# Patient Record
Sex: FEMALE | Race: BLACK OR AFRICAN AMERICAN | Employment: PART TIME | URBAN - METROPOLITAN AREA
[De-identification: names, ages, dates, MRNs, and addresses within clinical notes are randomized per-mention and may not be internally consistent; named-entity substitution may affect disease eponyms.]

---

## 2017-02-01 ENCOUNTER — OFFICE VISIT (OUTPATIENT)
Dept: BARIATRICS/WEIGHT MGMT | Age: 62
End: 2017-02-01

## 2017-02-01 DIAGNOSIS — E66.9 OBESITY, CLASS II, BMI 35-39.9: Primary | ICD-10-CM

## 2017-02-07 NOTE — PROGRESS NOTES
Patient attended a Medically Supervised Weight Loss New Patient Orientation today where we discussed:  - New Direction Very Low Calorie Diet details  - Medical Supervision  - Nutrition education  - Cost  - Policies and compliance required for program enrollment. - Lab slip was given to patient with instructions for having them drawn. Patients initial consultation with physician is tentatively scheduled for:  Future Appointments  Date Time Provider Amalia Garcia   2/15/2017 11:30 AM Vaughn Dyson, DO [facility] [address]   2/24/2017 8:30 AM [place] DO [facility] [address]   3/28/2017 8:30 AM Vaughn Dyson, DO [facility] SCHED       Patients starting weight was documented as: There were no vitals taken for this visit. Patient attended a Medically Supervised Weight Loss New Patient Orientation today where we discussed:  - New Direction Very Low Calorie Diet details  - Medical Supervision  - Nutrition education  - Cost  - Policies and compliance required for program enrollment. - Lab slip was given to patient with instructions for having them drawn. Patients initial consultation with physician is tentatively scheduled for:  Future Appointments  Date Time Provider Amalia Garcia   2/15/2017 11:30 AM Vaughn Dyson, DO [facility] [address]   2/24/2017 8:30 AM [place], DO [facility] [address]   3/28/2017 8:30 AM Vaughn Dyson, DO [facility] SCHED         There were no vitals taken for this visit.

## 2017-02-17 RX ORDER — ESZOPICLONE 2 MG/1
2 TABLET, FILM COATED ORAL
Qty: 30 TAB | Refills: 5 | OUTPATIENT
Start: 2017-02-17 | End: 2020-01-27 | Stop reason: ALTCHOICE

## 2017-02-24 ENCOUNTER — OFFICE VISIT (OUTPATIENT)
Dept: FAMILY MEDICINE CLINIC | Age: 62
End: 2017-02-24

## 2017-02-24 VITALS
SYSTOLIC BLOOD PRESSURE: 115 MMHG | BODY MASS INDEX: 37.08 KG/M2 | DIASTOLIC BLOOD PRESSURE: 72 MMHG | HEIGHT: 64 IN | HEART RATE: 65 BPM | RESPIRATION RATE: 16 BRPM | WEIGHT: 217.2 LBS | TEMPERATURE: 98 F | OXYGEN SATURATION: 100 %

## 2017-02-24 DIAGNOSIS — J20.8 ACUTE BRONCHITIS DUE TO OTHER SPECIFIED ORGANISMS: Primary | ICD-10-CM

## 2017-02-24 DIAGNOSIS — E66.9 OBESITY, CLASS II, BMI 35-39.9: ICD-10-CM

## 2017-02-24 DIAGNOSIS — R74.8 ELEVATED ALKALINE PHOSPHATASE LEVEL: ICD-10-CM

## 2017-02-24 DIAGNOSIS — F41.1 GENERALIZED ANXIETY DISORDER: ICD-10-CM

## 2017-02-24 DIAGNOSIS — E55.9 VITAMIN D DEFICIENCY: ICD-10-CM

## 2017-02-24 DIAGNOSIS — Z83.49 FAMILY HISTORY OF THYROID DISEASE: ICD-10-CM

## 2017-02-24 DIAGNOSIS — E78.00 PURE HYPERCHOLESTEROLEMIA: ICD-10-CM

## 2017-02-24 DIAGNOSIS — E06.3 HASHIMOTO'S THYROIDITIS: ICD-10-CM

## 2017-02-24 RX ORDER — MIRABEGRON 25 MG/1
25 TABLET, FILM COATED, EXTENDED RELEASE ORAL DAILY
Refills: 1 | COMMUNITY
Start: 2017-02-15 | End: 2017-09-21 | Stop reason: ALTCHOICE

## 2017-02-24 RX ORDER — LACTULOSE 10 G/15ML
30 SOLUTION ORAL; RECTAL
Refills: 1 | COMMUNITY
Start: 2017-01-10 | End: 2017-03-28 | Stop reason: ALTCHOICE

## 2017-02-24 RX ORDER — SERTRALINE HYDROCHLORIDE 50 MG/1
50 TABLET, FILM COATED ORAL DAILY
Qty: 135 TAB | Refills: 3 | Status: SHIPPED | OUTPATIENT
Start: 2017-02-24 | End: 2018-04-16 | Stop reason: SDUPTHER

## 2017-02-24 RX ORDER — ZOLPIDEM TARTRATE 6.25 MG/1
6.25 TABLET, FILM COATED, EXTENDED RELEASE ORAL
Refills: 5 | COMMUNITY
Start: 2016-11-20 | End: 2017-03-28 | Stop reason: ALTCHOICE

## 2017-02-24 RX ORDER — PHENDIMETRAZINE TARTRATE 105 MG/1
1 CAPSULE, EXTENDED RELEASE ORAL
Qty: 30 CAP | Refills: 0 | Status: SHIPPED | OUTPATIENT
Start: 2017-02-24 | End: 2017-03-28 | Stop reason: SDUPTHER

## 2017-02-24 RX ORDER — AZITHROMYCIN 250 MG/1
TABLET, FILM COATED ORAL
Qty: 6 TAB | Refills: 0 | Status: SHIPPED | OUTPATIENT
Start: 2017-02-24 | End: 2017-03-01

## 2017-02-24 RX ORDER — PHENTERMINE HYDROCHLORIDE 37.5 MG/1
37.5 TABLET ORAL DAILY
Refills: 0 | COMMUNITY
Start: 2016-12-03 | End: 2017-02-24 | Stop reason: ALTCHOICE

## 2017-02-24 NOTE — PROGRESS NOTES
HISTORY OF PRESENT ILLNESS  Kaylen Mcclure is a 64 y.o. female presents with Weight Management; Blood Pressure Check; Medication Refill; Cough; and Stress      Agree with nurse note. She is tolerating Phendimetrazine 105 mg well, month 1 of 3. Last prescribed on 12/29/16. She has noticed an increase of energy and decreased appetite since starting the medication. She drinks a Premier protein shake daily. Sometimes she thinks she does not eat enough. She normally walks on her treadmill before bed. She sleeps x8 hours nightly. 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  She gained 5 pounds since the last visit. Percent body fat has increased from 40.1% to 45.3%, waist circumference measurement has changed from 39.3\" to 41.25\". Overall, patient is pleased and requests a refill of the medication today. Pt with MARGI, Vitamin D deficiency, Hashimoto's thyroiditis, elevated alkaline phosphatase, hypercholesterolemia, and family hx of thyroid disease presents to the office with a BP of 115/72. She takes Zoloft 50 mg daily, tolerating well. She feels like she has a lot going on right now and has thought about looking into stress management through her job. Stressors: her sister with multiple health conditions who lives with her      Pt complains of a productive cough with green sputum with chest tightness and some SOB. She believes she may have had the flu about 3 weeks ago and her cough was worse then. Denies nasal congestion, wheezing, itchy/watery eyes, itchy nose, or other associated sxs. Patient denies fatigue, sleep disturbance, chest pain, heart palpitations, nausea, dry mouth, constipation, signs of depression. Written by hyun Braswell, as dictated by Dr. Christa Gowers, DO.    ROS    Review of Systems negative except as noted above in HPI.     ALLERGIES:    Allergies   Allergen Reactions    Advil Pm [Ibuprofen-Diphenhydramine Hcl] Other (comments)     Slightly elevated Creaitnine 1.02    Aleve [Naproxen Sodium] Other (comments)     Due to kidneys    Bactrim [Sulfamethoxazole-Trimethoprim] Nausea and Vomiting    Sulfa (Sulfonamide Antibiotics) Nausea and Vomiting       CURRENT MEDICATIONS:    Outpatient Prescriptions Marked as Taking for the 2/24/17 encounter (Office Visit) with Rain Yang, DO   Medication Sig Dispense Refill    GENERLAC 10 gram/15 mL solution Take 30 mL by mouth three (3) times daily as needed. 1    MYRBETRIQ 25 mg ER tablet Take 25 mg by mouth daily. 1    zolpidem CR (AMBIEN CR) 6.25 mg tablet Take 6.25 mg by mouth daily as needed. Taken at HS  5    azithromycin (ZITHROMAX) 250 mg tablet Take 2 tablets today, then take 1 tablet daily 6 Tab 0    sertraline (ZOLOFT) 50 mg tablet Take 1 Tab by mouth daily. Indications: GENERALIZED ANXIETY DISORDER 135 Tab 3    phendimetrazine tartrate 105 mg cpER Take 1 Cap by mouth Daily (before breakfast). Max Daily Amount: 1 Cap. Indications: WEIGHT LOSS MANAGEMENT FOR OBESE PATIENT (BMI >= 30) 30 Cap 0    eszopiclone (LUNESTA) 2 mg tablet Take 1 Tab by mouth nightly. Max Daily Amount: 2 mg. 30 Tab 5    furosemide (LASIX) 40 mg tablet TAKE 1 TABLET BY MOUTH ONCE DAILY FOR EDEMA 90 Tab 3    MINIVELLE 0.1 mg/24 hr 1 Patch by TransDERmal route every Monday. 0    cetirizine (ZYRTEC) 10 mg tablet Take 1 Tab by mouth daily. (Patient taking differently: Take 10 mg by mouth daily as needed.) 30 Tab 3    fluticasone (FLONASE) 50 mcg/actuation nasal spray 2 Sprays by Both Nostrils route daily. Indications: ALLERGIC RHINITIS 1 Bottle 5    albuterol (PROVENTIL HFA, VENTOLIN HFA) 90 mcg/actuation inhaler Take 2 Puffs by inhalation every four (4) hours as needed for Wheezing (cough). 1 Inhaler 1    montelukast (SINGULAIR) 10 mg tablet Take 1 Tab by mouth daily.  Indications: ALLERGIC RHINITIS, breathing 30 Tab 11       PAST MEDICAL HISTORY:    Past Medical History: Diagnosis Date    Achilles tendonitis 12/2004    Right. Dr. Abdulaziz Martinez Arthritis 2010    hips, knees. Dr. Radha Ibrahim    Chest pain 08/2013    Dr. Salma Almonte.  Chickenpox childhood    Endometriosis 1990    Dr. Bria Arroyo Environmental allergies     MARGI (generalized anxiety disorder)     Hip pain, bilateral 2010    due to severe OA. Iliopsoas bursitis. Dr. Lilliana Villalba    History of breast lump/mass excision 1998    Left breast.  Dr. Bobby Au Hyperlipidemia     Iron deficiency anemia     iron deficiency    Menopausal and postmenopausal disorder 2007    Dr. Bria Arroyo Uterine fibroid     Dr. Orlin Adkins.  Vitamin D deficiency 03/2008       PAST SURGICAL HISTORY:    Past Surgical History:   Procedure Laterality Date    BIOPSY BREAST  Rt 1993;Lt 1998    Dr. Valle Side    Right achiles tendon. Dr. Ari Rene.  HX BREAST BIOPSY  2001    Left. benign. Dr. Philip Pinto HX COLONOSCOPY  09/27/2016    Dr. Jackie Tarango.  due q 5 yrs due to fam hx   Vincent Fletcher. due to endometriosis    HX PELVIC LAPAROSCOPY  1995    due to endometriosis Dr. Scott Vieyra  04/2011    due to fibroids. Dr. Orlin Adkins.        FAMILY HISTORY:    Family History   Problem Relation Age of Onset    Hypertension Mother     Other Mother      hearing loss/vision loss    Heart Disease Father     Lung Disease Father     Diabetes Sister     Heart Attack Sister 54     March 2014    Stroke Sister 48     after MI    Heart Disease Sister     Thyroid Disease Sister      goiter    Colon Polyps Sister     Heart Disease Brother      Defibrillator placed    Diabetes Maternal Grandmother     Heart Attack Brother 48       SOCIAL HISTORY:    Social History     Social History    Marital status: SINGLE     Spouse name: N/A    Number of children: N/A    Years of education: N/A     Social History Main Topics    Smoking status: Never Smoker    Smokeless tobacco: Never Used      Comment: lived with smoker mom x 18 yrs    Alcohol use No    Drug use: No    Sexual activity: Not Currently     Partners: Male     Birth control/ protection: Abstinence     Other Topics Concern    None     Social History Narrative       IMMUNIZATIONS:    Immunization History   Administered Date(s) Administered    Influenza Vaccine 12/14/2012, 10/22/2013, 09/18/2014    Influenza Vaccine (Quad) 10/29/2015    Influenza Vaccine (Quad) PF 09/29/2016    Influenza Vaccine Split 11/12/2010, 11/07/2011    TD Vaccine 08/18/2000    Tdap 06/30/2016       PHYSICAL EXAMINATION    Vital Signs    Visit Vitals    /72 (BP 1 Location: Left arm, BP Patient Position: Sitting)    Pulse 65    Temp 98 °F (36.7 °C) (Oral)    Resp 16    Ht 5' 4\" (1.626 m)    Wt 217 lb 3.2 oz (98.5 kg)    SpO2 100%    BMI 37.28 kg/m2       Weight Metrics 2/24/2017 2/24/2017 12/29/2016 12/29/2016 10/28/2016 10/28/2016 9/29/2016   Weight - 217 lb 3.2 oz - 212 lb 6.4 oz - 217 lb -   Waist Measure Inches 41.25 - 39.3 - 38 - 38   Body Fat % 45.3 - 40.1 - 45.8 - 44.8   BMI - 37.28 kg/m2 - 36.46 kg/m2 - 37.25 kg/m2 -       General appearance - Well nourished. Well appearing. Well developed. No acute distress. Overweight. Head - Normocephalic. Atraumatic. Eyes - pupils equal and reactive, extraocular eye movements intact, sclera anicteric  Ears - Hearing is grossly normal bilaterally. Nose - normal and patent, no erythema, discharge or polyps   Mouth - mucous membranes moist, pharynx normal with cobblestone appearance. No erythema, white exudate or obstruction. Neck - supple. Midline trachea. No carotid bruits are noted. No thyromegaly noted. Chest - clear to auscultation bilaterally anterriorly and posteriorly. No wheezes, rales or rhonchi. Breath sounds are symmetrical and unlabored bilaterally. Heart - normal rate. Regular rhythm, normal S1, S2. No murmur. No rubs, clicks or gallops noted. Abdomen - soft and distended. No masses or organomegaly. No rebound, rigidity or guarding. Bowel sounds normal x 4 quadrants. No tenderness noted. Neurological - awake, alert and oriented to person, place, and time and event. Cranial nerves II through XII intact. Muscle strength is +5/5 x 4 extremities. Sensation is intact to light touch bilaterally. Steady gait. Heme/Lymph - peripheral pulses normal x 4 extremities. No peripheral edema is noted. No cervical adenopathy noted. Skin - no rashes, erythema, ecchymosis, lacerations, abrasions, suspicious moles noted. No skin tags. No acanthosis nigricans noted in the axilla or neck. Psychological -   normal behavior, speech, dress and thought processes. Good insight. Good eye contact. Normal affect. Appropriate mood. DATA REVIEWED    Lab Results   Component Value Date/Time    TSH 3.850 07/11/2016 08:10 AM    TSH 2.36 08/21/2014 07:50 AM     Lab Results   Component Value Date/Time    Vitamin D 25-Hydroxy 26.3 07/18/2011 10:22 AM    VITAMIN D, 25-HYDROXY 32.7 07/11/2016 08:10 AM       Lab Results   Component Value Date/Time    Sodium 139 07/11/2016 08:10 AM    Potassium 3.9 07/11/2016 08:10 AM    Chloride 97 07/11/2016 08:10 AM    CO2 24 07/11/2016 08:10 AM    Anion gap 5 04/23/2011 05:20 AM    Glucose 87 07/11/2016 08:10 AM    BUN 13 07/11/2016 08:10 AM    Creatinine 0.77 07/11/2016 08:10 AM    BUN/Creatinine ratio 17 07/11/2016 08:10 AM    GFR est AA 96 07/11/2016 08:10 AM    GFR est non-AA 84 07/11/2016 08:10 AM    Calcium 8.9 07/11/2016 08:10 AM    Bilirubin, total 0.5 07/11/2016 08:10 AM    AST (SGOT) 17 07/11/2016 08:10 AM    Alk. phosphatase 104 07/11/2016 08:10 AM    Protein, total 6.7 07/11/2016 08:10 AM    Albumin 3.9 07/11/2016 08:10 AM    A-G Ratio 1.4 07/11/2016 08:10 AM    ALT (SGPT) 12 07/11/2016 08:10 AM       ASSESSMENT and PLAN    ICD-10-CM ICD-9-CM    1.  Acute bronchitis due to other specified organisms J20.8 466.0 azithromycin (ZITHROMAX) 250 mg tablet   2. Hashimoto's thyroiditis E06.3 245.2 TSH 3RD GENERATION      T4, FREE   3. Generalized anxiety disorder F41.1 300.02 sertraline (ZOLOFT) 50 mg tablet   4. Elevated alkaline phosphatase level R74.8 790.5 ALK PHOS ISOENZYMES   5. Obesity, Class II, BMI 35-39.9 (HCC) E66.01 278.01 phendimetrazine tartrate 105 mg cpER   6. Vitamin D deficiency E55.9 268.9    7. Pure hypercholesterolemia E78.00 272.0    8. Family history of thyroid disease Z83.49 V18.19        Continue current medications and care. Take Z-shyam and use Albuterol HFA 90 mcg prn. Increase Zoloft from 50 mg to 75 mg daily. Pt will look into stress management through her job and into additional support for caring for her sister. Prescriptions written and given to patient (Zithromax 250 mg and Phendimetrazine 105 mg, month 2 of 3.)  Medication side effects discussed. Recheck pertinent labs today. Discussed the patient's BMI with her. The BMI follow up plan is as follows: I have counseled this patient on diet and exercise regimens. Addressed weight, diet and exercise with patient. Diet recommendations:  Decrease carbohydrates (white foods including flour, white bread, white rice, white pasta, white potatoes; corn, sweet foods, sweet drinks and alcohol), increase green leafy vegetables and protein with each meal.  Avoid fried foods. Eat 3-5 small meals daily. Do not skip meals. Ok to use meal replacements up to twice daily with protein goal of 60 mg per meal.   Recommend OTC Premiere Protein bars or shakes. Increase water intake. Increase physical activity to 30 minutes daily for health benefit or 60 minutes daily to prevent weight regain, as tolerated. Physical Activity prescription:  A goal of 30 minutes physical activity daily is recommended for health benefit and at least 60 minutes daily to prevent weight regain.   For weight loss, no less than 75% needs to be aerobic (i.e. Walking) and no more than 25% resistance exercising (i.e. Weight lifting). For weight maintenance phase, 50% aerobic and 50% resistance exercises. Sleep prescription:    A goal of 7-8 hours of uninterrupted sleep is recommended to turn off the Grehlin hormone to be released from the stomach and triggers appetite while promoting weight gain. Proper rest turns on Leptin hormone to be released from white adipose tissue and promotes weight loss. Counseled patient on: weight loss goals, emphasizing a 5-10% weight loss in 6-12 months and strategies. Stressors, bronchitis, Hashimoto's thyroiditis, and Vitamin D deficiency. Relevant handouts given and discussed with patient. Immunizations noted. Praised pt for weight loss efforts and progress. Patient was offered a choice/choices in the treatment plan today. Patient expresses understanding of the plan and agrees with recommendations. Follow-up Disposition: Not on File    Written by hyun Goins, as dictated by Dr. Zechariah Wray DO. Documentation True and Accepted by Alexa Parry. Jinx Check. Patient Instructions        Hashimoto's Thyroiditis: Care Instructions  Your Care Instructions    Hashimoto's thyroiditis is a problem with the thyroid gland. The thyroid gland, which is in your neck, controls the way your body uses energy. Sometimes the disease causes the gland to make too much thyroid hormone (thyrotoxicosis). This can make you feel nervous, lose weight, and have many loose bowel movements. You may also have a fast heartbeat. But as the disease progresses, the gland usually does not make enough thyroid hormone. This can cause you to feel tired and have dry skin and thinning hair. Most people with Hashimoto's are diagnosed when they have these symptoms.   You may need to take medicine if you have symptoms or if your thyroid hormone level is not normal. Most people with Hashimoto's thyroiditis need to take medicine for the rest of their lives. Follow-up care is a key part of your treatment and safety. Be sure to make and go to all appointments, and call your doctor if you are having problems. Its also a good idea to know your test results and keep a list of the medicines you take. How can you care for yourself at home? · Take your medicines exactly as prescribed. Call your doctor if you think you are having a problem with your medicine. You will get more details on the specific medicines your doctor prescribes. When should you call for help? Call 911 anytime you think you may need emergency care. For example, call if:  · You passed out (lost consciousness). · You have severe trouble breathing. · You have a very slow heartbeat (less than 60 beats a minute). · You have a low body temperature (95°F or below). Watch closely for changes in your health, and be sure to contact your doctor if:  · You gain weight even though you are eating normally or less than usual.  · You feel extremely weak or tired. · You have new changes in your skin, nails, or hair, or the changes get worse. · You notice that your thyroid gland has grown or changed in size. · You have constipation that is new or that gets worse. · You cannot stand cold temperatures. · You have heavy or irregular menstrual periods. · You have other new symptoms. Where can you learn more? Go to http://romulo-daron.info/. Enter K454 in the search box to learn more about \"Hashimoto's Thyroiditis: Care Instructions. \"  Current as of: July 28, 2016  Content Version: 11.1  © 7093-2550 [employer]. Care instructions adapted under license by [employer] (which disclaims liability or warranty for this information). If you have questions about a medical condition or this instruction, always ask your healthcare professional. [address] any warranty or liability for your use of this information.        Bronchitis: Care Instructions  Your Care Instructions    Bronchitis is inflammation of the bronchial tubes, which carry air to the lungs. The tubes swell and produce mucus, or phlegm. The mucus and inflamed bronchial tubes make you cough. You may have trouble breathing. Most cases of bronchitis are caused by viruses like those that cause colds. Antibiotics usually do not help and they may be harmful. Bronchitis usually develops rapidly and lasts about 2 to 3 weeks in otherwise healthy people. Follow-up care is a key part of your treatment and safety. Be sure to make and go to all appointments, and call your doctor if you are having problems. It's also a good idea to know your test results and keep a list of the medicines you take. How can you care for yourself at home? · Take all medicines exactly as prescribed. Call your doctor if you think you are having a problem with your medicine. · Get some extra rest.  · Take an over-the-counter pain medicine, such as acetaminophen (Tylenol), ibuprofen (Advil, Motrin), or naproxen (Aleve) to reduce fever and relieve body aches. Read and follow all instructions on the label. · Do not take two or more pain medicines at the same time unless the doctor told you to. Many pain medicines have acetaminophen, which is Tylenol. Too much acetaminophen (Tylenol) can be harmful. · Take an over-the-counter cough medicine that contains dextromethorphan to help quiet a dry, hacking cough so that you can sleep. Avoid cough medicines that have more than one active ingredient. Read and follow all instructions on the label. · Breathe moist air from a humidifier, hot shower, or sink filled with hot water. The heat and moisture will thin mucus so you can cough it out. · Do not smoke. Smoking can make bronchitis worse. If you need help quitting, talk to your doctor about stop-smoking programs and medicines. These can increase your chances of quitting for good. When should you call for help?   Call [phone] anytime you think you may need emergency care. For example, call if:  · You have severe trouble breathing. Call your doctor now or seek immediate medical care if:  · You have new or worse trouble breathing. · You cough up dark brown or bloody mucus (sputum). · You have a new or higher fever. · You have a new rash. Watch closely for changes in your health, and be sure to contact your doctor if:  · You cough more deeply or more often, especially if you notice more mucus or a change in the color of your mucus. · You are not getting better as expected. Where can you learn more? Go to http://romulo-daron.info/. Enter H333 in the search box to learn more about \"Bronchitis: Care Instructions. \"  Current as of: May 23, 2016  Content Version: 11.1  © 5454-5818 [employer]. Care instructions adapted under license by [employer] (which disclaims liability or warranty for this information). If you have questions about a medical condition or this instruction, always ask your healthcare professional. [place] [address] any warranty or liability for your use of this information. Starting a Weight Loss Plan: Care Instructions  Your Care Instructions  If you are thinking about losing weight, it can be hard to know where to start. Your doctor can help you set up a weight loss plan that best meets your needs. You may want to take a class on nutrition or exercise, or join a weight loss support group. If you have questions about how to make changes to your eating or exercise habits, ask your doctor about seeing a registered dietitian or an exercise specialist.  It can be a big challenge to lose weight. But you do not have to make huge changes at once. Make small changes, and stick with them. When those changes become habit, add a few more changes. If you do not think you are ready to make changes right now, try to pick a date in the future.  Make an appointment to see your doctor to discuss whether the time is right for you to start a plan. Follow-up care is a key part of your treatment and safety. Be sure to make and go to all appointments, and call your doctor if you are having problems. Its also a good idea to know your test results and keep a list of the medicines you take. How can you care for yourself at home? · Set realistic goals. Many people expect to lose much more weight than is likely. A weight loss of 5% to 10% of your body weight may be enough to improve your health. · Get family and friends involved to provide support. Talk to them about why you are trying to lose weight, and ask them to help. They can help by participating in exercise and having meals with you, even if they may be eating something different. · Find what works best for you. If you do not have time or do not like to cook, a program that offers meal replacement bars or shakes may be better for you. Or if you like to prepare meals, finding a plan that includes daily menus and recipes may be best.  · Ask your doctor about other health professionals who can help you achieve your weight loss goals. ¨ A dietitian can help you make healthy changes in your diet. ¨ An exercise specialist or  can help you develop a safe and effective exercise program.  ¨ A counselor or psychiatrist can help you cope with issues such as depression, anxiety, or family problems that can make it hard to focus on weight loss. · Consider joining a support group for people who are trying to lose weight. Your doctor can suggest groups in your area. Where can you learn more? Go to http://romulo-daron.info/. Enter U536 in the search box to learn more about \"Starting a Weight Loss Plan: Care Instructions. \"  Current as of: February 16, 2016  Content Version: 11.1  © 9534-3646 [employer].  Care instructions adapted under license by [employer] (which disclaims liability or warranty for this information). If you have questions about a medical condition or this instruction, always ask your healthcare professional. [place] [address] any warranty or liability for your use of this information. WEIGHT LOSS PLAN    1. Read food labels and count calories. Goal 8763-8385 calories daily for women and 0569-8027 calories daily for men. Achieve this by decreasing caloric intake by 500 calories by weekly increments. NOTE:  1 pound = 3500 calories! Www.loseit. Pivto  Www.Gaudenanesspal.Pivto  Www.[employer]  Www.[employer]. gov  Weight watchers mobile    Calories needed to lose 1-2 pounds a week = 10 x your weight in pounds    2. Increase water intake. Per [facility], it is important to drink 1/2 your body weight in ounces of water daily. Decrease your water consumption 2-3 hours before bedtime to prevent sleep disturbance from frequent urination. 3.  Decrease sugary beverages. Each can or glass of soda increases your risk of obesity by 60%. Can lose 10 pounds in a month by avoiding any soda. 12 oz can of soda = 140 calories, 16 oz cup of sweet tea = 200 calories    16 oz orange juice = 200 calories, 10 oz apple juice = 150 calories   32 oz sports drink = 200 calories, 16 oz punch = 240 calories   3.5 oz alcohol = 100-150 calories     4. Avoid High Fructose Corn Syrup products. This ingredient makes products highly addictive! 5. Exercise 30 minutes daily 5 days weekly, minimally. If you burn 3500 calories that equals a pound! Use a pedometer to count steps. Visit www. [employer]. Pivto for a free pedometer and diet recommendations. Your maximum heart rate = 220 - your age    Never exercise at your maximum heart rate. See handout for target heart rate. 6.  Decrease carbohydrates (white bread, pasta, rice, potatoes, sweet foods and sweet drinks like soda, tea, coffee, juice and sports drinks).   Increase fiber and protein. Goal:   calories daily of carbohydrates     Try CHROMIUM PICONATE 200 MG THREE TIMES DAILY,    to decrease Premenstrual carbohydrate cravings. 7.  Eat 3-5 small meals daily, include lots of protein (beans/legumes, nuts, lean meat, eggs) and green vegetables with each. (Breakfast, lunch, dinner with 2 healthy snacks)    8. Get proper rest 7-8 hours uninterrupted. When you get less than 6 hours, it triggers hunger by affecting your Grehlin:Leptin ratio and this results in weight gain. 9.  Watch your food portions. Green leafy vegetables should cover 1/2 of the plate, lean meat 1/4 of the plate, starchy vegetable 1/4 of the plate. Use smaller plates. 10.  Do not eat until you are full. Eat until you are no longer hungry. If you are not sure, try drinking a glass of water before getting your second serving of food. 11.  Do not weigh yourself daily. Wait until your next office visit. Use how you feel and  how your clothes fit as measurements of success. 12.  Address your spirituality to draw strength from above during your journey. Remember \"I am fearfully and wonderfully made. Marvelous in His eyes. \"    13. Set realistic, appropriate and achievable weight loss goals:     RECOMMENDED TARGET WEIGHT LOSS:  Initial weight loss of 5-10% of your initial body weight achieved over 6 months or a decrease of 2 BMI units. MINIMUM GOAL OF WEIGHT LOSS:  Reduce body weight and maintain a lower body weight. Prevent weight gain. RELATED WEBSITES:      Www.obesityaction. org  (and consider joining the [employer] for $25/year. The [address] mission is to elevated and empower those affected by obesity through education, advocacy and support. Quarterly journals included in membership fee. )    Www.[employer]. [employer]  www.[employer].com     RELATED DIETS:  Dr. Gunjan Culver Weight loss challenge, [address] [facility], [address] [place] [address] Christina Murray Diet (anti-inflammatory diet)        EXERCISE 150 MINUTES WEEKLY. THIS CAN BE ACHIEVED BY WORKING OUT OR WALKING A MINIMUM OF 30 MINUTES FOR 5 DAYS WEEKLY. YOU CAN EXERCISE IN INCREMENTS OF 10-15 MINUTES UP TO 3 TIMES A DAY. Consider performing \"brainless exercise. \"  Choose your favorite tv program.  Five minutes before and for 5 minutes after the tv program, stretch your body. While the program is on, walk in place watching the show. When commercials come on, rest or walk around the house to do other things. When the program begins, return to walking in place. When you are able keep walking during the commercials and add light weights to your ankles or hands. By the end of the show, you would have walked 30 minutes. If you need shorter spurts of exercise, walk during the commercials and rest during the show. Drink to glasses of water prior to any exercise to prevent dehydration and to improve the results of the work out. Your RESTING HEART RATE is the number of times your heart beats per minute when you are not exerting yourself. The more fit you are, the lower your resting heart rate will be. Your MAXIMUM HEART RATE is the number of times per minute your heart pumps when it is working at 100% capacity. NEVER EXERCISE AT YOUR MAXIMUM HEART RATE. MAX HEART RATE = 220 - your age    For example, in a 48year old, the maximum heart rate is 220-50 = 170 beats per minute    Your [place] is the number of beats per minute our heart should pump during aerobic exercise. Reaching your target heart rate indicates that your body is receiving maximum cardiovascular and fat burning benefits.      If you are fit, your TARGET HEART RATE = 70-85% of your maximum Heart rate      For a 48year old with vigorous intensity physical activity,         Target heart rate= 170 bpm x .70 = 119 bpm     Target heart rate = 170 bpm x .85=  145 bpm        Therefore, your target heart rate during physical activity is 119-145      If you are not fit, TARGET HEART RATE = 50-70% of your maximum Heart rate    MODERATE CONSISTENT AEROBIC EXERCISE OR WALKING FOR AT LEAST 150 MINUTES WEEKLY IS AN ESSENTIAL PART OF ANY WEIGHT LOSS OF WEIGHT MAINTENANCE PROGRAM.     During WEIGHT LOSS PHASE, at least 75% of your exercise needs to be walking or moderate aerobic activity and 25% or 0% can be Isometric or Resistance type exercises (the latter can be deferred to the weight maintenance phase.)     During WEIGHT MAINTENANCE PHASE, at least 50% of your exercise needs to be aerobic and 50% Isometric or Resistance type exercises. BENEFITS OF MODERATE INTENSITY EXERCISE MOST DAYS OF THE WEEK:     1. Insulin Resistance improves 30-85%   2. Abdominal Fat decreases by 30%   3. Inflammatory Markers decrease by 30% (therefore pain decreases)   4. Systolic and Diastolic Blood Pressure decrease by 4 mmHg   5. HDL improves by 5%   6. Triglycerides decrease by 15%   7. Shift from small dense LDL to large dense LDL (therefore decrease Insulin Resistance)   8. Decrease coagulability                  Starting a Weight Loss Plan: Care Instructions  Your Care Instructions  If you are thinking about losing weight, it can be hard to know where to start. Your doctor can help you set up a weight loss plan that best meets your needs. You may want to take a class on nutrition or exercise, or join a weight loss support group. If you have questions about how to make changes to your eating or exercise habits, ask your doctor about seeing a registered dietitian or an exercise specialist.  It can be a big challenge to lose weight. But you do not have to make huge changes at once. Make small changes, and stick with them. When those changes become habit, add a few more changes. If you do not think you are ready to make changes right now, try to pick a date in the future.  Make an appointment to see your doctor to discuss whether the time is right for you to start a plan. Follow-up care is a key part of your treatment and safety. Be sure to make and go to all appointments, and call your doctor if you are having problems. Its also a good idea to know your test results and keep a list of the medicines you take. How can you care for yourself at home? · Set realistic goals. Many people expect to lose much more weight than is likely. A weight loss of 5% to 10% of your body weight may be enough to improve your health. · Get family and friends involved to provide support. Talk to them about why you are trying to lose weight, and ask them to help. They can help by participating in exercise and having meals with you, even if they may be eating something different. · Find what works best for you. If you do not have time or do not like to cook, a program that offers meal replacement bars or shakes may be better for you. Or if you like to prepare meals, finding a plan that includes daily menus and recipes may be best.  · Ask your doctor about other health professionals who can help you achieve your weight loss goals. ¨ A dietitian can help you make healthy changes in your diet. ¨ An exercise specialist or  can help you develop a safe and effective exercise program.  ¨ A counselor or psychiatrist can help you cope with issues such as depression, anxiety, or family problems that can make it hard to focus on weight loss. · Consider joining a support group for people who are trying to lose weight. Your doctor can suggest groups in your area. Where can you learn more? Go to http://romulo-daron.info/. Enter P314 in the search box to learn more about \"Starting a Weight Loss Plan: Care Instructions. \"  Current as of: February 16, 2016  Content Version: 11.1  © 8495-1558 [employer]. Care instructions adapted under license by [employer] (which disclaims liability or warranty for this information).  If you have questions about a medical condition or this instruction, always ask your healthcare professional. [place] [address] any warranty or liability for your use of this information.

## 2017-02-24 NOTE — MR AVS SNAPSHOT
Visit Information Date & Time Provider Department Dept. Phone Encounter #  
 2/24/2017  8:30 AM Rain Soria DO [facility] [phone] [id number] Your Appointments 3/28/2017  8:30 AM  
Office Visit with Rain Soria  [facility] (ALLAN Robles) Appt Note: 1 MO F/U Weight, BP  
 [address] 
[address] [facility] [address]  
[phone]  
  
   
 [address] [facility] [address] [place] [address] Upcoming Health Maintenance Date Due  
 PAP AKA CERVICAL CYTOLOGY 5/19/2017* BREAST CANCER SCRN MAMMOGRAM 12/14/2018 COLONOSCOPY 9/27/2021 DTaP/Tdap/Td series (2 - Td) 6/30/2026 *Topic was postponed. The date shown is not the original due date. Allergies as of 2/24/2017  Review Complete On: 2/24/2017 By: Paula Ear Severity Noted Reaction Type Reactions Advil Pm [Ibuprofen-diphenhydramine Hcl]  07/18/2011    Other (comments) Slightly elevated Creaitnine 1.02 Aleve [Naproxen Sodium]  12/28/2011    Other (comments) Due to kidneys Bactrim [Sulfamethoxazole-trimethoprim]  09/11/2009    Nausea and Vomiting  
 Sulfa (Sulfonamide Antibiotics)  09/11/2009    Nausea and Vomiting Current Immunizations  Reviewed on 9/29/2016 Name Date Influenza Vaccine 9/18/2014, 10/22/2013, 12/14/2012 Influenza Vaccine Carmine Ramirezr) 10/29/2015 Influenza Vaccine (Quad) PF 9/29/2016 Influenza Vaccine Split 11/7/2011, 11/12/2010 TD Vaccine 8/18/2000 Tdap 6/30/2016  9:35 AM  
  
 Not reviewed this visit You Were Diagnosed With   
  
 Codes Comments Acute bronchitis due to other specified organisms    -  Primary ICD-10-CM: J20.8 ICD-9-CM: 466.0 Hashimoto's thyroiditis     ICD-10-CM: E06.3 ICD-9-CM: 245.2 Generalized anxiety disorder     ICD-10-CM: F41.1 ICD-9-CM: 300.02 Elevated alkaline phosphatase level     ICD-10-CM: R74.8 ICD-9-CM: 790.5 Obesity, Class II, BMI 35-39.9 (HCC)     ICD-10-CM: E66.01 
ICD-9-CM: 278.01 Vitamin D deficiency     ICD-10-CM: E55.9 ICD-9-CM: 268.9 Pure hypercholesterolemia     ICD-10-CM: E78.00 ICD-9-CM: 272.0 Vitals BP  
  
  
  
  
  
 115/72 (BP 1 Location: Left arm, BP Patient Position: Sitting) BMI and BSA Data Body Mass Index Body Surface Area  
 37.28 kg/m 2 2.11 m 2 Preferred Pharmacy Pharmacy Name Phone [facility] AT [address] [place] [address] Drive [phone] Your Updated Medication List  
  
   
This list is accurate as of: 2/24/17  9:46 AM.  Always use your most recent med list.  
  
  
  
  
 albuterol 90 mcg/actuation inhaler Commonly known as:  PROVENTIL HFA, VENTOLIN HFA, PROAIR HFA Take 2 Puffs by inhalation every four (4) hours as needed for Wheezing (cough). azithromycin 250 mg tablet Commonly known as:  Marin Beard Take 2 tablets today, then take 1 tablet daily  
  
 cetirizine 10 mg tablet Commonly known as:  ZYRTEC Take 1 Tab by mouth daily. eszopiclone 2 mg tablet Commonly known as:  Red Dieudonne Take 1 Tab by mouth nightly. Max Daily Amount: 2 mg. fluticasone 50 mcg/actuation nasal spray Commonly known as:  Domnick Means 2 Sprays by Both Nostrils route daily. Indications: ALLERGIC RHINITIS  
  
 furosemide 40 mg tablet Commonly known as:  LASIX TAKE 1 TABLET BY MOUTH ONCE DAILY FOR EDEMA GENERLAC 10 gram/15 mL solution Generic drug:  lactulose Take 30 mL by mouth three (3) times daily as needed. MINIVELLE 0.1 mg/24 hr  
Generic drug:  estradiol 1 Patch by TransDERmal route every Monday. montelukast 10 mg tablet Commonly known as:  SINGULAIR Take 1 Tab by mouth daily. Indications: ALLERGIC RHINITIS, breathing MYRBETRIQ 25 mg ER tablet Generic drug:  mirabegron ER Take 25 mg by mouth daily. oxybutynin chloride XL 5 mg CR tablet Commonly known as:  DITROPAN XL Take 5 mg by mouth daily. phendimetrazine tartrate 105 mg Cper Take 1 Cap by mouth Daily (before breakfast). Max Daily Amount: 1 Cap. Indications: WEIGHT LOSS MANAGEMENT FOR OBESE PATIENT (BMI >= 30)  
  
 sertraline 50 mg tablet Commonly known as:  ZOLOFT Take 1 Tab by mouth daily. Indications: GENERALIZED ANXIETY DISORDER  
  
 VITAMIN D3 2,000 unit Tab Generic drug:  cholecalciferol (vitamin D3) Take 2,000 Units by mouth daily. zolpidem CR 6.25 mg tablet Commonly known as:  AMBIEN CR Take 6.25 mg by mouth daily as needed. Taken at [facility] Prescriptions Printed Refills  
 phendimetrazine tartrate 105 mg cpER 0 Sig: Take 1 Cap by mouth Daily (before breakfast). Max Daily Amount: 1 Cap. Indications: WEIGHT LOSS MANAGEMENT FOR OBESE PATIENT (BMI >= 30) Class: Print Route: Oral  
  
Prescriptions Sent to Pharmacy Refills  
 azithromycin (ZITHROMAX) 250 mg tablet 0 Sig: Take 2 tablets today, then take 1 tablet daily Class: Normal  
 Pharmacy: [facility] [address] [place], [place] [address] RD AT [address] [place] [address] Drive Ph #: [phone]  
 sertraline (ZOLOFT) 50 mg tablet 3 Sig: Take 1 Tab by mouth daily. Indications: GENERALIZED ANXIETY DISORDER Class: Normal  
 Pharmacy: [facility] [address] AT [facility] [address] P Ph #: [phone] Route: Oral  
  
We Performed the Following ALK PHOS ISOENZYMES [25478 CPT(R)] T4, FREE [id number] CPT(R)] TSH 3RD GENERATION [79167 CPT(R)] Patient Instructions Hashimoto's Thyroiditis: Care Instructions Your Care Instructions Hashimoto's thyroiditis is a problem with the thyroid gland. The thyroid gland, which is in your neck, controls the way your body uses energy.  Sometimes the disease causes the gland to make too much thyroid hormone (thyrotoxicosis). This can make you feel nervous, lose weight, and have many loose bowel movements. You may also have a fast heartbeat. But as the disease progresses, the gland usually does not make enough thyroid hormone. This can cause you to feel tired and have dry skin and thinning hair. Most people with Hashimoto's are diagnosed when they have these symptoms. You may need to take medicine if you have symptoms or if your thyroid hormone level is not normal. Most people with Hashimoto's thyroiditis need to take medicine for the rest of their lives. Follow-up care is a key part of your treatment and safety. Be sure to make and go to all appointments, and call your doctor if you are having problems. Its also a good idea to know your test results and keep a list of the medicines you take. How can you care for yourself at home? · Take your medicines exactly as prescribed. Call your doctor if you think you are having a problem with your medicine. You will get more details on the specific medicines your doctor prescribes. When should you call for help? Call 911 anytime you think you may need emergency care. For example, call if: 
· You passed out (lost consciousness). · You have severe trouble breathing. · You have a very slow heartbeat (less than 60 beats a minute). · You have a low body temperature (95°F or below). Watch closely for changes in your health, and be sure to contact your doctor if: 
· You gain weight even though you are eating normally or less than usual. 
· You feel extremely weak or tired. · You have new changes in your skin, nails, or hair, or the changes get worse. · You notice that your thyroid gland has grown or changed in size. · You have constipation that is new or that gets worse. · You cannot stand cold temperatures. · You have heavy or irregular menstrual periods. · You have other new symptoms. Where can you learn more? Go to http://romulo-daron.info/. Enter K454 in the search box to learn more about \"Hashimoto's Thyroiditis: Care Instructions. \" Current as of: July 28, 2016 Content Version: 11.1 © 9473-3057 [employer]. Care instructions adapted under license by All My Data (which disclaims liability or warranty for this information). If you have questions about a medical condition or this instruction, always ask your healthcare professional. [place] [address] any warranty or liability for your use of this information. Bronchitis: Care Instructions Your Care Instructions Bronchitis is inflammation of the bronchial tubes, which carry air to the lungs. The tubes swell and produce mucus, or phlegm. The mucus and inflamed bronchial tubes make you cough. You may have trouble breathing. Most cases of bronchitis are caused by viruses like those that cause colds. Antibiotics usually do not help and they may be harmful. Bronchitis usually develops rapidly and lasts about 2 to 3 weeks in otherwise healthy people. Follow-up care is a key part of your treatment and safety. Be sure to make and go to all appointments, and call your doctor if you are having problems. It's also a good idea to know your test results and keep a list of the medicines you take. How can you care for yourself at home? · Take all medicines exactly as prescribed. Call your doctor if you think you are having a problem with your medicine. · Get some extra rest. 
· Take an over-the-counter pain medicine, such as acetaminophen (Tylenol), ibuprofen (Advil, Motrin), or naproxen (Aleve) to reduce fever and relieve body aches. Read and follow all instructions on the label. · Do not take two or more pain medicines at the same time unless the doctor told you to. Many pain medicines have acetaminophen, which is Tylenol. Too much acetaminophen (Tylenol) can be harmful.  
· Take an over-the-counter cough medicine that contains dextromethorphan to help quiet a dry, hacking cough so that you can sleep. Avoid cough medicines that have more than one active ingredient. Read and follow all instructions on the label. · Breathe moist air from a humidifier, hot shower, or sink filled with hot water. The heat and moisture will thin mucus so you can cough it out. · Do not smoke. Smoking can make bronchitis worse. If you need help quitting, talk to your doctor about stop-smoking programs and medicines. These can increase your chances of quitting for good. When should you call for help? Call 911 anytime you think you may need emergency care. For example, call if: 
· You have severe trouble breathing. Call your doctor now or seek immediate medical care if: 
· You have new or worse trouble breathing. · You cough up dark brown or bloody mucus (sputum). · You have a new or higher fever. · You have a new rash. Watch closely for changes in your health, and be sure to contact your doctor if: 
· You cough more deeply or more often, especially if you notice more mucus or a change in the color of your mucus. · You are not getting better as expected. Where can you learn more? Go to http://romulo-daron.info/. Enter H333 in the search box to learn more about \"Bronchitis: Care Instructions. \" Current as of: May 23, 2016 Content Version: 11.1 © 6318-9725 The 3Dood[employer]. Care instructions adapted under license by Q-Bot (which disclaims liability or warranty for this information). If you have questions about a medical condition or this instruction, always ask your healthcare professional. [place] [address] any warranty or liability for your use of this information. Starting a Weight Loss Plan: Care Instructions Your Care Instructions If you are thinking about losing weight, it can be hard to know where to start.  Your doctor can help you set up a weight loss plan that best meets your needs. You may want to take a class on nutrition or exercise, or join a weight loss support group. If you have questions about how to make changes to your eating or exercise habits, ask your doctor about seeing a registered dietitian or an exercise specialist. 
It can be a big challenge to lose weight. But you do not have to make huge changes at once. Make small changes, and stick with them. When those changes become habit, add a few more changes. If you do not think you are ready to make changes right now, try to pick a date in the future. Make an appointment to see your doctor to discuss whether the time is right for you to start a plan. Follow-up care is a key part of your treatment and safety. Be sure to make and go to all appointments, and call your doctor if you are having problems. Its also a good idea to know your test results and keep a list of the medicines you take. How can you care for yourself at home? · Set realistic goals. Many people expect to lose much more weight than is likely. A weight loss of 5% to 10% of your body weight may be enough to improve your health. · Get family and friends involved to provide support. Talk to them about why you are trying to lose weight, and ask them to help. They can help by participating in exercise and having meals with you, even if they may be eating something different. · Find what works best for you. If you do not have time or do not like to cook, a program that offers meal replacement bars or shakes may be better for you. Or if you like to prepare meals, finding a plan that includes daily menus and recipes may be best. 
· Ask your doctor about other health professionals who can help you achieve your weight loss goals. ¨ A dietitian can help you make healthy changes in your diet.  
¨ An exercise specialist or  can help you develop a safe and effective exercise program. 
 ¨ A counselor or psychiatrist can help you cope with issues such as depression, anxiety, or family problems that can make it hard to focus on weight loss. · Consider joining a support group for people who are trying to lose weight. Your doctor can suggest groups in your area. Where can you learn more? Go to http://romulo-daron.info/. Enter J061 in the search box to learn more about \"Starting a Weight Loss Plan: Care Instructions. \" Current as of: February 16, 2016 Content Version: 11.1 © 8214-4358 Plex[employer]. Care instructions adapted under license by Bellabox (which disclaims liability or warranty for this information). If you have questions about a medical condition or this instruction, always ask your healthcare professional. [address] any warranty or liability for your use of this information. WEIGHT LOSS PLAN 1. Read food labels and count calories. Goal 7191-4198 calories daily for women and 0609-5042 calories daily for men. Achieve this by decreasing caloric intake by 500 calories by weekly increments. NOTE:  1 pound = 3500 calories! Www.loseit. com Www.myfitnesspal.com Www.Wander Www.Combined Effortfinder. gov Weight watchers mobile Calories needed to lose 1-2 pounds a week = 10 x your weight in pounds 2. Increase water intake. Per [employer] Weight loss Program, it is important to drink 1/2 your body weight in ounces of water daily. Decrease your water consumption 2-3 hours before bedtime to prevent sleep disturbance from frequent urination. 3.  Decrease sugary beverages. Each can or glass of soda increases your risk of obesity by 60%. Can lose 10 pounds in a month by avoiding any soda. 12 oz can of soda = 140 calories, 16 oz cup of sweet tea = 200 calories 16 oz orange juice = 200 calories, 10 oz apple juice = 150 calories 32 oz sports drink = 200 calories, 16 oz punch = 240 calories 3.5 oz alcohol = 100-150 calories 4. Avoid High Fructose Corn Syrup products. This ingredient makes products highly addictive! 5. Exercise 30 minutes daily 5 days weekly, minimally. If you burn 3500 calories that equals a pound! Use a pedometer to count steps. Visit www. MD On-Line for a free pedometer and diet recommendations. Your maximum heart rate = 220 - your age Never exercise at your maximum heart rate. See handout for target heart rate. 6.  Decrease carbohydrates (white bread, pasta, rice, potatoes, sweet foods and sweet drinks like soda, tea, coffee, juice and sports drinks). Increase fiber and protein. Goal:   calories daily of carbohydrates Try CHROMIUM PICONATE 200 MG THREE TIMES DAILY,  
 to decrease Premenstrual carbohydrate cravings. 7.  Eat 3-5 small meals daily, include lots of protein (beans/legumes, nuts, lean meat, eggs) and green vegetables with each. (Breakfast, lunch, dinner with 2 healthy snacks) 8. Get proper rest 7-8 hours uninterrupted. When you get less than 6 hours, it triggers hunger by affecting your Grehlin:Leptin ratio and this results in weight gain. 9.  Watch your food portions. Green leafy vegetables should cover 1/2 of the plate, lean meat 1/4 of the plate, starchy vegetable 1/4 of the plate. Use smaller plates. 10.  Do not eat until you are full. Eat until you are no longer hungry. If you are not sure, try drinking a glass of water before getting your second serving of food. 11.  Do not weigh yourself daily. Wait until your next office visit. Use how you feel and  how your clothes fit as measurements of success. 12.  Address your spirituality to draw strength from above during your journey. Remember \"I am fearfully and wonderfully made. Marvelous in His eyes. \" 
 
13.   Set realistic, appropriate and achievable weight loss goals: 
 
 RECOMMENDED TARGET WEIGHT LOSS:  Initial weight loss of 5-10% of your initial body weight achieved over 6 months or a decrease of 2 BMI units. MINIMUM GOAL OF WEIGHT LOSS:  Reduce body weight and maintain a lower body weight. Prevent weight gain. RELATED WEBSITES:     
Www.obesityaction. org 
(and consider joining the Obesity Action Coalition for $25/year. The [facility] mission is to elevated and empower those affected by obesity through education, advocacy and support. Quarterly journals included in membership fee. ) Www.[employer]ision [employer]ologics 
www."T[employer] RELATED DIETS:  Dr. Melony Nunez Weight loss challenge, Mediterranean diet, [place] Diet, [place] Watchers, Paleo Diet (anti-inflammatory diet) EXERCISE 150 MINUTES WEEKLY. THIS CAN BE ACHIEVED BY WORKING OUT OR WALKING A MINIMUM OF 30 MINUTES FOR 5 DAYS WEEKLY. YOU CAN EXERCISE IN INCREMENTS OF 10-15 MINUTES UP TO 3 TIMES A DAY. Consider performing \"brainless exercise. \"  Choose your favorite tv program.  Five minutes before and for 5 minutes after the tv program, stretch your body. While the program is on, walk in place watching the show. When commercials come on, rest or walk around the house to do other things. When the program begins, return to walking in place. When you are able keep walking during the commercials and add light weights to your ankles or hands. By the end of the show, you would have walked 30 minutes. If you need shorter spurts of exercise, walk during the commercials and rest during the show. Drink to glasses of water prior to any exercise to prevent dehydration and to improve the results of the work out. Your RESTING HEART RATE is the number of times your heart beats per minute when you are not exerting yourself. The more fit you are, the lower your resting heart rate will be. Your MAXIMUM HEART RATE is the number of times per minute your heart pumps when it is working at 100% capacity. NEVER EXERCISE AT YOUR MAXIMUM HEART RATE. MAX HEART RATE = 220 - your age For example, in a 48year old, the maximum heart rate is 220-50 = 170 beats per minute Your TARGET HEART RATE is the number of beats per minute our heart should pump during aerobic exercise. Reaching your target heart rate indicates that your body is receiving maximum cardiovascular and fat burning benefits. If you are fit, your TARGET HEART RATE = 70-85% of your maximum Heart rate For a 48year old with vigorous intensity physical activity, Target heart rate= 170 bpm x .70 = 119 bpm 
   Target heart rate = 170 bpm x .85=  145 bpm 
 
    Therefore, your target heart rate during physical activity is 119-145 If you are not fit, TARGET HEART RATE = 50-70% of your maximum Heart rate MODERATE CONSISTENT AEROBIC EXERCISE OR WALKING FOR AT LEAST 150 MINUTES WEEKLY IS AN ESSENTIAL PART OF ANY WEIGHT LOSS OF WEIGHT MAINTENANCE PROGRAM. During WEIGHT LOSS PHASE, at least 75% of your exercise needs to be walking or moderate aerobic activity and 25% or 0% can be Isometric or Resistance type exercises (the latter can be deferred to the weight maintenance phase.) During WEIGHT MAINTENANCE PHASE, at least 50% of your exercise needs to be aerobic and 50% Isometric or Resistance type exercises. BENEFITS OF MODERATE INTENSITY EXERCISE MOST DAYS OF THE WEEK: 
 
 1. Insulin Resistance improves 30-85% 2. Abdominal Fat decreases by 30% 3. Inflammatory Markers decrease by 30% (therefore pain decreases) 4. Systolic and Diastolic Blood Pressure decrease by 4 mmHg 5. HDL improves by 5% 6. Triglycerides decrease by 15% 7. Shift from small dense LDL to large dense LDL (therefore decrease Insulin Resistance) 8. Decrease coagulability Starting a Weight Loss Plan: Care Instructions Your Care Instructions If you are thinking about losing weight, it can be hard to know where to start. Your doctor can help you set up a weight loss plan that best meets your needs. You may want to take a class on nutrition or exercise, or join a weight loss support group. If you have questions about how to make changes to your eating or exercise habits, ask your doctor about seeing a registered dietitian or an exercise specialist. 
It can be a big challenge to lose weight. But you do not have to make huge changes at once. Make small changes, and stick with them. When those changes become habit, add a few more changes. If you do not think you are ready to make changes right now, try to pick a date in the future. Make an appointment to see your doctor to discuss whether the time is right for you to start a plan. Follow-up care is a key part of your treatment and safety. Be sure to make and go to all appointments, and call your doctor if you are having problems. Its also a good idea to know your test results and keep a list of the medicines you take. How can you care for yourself at home? · Set realistic goals. Many people expect to lose much more weight than is likely. A weight loss of 5% to 10% of your body weight may be enough to improve your health. · Get family and friends involved to provide support. Talk to them about why you are trying to lose weight, and ask them to help. They can help by participating in exercise and having meals with you, even if they may be eating something different. · Find what works best for you. If you do not have time or do not like to cook, a program that offers meal replacement bars or shakes may be better for you. Or if you like to prepare meals, finding a plan that includes daily menus and recipes may be best. 
· Ask your doctor about other health professionals who can help you achieve your weight loss goals. ¨ A dietitian can help you make healthy changes in your diet.  
¨ An exercise specialist or  can help you develop a safe and effective exercise program. 
¨ A counselor or psychiatrist can help you cope with issues such as depression, anxiety, or family problems that can make it hard to focus on weight loss. · Consider joining a support group for people who are trying to lose weight. Your doctor can suggest groups in your area. Where can you learn more? Go to http://romulo-daron.info/. Enter M483 in the search box to learn more about \"Starting a Weight Loss Plan: Care Instructions. \" Current as of: February 16, 2016 Content Version: 11.1 © 6548-9720 Fant[employer]xico. Care instructions adapted under license by CloudHashing (which disclaims liability or warranty for this information). If you have questions about a medical condition or this instruction, always ask your healthcare professional. [address] any warranty or liability for your use of this information. Introducing [facility]! Davidson Carpenter introduces Locu patient portal. Now you can access parts of your medical record, email your doctor's office, and request medication refills online. 1. In your internet browser, go to [email or web address] 2. Click on the First Time User? Click Here link in the Sign In box. You will see the New Member Sign Up page. 3. Enter your Locu Access Code exactly as it appears below. You will not need to use this code after youve completed the sign-up process. If you do not sign up before the expiration date, you must request a new code. · Locu Access Code: [email or web address] Expires: 3/29/2017  9:16 AM 
 
4. Enter the last four digits of your Social Security Number (xxxx) and Date of Birth (mm/dd/yyyy) as indicated and click Submit. You will be taken to the next sign-up page. 5. Create a Locu ID. This will be your Locu login ID and cannot be changed, so think of one that is secure and easy to remember. 6. Create a Hapara password. You can change your password at any time. 7. Enter your Password Reset Question and Answer. This can be used at a later time if you forget your password. 8. Enter your e-mail address. You will receive e-mail notification when new information is available in [address] 9. Click Sign Up. You can now view and download portions of your medical record. 10. Click the Download Summary menu link to download a portable copy of your medical information. If you have questions, please visit the Frequently Asked Questions section of the Hapara website. Remember, Hapara is NOT to be used for urgent needs. For medical emergencies, dial 911. Now available from your iPhone and Android! Please provide this summary of care documentation to your next provider. Your primary care clinician is listed as Tio Guerrero. If you have any questions after today's visit, please call [phone].

## 2017-02-24 NOTE — Clinical Note
Ashok Brown went to the orientation for [facility] but then never had labs drawn. She told us today that her barrier to starting was that she needs the 8:30 appointment every month with Dr. Magalys Brown due to her 2 jobs. We looked ahead and saw that no one is scheduled for 3/29/17 yet and were wondering if we could get her on for the 8:30 spot. Thanks!

## 2017-02-24 NOTE — PATIENT INSTRUCTIONS
Hashimoto's Thyroiditis: Care Instructions  Your Care Instructions    Hashimoto's thyroiditis is a problem with the thyroid gland. The thyroid gland, which is in your neck, controls the way your body uses energy. Sometimes the disease causes the gland to make too much thyroid hormone (thyrotoxicosis). This can make you feel nervous, lose weight, and have many loose bowel movements. You may also have a fast heartbeat. But as the disease progresses, the gland usually does not make enough thyroid hormone. This can cause you to feel tired and have dry skin and thinning hair. Most people with Hashimoto's are diagnosed when they have these symptoms. You may need to take medicine if you have symptoms or if your thyroid hormone level is not normal. Most people with Hashimoto's thyroiditis need to take medicine for the rest of their lives. Follow-up care is a key part of your treatment and safety. Be sure to make and go to all appointments, and call your doctor if you are having problems. Its also a good idea to know your test results and keep a list of the medicines you take. How can you care for yourself at home? · Take your medicines exactly as prescribed. Call your doctor if you think you are having a problem with your medicine. You will get more details on the specific medicines your doctor prescribes. When should you call for help? Call 911 anytime you think you may need emergency care. For example, call if:  · You passed out (lost consciousness). · You have severe trouble breathing. · You have a very slow heartbeat (less than 60 beats a minute). · You have a low body temperature (95°F or below). Watch closely for changes in your health, and be sure to contact your doctor if:  · You gain weight even though you are eating normally or less than usual.  · You feel extremely weak or tired. · You have new changes in your skin, nails, or hair, or the changes get worse.   · You notice that your thyroid gland has grown or changed in size. · You have constipation that is new or that gets worse. · You cannot stand cold temperatures. · You have heavy or irregular menstrual periods. · You have other new symptoms. Where can you learn more? Go to http://romulo-daron.info/. Enter K454 in the search box to learn more about \"Hashimoto's Thyroiditis: Care Instructions. \"  Current as of: July 28, 2016  Content Version: 11.1  © 1010-9355 [employer]. Care instructions adapted under license by [employer] (which disclaims liability or warranty for this information). If you have questions about a medical condition or this instruction, always ask your healthcare professional. [address] any warranty or liability for your use of this information. Bronchitis: Care Instructions  Your Care Instructions    Bronchitis is inflammation of the bronchial tubes, which carry air to the lungs. The tubes swell and produce mucus, or phlegm. The mucus and inflamed bronchial tubes make you cough. You may have trouble breathing. Most cases of bronchitis are caused by viruses like those that cause colds. Antibiotics usually do not help and they may be harmful. Bronchitis usually develops rapidly and lasts about 2 to 3 weeks in otherwise healthy people. Follow-up care is a key part of your treatment and safety. Be sure to make and go to all appointments, and call your doctor if you are having problems. It's also a good idea to know your test results and keep a list of the medicines you take. How can you care for yourself at home? · Take all medicines exactly as prescribed. Call your doctor if you think you are having a problem with your medicine. · Get some extra rest.  · Take an over-the-counter pain medicine, such as acetaminophen (Tylenol), ibuprofen (Advil, Motrin), or naproxen (Aleve) to reduce fever and relieve body aches.  Read and follow all instructions on the label.  · Do not take two or more pain medicines at the same time unless the doctor told you to. Many pain medicines have acetaminophen, which is Tylenol. Too much acetaminophen (Tylenol) can be harmful. · Take an over-the-counter cough medicine that contains dextromethorphan to help quiet a dry, hacking cough so that you can sleep. Avoid cough medicines that have more than one active ingredient. Read and follow all instructions on the label. · Breathe moist air from a humidifier, hot shower, or sink filled with hot water. The heat and moisture will thin mucus so you can cough it out. · Do not smoke. Smoking can make bronchitis worse. If you need help quitting, talk to your doctor about stop-smoking programs and medicines. These can increase your chances of quitting for good. When should you call for help? Call 911 anytime you think you may need emergency care. For example, call if:  · You have severe trouble breathing. Call your doctor now or seek immediate medical care if:  · You have new or worse trouble breathing. · You cough up dark brown or bloody mucus (sputum). · You have a new or higher fever. · You have a new rash. Watch closely for changes in your health, and be sure to contact your doctor if:  · You cough more deeply or more often, especially if you notice more mucus or a change in the color of your mucus. · You are not getting better as expected. Where can you learn more? Go to http://romulo-daron.info/. Enter H333 in the search box to learn more about \"Bronchitis: Care Instructions. \"  Current as of: May 23, 2016  Content Version: 11.1  © 8483-2187 [employer]Ego. Care instructions adapted under license by [employer] (which disclaims liability or warranty for this information).  If you have questions about a medical condition or this instruction, always ask your healthcare professional. [place] [address] any warranty or liability for your use of this information. Starting a Weight Loss Plan: Care Instructions  Your Care Instructions  If you are thinking about losing weight, it can be hard to know where to start. Your doctor can help you set up a weight loss plan that best meets your needs. You may want to take a class on nutrition or exercise, or join a weight loss support group. If you have questions about how to make changes to your eating or exercise habits, ask your doctor about seeing a registered dietitian or an exercise specialist.  It can be a big challenge to lose weight. But you do not have to make huge changes at once. Make small changes, and stick with them. When those changes become habit, add a few more changes. If you do not think you are ready to make changes right now, try to pick a date in the future. Make an appointment to see your doctor to discuss whether the time is right for you to start a plan. Follow-up care is a key part of your treatment and safety. Be sure to make and go to all appointments, and call your doctor if you are having problems. Its also a good idea to know your test results and keep a list of the medicines you take. How can you care for yourself at home? · Set realistic goals. Many people expect to lose much more weight than is likely. A weight loss of 5% to 10% of your body weight may be enough to improve your health. · Get family and friends involved to provide support. Talk to them about why you are trying to lose weight, and ask them to help. They can help by participating in exercise and having meals with you, even if they may be eating something different. · Find what works best for you. If you do not have time or do not like to cook, a program that offers meal replacement bars or shakes may be better for you.  Or if you like to prepare meals, finding a plan that includes daily menus and recipes may be best.  · Ask your doctor about other health professionals who can help you achieve your weight loss goals. ¨ A dietitian can help you make healthy changes in your diet. ¨ An exercise specialist or  can help you develop a safe and effective exercise program.  ¨ A counselor or psychiatrist can help you cope with issues such as depression, anxiety, or family problems that can make it hard to focus on weight loss. · Consider joining a support group for people who are trying to lose weight. Your doctor can suggest groups in your area. Where can you learn more? Go to http://romulo-daron.info/. Enter T642 in the search box to learn more about \"Starting a Weight Loss Plan: Care Instructions. \"  Current as of: February 16, 2016  Content Version: 11.1  © 6097-3580 anchor.travel. Care instructions adapted under license by [employer] (which disclaims liability or warranty for this information). If you have questions about a medical condition or this instruction, always ask your healthcare professional. [place] [address] any warranty or liability for your use of this information. WEIGHT LOSS PLAN    1. Read food labels and count calories. Goal 6740-8222 calories daily for women and 3030-0273 calories daily for men. Achieve this by decreasing caloric intake by 500 calories by weekly increments. NOTE:  1 pound = 3500 calories! Www.loseit. com  Www.mySmart Voicemailnesspal.com  Www.[employer]  Www.eigitalfinder. gov  Weight watchers mobile    Calories needed to lose 1-2 pounds a week = 10 x your weight in pounds    2. Increase water intake. Per [employer] [facility] loss Program, it is important to drink 1/2 your body weight in ounces of water daily. Decrease your water consumption 2-3 hours before bedtime to prevent sleep disturbance from frequent urination. 3.  Decrease sugary beverages. Each can or glass of soda increases your risk of obesity by 60%. Can lose 10 pounds in a month by avoiding any soda.      12 oz can of soda = 140 calories, 16 oz cup of sweet tea = 200 calories    16 oz orange juice = 200 calories, 10 oz apple juice = 150 calories   32 oz sports drink = 200 calories, 16 oz punch = 240 calories   3.5 oz alcohol = 100-150 calories     4. Avoid High Fructose Corn Syrup products. This ingredient makes products highly addictive! 5. Exercise 30 minutes daily 5 days weekly, minimally. If you burn 3500 calories that equals a pound! Use a pedometer to count steps. Visit www. Zenput for a free pedometer and diet recommendations. Your maximum heart rate = 220 - your age    Never exercise at your maximum heart rate. See handout for target heart rate. 6.  Decrease carbohydrates (white bread, pasta, rice, potatoes, sweet foods and sweet drinks like soda, tea, coffee, juice and sports drinks). Increase fiber and protein. Goal:   calories daily of carbohydrates     Try CHROMIUM PICONATE 200 MG THREE TIMES DAILY,    to decrease Premenstrual carbohydrate cravings. 7.  Eat 3-5 small meals daily, include lots of protein (beans/legumes, nuts, lean meat, eggs) and green vegetables with each. (Breakfast, lunch, dinner with 2 healthy snacks)    8. Get proper rest 7-8 hours uninterrupted. When you get less than 6 hours, it triggers hunger by affecting your [place]:Leptin ratio and this results in weight gain. 9.  Watch your food portions. Green leafy vegetables should cover 1/2 of the plate, lean meat 1/4 of the plate, starchy vegetable 1/4 of the plate. Use smaller plates. 10.  Do not eat until you are full. Eat until you are no longer hungry. If you are not sure, try drinking a glass of water before getting your second serving of food. 11.  Do not weigh yourself daily. Wait until your next office visit. Use how you feel and  how your clothes fit as measurements of success. 12.  Address your spirituality to draw strength from above during your journey.   Remember \"I am fearfully and wonderfully made. Marvelous in His eyes. \"    13. Set realistic, appropriate and achievable weight loss goals:     RECOMMENDED TARGET WEIGHT LOSS:  Initial weight loss of 5-10% of your initial body weight achieved over 6 months or a decrease of 2 BMI units. MINIMUM GOAL OF WEIGHT LOSS:  Reduce body weight and maintain a lower body weight. Prevent weight gain. RELATED WEBSITES:      Www.obesityaction. org  (and consider joining the Obesity Action Coalition for $25/year. The [facility] mission is to elevated and empower those affected by obesity through education, advocacy and support. Quarterly journals included in membership fee. )    Www.[employer]Direct [employer]. [employer]  www.ZendyPlace     RELATED DIETS:  Dr. Tania Rahman Weight loss challenge, Mediterranean diet, [address] [place] [address] Watchers, Paleo Diet (anti-inflammatory diet)        EXERCISE 150 MINUTES WEEKLY. THIS CAN BE ACHIEVED BY WORKING OUT OR WALKING A MINIMUM OF 30 MINUTES FOR 5 DAYS WEEKLY. YOU CAN EXERCISE IN INCREMENTS OF 10-15 MINUTES UP TO 3 TIMES A DAY. Consider performing \"brainless exercise. \"  Choose your favorite tv program.  Five minutes before and for 5 minutes after the tv program, stretch your body. While the program is on, walk in place watching the show. When commercials come on, rest or walk around the house to do other things. When the program begins, return to walking in place. When you are able keep walking during the commercials and add light weights to your ankles or hands. By the end of the show, you would have walked 30 minutes. If you need shorter spurts of exercise, walk during the commercials and rest during the show. Drink to glasses of water prior to any exercise to prevent dehydration and to improve the results of the work out. Your RESTING HEART RATE is the number of times your heart beats per minute when you are not exerting yourself.   The more fit you are, the lower your resting heart rate will be. Your MAXIMUM HEART RATE is the number of times per minute your heart pumps when it is working at 100% capacity. NEVER EXERCISE AT YOUR MAXIMUM HEART RATE. MAX HEART RATE = 220 - your age    For example, in a 48year old, the maximum heart rate is 220-50 = 170 beats per minute    Your [place] is the number of beats per minute our heart should pump during aerobic exercise. Reaching your target heart rate indicates that your body is receiving maximum cardiovascular and fat burning benefits. If you are fit, your TARGET HEART RATE = 70-85% of your maximum Heart rate      For a 48year old with vigorous intensity physical activity,         Target heart rate= 170 bpm x .70 = 119 bpm     Target heart rate = 170 bpm x .85=  145 bpm        Therefore, your target heart rate during physical activity is 119-145      If you are not fit, TARGET HEART RATE = 50-70% of your maximum Heart rate    MODERATE CONSISTENT AEROBIC EXERCISE OR WALKING FOR AT LEAST 150 MINUTES WEEKLY IS AN ESSENTIAL PART OF ANY WEIGHT LOSS OF WEIGHT MAINTENANCE PROGRAM.     During WEIGHT LOSS PHASE, at least 75% of your exercise needs to be walking or moderate aerobic activity and 25% or 0% can be Isometric or Resistance type exercises (the latter can be deferred to the weight maintenance phase.)     During WEIGHT MAINTENANCE PHASE, at least 50% of your exercise needs to be aerobic and 50% Isometric or Resistance type exercises. BENEFITS OF MODERATE INTENSITY EXERCISE MOST DAYS OF THE WEEK:     1. Insulin Resistance improves 30-85%   2. Abdominal Fat decreases by 30%   3. Inflammatory Markers decrease by 30% (therefore pain decreases)   4. Systolic and Diastolic Blood Pressure decrease by 4 mmHg   5. HDL improves by 5%   6. Triglycerides decrease by 15%   7. Shift from small dense LDL to large dense LDL (therefore decrease Insulin Resistance)   8.   Decrease coagulability                  Starting a Weight Loss Plan: Care Instructions  Your Care Instructions  If you are thinking about losing weight, it can be hard to know where to start. Your doctor can help you set up a weight loss plan that best meets your needs. You may want to take a class on nutrition or exercise, or join a weight loss support group. If you have questions about how to make changes to your eating or exercise habits, ask your doctor about seeing a registered dietitian or an exercise specialist.  It can be a big challenge to lose weight. But you do not have to make huge changes at once. Make small changes, and stick with them. When those changes become habit, add a few more changes. If you do not think you are ready to make changes right now, try to pick a date in the future. Make an appointment to see your doctor to discuss whether the time is right for you to start a plan. Follow-up care is a key part of your treatment and safety. Be sure to make and go to all appointments, and call your doctor if you are having problems. Its also a good idea to know your test results and keep a list of the medicines you take. How can you care for yourself at home? · Set realistic goals. Many people expect to lose much more weight than is likely. A weight loss of 5% to 10% of your body weight may be enough to improve your health. · Get family and friends involved to provide support. Talk to them about why you are trying to lose weight, and ask them to help. They can help by participating in exercise and having meals with you, even if they may be eating something different. · Find what works best for you. If you do not have time or do not like to cook, a program that offers meal replacement bars or shakes may be better for you. Or if you like to prepare meals, finding a plan that includes daily menus and recipes may be best.  · Ask your doctor about other health professionals who can help you achieve your weight loss goals.   ¨ A dietitian can help you make healthy changes in your diet. ¨ An exercise specialist or  can help you develop a safe and effective exercise program.  ¨ A counselor or psychiatrist can help you cope with issues such as depression, anxiety, or family problems that can make it hard to focus on weight loss. · Consider joining a support group for people who are trying to lose weight. Your doctor can suggest groups in your area. Where can you learn more? Go to http://romulo-daron.info/. Enter Z190 in the search box to learn more about \"Starting a Weight Loss Plan: Care Instructions. \"  Current as of: February 16, 2016  Content Version: 11.1  © 6995-6193 Tripda, [employer]. Care instructions adapted under license by [employer] (which disclaims liability or warranty for this information). If you have questions about a medical condition or this instruction, always ask your healthcare professional. [address] any warranty or liability for your use of this information.

## 2017-02-24 NOTE — PROGRESS NOTES
Chief Complaint   Patient presents with    Weight Management    Blood Pressure Check     1. Have you been to the ER, urgent care clinic since your last visit? Hospitalized since your last visit? No    2. Have you seen or consulted any other health care providers outside of the [facility] since your last visit? Include any pap smears or colon screening. No    In the event something were to happen to you and you were unable to speak on your behalf, do you have an Advance Directive/ Living Will in place stating your wishes? Yes    If yes, do we have a copy on file: No     If no, would you like information:    Patient stated that she has one in place, and will bring it in next visit.

## 2017-02-28 ENCOUNTER — TELEPHONE (OUTPATIENT)
Dept: BARIATRICS/WEIGHT MGMT | Age: 62
End: 2017-02-28

## 2017-02-28 NOTE — TELEPHONE ENCOUNTER
Called patient to schedule [facility] initial consult for March 29th at 8:30am.  Left contact information on home number, mobile number had been disconnected.

## 2017-03-01 LAB
ALP BONE CFR SERPL: 34 % (ref 14–68)
ALP INTEST CFR SERPL: 0 % (ref 0–18)
ALP LIVER CFR SERPL: 66 % (ref 18–85)
ALP SERPL-CCNC: 95 IU/L (ref 39–117)
T4 FREE SERPL-MCNC: 1.29 NG/DL (ref 0.82–1.77)
TSH SERPL DL<=0.005 MIU/L-ACNC: 1.9 UIU/ML (ref 0.45–4.5)

## 2017-03-28 ENCOUNTER — OFFICE VISIT (OUTPATIENT)
Dept: FAMILY MEDICINE CLINIC | Age: 62
End: 2017-03-28

## 2017-03-28 VITALS
TEMPERATURE: 97.8 F | SYSTOLIC BLOOD PRESSURE: 107 MMHG | RESPIRATION RATE: 20 BRPM | HEIGHT: 64 IN | OXYGEN SATURATION: 100 % | BODY MASS INDEX: 36.55 KG/M2 | HEART RATE: 60 BPM | WEIGHT: 214.1 LBS | DIASTOLIC BLOOD PRESSURE: 74 MMHG

## 2017-03-28 DIAGNOSIS — E55.9 VITAMIN D DEFICIENCY: ICD-10-CM

## 2017-03-28 DIAGNOSIS — Z11.59 NEED FOR HEPATITIS C SCREENING TEST: ICD-10-CM

## 2017-03-28 DIAGNOSIS — E06.3 HASHIMOTO'S THYROIDITIS: ICD-10-CM

## 2017-03-28 DIAGNOSIS — R20.9 COLD HANDS: ICD-10-CM

## 2017-03-28 DIAGNOSIS — R73.9 HYPERGLYCEMIA: ICD-10-CM

## 2017-03-28 DIAGNOSIS — E66.9 OBESITY, CLASS II, BMI 35-39.9: ICD-10-CM

## 2017-03-28 DIAGNOSIS — E78.00 PURE HYPERCHOLESTEROLEMIA: Primary | ICD-10-CM

## 2017-03-28 DIAGNOSIS — R63.4 WEIGHT LOSS: ICD-10-CM

## 2017-03-28 DIAGNOSIS — F51.04 CHRONIC INSOMNIA: ICD-10-CM

## 2017-03-28 DIAGNOSIS — M06.9 RHEUMATOID ARTHRITIS INVOLVING BOTH HIPS, UNSPECIFIED RHEUMATOID FACTOR PRESENCE: ICD-10-CM

## 2017-03-28 DIAGNOSIS — R60.0 HAND EDEMA: ICD-10-CM

## 2017-03-28 RX ORDER — PHENDIMETRAZINE TARTRATE 105 MG/1
1 CAPSULE, EXTENDED RELEASE ORAL
Qty: 30 CAP | Refills: 0 | Status: SHIPPED | OUTPATIENT
Start: 2017-03-28 | End: 2017-05-09 | Stop reason: ALTCHOICE

## 2017-03-28 NOTE — PATIENT INSTRUCTIONS
Learning About High Blood Sugar  What is high blood sugar? Your body turns the food you eat into glucose (sugar), which it uses for energy. But if your body isn't able to use the sugar right away, it can build up in your blood and lead to high blood sugar. When the amount of sugar in your blood stays too high for too much of the time, you may have diabetes. Diabetes is a disease that can cause serious health problems. The good news is that lifestyle changes may help you get your blood sugar back to normal and avoid or delay diabetes. What causes high blood sugar? Sugar (glucose) can build up in your blood if you:  · Are overweight. · Have a family history of diabetes. · Take certain medicines, such as steroids. What are the symptoms? Having high blood sugar may not cause any symptoms at all. Or it may make you feel very thirsty or very hungry. You may also urinate more often than usual, have blurry vision, or lose weight without trying. How is high blood sugar treated? You can take steps to lower your blood sugar level if you understand what makes it get higher. Your doctor may want you to learn how to test your blood sugar level at home. Then you can see how illness, stress, or different kinds of food or medicine raise or lower your blood sugar level. Other tests may be needed to see if you have diabetes. How can you prevent high blood sugar? · Watch your weight. If you're overweight, losing just a small amount of weight may help. Reducing fat around your waist is most important. · Limit the amount of calories, sweets, and unhealthy fat you eat. Ask your doctor if a dietitian can help you. A registered dietitian can help you create meal plans that fit your lifestyle. · Get at least 30 minutes of exercise on most days of the week. Exercise helps control your blood sugar. It also helps you maintain a healthy weight. Walking is a good choice.  You also may want to do other activities, such as running, swimming, cycling, or playing tennis or team sports. · If your doctor prescribed medicines, take them exactly as prescribed. Call your doctor if you think you are having a problem with your medicine. You will get more details on the specific medicines your doctor prescribes. Follow-up care is a key part of your treatment and safety. Be sure to make and go to all appointments, and call your doctor if you are having problems. It's also a good idea to know your test results and keep a list of the medicines you take. Where can you learn more? Go to http://romuloToucan Globaldaron.info/. Enter O108 in the search box to learn more about \"Learning About High Blood Sugar. \"  Current as of: May 23, 2016  Content Version: 11.1  © 6583-5811 [employer]. Care instructions adapted under license by [employer] (which disclaims liability or warranty for this information). If you have questions about a medical condition or this instruction, always ask your healthcare professional. [address] any warranty or liability for your use of this information. Starting a Weight Loss Plan: Care Instructions  Your Care Instructions  If you are thinking about losing weight, it can be hard to know where to start. Your doctor can help you set up a weight loss plan that best meets your needs. You may want to take a class on nutrition or exercise, or join a weight loss support group. If you have questions about how to make changes to your eating or exercise habits, ask your doctor about seeing a registered dietitian or an exercise specialist.  It can be a big challenge to lose weight. But you do not have to make huge changes at once. Make small changes, and stick with them. When those changes become habit, add a few more changes. If you do not think you are ready to make changes right now, try to pick a date in the future.  Make an appointment to see your doctor to discuss whether the time is right for you to start a plan. Follow-up care is a key part of your treatment and safety. Be sure to make and go to all appointments, and call your doctor if you are having problems. Its also a good idea to know your test results and keep a list of the medicines you take. How can you care for yourself at home? · Set realistic goals. Many people expect to lose much more weight than is likely. A weight loss of 5% to 10% of your body weight may be enough to improve your health. · Get family and friends involved to provide support. Talk to them about why you are trying to lose weight, and ask them to help. They can help by participating in exercise and having meals with you, even if they may be eating something different. · Find what works best for you. If you do not have time or do not like to cook, a program that offers meal replacement bars or shakes may be better for you. Or if you like to prepare meals, finding a plan that includes daily menus and recipes may be best.  · Ask your doctor about other health professionals who can help you achieve your weight loss goals. ¨ A dietitian can help you make healthy changes in your diet. ¨ An exercise specialist or  can help you develop a safe and effective exercise program.  ¨ A counselor or psychiatrist can help you cope with issues such as depression, anxiety, or family problems that can make it hard to focus on weight loss. · Consider joining a support group for people who are trying to lose weight. Your doctor can suggest groups in your area. Where can you learn more? Go to http://romulo-daron.info/. Enter Y130 in the search box to learn more about \"Starting a Weight Loss Plan: Care Instructions. \"  Current as of: February 16, 2016  Content Version: 11.1  © 8822-9429 [employer].  Care instructions adapted under license by [employer] (which disclaims liability or warranty for this information). If you have questions about a medical condition or this instruction, always ask your healthcare professional. [address] any warranty or liability for your use of this information. WEIGHT LOSS PLAN    1. Read food labels and count calories. Goal 7091-2752 calories daily for women and 7965-0527 calories daily for men. Achieve this by decreasing caloric intake by 500 calories by weekly increments. NOTE:  1 pound = 3500 calories! Www.loseit. Aircrm  Www.Element Powernesspal.Aircrm  Www.StackBlaze  Www.Park[employer] Enterprise. gov  Weight watchers mobile    Calories needed to lose 1-2 pounds a week = 10 x your weight in pounds    2. Increase water intake. Per [facility], it is important to drink 1/2 your body weight in ounces of water daily. Decrease your water consumption 2-3 hours before bedtime to prevent sleep disturbance from frequent urination. 3.  Decrease sugary beverages. Each can or glass of soda increases your risk of obesity by 60%. Can lose 10 pounds in a month by avoiding any soda. 12 oz can of soda = 140 calories, 16 oz cup of sweet tea = 200 calories    16 oz orange juice = 200 calories, 10 oz apple juice = 150 calories   32 oz sports drink = 200 calories, 16 oz punch = 240 calories   3.5 oz alcohol = 100-150 calories     4. Avoid High Fructose Corn Syrup products. This ingredient makes products highly addictive! 5. Exercise 30 minutes daily 5 days weekly, minimally. If you burn 3500 calories that equals a pound! Use a pedometer to count steps. Visit www. Soup.io. Aircrm for a free pedometer and diet recommendations. Your maximum heart rate = 220 - your age    Never exercise at your maximum heart rate. See handout for target heart rate. 6.  Decrease carbohydrates (white bread, pasta, rice, potatoes, sweet foods and sweet drinks like soda, tea, coffee, juice and sports drinks). Increase fiber and protein.     Goal:   calories daily of carbohydrates     Try CHROMIUM PICONATE 200 MG THREE TIMES DAILY,    to decrease Premenstrual carbohydrate cravings. 7.  Eat 3-5 small meals daily, include lots of protein (beans/legumes, nuts, lean meat, eggs) and green vegetables with each. (Breakfast, lunch, dinner with 2 healthy snacks)    8. Get proper rest 7-8 hours uninterrupted. When you get less than 6 hours, it triggers hunger by affecting your [place]:Leptin ratio and this results in weight gain. 9.  Watch your food portions. Green leafy vegetables should cover 1/2 of the plate, lean meat 1/4 of the plate, starchy vegetable 1/4 of the plate. Use smaller plates. 10.  Do not eat until you are full. Eat until you are no longer hungry. If you are not sure, try drinking a glass of water before getting your second serving of food. 11.  Do not weigh yourself daily. Wait until your next office visit. Use how you feel and  how your clothes fit as measurements of success. 12.  Address your spirituality to draw strength from above during your journey. Remember \"I am fearfully and wonderfully made. Marvelous in His eyes. \"    13. Set realistic, appropriate and achievable weight loss goals:     RECOMMENDED TARGET WEIGHT LOSS:  Initial weight loss of 5-10% of your initial body weight achieved over 6 months or a decrease of 2 BMI units. MINIMUM GOAL OF WEIGHT LOSS:  Reduce body weight and maintain a lower body weight. Prevent weight gain. RELATED WEBSITES:      Www.obesityaction. org  (and consider joining the Obesity Action Coalition for $25/year. The [facility] mission is to elevated and empower those affected by obesity through education, advocacy and support. Quarterly journals included in membership fee. )    Www.PlaytestCloud. [employer]  www.[email or web address]     RELATED DIETS:  Dr. Melony Nunez Weight loss challenge, Mediterranean diet, [address] [place] [address] Watchers, Paleo Diet (anti-inflammatory diet)      EXERCISE 150 MINUTES WEEKLY. THIS CAN BE ACHIEVED BY WORKING OUT OR WALKING A MINIMUM OF 30 MINUTES FOR 5 DAYS WEEKLY. YOU CAN EXERCISE IN INCREMENTS OF 10-15 MINUTES UP TO 3 TIMES A DAY. Consider performing \"brainless exercise. \"  Choose your favorite tv program.  Five minutes before and for 5 minutes after the tv program, stretch your body. While the program is on, walk in place watching the show. When commercials come on, rest or walk around the house to do other things. When the program begins, return to walking in place. When you are able keep walking during the commercials and add light weights to your ankles or hands. By the end of the show, you would have walked 30 minutes. If you need shorter spurts of exercise, walk during the commercials and rest during the show. Drink to glasses of water prior to any exercise to prevent dehydration and to improve the results of the work out. Your RESTING HEART RATE is the number of times your heart beats per minute when you are not exerting yourself. The more fit you are, the lower your resting heart rate will be. Your MAXIMUM HEART RATE is the number of times per minute your heart pumps when it is working at 100% capacity. NEVER EXERCISE AT YOUR MAXIMUM HEART RATE. MAX HEART RATE = 220 - your age    For example, in a 48year old, the maximum heart rate is 220-50 = 170 beats per minute    Your [place] is the number of beats per minute our heart should pump during aerobic exercise. Reaching your target heart rate indicates that your body is receiving maximum cardiovascular and fat burning benefits.      If you are fit, your TARGET HEART RATE = 70-85% of your maximum Heart rate      For a 48year old with vigorous intensity physical activity,         Target heart rate= 170 bpm x .70 = 119 bpm     Target heart rate = 170 bpm x .85=  145 bpm        Therefore, your target heart rate during physical activity is 119-145      If you are not fit, TARGET HEART RATE = 50-70% of your maximum Heart rate    MODERATE CONSISTENT AEROBIC EXERCISE OR WALKING FOR AT LEAST 150 MINUTES WEEKLY IS AN ESSENTIAL PART OF ANY WEIGHT LOSS OF WEIGHT MAINTENANCE PROGRAM.     During WEIGHT LOSS PHASE, at least 75% of your exercise needs to be walking or moderate aerobic activity and 25% or 0% can be Isometric or Resistance type exercises (the latter can be deferred to the weight maintenance phase.)     During WEIGHT MAINTENANCE PHASE, at least 50% of your exercise needs to be aerobic and 50% Isometric or Resistance type exercises. BENEFITS OF MODERATE INTENSITY EXERCISE MOST DAYS OF THE WEEK:     1. Insulin Resistance improves 30-85%   2. Abdominal Fat decreases by 30%   3. Inflammatory Markers decrease by 30% (therefore pain decreases)   4. Systolic and Diastolic Blood Pressure decrease by 4 mmHg   5. HDL improves by 5%   6. Triglycerides decrease by 15%   7. Shift from small dense LDL to large dense LDL (therefore decrease Insulin Resistance)   8. Decrease coagulability                  Starting a Weight Loss Plan: Care Instructions  Your Care Instructions  If you are thinking about losing weight, it can be hard to know where to start. Your doctor can help you set up a weight loss plan that best meets your needs. You may want to take a class on nutrition or exercise, or join a weight loss support group. If you have questions about how to make changes to your eating or exercise habits, ask your doctor about seeing a registered dietitian or an exercise specialist.  It can be a big challenge to lose weight. But you do not have to make huge changes at once. Make small changes, and stick with them. When those changes become habit, add a few more changes. If you do not think you are ready to make changes right now, try to pick a date in the future. Make an appointment to see your doctor to discuss whether the time is right for you to start a plan.   Follow-up care is a key part of your treatment and safety. Be sure to make and go to all appointments, and call your doctor if you are having problems. Its also a good idea to know your test results and keep a list of the medicines you take. How can you care for yourself at home? · Set realistic goals. Many people expect to lose much more weight than is likely. A weight loss of 5% to 10% of your body weight may be enough to improve your health. · Get family and friends involved to provide support. Talk to them about why you are trying to lose weight, and ask them to help. They can help by participating in exercise and having meals with you, even if they may be eating something different. · Find what works best for you. If you do not have time or do not like to cook, a program that offers meal replacement bars or shakes may be better for you. Or if you like to prepare meals, finding a plan that includes daily menus and recipes may be best.  · Ask your doctor about other health professionals who can help you achieve your weight loss goals. ¨ A dietitian can help you make healthy changes in your diet. ¨ An exercise specialist or  can help you develop a safe and effective exercise program.  ¨ A counselor or psychiatrist can help you cope with issues such as depression, anxiety, or family problems that can make it hard to focus on weight loss. · Consider joining a support group for people who are trying to lose weight. Your doctor can suggest groups in your area. Where can you learn more? Go to http://romulo-daron.info/. Enter J009 in the search box to learn more about \"Starting a Weight Loss Plan: Care Instructions. \"  Current as of: February 16, 2016  Content Version: 11.1  © 1342-0044 [employer]. Care instructions adapted under license by [employer] (which disclaims liability or warranty for this information).  If you have questions about a medical condition or this instruction, always ask your healthcare professional. [place] [address] any warranty or liability for your use of this information.

## 2017-03-28 NOTE — Clinical Note
George Portillo,   We spoke with Eve Barahona today and let her know you tried contacting her. She told us that the best number to contact her at during the day is [phone] (work number). She still would need the 8:30AM slot. Thanks!

## 2017-03-28 NOTE — PROGRESS NOTES
HISTORY OF PRESENT ILLNESS  Wolfgang Lemon is a 64 y.o. female presents with Weight Management; Blood Pressure Check; Hand Injury (Fell back in December; no injury at time of fall; but stating that hands will feel cold for awhile but then go back to normal; Pt stated that she forgot to tell Dr. Ellie Duong at last ov); Medication Refill; and Results      Agree with nurse note. She is tolerating Phendimetrazine 105 mg well, month 2 of 3. Last prescribed on 2/24/17. Compared to Phentermine, she finds Phendimetrazine to be \"real mild' and that it causes her to make conscious efforts not to eat the wrong foods. She is participating in the 6-week [facility] workplace wellness program through her job and is guided by a dietician. She walks x10 minutes in the mornings, x10 minutes during lunch time, and x30 minutes in the evenings. She lost 3 pounds since the last visit. Percent body fat has decreased from 45.3% to 39.5%, waist circumference measurement has changed from 41.25\" to 45.4\". Overall, patient is pleased and requests a refill of the medication today. Hyperglycemic pt with Hashimoto's thyroiditis, Vit D deficiency, and hypercholesterolemia presents to the office with a BP of 107/74. She requests her most recent labs from 2/24/17. Alk phosphatase was 95, down from 104. TSH was 1.9. Free T4 was 1.29. On 01/30/13, Hgb A1C was 5.7 and it was 5.5. On 2/10/16. Pt reports falling face forward in 12/2016 on an uneven sidewalk while walking and believes she landed on her hands. Since the fall, her hands feel cold off and on lasting x5-10 minutes and she wonders why. Sometimes she wears a glove. No pattern as to when her hands get cold. Improves on its own. She has a hx of RA but denies any hand pain, wrist pain, or shoulder pain. Denies discoloration, fatigue, or cravings for ice, cornstarch, or dirt. She requests a referral to a vascular surgeon. Pt with BL hand edema and RA. She takes Lasix 40 mg daily with relief. Pt with chronic insomnia; stable on Lunesta 2 mg. She sleeps x8-9 hours nightly. Her insurance would not cover Belsomra. Patient denies chest pain, heart palpitations, nausea, dry mouth, constipation, signs of depression. Pt is agreeable with Hep C screening. Written by hyun Bhatt, as dictated by Dr. Zaheer Canela DO.    ROS    Review of Systems negative except as noted above in HPI. ALLERGIES:    Allergies   Allergen Reactions    Advil Pm [Ibuprofen-Diphenhydramine Hcl] Other (comments)     Slightly elevated Creaitnine 1.02    Aleve [Naproxen Sodium] Other (comments)     Due to kidneys    Bactrim [Sulfamethoxazole-Trimethoprim] Nausea and Vomiting    Sulfa (Sulfonamide Antibiotics) Nausea and Vomiting       CURRENT MEDICATIONS:    Outpatient Prescriptions Marked as Taking for the 3/28/17 encounter (Office Visit) with Wendi Pedersen DO   Medication Sig Dispense Refill    phendimetrazine tartrate 105 mg cpER Take 1 Cap by mouth Daily (before breakfast). Max Daily Amount: 1 Cap. Indications: WEIGHT LOSS MANAGEMENT FOR OBESE PATIENT (BMI >= 30) 30 Cap 0    MYRBETRIQ 25 mg ER tablet Take 25 mg by mouth daily. 1    sertraline (ZOLOFT) 50 mg tablet Take 1 Tab by mouth daily. Indications: GENERALIZED ANXIETY DISORDER 135 Tab 3    eszopiclone (LUNESTA) 2 mg tablet Take 1 Tab by mouth nightly. Max Daily Amount: 2 mg. 30 Tab 5    furosemide (LASIX) 40 mg tablet TAKE 1 TABLET BY MOUTH ONCE DAILY FOR EDEMA 90 Tab 3    MINIVELLE 0.1 mg/24 hr 1 Patch by TransDERmal route every Monday. 0    cetirizine (ZYRTEC) 10 mg tablet Take 1 Tab by mouth daily. (Patient taking differently: Take 10 mg by mouth daily as needed.) 30 Tab 3    fluticasone (FLONASE) 50 mcg/actuation nasal spray 2 Sprays by Both Nostrils route daily.  Indications: ALLERGIC RHINITIS 1 Bottle 5    albuterol (PROVENTIL HFA, VENTOLIN HFA) 90 mcg/actuation inhaler Take 2 Puffs by inhalation every four (4) hours as needed for Wheezing (cough). 1 Inhaler 1    montelukast (SINGULAIR) 10 mg tablet Take 1 Tab by mouth daily. Indications: ALLERGIC RHINITIS, breathing 30 Tab 11    cholecalciferol, vitamin D3, (VITAMIN D3) 2,000 unit Tab Take 2,000 Units by mouth daily. PAST MEDICAL HISTORY:    Past Medical History:   Diagnosis Date    Achilles tendonitis 12/2004    Right. Dr. Milvia Rudd Arthritis 2010    hips, knees. Dr. Alaina Childers    Chest pain 08/2013    Dr. Lucy Garcia.  Chickenpox childhood    Endometriosis 1990    Dr. Xiomara Acuña Environmental allergies     MARGI (generalized anxiety disorder)     Hip pain, bilateral 2010    due to severe OA. Iliopsoas bursitis. Dr. Mora Olivarez    History of breast lump/mass excision 1998    Left breast.  Dr. Hermann Contreras Hyperlipidemia     Iron deficiency anemia     iron deficiency    Menopausal and postmenopausal disorder 2007    Dr. Xiomara Acuña Uterine fibroid     Dr. Carmen Small.  Vitamin D deficiency 03/2008       PAST SURGICAL HISTORY:    Past Surgical History:   Procedure Laterality Date    BIOPSY BREAST  Rt 1993;Lt 1998    Dr. Aubree Wetzel    Right achiles tendon. Dr. Christopher Barillas.  HX BREAST BIOPSY  2001    Left. benign. Dr. Jaime Oneil HX COLONOSCOPY  09/27/2016    Dr. Shadia Laguerre.  due q 5 yrs due to fam hx   Sayda He    Dr. Osmany Ward. due to endometriosis    HX PELVIC LAPAROSCOPY  1995    due to endometriosis Dr. Austen Bond  04/2011    due to fibroids. Dr. Carmen Small.        FAMILY HISTORY:    Family History   Problem Relation Age of Onset    Hypertension Mother     Other Mother      hearing loss/vision loss    Heart Disease Father     Lung Disease Father     Diabetes Sister     Heart Attack Sister 54     March 2014    Stroke Sister 48     after MI    Heart Disease Sister     Thyroid Disease Sister      goiter    Colon Polyps Sister     Heart Disease Brother      Defibrillator placed    Diabetes Maternal Grandmother     Heart Attack Brother 48       SOCIAL HISTORY:    Social History     Social History    Marital status: SINGLE     Spouse name: N/A    Number of children: N/A    Years of education: N/A     Social History Main Topics    Smoking status: Never Smoker    Smokeless tobacco: Never Used      Comment: lived with smoker mom x 18 yrs    Alcohol use No    Drug use: No    Sexual activity: Not Currently     Partners: Male     Birth control/ protection: Abstinence     Other Topics Concern    None     Social History Narrative       IMMUNIZATIONS:    Immunization History   Administered Date(s) Administered    Influenza Vaccine 12/14/2012, 10/22/2013, 09/18/2014    Influenza Vaccine (Quad) 10/29/2015    Influenza Vaccine (Quad) PF 09/29/2016    Influenza Vaccine Split 11/12/2010, 11/07/2011    TD Vaccine 08/18/2000    Tdap 06/30/2016       PHYSICAL EXAMINATION    Vital Signs    Visit Vitals    /74 (BP 1 Location: Right arm, BP Patient Position: Sitting)    Pulse 60    Temp 97.8 °F (36.6 °C) (Oral)    Resp 20    Ht 5' 4.02\" (1.626 m)    Wt 214 lb 1.6 oz (97.1 kg)    SpO2 100%    BMI 36.73 kg/m2       Weight Metrics 3/28/2017 3/28/2017 2/24/2017 2/24/2017 12/29/2016 12/29/2016 10/28/2016   Weight - 214 lb 1.6 oz - 217 lb 3.2 oz - 212 lb 6.4 oz -   Waist Measure Inches 45.4 - 41.25 - 39.3 - 38   Body Fat % 39.5 - 45.3 - 40.1 - 45.8   BMI - 36.73 kg/m2 - 37.28 kg/m2 - 36.46 kg/m2 -       General appearance - Well nourished. Well appearing. Well developed. No acute distress. Overweight. Head - Normocephalic. Atraumatic. Eyes - pupils equal and reactive, extraocular eye movements intact, sclera anicteric  Ears - Hearing is grossly normal bilaterally. Nose - normal and patent, no erythema, discharge or polyps   Mouth - mucous membranes moist, pharynx normal with cobblestone appearance.   No erythema, white exudate or obstruction. Neck - supple. Midline trachea. No carotid bruits are noted. No thyromegaly noted. Chest - clear to auscultation bilaterally anterriorly and posteriorly. No wheezes, rales or rhonchi. Breath sounds are symmetrical and unlabored bilaterally. Heart - normal rate. Regular rhythm, normal S1, S2. No murmur. No rubs, clicks or gallops noted. Abdomen - soft and distended. No masses or organomegaly. No rebound, rigidity or guarding. Bowel sounds normal x 4 quadrants. No tenderness noted. Neurological - awake, alert and oriented to person, place, and time and event. Cranial nerves II through XII intact. Muscle strength is +5/5 x 4 extremities. Sensation is intact to light touch bilaterally. Steady gait. Heme/Lymph - peripheral pulses normal x 4 extremities. No peripheral edema is noted. No cervical adenopathy noted. <3 second capillary refill in fingers. Musculoskeletal - Intact x 4. No pain on palpation of the bilateral shoulders, elbows, wrists, hands. Skin - no rashes, erythema, ecchymosis, lacerations, abrasions, suspicious moles noted. No skin tags. No acanthosis nigricans noted in the axilla or neck. Fingernails have white-kam discoloration. Psychological -   normal behavior, speech, dress and thought processes. Good insight. Good eye contact. Normal affect. Appropriate mood.       DATA REVIEWED    Lab Results   Component Value Date/Time    WBC 4.2 07/11/2016 08:10 AM    HGB 12.7 07/11/2016 08:10 AM    HCT 39.9 07/11/2016 08:10 AM    PLATELET 760 27/69/5456 08:10 AM    MCV 91 07/11/2016 08:10 AM     Lab Results   Component Value Date/Time    Sodium 139 07/11/2016 08:10 AM    Potassium 3.9 07/11/2016 08:10 AM    Chloride 97 07/11/2016 08:10 AM    CO2 24 07/11/2016 08:10 AM    Anion gap 5 04/23/2011 05:20 AM    Glucose 87 07/11/2016 08:10 AM    BUN 13 07/11/2016 08:10 AM    Creatinine 0.77 07/11/2016 08:10 AM    BUN/Creatinine ratio 17 07/11/2016 08:10 AM    GFR est AA 96 07/11/2016 08:10 AM    GFR est non-AA 84 07/11/2016 08:10 AM    Calcium 8.9 07/11/2016 08:10 AM    Bilirubin, total 0.5 07/11/2016 08:10 AM    AST (SGOT) 17 07/11/2016 08:10 AM    Alk. phosphatase 95 02/24/2017 10:03 AM    Protein, total 6.7 07/11/2016 08:10 AM    Albumin 3.9 07/11/2016 08:10 AM    A-G Ratio 1.4 07/11/2016 08:10 AM    ALT (SGPT) 12 07/11/2016 08:10 AM     Lab Results   Component Value Date/Time    Cholesterol, total 174 02/10/2016 08:37 AM    Cholesterol, Total 174 02/10/2016 08:37 AM    HDL Cholesterol 70 02/10/2016 08:37 AM    LDL, calculated 92 02/10/2016 08:37 AM    VLDL, calculated 12 02/10/2016 08:37 AM    Triglyceride 60 02/10/2016 08:37 AM     Lab Results   Component Value Date/Time    Vitamin D 25-Hydroxy 26.3 07/18/2011 10:22 AM    VITAMIN D, 25-HYDROXY 32.7 07/11/2016 08:10 AM       Lab Results   Component Value Date/Time    Hemoglobin A1c 5.5 02/10/2016 08:37 AM     Lab Results   Component Value Date/Time    TSH 1.900 02/24/2017 10:03 AM    TSH 2.36 08/21/2014 07:50 AM       ASSESSMENT and PLAN    ICD-10-CM ICD-9-CM    1. Pure hypercholesterolemia E78.00 272.0 LIPID PANEL      METABOLIC PANEL, COMPREHENSIVE   2. Chronic insomnia F51.04 780.52     stable on Lunesta 2 mg   3. Hashimoto's thyroiditis E06.3 245.2    4. Vitamin D deficiency E55.9 268.9 VITAMIN D, 25 HYDROXY   5. Hyperglycemia O35.3 745.10 METABOLIC PANEL, COMPREHENSIVE      HEMOGLOBIN A1C WITH EAG   6. Cold hands R20.9 782.9 REFERRAL TO VASCULAR SURGERY   7. Obesity, Class II, BMI 35-39.9 ([facility]) E66.01 278.01 phendimetrazine tartrate 105 mg cpER   8. Weight loss R63.4 783.21 CBC W/O DIFF    3# since 02/2017, 10# since 03/2016 due to efforts   9. Rheumatoid arthritis involving both hips, unspecified rheumatoid factor presence (HCC) M06.9 714.0    10. Hand edema R60.0 782. 3 MIRIAM, DIRECT, W/REFLEX    Lasix helps   11.  Need for hepatitis C screening test Z11.59 V73.89 HCV AB W/RFLX TO PRISCILLA       Continue current medications and care.    Prescriptions written and given to patient (Phendimetrazine 105 mg, month 3 of 3.)  Medication side effects discussed. Reviewed most recent labs from 2/24/17. Recheck pertinent labs when fasting. Referrals given today. Advise pt to keep appointments with specialists today. Vascular Surgery. Discussed the patient's BMI with her. The BMI follow up plan is as follows: I have counseled this patient on diet and exercise regimens. Addressed weight, diet and exercise with patient. Diet recommendations:  Decrease carbohydrates (white foods including flour, white bread, white rice, white pasta, white potatoes; corn, sweet foods, sweet drinks and alcohol), increase green leafy vegetables and protein with each meal.  Avoid fried foods. Eat 3-5 small meals daily. Do not skip meals. Ok to use meal replacements up to twice daily with protein goal of 60 mg per meal.   Recommend OTC Premiere Protein bars or shakes. Increase water intake. Physical Activity prescription:  A goal of 30 minutes physical activity daily is recommended for health benefit and at least 60 minutes daily to prevent weight regain. For weight loss, no less than 75% needs to be aerobic (i.e. Walking) and no more than 25% resistance exercising (i.e. Weight lifting). For weight maintenance phase, 50% aerobic and 50% resistance exercises. Sleep prescription:    A goal of 7-8 hours of uninterrupted sleep is recommended to turn off the Grehlin hormone to be released from the stomach and triggers appetite while promoting weight gain. Proper rest turns on Leptin hormone to be released from white adipose tissue and promotes weight loss. Counseled patient on: weight loss goals, emphasizing a 5-10% weight loss in 6-12 months and strategies. Sleep hygiene, hand edema, hyperglycemia, Hashimoto's, and cold hands. Relevant handouts given and discussed with patient. Immunizations noted.     Praised pt for weight loss efforts and progress. Patient was offered a choice/choices in the treatment plan today. Patient expresses understanding of the plan and agrees with recommendations. Follow-up Disposition:  Return in about 1 month (around 4/28/2017) for weight, bp, results. Written by hyun Sykes, as dictated by Dr. Adilene Cadet DO. Documentation True and Accepted by Asuncion Hammond. Patient Instructions        Learning About High Blood Sugar  What is high blood sugar? Your body turns the food you eat into glucose (sugar), which it uses for energy. But if your body isn't able to use the sugar right away, it can build up in your blood and lead to high blood sugar. When the amount of sugar in your blood stays too high for too much of the time, you may have diabetes. Diabetes is a disease that can cause serious health problems. The good news is that lifestyle changes may help you get your blood sugar back to normal and avoid or delay diabetes. What causes high blood sugar? Sugar (glucose) can build up in your blood if you:  · Are overweight. · Have a family history of diabetes. · Take certain medicines, such as steroids. What are the symptoms? Having high blood sugar may not cause any symptoms at all. Or it may make you feel very thirsty or very hungry. You may also urinate more often than usual, have blurry vision, or lose weight without trying. How is high blood sugar treated? You can take steps to lower your blood sugar level if you understand what makes it get higher. Your doctor may want you to learn how to test your blood sugar level at home. Then you can see how illness, stress, or different kinds of food or medicine raise or lower your blood sugar level. Other tests may be needed to see if you have diabetes. How can you prevent high blood sugar? · Watch your weight. If you're overweight, losing just a small amount of weight may help.  Reducing fat around your waist is most important. · Limit the amount of calories, sweets, and unhealthy fat you eat. Ask your doctor if a dietitian can help you. A registered dietitian can help you create meal plans that fit your lifestyle. · Get at least 30 minutes of exercise on most days of the week. Exercise helps control your blood sugar. It also helps you maintain a healthy weight. Walking is a good choice. You also may want to do other activities, such as running, swimming, cycling, or playing tennis or team sports. · If your doctor prescribed medicines, take them exactly as prescribed. Call your doctor if you think you are having a problem with your medicine. You will get more details on the specific medicines your doctor prescribes. Follow-up care is a key part of your treatment and safety. Be sure to make and go to all appointments, and call your doctor if you are having problems. It's also a good idea to know your test results and keep a list of the medicines you take. Where can you learn more? Go to http://romulo-daron.info/. Enter O108 in the search box to learn more about \"Learning About High Blood Sugar. \"  Current as of: May 23, 2016  Content Version: 11.1  © 4725-6785 [employer], [employer]. Care instructions adapted under license by [employer] (which disclaims liability or warranty for this information). If you have questions about a medical condition or this instruction, always ask your healthcare professional. [place] [address] any warranty or liability for your use of this information. Starting a Weight Loss Plan: Care Instructions  Your Care Instructions  If you are thinking about losing weight, it can be hard to know where to start. Your doctor can help you set up a weight loss plan that best meets your needs. You may want to take a class on nutrition or exercise, or join a weight loss support group.  If you have questions about how to make changes to your eating or exercise habits, ask your doctor about seeing a registered dietitian or an exercise specialist.  It can be a big challenge to lose weight. But you do not have to make huge changes at once. Make small changes, and stick with them. When those changes become habit, add a few more changes. If you do not think you are ready to make changes right now, try to pick a date in the future. Make an appointment to see your doctor to discuss whether the time is right for you to start a plan. Follow-up care is a key part of your treatment and safety. Be sure to make and go to all appointments, and call your doctor if you are having problems. Its also a good idea to know your test results and keep a list of the medicines you take. How can you care for yourself at home? · Set realistic goals. Many people expect to lose much more weight than is likely. A weight loss of 5% to 10% of your body weight may be enough to improve your health. · Get family and friends involved to provide support. Talk to them about why you are trying to lose weight, and ask them to help. They can help by participating in exercise and having meals with you, even if they may be eating something different. · Find what works best for you. If you do not have time or do not like to cook, a program that offers meal replacement bars or shakes may be better for you. Or if you like to prepare meals, finding a plan that includes daily menus and recipes may be best.  · Ask your doctor about other health professionals who can help you achieve your weight loss goals. ¨ A dietitian can help you make healthy changes in your diet. ¨ An exercise specialist or  can help you develop a safe and effective exercise program.  ¨ A counselor or psychiatrist can help you cope with issues such as depression, anxiety, or family problems that can make it hard to focus on weight loss. · Consider joining a support group for people who are trying to lose weight. Your doctor can suggest groups in your area. Where can you learn more? Go to http://romulo-daron.info/. Enter A386 in the search box to learn more about \"Starting a Weight Loss Plan: Care Instructions. \"  Current as of: February 16, 2016  Content Version: 11.1  © 6980-8108 [employer]. Care instructions adapted under license by [employer] (which disclaims liability or warranty for this information). If you have questions about a medical condition or this instruction, always ask your healthcare professional. [address] any warranty or liability for your use of this information. WEIGHT LOSS PLAN    1. Read food labels and count calories. Goal 3657-4015 calories daily for women and 9570-3023 calories daily for men. Achieve this by decreasing caloric intake by 500 calories by weekly increments. NOTE:  1 pound = 3500 calories! Www.loseit. [employer] Skin Care  Www.[employer].pal.[employer]ut[employer] Skin Care  Www.[employer]. [employer] Skin Care  Www.[employer]. gov  Weight watchers mobile    Calories needed to lose 1-2 pounds a week = 10 x your weight in pounds    2. Increase water intake. Per [employer] Weight loss Program, it is important to drink 1/2 your body weight in ounces of water daily. Decrease your water consumption 2-3 hours before bedtime to prevent sleep disturbance from frequent urination. 3.  Decrease sugary beverages. Each can or glass of soda increases your risk of obesity by 60%. Can lose 10 pounds in a month by avoiding any soda. 12 oz can of soda = 140 calories, 16 oz cup of sweet tea = 200 calories    16 oz orange juice = 200 calories, 10 oz apple juice = 150 calories   32 oz sports drink = 200 calories, 16 oz punch = 240 calories   3.5 oz alcohol = 100-150 calories     4. Avoid High Fructose Corn Syrup products. This ingredient makes products highly addictive! 5. Exercise 30 minutes daily 5 days weekly, minimally.   If you burn 3500 calories that equals a pound! Use a pedometer to count steps. Visit www. [employer] for a free pedometer and diet recommendations. Your maximum heart rate = 220 - your age    Never exercise at your maximum heart rate. See handout for target heart rate. 6.  Decrease carbohydrates (white bread, pasta, rice, potatoes, sweet foods and sweet drinks like soda, tea, coffee, juice and sports drinks). Increase fiber and protein. Goal:   calories daily of carbohydrates     Try CHROMIUM PICONATE 200 MG THREE TIMES DAILY,    to decrease Premenstrual carbohydrate cravings. 7.  Eat 3-5 small meals daily, include lots of protein (beans/legumes, nuts, lean meat, eggs) and green vegetables with each. (Breakfast, lunch, dinner with 2 healthy snacks)    8. Get proper rest 7-8 hours uninterrupted. When you get less than 6 hours, it triggers hunger by affecting your Grehlin:Leptin ratio and this results in weight gain. 9.  Watch your food portions. Green leafy vegetables should cover 1/2 of the plate, lean meat 1/4 of the plate, starchy vegetable 1/4 of the plate. Use smaller plates. 10.  Do not eat until you are full. Eat until you are no longer hungry. If you are not sure, try drinking a glass of water before getting your second serving of food. 11.  Do not weigh yourself daily. Wait until your next office visit. Use how you feel and  how your clothes fit as measurements of success. 12.  Address your spirituality to draw strength from above during your journey. Remember \"I am fearfully and wonderfully made. Marvelous in His eyes. \"    13. Set realistic, appropriate and achievable weight loss goals:     RECOMMENDED TARGET WEIGHT LOSS:  Initial weight loss of 5-10% of your initial body weight achieved over 6 months or a decrease of 2 BMI units. MINIMUM GOAL OF WEIGHT LOSS:  Reduce body weight and maintain a lower body weight. Prevent weight gain.           RELATED WEBSITES: Www.obesityaction. org  (and consider joining the Obesity Action Coalition for $25/year. The [address] mission is to elevated and empower those affected by obesity through education, advocacy and support. Quarterly journals included in membership fee. )    Www.[employer]. [employer]  www.X BODY.com     RELATED DIETS:  Dr. Juanjose Howard Weight loss challenge, Mediterranean diet, [facility] Watchers, Paleo Diet (anti-inflammatory diet)      EXERCISE 150 MINUTES WEEKLY. THIS CAN BE ACHIEVED BY WORKING OUT OR WALKING A MINIMUM OF 30 MINUTES FOR 5 DAYS WEEKLY. YOU CAN EXERCISE IN INCREMENTS OF 10-15 MINUTES UP TO 3 TIMES A DAY. Consider performing \"brainless exercise. \"  Choose your favorite tv program.  Five minutes before and for 5 minutes after the tv program, stretch your body. While the program is on, walk in place watching the show. When commercials come on, rest or walk around the house to do other things. When the program begins, return to walking in place. When you are able keep walking during the commercials and add light weights to your ankles or hands. By the end of the show, you would have walked 30 minutes. If you need shorter spurts of exercise, walk during the commercials and rest during the show. Drink to glasses of water prior to any exercise to prevent dehydration and to improve the results of the work out. Your RESTING HEART RATE is the number of times your heart beats per minute when you are not exerting yourself. The more fit you are, the lower your resting heart rate will be. Your MAXIMUM HEART RATE is the number of times per minute your heart pumps when it is working at 100% capacity. NEVER EXERCISE AT YOUR MAXIMUM HEART RATE. MAX HEART RATE = 220 - your age    For example, in a 48year old, the maximum heart rate is 220-50 = 170 beats per minute    Your [place] is the number of beats per minute our heart should pump during aerobic exercise. Reaching your target heart rate indicates that your body is receiving maximum cardiovascular and fat burning benefits. If you are fit, your TARGET HEART RATE = 70-85% of your maximum Heart rate      For a 48year old with vigorous intensity physical activity,         Target heart rate= 170 bpm x .70 = 119 bpm     Target heart rate = 170 bpm x .85=  145 bpm        Therefore, your target heart rate during physical activity is 119-145      If you are not fit, TARGET HEART RATE = 50-70% of your maximum Heart rate    MODERATE CONSISTENT AEROBIC EXERCISE OR WALKING FOR AT LEAST 150 MINUTES WEEKLY IS AN ESSENTIAL PART OF ANY WEIGHT LOSS OF WEIGHT MAINTENANCE PROGRAM.     During WEIGHT LOSS PHASE, at least 75% of your exercise needs to be walking or moderate aerobic activity and 25% or 0% can be Isometric or Resistance type exercises (the latter can be deferred to the weight maintenance phase.)     During WEIGHT MAINTENANCE PHASE, at least 50% of your exercise needs to be aerobic and 50% Isometric or Resistance type exercises. BENEFITS OF MODERATE INTENSITY EXERCISE MOST DAYS OF THE WEEK:     1. Insulin Resistance improves 30-85%   2. Abdominal Fat decreases by 30%   3. Inflammatory Markers decrease by 30% (therefore pain decreases)   4. Systolic and Diastolic Blood Pressure decrease by 4 mmHg   5. HDL improves by 5%   6. Triglycerides decrease by 15%   7. Shift from small dense LDL to large dense LDL (therefore decrease Insulin Resistance)   8. Decrease coagulability                  Starting a Weight Loss Plan: Care Instructions  Your Care Instructions  If you are thinking about losing weight, it can be hard to know where to start. Your doctor can help you set up a weight loss plan that best meets your needs. You may want to take a class on nutrition or exercise, or join a weight loss support group.  If you have questions about how to make changes to your eating or exercise habits, ask your doctor about seeing a registered dietitian or an exercise specialist.  It can be a big challenge to lose weight. But you do not have to make huge changes at once. Make small changes, and stick with them. When those changes become habit, add a few more changes. If you do not think you are ready to make changes right now, try to pick a date in the future. Make an appointment to see your doctor to discuss whether the time is right for you to start a plan. Follow-up care is a key part of your treatment and safety. Be sure to make and go to all appointments, and call your doctor if you are having problems. Its also a good idea to know your test results and keep a list of the medicines you take. How can you care for yourself at home? · Set realistic goals. Many people expect to lose much more weight than is likely. A weight loss of 5% to 10% of your body weight may be enough to improve your health. · Get family and friends involved to provide support. Talk to them about why you are trying to lose weight, and ask them to help. They can help by participating in exercise and having meals with you, even if they may be eating something different. · Find what works best for you. If you do not have time or do not like to cook, a program that offers meal replacement bars or shakes may be better for you. Or if you like to prepare meals, finding a plan that includes daily menus and recipes may be best.  · Ask your doctor about other health professionals who can help you achieve your weight loss goals. ¨ A dietitian can help you make healthy changes in your diet. ¨ An exercise specialist or  can help you develop a safe and effective exercise program.  ¨ A counselor or psychiatrist can help you cope with issues such as depression, anxiety, or family problems that can make it hard to focus on weight loss. · Consider joining a support group for people who are trying to lose weight.  Your doctor can suggest groups in your area.  Where can you learn more? Go to http://romulo-daron.info/. Enter L330 in the search box to learn more about \"Starting a Weight Loss Plan: Care Instructions. \"  Current as of: February 16, 2016  Content Version: 11.1  © 5684-6271 [employer]. Care instructions adapted under license by [employer] (which disclaims liability or warranty for this information). If you have questions about a medical condition or this instruction, always ask your healthcare professional. [address] any warranty or liability for your use of this information.

## 2017-03-28 NOTE — PROGRESS NOTES
Chief Complaint   Patient presents with    Weight Management    Blood Pressure Check    Hand Injury     Fell back in December; no injury at time of fall; but stating that hands will feel cold for awhile but then go back to normal; Pt stated that she forgot to tell Dr. Maynor Gonzales at last ov    Medication Refill     weight loss pill     1. Have you been to the ER, urgent care clinic since your last visit? Hospitalized since your last visit? No    2. Have you seen or consulted any other health care providers outside of the [address] [place] [address] since your last visit? Include any pap smears or colon screening. No    In the event something were to happen to you and you were unable to speak on your behalf, do you have an Advance Directive/ Living Will in place stating your wishes? YES    If yes, do we have a copy on file NO    If no, would you like information:    Pt encouraged to bring in to next ov. Pt stated that she forgot to bring it today.

## 2017-03-28 NOTE — MR AVS SNAPSHOT
Visit Information Date & Time Provider Department Dept. Phone Encounter #  
 3/28/2017  8:30 AM DO Carlie Julian [phone] [id number] Follow-up Instructions Return in about 1 month (around 4/28/2017) for weight, bp, results. Routing History Your Appointments 4/25/2017  7:40 AM  
Any with DO Carlie Julian (ALLAN Robles) Appt Note: weight check [address] [facility] [address]  
[phone]  
  
   
 [address]  
  
    
 5/4/2017  8:30 AM  
Office Visit with DO Carlie Julian (ALLAN Robles) Appt Note: weight check [address] [facility] [address]  
[phone]  
  
   
 [address] [facility] [address] [facility] [address] [facility] Upcoming Health Maintenance Date Due  
 PAP AKA CERVICAL CYTOLOGY 5/19/2017* BREAST CANCER SCRN MAMMOGRAM 12/14/2018 COLONOSCOPY 9/27/2021 DTaP/Tdap/Td series (2 - Td) 6/30/2026 *Topic was postponed. The date shown is not the original due date. Allergies as of 3/28/2017  Review Complete On: 3/28/2017 By: Fae Bumpers Severity Noted Reaction Type Reactions Advil Pm [Ibuprofen-diphenhydramine Hcl]  07/18/2011    Other (comments) Slightly elevated Creaitnine 1.02 Aleve [Naproxen Sodium]  12/28/2011    Other (comments) Due to kidneys Bactrim [Sulfamethoxazole-trimethoprim]  09/11/2009    Nausea and Vomiting  
 Sulfa (Sulfonamide Antibiotics)  09/11/2009    Nausea and Vomiting Current Immunizations  Reviewed on 3/28/2017 Name Date Influenza Vaccine 9/18/2014, 10/22/2013, 12/14/2012 Influenza Vaccine Sammy Lobstein) 10/29/2015 Influenza Vaccine (Quad) PF 9/29/2016 Influenza Vaccine Split 11/7/2011, 11/12/2010 TD Vaccine 8/18/2000  Tdap 6/30/2016  9:35 AM  
  
 Reviewed by Ludmila Chung DO on 3/28/2017 at  9:13 AM  
You Were Diagnosed With   
  
 Codes Comments Pure hypercholesterolemia    -  Primary ICD-10-CM: E78.00 ICD-9-CM: 272.0 Chronic insomnia     ICD-10-CM: F51.04 
ICD-9-CM: 780.52 stable on Lunesta 2 mg Hashimoto's thyroiditis     ICD-10-CM: E06.3 ICD-9-CM: 005. 2 Vitamin D deficiency     ICD-10-CM: E55.9 ICD-9-CM: 268.9 Hyperglycemia     ICD-10-CM: R73.9 ICD-9-CM: 790.29 Cold hands     ICD-10-CM: R20.9 ICD-9-CM: 782.9 Obesity, Class II, BMI 35-39.9 (HCC)     ICD-10-CM: E66.01 
ICD-9-CM: 278.01 Weight loss     ICD-10-CM: R63.4 ICD-9-CM: 783.21 3# since 02/2017, 10# since 03/2016 due to efforts Rheumatoid arthritis involving both hips, unspecified rheumatoid factor presence ([facility]     ICD-10-CM: M06.9 ICD-9-CM: 714.0 Hand edema     ICD-10-CM: R60.0 ICD-9-CM: 876. 3 Lasix helps Need for hepatitis C screening test     ICD-10-CM: Z11.59 
ICD-9-CM: V73.89 Vitals BP Pulse Temp Resp Height(growth percentile) Weight(growth percentile) 107/74 (BP 1 Location: Right arm, BP Patient Position: Sitting) 60 97.8 °F (36.6 °C) (Oral) 20 5' 4.02\" (1.626 m) 214 lb 1.6 oz (97.1 kg) SpO2 BMI OB Status Smoking Status 100% 36.73 kg/m2 Hysterectomy Never Smoker BMI and BSA Data Body Mass Index Body Surface Area  
 36.73 kg/m 2 2.09 m 2 Preferred Pharmacy Pharmacy Name Phone [facility] [address] AT [address] [place] [address] Drive [phone] Your Updated Medication List  
  
   
This list is accurate as of: 3/28/17  9:13 AM.  Always use your most recent med list.  
  
  
  
  
 albuterol 90 mcg/actuation inhaler Commonly known as:  PROVENTIL HFA, VENTOLIN HFA, PROAIR HFA Take 2 Puffs by inhalation every four (4) hours as needed for Wheezing (cough). cetirizine 10 mg tablet Commonly known as:  ZYRTEC  
 Take 1 Tab by mouth daily. eszopiclone 2 mg tablet Commonly known as:  To Hector Take 1 Tab by mouth nightly. Max Daily Amount: 2 mg. fluticasone 50 mcg/actuation nasal spray Commonly known as:  Euna Brandon 2 Sprays by Both Nostrils route daily. Indications: ALLERGIC RHINITIS  
  
 furosemide 40 mg tablet Commonly known as:  LASIX TAKE 1 TABLET BY MOUTH ONCE DAILY FOR EDEMA  
  
 MINIVELLE 0.1 mg/24 hr  
Generic drug:  estradiol 1 Patch by TransDERmal route every Monday. montelukast 10 mg tablet Commonly known as:  SINGULAIR Take 1 Tab by mouth daily. Indications: ALLERGIC RHINITIS, breathing MYRBETRIQ 25 mg ER tablet Generic drug:  mirabegron ER Take 25 mg by mouth daily. phendimetrazine tartrate 105 mg Cper Take 1 Cap by mouth Daily (before breakfast). Max Daily Amount: 1 Cap. Indications: WEIGHT LOSS MANAGEMENT FOR OBESE PATIENT (BMI >= 30)  
  
 sertraline 50 mg tablet Commonly known as:  ZOLOFT Take 1 Tab by mouth daily. Indications: GENERALIZED ANXIETY DISORDER  
  
 VITAMIN D3 2,000 unit Tab Generic drug:  cholecalciferol (vitamin D3) Take 2,000 Units by mouth daily. Prescriptions Printed Refills  
 phendimetrazine tartrate 105 mg cpER 0 Sig: Take 1 Cap by mouth Daily (before breakfast). Max Daily Amount: 1 Cap. Indications: WEIGHT LOSS MANAGEMENT FOR OBESE PATIENT (BMI >= 30) Class: Print Route: Oral  
  
We Performed the Following MIRIAM, DIRECT, W/REFLEX [id number] CPT(R)] CBC W/O DIFF [34038 CPT(R)] HCV AB W/RFLX TO PRISCILLA [49727 CPT(R)] HEMOGLOBIN A1C WITH EAG [64653 CPT(R)] LIPID PANEL [59801 CPT(R)] METABOLIC PANEL, COMPREHENSIVE [35357 CPT(R)] REFERRAL TO VASCULAR SURGERY [DGP281 Custom] Comments:  
 Please evaluate patient for chronic cold hands VITAMIN D, 25 HYDROXY [id number] CPT(R)] Follow-up Instructions Return in about 1 month (around 4/28/2017) for weight, bp, results. Referral Information Referral ID Referred By Referred To  
  
 [id number] [facility] Mayela Cardona, [place] Kinjal Jaquez Visits Status Start Date End Date 1 New Request 3/28/17 3/28/18 If your referral has a status of pending review or denied, additional information will be sent to support the outcome of this decision. Patient Instructions Learning About High Blood Sugar What is high blood sugar? Your body turns the food you eat into glucose (sugar), which it uses for energy. But if your body isn't able to use the sugar right away, it can build up in your blood and lead to high blood sugar. When the amount of sugar in your blood stays too high for too much of the time, you may have diabetes. Diabetes is a disease that can cause serious health problems. The good news is that lifestyle changes may help you get your blood sugar back to normal and avoid or delay diabetes. What causes high blood sugar? Sugar (glucose) can build up in your blood if you: · Are overweight. · Have a family history of diabetes. · Take certain medicines, such as steroids. What are the symptoms? Having high blood sugar may not cause any symptoms at all. Or it may make you feel very thirsty or very hungry. You may also urinate more often than usual, have blurry vision, or lose weight without trying. How is high blood sugar treated? You can take steps to lower your blood sugar level if you understand what makes it get higher. Your doctor may want you to learn how to test your blood sugar level at home. Then you can see how illness, stress, or different kinds of food or medicine raise or lower your blood sugar level. Other tests may be needed to see if you have diabetes. How can you prevent high blood sugar? · Watch your weight. If you're overweight, losing just a small amount of weight may help. Reducing fat around your waist is most important. · Limit the amount of calories, sweets, and unhealthy fat you eat. Ask your doctor if a dietitian can help you. A registered dietitian can help you create meal plans that fit your lifestyle. · Get at least 30 minutes of exercise on most days of the week. Exercise helps control your blood sugar. It also helps you maintain a healthy weight. Walking is a good choice. You also may want to do other activities, such as running, swimming, cycling, or playing tennis or team sports. · If your doctor prescribed medicines, take them exactly as prescribed. Call your doctor if you think you are having a problem with your medicine. You will get more details on the specific medicines your doctor prescribes. Follow-up care is a key part of your treatment and safety. Be sure to make and go to all appointments, and call your doctor if you are having problems. It's also a good idea to know your test results and keep a list of the medicines you take. Where can you learn more? Go to http://romulo-daron.info/. Enter O108 in the search box to learn more about \"Learning About High Blood Sugar. \" Current as of: May 23, 2016 Content Version: 11.1 © 4102-8860 [employer]. Care instructions adapted under license by [employer] (which disclaims liability or warranty for this information). If you have questions about a medical condition or this instruction, always ask your healthcare professional. [place] [address] any warranty or liability for your use of this information. Starting a Weight Loss Plan: Care Instructions Your Care Instructions If you are thinking about losing weight, it can be hard to know where to start. Your doctor can help you set up a weight loss plan that best meets your needs. You may want to take a class on nutrition or exercise, or join a weight loss support group.  If you have questions about how to make changes to your eating or exercise habits, ask your doctor about seeing a registered dietitian or an exercise specialist. 
It can be a big challenge to lose weight. But you do not have to make huge changes at once. Make small changes, and stick with them. When those changes become habit, add a few more changes. If you do not think you are ready to make changes right now, try to pick a date in the future. Make an appointment to see your doctor to discuss whether the time is right for you to start a plan. Follow-up care is a key part of your treatment and safety. Be sure to make and go to all appointments, and call your doctor if you are having problems. Its also a good idea to know your test results and keep a list of the medicines you take. How can you care for yourself at home? · Set realistic goals. Many people expect to lose much more weight than is likely. A weight loss of 5% to 10% of your body weight may be enough to improve your health. · Get family and friends involved to provide support. Talk to them about why you are trying to lose weight, and ask them to help. They can help by participating in exercise and having meals with you, even if they may be eating something different. · Find what works best for you. If you do not have time or do not like to cook, a program that offers meal replacement bars or shakes may be better for you. Or if you like to prepare meals, finding a plan that includes daily menus and recipes may be best. 
· Ask your doctor about other health professionals who can help you achieve your weight loss goals. ¨ A dietitian can help you make healthy changes in your diet. ¨ An exercise specialist or  can help you develop a safe and effective exercise program. 
¨ A counselor or psychiatrist can help you cope with issues such as depression, anxiety, or family problems that can make it hard to focus on weight loss. · Consider joining a support group for people who are trying to lose weight. Your doctor can suggest groups in your area. Where can you learn more? Go to http://romulo-daron.info/. Enter B837 in the search box to learn more about \"Starting a Weight Loss Plan: Care Instructions. \" Current as of: February 16, 2016 Content Version: 11.1 © 4861-4179 [employer]. Care instructions adapted under license by [employer] (which disclaims liability or warranty for this information). If you have questions about a medical condition or this instruction, always ask your healthcare professional. [place] [address] any warranty or liability for your use of this information. WEIGHT LOSS PLAN 1. Read food labels and count calories. Goal 9582-1982 calories daily for women and 5499-3523 calories daily for men. Achieve this by decreasing caloric intake by 500 calories by weekly increments. NOTE:  1 pound = 3500 calories! Www.Highconit. NetworkingPhoenix.com Www.Beisen.NetworkingPhoenix.com Www.Globe Wireless Www.Suitey. gov Weight watchers mobile Calories needed to lose 1-2 pounds a week = 10 x your weight in pounds 2. Increase water intake. Per [employer] Weight loss Program, it is important to drink 1/2 your body weight in ounces of water daily. Decrease your water consumption 2-3 hours before bedtime to prevent sleep disturbance from frequent urination. 3.  Decrease sugary beverages. Each can or glass of soda increases your risk of obesity by 60%. Can lose 10 pounds in a month by avoiding any soda. 12 oz can of soda = 140 calories, 16 oz cup of sweet tea = 200 calories 16 oz orange juice = 200 calories, 10 oz apple juice = 150 calories 32 oz sports drink = 200 calories, 16 oz punch = 240 calories 3.5 oz alcohol = 100-150 calories 4. Avoid High Fructose Corn Syrup products. This ingredient makes products highly addictive! 5.  Exercise 30 minutes daily 5 days weekly, minimally. If you burn 3500 calories that equals a pound! Use a pedometer to count steps. Visit www. [employer] for a free pedometer and diet recommendations. Your maximum heart rate = 220 - your age Never exercise at your maximum heart rate. See handout for target heart rate. 6.  Decrease carbohydrates (white bread, pasta, rice, potatoes, sweet foods and sweet drinks like soda, tea, coffee, juice and sports drinks). Increase fiber and protein. Goal:   calories daily of carbohydrates Try CHROMIUM PICONATE 200 MG THREE TIMES DAILY,  
 to decrease Premenstrual carbohydrate cravings. 7.  Eat 3-5 small meals daily, include lots of protein (beans/legumes, nuts, lean meat, eggs) and green vegetables with each. (Breakfast, lunch, dinner with 2 healthy snacks) 8. Get proper rest 7-8 hours uninterrupted. When you get less than 6 hours, it triggers hunger by affecting your Grehlin:Leptin ratio and this results in weight gain. 9.  Watch your food portions. Green leafy vegetables should cover 1/2 of the plate, lean meat 1/4 of the plate, starchy vegetable 1/4 of the plate. Use smaller plates. 10.  Do not eat until you are full. Eat until you are no longer hungry. If you are not sure, try drinking a glass of water before getting your second serving of food. 11.  Do not weigh yourself daily. Wait until your next office visit. Use how you feel and  how your clothes fit as measurements of success. 12.  Address your spirituality to draw strength from above during your journey. Remember \"I am fearfully and wonderfully made. Marvelous in His eyes. \" 
 
13. Set realistic, appropriate and achievable weight loss goals: 
 
 RECOMMENDED TARGET WEIGHT LOSS:  Initial weight loss of 5-10% of your initial body weight achieved over 6 months or a decrease of 2 BMI units. MINIMUM GOAL OF WEIGHT LOSS:  Reduce body weight and maintain a lower body weight. Prevent weight gain. RELATED WEBSITES:     
Www.obesityaction. org 
(and consider joining the Obesity Action Coalition for $25/year. The [address] mission is to elevated and empower those affected by obesity through education, advocacy and support. Quarterly journals included in membership fee. ) Www.GlobeIn 
www.Meetup.You.i RELATED DIETS:  Dr. Adela Reddy Weight loss challenge, Mediterranean diet, [place] Diet, Proximagen Watchers, PaleCANWE STUDIOS Diet (anti-inflammatory diet) EXERCISE 150 MINUTES WEEKLY. THIS CAN BE ACHIEVED BY WORKING OUT OR WALKING A MINIMUM OF 30 MINUTES FOR 5 DAYS WEEKLY. YOU CAN EXERCISE IN INCREMENTS OF 10-15 MINUTES UP TO 3 TIMES A DAY. Consider performing \"brainless exercise. \"  Choose your favorite tv program.  Five minutes before and for 5 minutes after the tv program, stretch your body. While the program is on, walk in place watching the show. When commercials come on, rest or walk around the house to do other things. When the program begins, return to walking in place. When you are able keep walking during the commercials and add light weights to your ankles or hands. By the end of the show, you would have walked 30 minutes. If you need shorter spurts of exercise, walk during the commercials and rest during the show. Drink to glasses of water prior to any exercise to prevent dehydration and to improve the results of the work out. Your RESTING HEART RATE is the number of times your heart beats per minute when you are not exerting yourself. The more fit you are, the lower your resting heart rate will be. Your MAXIMUM HEART RATE is the number of times per minute your heart pumps when it is working at 100% capacity. NEVER EXERCISE AT YOUR MAXIMUM HEART RATE. MAX HEART RATE = 220 - your age For example, in a 48year old, the maximum heart rate is 220-50 = 170 beats per minute Your TARGET HEART RATE is the number of beats per minute our heart should pump during aerobic exercise. Reaching your target heart rate indicates that your body is receiving maximum cardiovascular and fat burning benefits. If you are fit, your TARGET HEART RATE = 70-85% of your maximum Heart rate For a 48year old with vigorous intensity physical activity, Target heart rate= 170 bpm x .70 = 119 bpm 
   Target heart rate = 170 bpm x .85=  145 bpm 
 
    Therefore, your target heart rate during physical activity is 119-145 If you are not fit, TARGET HEART RATE = 50-70% of your maximum Heart rate MODERATE CONSISTENT AEROBIC EXERCISE OR WALKING FOR AT LEAST 150 MINUTES WEEKLY IS AN ESSENTIAL PART OF ANY WEIGHT LOSS OF WEIGHT MAINTENANCE PROGRAM. During WEIGHT LOSS PHASE, at least 75% of your exercise needs to be walking or moderate aerobic activity and 25% or 0% can be Isometric or Resistance type exercises (the latter can be deferred to the weight maintenance phase.) During WEIGHT MAINTENANCE PHASE, at least 50% of your exercise needs to be aerobic and 50% Isometric or Resistance type exercises. BENEFITS OF MODERATE INTENSITY EXERCISE MOST DAYS OF THE WEEK: 
 
 1. Insulin Resistance improves 30-85% 2. Abdominal Fat decreases by 30% 3. Inflammatory Markers decrease by 30% (therefore pain decreases) 4. Systolic and Diastolic Blood Pressure decrease by 4 mmHg 5. HDL improves by 5% 6. Triglycerides decrease by 15% 7. Shift from small dense LDL to large dense LDL (therefore decrease Insulin Resistance) 8. Decrease coagulability Starting a Weight Loss Plan: Care Instructions Your Care Instructions If you are thinking about losing weight, it can be hard to know where to start.  Your doctor can help you set up a weight loss plan that best meets your needs. You may want to take a class on nutrition or exercise, or join a weight loss support group. If you have questions about how to make changes to your eating or exercise habits, ask your doctor about seeing a registered dietitian or an exercise specialist. 
It can be a big challenge to lose weight. But you do not have to make huge changes at once. Make small changes, and stick with them. When those changes become habit, add a few more changes. If you do not think you are ready to make changes right now, try to pick a date in the future. Make an appointment to see your doctor to discuss whether the time is right for you to start a plan. Follow-up care is a key part of your treatment and safety. Be sure to make and go to all appointments, and call your doctor if you are having problems. Its also a good idea to know your test results and keep a list of the medicines you take. How can you care for yourself at home? · Set realistic goals. Many people expect to lose much more weight than is likely. A weight loss of 5% to 10% of your body weight may be enough to improve your health. · Get family and friends involved to provide support. Talk to them about why you are trying to lose weight, and ask them to help. They can help by participating in exercise and having meals with you, even if they may be eating something different. · Find what works best for you. If you do not have time or do not like to cook, a program that offers meal replacement bars or shakes may be better for you. Or if you like to prepare meals, finding a plan that includes daily menus and recipes may be best. 
· Ask your doctor about other health professionals who can help you achieve your weight loss goals. ¨ A dietitian can help you make healthy changes in your diet.  
¨ An exercise specialist or  can help you develop a safe and effective exercise program. 
 ¨ A counselor or psychiatrist can help you cope with issues such as depression, anxiety, or family problems that can make it hard to focus on weight loss. · Consider joining a support group for people who are trying to lose weight. Your doctor can suggest groups in your area. Where can you learn more? Go to http://romulo-daron.info/. Enter O558 in the search box to learn more about \"Starting a Weight Loss Plan: Care Instructions. \" Current as of: February 16, 2016 Content Version: 11.1 © 5671-5606 [employer]. Care instructions adapted under license by [employer] (which disclaims liability or warranty for this information). If you have questions about a medical condition or this instruction, always ask your healthcare professional. [address] any warranty or liability for your use of this information. Introducing [facility]! [facility] introduces Media In[employer]uity patient portal. Now you can access parts of your medical record, email your doctor's office, and request medication refills online. 1. In your internet browser, go to https://Tianjin [employer]onna[employer]. [employer] [place]/[employer] 2. Click on the First Time User? Click Here link in the Sign In box. You will see the New Member Sign Up page. 3. Enter your Media In[employer]uity Access Code exactly as it appears below. You will not need to use this code after youve completed the sign-up process. If you do not sign up before the expiration date, you must request a new code. · Media [employer] Access Code: [email or web address] Expires: 3/29/2017 10:16 AM 
 
4. Enter the last four digits of your Social Security Number (xxxx) and Date of Birth (mm/dd/yyyy) as indicated and click Submit. You will be taken to the next sign-up page. 5. Create a Media Ingenuity ID. This will be your [employer] login ID and cannot be changed, so think of one that is secure and easy to remember. 6. Create a NeuralStem password. You can change your password at any time. 7. Enter your Password Reset Question and Answer. This can be used at a later time if you forget your password. 8. Enter your e-mail address. You will receive e-mail notification when new information is available in [address] 9. Click Sign Up. You can now view and download portions of your medical record. 10. Click the Download Summary menu link to download a portable copy of your medical information. If you have questions, please visit the Frequently Asked Questions section of the NeuralStem website. Remember, NeuralStem is NOT to be used for urgent needs. For medical emergencies, dial 911. Now available from your iPhone and Android! Please provide this summary of care documentation to your next provider. Your primary care clinician is listed as Jackeline Suárez. If you have any questions after today's visit, please call [phone].

## 2017-03-29 LAB
25(OH)D3+25(OH)D2 SERPL-MCNC: 34.7 NG/ML (ref 30–100)
ALBUMIN SERPL-MCNC: 4.4 G/DL (ref 3.6–4.8)
ALBUMIN/GLOB SERPL: 1.7 {RATIO} (ref 1.2–2.2)
ALP SERPL-CCNC: 91 IU/L (ref 39–117)
ALT SERPL-CCNC: 17 IU/L (ref 0–32)
ANA SER QL: NEGATIVE
AST SERPL-CCNC: 22 IU/L (ref 0–40)
BILIRUB SERPL-MCNC: 0.5 MG/DL (ref 0–1.2)
BUN SERPL-MCNC: 17 MG/DL (ref 8–27)
BUN/CREAT SERPL: 17 (ref 11–26)
CALCIUM SERPL-MCNC: 9.1 MG/DL (ref 8.7–10.3)
CHLORIDE SERPL-SCNC: 98 MMOL/L (ref 96–106)
CHOLEST SERPL-MCNC: 169 MG/DL (ref 100–199)
CO2 SERPL-SCNC: 26 MMOL/L (ref 18–29)
CREAT SERPL-MCNC: 0.98 MG/DL (ref 0.57–1)
ERYTHROCYTE [DISTWIDTH] IN BLOOD BY AUTOMATED COUNT: 13.8 % (ref 12.3–15.4)
EST. AVERAGE GLUCOSE BLD GHB EST-MCNC: 111 MG/DL
GLOBULIN SER CALC-MCNC: 2.6 G/DL (ref 1.5–4.5)
GLUCOSE SERPL-MCNC: 80 MG/DL (ref 65–99)
HBA1C MFR BLD: 5.5 % (ref 4.8–5.6)
HCT VFR BLD AUTO: 40.8 % (ref 34–46.6)
HCV AB S/CO SERPL IA: 0.1 S/CO RATIO (ref 0–0.9)
HCV AB SERPL QL IA: NORMAL
HDLC SERPL-MCNC: 68 MG/DL
HGB BLD-MCNC: 13 G/DL (ref 11.1–15.9)
INTERPRETATION, 910389: NORMAL
LDLC SERPL CALC-MCNC: 92 MG/DL (ref 0–99)
MCH RBC QN AUTO: 29.2 PG (ref 26.6–33)
MCHC RBC AUTO-ENTMCNC: 31.9 G/DL (ref 31.5–35.7)
MCV RBC AUTO: 92 FL (ref 79–97)
NRBC BLD AUTO-RTO: 0 %
PLATELET # BLD AUTO: 230 X10E3/UL (ref 150–379)
POTASSIUM SERPL-SCNC: 4 MMOL/L (ref 3.5–5.2)
PROT SERPL-MCNC: 7 G/DL (ref 6–8.5)
RBC # BLD AUTO: 4.45 X10E6/UL (ref 3.77–5.28)
SODIUM SERPL-SCNC: 139 MMOL/L (ref 134–144)
TRIGL SERPL-MCNC: 44 MG/DL (ref 0–149)
VLDLC SERPL CALC-MCNC: 9 MG/DL (ref 5–40)
WBC # BLD AUTO: 3.6 X10E3/UL (ref 3.4–10.8)

## 2017-05-05 ENCOUNTER — OFFICE VISIT (OUTPATIENT)
Dept: FAMILY MEDICINE CLINIC | Age: 62
End: 2017-05-05

## 2017-05-05 ENCOUNTER — HOSPITAL ENCOUNTER (OUTPATIENT)
Dept: ULTRASOUND IMAGING | Age: 62
Discharge: HOME | End: 2017-05-05
Attending: FAMILY MEDICINE
Payer: COMMERCIAL

## 2017-05-05 VITALS
HEART RATE: 65 BPM | WEIGHT: 215 LBS | SYSTOLIC BLOOD PRESSURE: 117 MMHG | BODY MASS INDEX: 36.7 KG/M2 | OXYGEN SATURATION: 100 % | HEIGHT: 64 IN | RESPIRATION RATE: 18 BRPM | DIASTOLIC BLOOD PRESSURE: 66 MMHG | TEMPERATURE: 96.9 F

## 2017-05-05 DIAGNOSIS — M79.604 PAIN OF RIGHT LOWER EXTREMITY: ICD-10-CM

## 2017-05-05 DIAGNOSIS — S39.012A LUMBAR STRAIN, INITIAL ENCOUNTER: Primary | ICD-10-CM

## 2017-05-05 DIAGNOSIS — I83.90 VARICOSE VEIN OF LEG: ICD-10-CM

## 2017-05-05 PROCEDURE — 93971 EXTREMITY STUDY: CPT

## 2017-05-05 RX ORDER — BACLOFEN 10 MG/1
10 TABLET ORAL
Qty: 30 TAB | Refills: 0 | Status: SHIPPED | OUTPATIENT
Start: 2017-05-05 | End: 2017-09-21 | Stop reason: ALTCHOICE

## 2017-05-05 NOTE — MR AVS SNAPSHOT
Visit Information Date & Time Provider Department Dept. Phone Encounter #  
 5/5/2017 11:40 AM Cristian George. MD Carlie Linton [phone] [id number] Your Appointments 6/5/2017  8:30 AM  
Office Visit with DO Carlie Cintron (ALLAN Robles) Appt Note: weight check 3979 [facility] [address]  
[phone]  
  
   
 [address] 7/14/2017  8:30 AM  
Office Visit with DO Carlie Cintron (ALLAN Robles) Appt Note: weight check 3979 [facility] [address]  
[phone]  
  
    
 8/14/2017  8:30 AM  
Office Visit with DO Carlie Cintron (ALLAN Robles) Appt Note: weight check [facility] [facility] [address]  
[phone] Upcoming Health Maintenance Date Due  
 PAP AKA CERVICAL CYTOLOGY 5/19/2017* INFLUENZA AGE 9 TO ADULT 8/1/2017 BREAST CANCER SCRN MAMMOGRAM 12/14/2018 COLONOSCOPY 9/27/2021 DTaP/Tdap/Td series (2 - Td) 6/30/2026 *Topic was postponed. The date shown is not the original due date. Allergies as of 5/5/2017  Review Complete On: 5/5/2017 By: Cristian George. Db Alvarado MD  
  
 Severity Noted Reaction Type Reactions Advil Pm [Ibuprofen-diphenhydramine Hcl]  07/18/2011    Other (comments) Slightly elevated Creaitnine 1.02 Aleve [Naproxen Sodium]  12/28/2011    Other (comments) Due to kidneys Bactrim [Sulfamethoxazole-trimethoprim]  09/11/2009    Nausea and Vomiting  
 Sulfa (Sulfonamide Antibiotics)  09/11/2009    Nausea and Vomiting Current Immunizations  Reviewed on 3/28/2017 Name Date Influenza Vaccine 9/18/2014, 10/22/2013, 12/14/2012 Influenza Vaccine Jaelyn Howe) 10/29/2015 Influenza Vaccine (Quad) PF 9/29/2016 Influenza Vaccine Split 11/7/2011, 11/12/2010 TD Vaccine 8/18/2000 Tdap 6/30/2016  9:35 AM  
  
 Not reviewed this visit You Were Diagnosed With   
  
 Codes Comments Lumbar strain, initial encounter    -  Primary ICD-10-CM: M23.733X ICD-9-CM: 548. 2 Varicose vein of leg     ICD-10-CM: I83.90 ICD-9-CM: 454.9 Pain of right lower extremity     ICD-10-CM: M79.604 ICD-9-CM: 729.5 Vitals BP Pulse Temp Resp Height(growth percentile) Weight(growth percentile)  
 117/66 (BP 1 Location: Left arm, BP Patient Position: Sitting) 65 96.9 °F (36.1 °C) (Oral) 18 5' 4.02\" (1.626 m) 215 lb (97.5 kg) SpO2 BMI OB Status Smoking Status 100% 36.89 kg/m2 Hysterectomy Never Smoker BMI and BSA Data Body Mass Index Body Surface Area  
 36.89 kg/m 2 2.1 m 2 Preferred Pharmacy Pharmacy Name Phone [facility] [address] AT [address] [place] [address] Drive [phone] Your Updated Medication List  
  
   
This list is accurate as of: 5/5/17 12:42 PM.  Always use your most recent med list.  
  
  
  
  
 albuterol 90 mcg/actuation inhaler Commonly known as:  PROVENTIL HFA, VENTOLIN HFA, PROAIR HFA Take 2 Puffs by inhalation every four (4) hours as needed for Wheezing (cough). baclofen 10 mg tablet Commonly known as:  LIORESAL Take 1 Tab by mouth nightly as needed. cetirizine 10 mg tablet Commonly known as:  ZYRTEC Take 1 Tab by mouth daily. Compression Socks, Large Misc  
custom-fit compression stockings, knee- high, 20-30 mmHg pressure. Pls fit/measure pt's legs. 1 pair.  
  
 eszopiclone 2 mg tablet Commonly known as:  Guero Clemente Take 1 Tab by mouth nightly. Max Daily Amount: 2 mg. fluticasone 50 mcg/actuation nasal spray Commonly known as:  Natividad Gauthier 2 Sprays by Both Nostrils route daily. Indications: ALLERGIC RHINITIS  
  
 furosemide 40 mg tablet Commonly known as:  LASIX TAKE 1 TABLET BY MOUTH ONCE DAILY FOR EDEMA MINIVELLE 0.1 mg/24 hr  
Generic drug:  estradiol 1 Patch by TransDERmal route every Monday. montelukast 10 mg tablet Commonly known as:  SINGULAIR Take 1 Tab by mouth daily. Indications: ALLERGIC RHINITIS, breathing MYRBETRIQ 25 mg ER tablet Generic drug:  mirabegron ER Take 25 mg by mouth daily. phendimetrazine tartrate 105 mg Cper Take 1 Cap by mouth Daily (before breakfast). Max Daily Amount: 1 Cap. Indications: WEIGHT LOSS MANAGEMENT FOR OBESE PATIENT (BMI >= 30)  
  
 sertraline 50 mg tablet Commonly known as:  ZOLOFT Take 1 Tab by mouth daily. Indications: GENERALIZED ANXIETY DISORDER  
  
 VITAMIN D3 2,000 unit Tab Generic drug:  cholecalciferol (vitamin D3) Take 2,000 Units by mouth daily. Prescriptions Printed Refills Compression Socks, Large misc 0 Sig: custom-fit compression stockings, knee- high, 20-30 mmHg pressure. Pls fit/measure pt's legs. 1 pair. Class: Print Prescriptions Sent to Pharmacy Refills  
 baclofen (LIORESAL) 10 mg tablet 0 Sig: Take 1 Tab by mouth nightly as needed. Class: Normal  
 Pharmacy: [facility] [address] AT [facility] Ph #: [phone] Route: Oral  
  
To-Do List   
 05/05/2017 Imaging:  DUPLEX LOWER EXT VENOUS RIGHT Patient Instructions TODAY, please go to: CHECK OUT Have leg US today Please schedule the following appointments at [facility] OUT: 
· Leg pain follow up with Dr. Mansoor Bailey in 8 weeks if not better 
 
_____________________ Today's Plan: · Do home exercises, if not better in 2 weeks, start PT 
· May use baclofen. This can make you drowsy. · May use small doses of NSAID. Be careful though as this has hurt your kidneys in the past.  
· Use compression stocking on right leg. You can use on both if desired.  
_____________________ Review your health maintenance below. Make plans to return and address anything that is due or will be due soon. There are no preventive care reminders to display for this patient. If you need to get your labs done at [facility], visit this site to find a convenient location: [email or web address] Acute Low Back Pain: Exercises Your Care Instructions Here are some examples of typical rehabilitation exercises for your condition. Start each exercise slowly. Ease off the exercise if you start to have pain. Your doctor or physical therapist will tell you when you can start these exercises and which ones will work best for you. When you are not being active, find a comfortable position for rest. Some people are comfortable on the floor or a medium-firm bed with a small pillow under their head and another under their knees. Some people prefer to lie on their side with a pillow between their knees. Don't stay in one position for too long. Take short walks (10 to 20 minutes) every 2 to 3 hours. Avoid slopes, hills, and stairs until you feel better. Walk only distances you can manage without pain, especially leg pain. How to do the exercises Back stretches 2. Get down on your hands and knees on the floor. 3. Relax your head and allow it to droop. Round your back up toward the ceiling until you feel a nice stretch in your upper, middle, and lower back. Hold this stretch for as long as it feels comfortable, or about 15 to 30 seconds. 4. Return to the starting position with a flat back while you are on your hands and knees. 5. Let your back sway by pressing your stomach toward the floor. Lift your buttocks toward the ceiling. 6. Hold this position for 15 to 30 seconds. 7. Repeat 2 to 4 times. Follow-up care is a key part of your treatment and safety.  Be sure to make and go to all appointments, and call your doctor if you are having problems. It's also a good idea to know your test results and keep a list of the medicines you take. Where can you learn more? Go to http://romulo-daron.info/. Enter J518 in the search box to learn more about \"Acute Low Back Pain: Exercises. \" Current as of: May 23, 2016 Content Version: 11.2 © 2258-1649 [employer]. Care instructions adapted under license by [employer] (which disclaims liability or warranty for this information). If you have questions about a medical condition or this instruction, always ask your healthcare professional. [address] any warranty or liability for your use of this information. Introducing [facility]! Eve Toro introduces [employer] patient portal. Now you can access parts of your medical record, email your doctor's office, and request medication refills online. 1. In your internet browser, go to [email or web address] 2. Click on the First Time User? Click Here link in the Sign In box. You will see the New Member Sign Up page. 3. Enter your [employer] Access Code exactly as it appears below. You will not need to use this code after youve completed the sign-up process. If you do not sign up before the expiration date, you must request a new code. · [employer] Access Code: [email or web address] Expires: 8/3/2017 12:42 PM 
 
4. Enter the last four digits of your Social Security Number (xxxx) and Date of Birth (mm/dd/yyyy) as indicated and click Submit. You will be taken to the next sign-up page. 5. Create a [employer] ID. This will be your [employer] login ID and cannot be changed, so think of one that is secure and easy to remember. 6. Create a [employer] password. You can change your password at any time. 7. Enter your Password Reset Question and Answer. This can be used at a later time if you forget your password. 8. Enter your e-mail address.  You will receive e-mail notification when new information is available in AppSlingr. 9. Click Sign Up. You can now view and download portions of your medical record. 10. Click the Download Summary menu link to download a portable copy of your medical information. If you have questions, please visit the Frequently Asked Questions section of the App[employer]ling[employer] website. Remember, [employer] is NOT to be used for urgent needs. For medical emergencies, dial 911. Now available from your iPhone and Android! Please provide this summary of care documentation to your next provider. Your primary care clinician is listed as Magdaleno Enciso. If you have any questions after today's visit, please call [phone].

## 2017-05-05 NOTE — PROGRESS NOTES
[phone] (home) [phone] (work)  6:17 PM Called pt. No answer. 6:19 PM called work phone. No answer. Left Eatwave with result as she identified herself on Eatwave. Please mail letter with result also.

## 2017-05-05 NOTE — PROGRESS NOTES
Chief Complaint   Patient presents with    Leg Pain     right leg started Sunday. patient thinks it may be her varicose veins, hurts when she walks    Hip Pain     right pain       1. Have you been to the ER, urgent care clinic since your last visit? Hospitalized since your last visit? NO    2. Have you seen or consulted any other health care providers outside of the [address] [place] [address] since your last visit? Include any pap smears or colon screening.   NO

## 2017-05-05 NOTE — PROGRESS NOTES
[address] Visit   Patient ID:   Yasmany Hewitt is a 64 y.o. female. Assessment/Plan:    Charu Doctor was seen today for leg pain and hip pain. Diagnoses and all orders for this visit:    Lumbar strain, initial encounter  Pain in lumbar right from strain during cleaning at home. Recommend home exercises then PT. Left before PT order printed, will make available for . . May use small doses of NSAID and prn baclofen. -     baclofen (LIORESAL) 10 mg tablet; Take 1 Tab by mouth nightly as needed. Varicose vein of leg  R/o DVT with Doppler today given asx and discomfort. Recommend use of compression stockings to manage edema and varicosities. -     Compression Socks, Large misc; custom-fit compression stockings, knee- high, 20-30 mmHg pressure. Pls fit/measure pt's legs. 1 pair. Pain of right lower extremity  See above. Knee and hip exams benign.   -     DUPLEX LOWER EXT VENOUS RIGHT; Future      Counselled pt on:  Patient health concerns, plan as outlined in patient instructions and above. Patient was offered a choice/choices in the treatment plan today. Patient expresses understanding of the plan and agrees with recommendations. Patient Instructions   TODAY, please go to:   CHECK OUT    Have leg US today    Please schedule the following appointments at [facility] OUT:  · Leg pain follow up with Dr. Helen Galan in 8 weeks if not better    _____________________     Today's Plan:  · Do home exercises, if not better in 2 weeks, start PT  · May use baclofen. This can make you drowsy. · May use small doses of NSAID. Be careful though as this has hurt your kidneys in the past.   · Use compression stocking on right leg. You can use on both if desired.   _____________________     Review your health maintenance below. Make plans to return and address anything that is due or will be due soon. There are no preventive care reminders to display for this patient.     If you need to get your labs done at [facility], visit this site to find a convenient location: [email or web address]      Subjective:   HPI:  Javed Malik is a 64 y.o. female being seen for:   Chief Complaint   Patient presents with    Leg Pain     right leg started Sunday. patient thinks it may be her varicose veins, hurts when she walks    Hip Pain     right pain     Past medical history, surgical history, social history, family history, medications, allergies reviewed and updated. See below for more detail  PCP Guanakito Rubio,     Has RA and b/l hip pain due to severe OA.  Iliopsoas bursitis. Leg Pain  ·  6 days ago, hit big toe when cleaning. · Toe does not hurt. She did cleaning, moved everything out, scrubbed floors, got on floor to clean behind commode. She has cleaned this way before has not felt this way after before   · She took 2 ibuprofen X 2 nights. Nothing last night. · The next day felt very stiff and walking \"like an old lady\"  · Feels like right leg is swollen  · Feels like varicose veins are more prominent on right side  · She has pain in right lateral calf when standing and applying more pressure.    · Also some right buttock pain  · 5/10   · Pain is bothersome  · There is dullness and stiffness     Review of Systems  Otherwise, per HPI  Active Problem List:  Patient Active Problem List   Diagnosis Code    Arthritis M19.90    Menopausal and postmenopausal disorder N95.9    Hip pain, bilateral M25.551, M25.552    Female pelvic pain R10.2    RA (rheumatoid arthritis) ([facility] [address] M06.9    Pure hypercholesterolemia E78.00    Achilles tendonitis M76.60    Vitamin D deficiency E55.9    Chronic insomnia F51.04    Generalized anxiety disorder F41.1    Peripheral edema R60.9    Family history of ischemic heart disease Z80.55    Family history of stroke Z82.3    Elevated alkaline phosphatase level R74.8    Advance care planning Z71.89    Obesity, Class II, BMI 35-39.9 (HCC) E66.9  Hashimoto's thyroiditis E06.3     ? Objective:     Visit Vitals    /66 (BP 1 Location: Left arm, BP Patient Position: Sitting)    Pulse 65    Temp 96.9 °F (36.1 °C) (Oral)    Resp 18    Ht 5' 4.02\" (1.626 m)    Wt 215 lb (97.5 kg)    SpO2 100%    BMI 36.89 kg/m2     Wt Readings from Last 3 Encounters:   05/05/17 215 lb (97.5 kg)   03/28/17 214 lb 1.6 oz (97.1 kg)   02/24/17 217 lb 3.2 oz (98.5 kg)     PHQ over the last two weeks 5/5/2017   Little interest or pleasure in doing things Not at all   Feeling down, depressed or hopeless Not at all   Total Score PHQ 2 0         Physical Exam   Constitutional: She appears well-developed and well-nourished. No distress. Cardiovascular:   2+ DP on right   Pulmonary/Chest: Effort normal. No respiratory distress. Musculoskeletal: She exhibits edema (right > left). Neurological: She is alert. Psychiatric: She has a normal mood and affect. Her behavior is normal.   . .Right Knee Exam   Effusion: No    Tenderness   The patient is experiencing tenderness in the no ttp. Range of Motion   Extension: Normal  Flexion:       Tests   McMurrays:  Medial - Negative      Lateral - Negative  Drawer:       Anterior - Negative    Posterior - Negative  Varus:  Negative  Valgus: Negative    Right Hip Exam     Tenderness   None    Back Exam   Sensation: Normal.    Tenderness   The patient is experiencing tenderness in the right lumbar ttp.   SLR    Right: Negative  Left:    Negative    Muscle Strength   Hip Abductors: 5/5  Hip Adductors:   Quadriceps:      5/5  Hamstrings:      5/5    Reflexes   Patellar:  Normal    Comments:  Antalgic gait        Allergies   Allergen Reactions    Advil Pm [Ibuprofen-Diphenhydramine Hcl] Other (comments)     Slightly elevated Creaitnine 1.02    Aleve [Naproxen Sodium] Other (comments)     Due to kidneys    Bactrim [Sulfamethoxazole-Trimethoprim] Nausea and Vomiting    Sulfa (Sulfonamide Antibiotics) Nausea and Vomiting     Prior to Admission medications    Medication Sig Start Date End Date Taking? Authorizing Provider   baclofen (LIORESAL) 10 mg tablet Take 1 Tab by mouth nightly as needed. 5/5/17  Yes Kendrick Díaz MD   Compression Socks, Large misc custom-fit compression stockings, knee- high, 20-30 mmHg pressure. Pls fit/measure pt's legs. 1 pair. 5/5/17  Yes Kendrick Díaz MD   phendimetrazine tartrate 105 mg cpER Take 1 Cap by mouth Daily (before breakfast). Max Daily Amount: 1 Cap. Indications: WEIGHT LOSS MANAGEMENT FOR OBESE PATIENT (BMI >= 30) 3/28/17  Yes Vern Berrios DO   MYRBETRIQ 25 mg ER tablet Take 25 mg by mouth daily. 2/15/17  Yes Historical Provider   sertraline (ZOLOFT) 50 mg tablet Take 1 Tab by mouth daily. Indications: GENERALIZED ANXIETY DISORDER 2/24/17  Yes Vern Berrios DO   eszopiclone (LUNESTA) 2 mg tablet Take 1 Tab by mouth nightly. Max Daily Amount: 2 mg. 2/17/17  Yes Vern Berrios DO   furosemide (LASIX) 40 mg tablet TAKE 1 TABLET BY MOUTH ONCE DAILY FOR EDEMA 4/14/16  Yes Vern Berrios DO   MINIVELLE 0.1 mg/24 hr 1 Patch by TransDERmal route every Monday. 2/9/15  Yes Historical Provider   cetirizine (ZYRTEC) 10 mg tablet Take 1 Tab by mouth daily. Patient taking differently: Take 10 mg by mouth daily as needed. 3/25/15  Yes Manjit Barker DO   fluticasone [facility]) 50 mcg/actuation nasal spray 2 Sprays by Both Nostrils route daily. Indications: ALLERGIC RHINITIS 3/25/15  Yes Manjit Barker DO   albuterol (PROVENTIL HFA, VENTOLIN HFA) 90 mcg/actuation inhaler Take 2 Puffs by inhalation every four (4) hours as needed for Wheezing (cough). 8/7/13  Yes eVrn Berrios DO   montelukast (SINGULAIR) 10 mg tablet Take 1 Tab by mouth daily. Indications: ALLERGIC RHINITIS, breathing 8/7/13  Yes Crystal Bear, DO   cholecalciferol, vitamin D3, (VITAMIN D3) 2,000 unit Tab Take 2,000 Units by mouth daily. Yes Historical Provider     . 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 Dr. Lisa Vivar Arthritis 2010    hips, knees. Dr. Licona Shells    Chest pain 2013    Dr. Kaleb Dolan.  Chickenpox childhood    Endometriosis     Dr. Enedina Das Environmental allergies     MARGI (generalized anxiety disorder)     Hip pain, bilateral     due to severe OA. Iliopsoas bursitis. Dr. Dana Turcios    History of breast lump/mass excision     Left breast.  Dr. Emily Oscar Hyperlipidemia     Iron deficiency anemia     iron deficiency    Menopausal and postmenopausal disorder     Dr. Enedina Das Uterine fibroid     Dr. Russel Pratt.  Vitamin D deficiency 2008       Past Surgical History:   Procedure Laterality Date    BIOPSY BREAST  Rt ;Lt     Dr. Kirti Bryan Right     Right achiles tendon. Dr. Ismael Narayan.  HX BREAST BIOPSY      Left. benign. Dr. Rosalinda Arteaga HX COLONOSCOPY  2016    Dr. Gamaliel Randhawa.  due q 5 yrs due to fam hx   Lucia Bocanegra. due to endometriosis    HX PELVIC LAPAROSCOPY      due to endometriosis Dr. Flo Triana  2011    due to fibroids. Dr. Russel Pratt.        OB History      Para Term  AB TAB SAB Ectopic Multiple Living    2 2 2       2        Obstetric Comments     Patient has had a hysterectomy and BSO d/t fibroids.              Family History   Problem Relation Age of Onset    Hypertension Mother     Other Mother      hearing loss/vision loss    Heart Disease Father     Lung Disease Father     Diabetes Sister     Heart Attack Sister 54     March 2014    Stroke Sister      after MI    Thyroid Disease Sister     Colon Polyps Sister     Heart Disease Brother      Defibrillator placed    Heart Attack Brother 48    Diabetes Maternal Grandmother

## 2017-05-05 NOTE — PATIENT INSTRUCTIONS
TODAY, please go to:   CHECK OUT    Have leg US today    Please schedule the following appointments at [facility] OUT:  · Leg pain follow up with Dr. Marcelino Rosen in 8 weeks if not better    _____________________     Today's Plan:  · Do home exercises, if not better in 2 weeks, start PT  · May use baclofen. This can make you drowsy. · May use small doses of NSAID. Be careful though as this has hurt your kidneys in the past.   · Use compression stocking on right leg. You can use on both if desired.   _____________________     Review your health maintenance below. Make plans to return and address anything that is due or will be due soon. There are no preventive care reminders to display for this patient. If you need to get your labs done at [facility], visit this site to find a convenient location: [email or web address]       Acute Low Back Pain: Exercises  Your Care Instructions  Here are some examples of typical rehabilitation exercises for your condition. Start each exercise slowly. Ease off the exercise if you start to have pain. Your doctor or physical therapist will tell you when you can start these exercises and which ones will work best for you. When you are not being active, find a comfortable position for rest. Some people are comfortable on the floor or a medium-firm bed with a small pillow under their head and another under their knees. Some people prefer to lie on their side with a pillow between their knees. Don't stay in one position for too long. Take short walks (10 to 20 minutes) every 2 to 3 hours. Avoid slopes, hills, and stairs until you feel better. Walk only distances you can manage without pain, especially leg pain. How to do the exercises  Back stretches    2. Get down on your hands and knees on the floor. 3. Relax your head and allow it to droop. Round your back up toward the ceiling until you feel a nice stretch in your upper, middle, and lower back.  Hold this stretch for as long as it feels comfortable, or about 15 to 30 seconds. 4. Return to the starting position with a flat back while you are on your hands and knees. 5. Let your back sway by pressing your stomach toward the floor. Lift your buttocks toward the ceiling. 6. Hold this position for 15 to 30 seconds. 7. Repeat 2 to 4 times. Follow-up care is a key part of your treatment and safety. Be sure to make and go to all appointments, and call your doctor if you are having problems. It's also a good idea to know your test results and keep a list of the medicines you take. Where can you learn more? Go to http://romulo-daron.info/. Enter W066 in the search box to learn more about \"Acute Low Back Pain: Exercises. \"  Current as of: May 23, 2016  Content Version: 11.2  © 6789-0600 [employer]. Care instructions adapted under license by [employer] (which disclaims liability or warranty for this information). If you have questions about a medical condition or this instruction, always ask your healthcare professional. [address] any warranty or liability for your use of this information.

## 2017-05-09 ENCOUNTER — OFFICE VISIT (OUTPATIENT)
Dept: FAMILY MEDICINE CLINIC | Age: 62
End: 2017-05-09

## 2017-05-09 VITALS
RESPIRATION RATE: 18 BRPM | DIASTOLIC BLOOD PRESSURE: 61 MMHG | BODY MASS INDEX: 36.95 KG/M2 | TEMPERATURE: 98.4 F | WEIGHT: 216.4 LBS | HEART RATE: 64 BPM | OXYGEN SATURATION: 100 % | HEIGHT: 64 IN | SYSTOLIC BLOOD PRESSURE: 92 MMHG

## 2017-05-09 DIAGNOSIS — E55.9 VITAMIN D DEFICIENCY: ICD-10-CM

## 2017-05-09 DIAGNOSIS — E66.9 OBESITY, CLASS II, BMI 35-39.9: ICD-10-CM

## 2017-05-09 DIAGNOSIS — E06.3 HASHIMOTO'S THYROIDITIS: Primary | ICD-10-CM

## 2017-05-09 DIAGNOSIS — F51.04 CHRONIC INSOMNIA: ICD-10-CM

## 2017-05-09 DIAGNOSIS — R63.5 WEIGHT GAIN: ICD-10-CM

## 2017-05-09 DIAGNOSIS — R60.0 LEG EDEMA, RIGHT: ICD-10-CM

## 2017-05-09 RX ORDER — PHENTERMINE HYDROCHLORIDE 37.5 MG/1
37.5 TABLET ORAL DAILY
Qty: 30 TAB | Refills: 0 | Status: SHIPPED | OUTPATIENT
Start: 2017-05-09 | End: 2017-06-05 | Stop reason: SDUPTHER

## 2017-05-09 NOTE — PROGRESS NOTES
HISTORY OF PRESENT ILLNESS  Juan Beach is a 64 y.o. female presents with Weight Management; Blood Pressure Check; and Medication Refill      Agree with nurse note. She is tolerating Phendimetrazine 105 mg well, month 3 of 3. Last prescribed on 03/28/17. She has noticed an increase of energy and decreased appetite since starting the medication. Her R leg is swollen and she attributes her weight gain to swelling. She gained 2 pounds since the last visit. Percent body fat has increased from 39.5% to 46.1%, waist circumference measurement has changed from 45.4\" to 38\". Overall, patient requests to restart Phentermine. Pt with Hashimoto's thyroiditis and Vit D deficiency presents to the office with a BP of 92/61. On 02/24/17, TSH was 1.9 without med use. She takes OTC Vit D, tolerating well. Pt with chronic insomnia; stable while taking Phendimetrazine. Patient denies fatigue, sleep disturbance, chest pain, heart palpitations, nausea, dry mouth, constipation, signs of depression. Written by hyun Mcfarland, as dictated by Dr. Caty Timmons DO.    ROS    Review of Systems negative except as noted above in HPI. ALLERGIES:    Allergies   Allergen Reactions    Advil Pm [Ibuprofen-Diphenhydramine Hcl] Other (comments)     Slightly elevated Creaitnine 1.02    Aleve [Naproxen Sodium] Other (comments)     Due to kidneys    Bactrim [Sulfamethoxazole-Trimethoprim] Nausea and Vomiting    Sulfa (Sulfonamide Antibiotics) Nausea and Vomiting       CURRENT MEDICATIONS:    Outpatient Prescriptions Marked as Taking for the 5/9/17 encounter (Office Visit) with Claudette Moss DO   Medication Sig Dispense Refill    phentermine (ADIPEX-P) 37.5 mg tablet Take 1 Tab by mouth daily. Max Daily Amount: 37.5 mg. 30 Tab 0    baclofen (LIORESAL) 10 mg tablet Take 1 Tab by mouth nightly as needed.  30 Tab 0    Compression Socks, Large misc custom-fit compression stockings, knee- high, 20-30 mmHg pressure. Pls fit/measure pt's legs. 1 pair. 2 Each 0    MYRBETRIQ 25 mg ER tablet Take 25 mg by mouth daily. 1    sertraline (ZOLOFT) 50 mg tablet Take 1 Tab by mouth daily. Indications: GENERALIZED ANXIETY DISORDER 135 Tab 3    eszopiclone (LUNESTA) 2 mg tablet Take 1 Tab by mouth nightly. Max Daily Amount: 2 mg. 30 Tab 5    furosemide (LASIX) 40 mg tablet TAKE 1 TABLET BY MOUTH ONCE DAILY FOR EDEMA 90 Tab 3    MINIVELLE 0.1 mg/24 hr 1 Patch by TransDERmal route every Monday. 0    cetirizine (ZYRTEC) 10 mg tablet Take 1 Tab by mouth daily. (Patient taking differently: Take 10 mg by mouth daily as needed.) 30 Tab 3    fluticasone (FLONASE) 50 mcg/actuation nasal spray 2 Sprays by Both Nostrils route daily. Indications: ALLERGIC RHINITIS 1 Bottle 5    albuterol (PROVENTIL HFA, VENTOLIN HFA) 90 mcg/actuation inhaler Take 2 Puffs by inhalation every four (4) hours as needed for Wheezing (cough). 1 Inhaler 1    montelukast (SINGULAIR) 10 mg tablet Take 1 Tab by mouth daily. Indications: ALLERGIC RHINITIS, breathing 30 Tab 11    cholecalciferol, vitamin D3, (VITAMIN D3) 2,000 unit Tab Take 2,000 Units by mouth daily. PAST MEDICAL HISTORY:    Past Medical History:   Diagnosis Date    Achilles tendonitis 12/2004    Right. Dr. Lisa Vivar Arthritis 2010    hips, knees. Dr. Licona Shells    Chest pain 08/2013    Dr. Kaleb Dolan.  Chickenpox childhood    Endometriosis 1990    Dr. Enedina Das Environmental allergies     MARGI (generalized anxiety disorder)     Hip pain, bilateral 2010    due to severe OA. Iliopsoas bursitis. Dr. Dana Turcios    History of breast lump/mass excision 1998    Left breast.  Dr. Emily Oscar Hyperlipidemia     Iron deficiency anemia     iron deficiency    Menopausal and postmenopausal disorder 2007    Dr. Enedina Das Uterine fibroid     Dr. Russel Pratt.     Vitamin D deficiency 03/2008       PAST SURGICAL HISTORY:    Past Surgical History:   Procedure Laterality Date    BIOPSY BREAST  Rt 1993;Lt 1998    Dr. Hayley Elizondo FOOT/TOES SURGERY [address] UNLISTED Right 1990s    Right achiles tendon. Dr. Jacques Driver.  HX BREAST BIOPSY  2001    Left. benign. Dr. Brionna Vasquez HX COLONOSCOPY  09/27/2016    Dr. Nick Perez.  due q 5 yrs due to fam hx   [place]    Dr. Tery Lesch. due to endometriosis    HX PELVIC LAPAROSCOPY  1995    due to endometriosis Dr. Cira Dunn  04/2011    due to fibroids. Dr. Carlos Navarrete.        FAMILY HISTORY:    Family History   Problem Relation Age of Onset    Hypertension Mother     Other Mother      hearing loss/vision loss    Heart Disease Father     Lung Disease Father     Diabetes Sister     Heart Attack Sister 54     March 2014    Stroke Sister      after MI    Thyroid Disease Sister     Colon Polyps Sister     Heart Disease Brother      Defibrillator placed    Heart Attack Brother 48    Diabetes Maternal Grandmother        SOCIAL HISTORY:    Social History     Social History    Marital status: SINGLE     Spouse name: N/A    Number of children: 2    Years of education: N/A     Occupational History    [place] [employer]      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      Social History Main Topics    Smoking status: Never Smoker    Smokeless tobacco: Never Used      Comment: lived with smoker mom x 18 yrs    Alcohol use No    Drug use: No    Sexual activity: Not Currently     Partners: Male     Birth control/ protection: Abstinence, Surgical     Other Topics Concern    None     Social History Narrative       IMMUNIZATIONS:    Immunization History   Administered Date(s) Administered    Influenza Vaccine 12/14/2012, 10/22/2013, 09/18/2014    Influenza Vaccine (Quad) 10/29/2015    Influenza Vaccine (Quad) PF 09/29/2016    Influenza Vaccine Split 11/12/2010, 11/07/2011    TD Vaccine 08/18/2000    Tdap 06/30/2016       PHYSICAL EXAMINATION    Vital Signs    Visit Vitals    BP 92/61 (BP 1 Location: Left arm, BP Patient Position: Sitting)    Pulse 64    Temp 98.4 °F (36.9 °C) (Oral)    Resp 18    Ht 5' 4.02\" (1.626 m)    Wt 216 lb 6.4 oz (98.2 kg)    SpO2 100%    BMI 37.13 kg/m2       Weight Metrics 5/9/2017 5/9/2017 5/5/2017 3/28/2017 3/28/2017 2/24/2017 2/24/2017   Weight - 216 lb 6.4 oz 215 lb - 214 lb 1.6 oz - 217 lb 3.2 oz   Waist Measure Inches 38 - - 45.4 - 41.25 -   Body Fat % 46.1 - - 39.5 - 45.3 -   BMI - 37.13 kg/m2 36.89 kg/m2 - 36.73 kg/m2 - 37.28 kg/m2       General appearance - Well nourished. Well appearing. Well developed. No acute distress. Overweight. Head - Normocephalic. Atraumatic. Eyes - pupils equal and reactive, extraocular eye movements intact, sclera anicteric  Ears - Hearing is grossly normal bilaterally. Nose - normal and patent, no erythema, discharge or polyps   Mouth - mucous membranes moist, pharynx normal with cobblestone appearance. No erythema, white exudate or obstruction. Neck - supple. Midline trachea. No carotid bruits are noted. No thyromegaly noted. Chest - clear to auscultation bilaterally anterriorly and posteriorly. No wheezes, rales or rhonchi. Breath sounds are symmetrical and unlabored bilaterally. Heart - normal rate. Regular rhythm, normal S1, S2. No murmur. No rubs, clicks or gallops noted. Abdomen - soft and distended. No masses or organomegaly. No rebound, rigidity or guarding. Bowel sounds normal x 4 quadrants. No tenderness noted. Neurological - awake, alert and oriented to person, place, and time and event. Cranial nerves II through XII intact. Muscle strength is +5/5 x 4 extremities. Sensation is intact to light touch bilaterally. Steady gait with obvious limp. Heme/Lymph - peripheral pulses normal x 4 extremities. +1 non-pitting edema in RLE is noted. No cervical adenopathy noted. Skin - no rashes, erythema, ecchymosis, lacerations, abrasions, suspicious moles noted. No skin tags.   No acanthosis nigricans noted in the axilla or neck. Psychological -   normal behavior, speech, dress and thought processes. Good insight. Good eye contact. Normal affect. Appropriate mood. DATA REVIEWED    Lab Results   Component Value Date/Time    Vitamin D 25-Hydroxy 26.3 07/18/2011 10:22 AM    VITAMIN D, 25-HYDROXY 34.7 03/28/2017 09:32 AM       Lab Results   Component Value Date/Time    TSH 1.900 02/24/2017 10:03 AM    TSH 2.36 08/21/2014 07:50 AM       ASSESSMENT and PLAN    ICD-10-CM ICD-9-CM    1. Hashimoto's thyroiditis E06.3 245.2    2. Chronic insomnia F51.04 780.52    3. Vitamin D deficiency E55.9 268.9    4. Obesity, Class II, BMI 35-39.9 ([facility]) E66.9 278.00 phentermine (ADIPEX-P) 37.5 mg tablet   5. Weight gain R63.5 783.1     2#    6. Leg edema, right R60.0 782.3     due to chronic achilles tendonitis vs other, stable       Continue current medications and care. Change from Phendimetrazine to Phentermine. Prescriptions written and given to patient (Adipex-P 37.5 mg, month 1 of 3.)  Medication side effects discussed. Discussed the patient's BMI with her. The BMI follow up plan is as follows: I have counseled this patient on diet and exercise regimens. Addressed weight, diet and exercise with patient. Diet recommendations:  Decrease salt and carbohydrates (white foods including flour, white bread, white rice, white pasta, white potatoes; corn, sweet foods, sweet drinks and alcohol), increase green leafy vegetables and protein with each meal.  Avoid fried foods. Eat 3-5 small meals daily. Do not skip meals. Ok to use meal replacements up to twice daily with protein goal of 60 mg per meal.   Recommend OTC Premiere Protein bars or shakes. Increase water intake. Increase physical activity to 30 minutes daily for health benefit or 60 minutes daily to prevent weight regain, as tolerated.     Physical Activity prescription:  A goal of 30 minutes physical activity daily is recommended for health benefit and at least 60 minutes daily to prevent weight regain. For weight loss, no less than 75% needs to be aerobic (i.e. Walking) and no more than 25% resistance exercising (i.e. Weight lifting). For weight maintenance phase, 50% aerobic and 50% resistance exercises. Sleep prescription:    A goal of 7-8 hours of uninterrupted sleep is recommended to turn off the Grehlin hormone to be released from the stomach and triggers appetite while promoting weight gain. Proper rest turns on Leptin hormone to be released from white adipose tissue and promotes weight loss. Counseled patient on: weight loss goals, emphasizing a 5-10% weight loss in 6-12 months and strategies. Leg edema. Signs of DVT and cellulitis. Notify me if present. Immunizations noted. Praised pt for weight loss efforts and progress. Patient was offered a choice/choices in the treatment plan today. Patient expresses understanding of the plan and agrees with recommendations. Patient declines any additional handouts. Patient is satisfied with previous handouts received from our office    Follow-up Disposition:  Return in about 1 month (around 6/9/2017) for weight, bp. Written by hyun Stewart, as dictated by Dr. Zack Trevino DO. Documentation True and Accepted by Gab Treviño.  Kei Roach.

## 2017-05-09 NOTE — MR AVS SNAPSHOT
Visit Information Date & Time Provider Department Dept. Phone Encounter #  
 5/9/2017  7:40 AM DO Carlie Parham [phone] [id number] Follow-up Instructions Return in about 1 month (around 6/9/2017) for weight, bp. Your Appointments 6/5/2017  8:30 AM  
Office Visit with Cayden Bear DO [facility] ([address]) Appt Note: weight check [address] [facility] [address]  
[phone]  
  
   
 [address] [facility] [address] [facility] [address] 7/14/2017  8:30 AM  
Office Visit with Cayden Bear DO [facility] ([address]) Appt Note: weight check [address] [facility] [address]  
[phone]  
  
    
 8/14/2017  8:30 AM  
Office Visit with DO [facility]es [facility] ([address]) Appt Note: weight check [address] [facility] [address]  
[phone] Upcoming Health Maintenance Date Due  
 PAP AKA CERVICAL CYTOLOGY 5/19/2017* INFLUENZA AGE 9 TO ADULT 8/1/2017 BREAST CANCER SCRN MAMMOGRAM 12/14/2018 COLONOSCOPY 9/27/2021 DTaP/Tdap/Td series (2 - Td) 6/30/2026 *Topic was postponed. The date shown is not the original due date. Allergies as of 5/9/2017  Review Complete On: 5/9/2017 By: Jennifer Sumner Severity Noted Reaction Type Reactions Advil Pm [Ibuprofen-diphenhydramine Hcl]  07/18/2011    Other (comments) Slightly elevated Creaitnine 1.02 Aleve [Naproxen Sodium]  12/28/2011    Other (comments) Due to kidneys Bactrim [Sulfamethoxazole-trimethoprim]  09/11/2009    Nausea and Vomiting  
 Sulfa (Sulfonamide Antibiotics)  09/11/2009    Nausea and Vomiting Current Immunizations  Reviewed on 3/28/2017 Name Date Influenza Vaccine 9/18/2014, 10/22/2013, 12/14/2012 Influenza Vaccine Rebecca [place]) 10/29/2015 Influenza Vaccine (Quad) PF 9/29/2016 Influenza Vaccine Split 11/7/2011, 11/12/2010 TD Vaccine 8/18/2000 Tdap 6/30/2016  9:35 AM  
  
 Not reviewed this visit You Were Diagnosed With   
  
 Codes Comments Hashimoto's thyroiditis    -  Primary ICD-10-CM: E06.3 ICD-9-CM: 395. 2 Chronic insomnia     ICD-10-CM: F51.04 
ICD-9-CM: 780.52 Vitamin D deficiency     ICD-10-CM: E55.9 ICD-9-CM: 268.9 Obesity, Class II, BMI 35-39.9 (HCC)     ICD-10-CM: E66.9 ICD-9-CM: 278.00 Weight gain     ICD-10-CM: R63.5 ICD-9-CM: 783.1 2# Vitals BP Pulse Temp Resp Height(growth percentile) Weight(growth percentile) 92/61 (BP 1 Location: Left arm, BP Patient Position: Sitting) 64 98.4 °F (36.9 °C) (Oral) 18 5' 4.02\" (1.626 m) 216 lb 6.4 oz (98.2 kg) SpO2 BMI OB Status Smoking Status 100% 37.13 kg/m2 Hysterectomy Never Smoker Vitals History BMI and BSA Data Body Mass Index Body Surface Area  
 37.13 kg/m 2 2.11 m 2 Preferred Pharmacy Pharmacy Name Phone [facility], [address] [place] [address] AT [address] [place] [address] Drive [phone] Your Updated Medication List  
  
   
This list is accurate as of: 5/9/17  8:21 AM.  Always use your most recent med list.  
  
  
  
  
 albuterol 90 mcg/actuation inhaler Commonly known as:  PROVENTIL HFA, VENTOLIN HFA, PROAIR HFA Take 2 Puffs by inhalation every four (4) hours as needed for Wheezing (cough). baclofen 10 mg tablet Commonly known as:  LIORESAL Take 1 Tab by mouth nightly as needed. cetirizine 10 mg tablet Commonly known as:  ZYRTEC Take 1 Tab by mouth daily. Compression Socks, Large Misc  
custom-fit compression stockings, knee- high, 20-30 mmHg pressure. Pls fit/measure pt's legs. 1 pair.  
  
 eszopiclone 2 mg tablet Commonly known as:  Nolene Orchard Take 1 Tab by mouth nightly. Max Daily Amount: 2 mg. fluticasone 50 mcg/actuation nasal spray Commonly known as:  [place] Longest 2 Sprays by Both Nostrils route daily. Indications: ALLERGIC RHINITIS  
  
 furosemide 40 mg tablet Commonly known as:  LASIX TAKE 1 TABLET BY MOUTH ONCE DAILY FOR EDEMA  
  
 MINIVELLE 0.1 mg/24 hr  
Generic drug:  estradiol 1 Patch by TransDERmal route every Monday. montelukast 10 mg tablet Commonly known as:  SINGULAIR Take 1 Tab by mouth daily. Indications: ALLERGIC RHINITIS, breathing MYRBETRIQ 25 mg ER tablet Generic drug:  mirabegron ER Take 25 mg by mouth daily. phentermine 37.5 mg tablet Commonly known as:  ADIPEX-P Take 1 Tab by mouth daily. Max Daily Amount: 37.5 mg.  
  
 sertraline 50 mg tablet Commonly known as:  ZOLOFT Take 1 Tab by mouth daily. Indications: GENERALIZED ANXIETY DISORDER  
  
 VITAMIN D3 2,000 unit Tab Generic drug:  cholecalciferol (vitamin D3) Take 2,000 Units by mouth daily. Prescriptions Printed Refills  
 phentermine (ADIPEX-P) 37.5 mg tablet 0 Sig: Take 1 Tab by mouth daily. Max Daily Amount: 37.5 mg.  
 Class: Print Route: Oral  
  
Follow-up Instructions Return in about 1 month (around 6/9/2017) for weight, bp. Introducing [facility]! Hollie Oneil introduces [employer] patient portal. Now you can access parts of your medical record, email your doctor's office, and request medication refills online. 1. In your internet browser, go to [email or web address]. [employer] 2. Click on the First Time User? Click Here link in the Sign In box. You will see the New Member Sign Up page. 3. Enter your [employer]inder Access Code exactly as it appears below. You will not need to use this code after youve completed the sign-up process. If you do not sign up before the expiration date, you must request a new code. · [employer] Access Code: [email or web address] Expires: 8/3/2017 12:42 PM 
 
 4. Enter the last four digits of your Social Security Number (xxxx) and Date of Birth (mm/dd/yyyy) as indicated and click Submit. You will be taken to the next sign-up page. 5. Create a [employer] ID. This will be your [employer] login ID and cannot be changed, so think of one that is secure and easy to remember. 6. Create a [employer] password. You can change your password at any time. 7. Enter your Password Reset Question and Answer. This can be used at a later time if you forget your password. 8. Enter your e-mail address. You will receive e-mail notification when new information is available in [address] 9. Click Sign Up. You can now view and download portions of your medical record. 10. Click the Download Summary menu link to download a portable copy of your medical information. If you have questions, please visit the Frequently Asked Questions section of the [employer] website. Remember, [employer] is NOT to be used for urgent needs. For medical emergencies, dial 911. Now available from your iPhone and Android! Please provide this summary of care documentation to your next provider. Your primary care clinician is listed as Dante Valdez. If you have any questions after today's visit, please call [phone].

## 2017-05-09 NOTE — PROGRESS NOTES
Chief Complaint   Patient presents with    Weight Management    Blood Pressure Check    Results     1. Have you been to the ER, urgent care clinic since your last visit? Hospitalized since your last visit? No    2. Have you seen or consulted any other health care providers outside of the [address] [place] [address] since your last visit? Include any pap smears or colon screening. No    In the event something were to happen to you and you were unable to speak on your behalf, do you have an Advance Directive/ Living Will in place stating your wishes? YES    If yes, do we have a copy on file NO    If no, would you like information:   Pt encouraged to bring into nov so it can be scanned into her chart. Pt saw Dr. Erickson Moreno on Friday for her right leg pain and swelling; had ultrasound done; note in encounters. Writer stated to pt that today is a 5 minute visit; pt stated \"I know, I know; I'm going to talk to her next month about my leg. \"

## 2017-05-29 PROBLEM — R60.0 LEG EDEMA, RIGHT: Status: ACTIVE | Noted: 2017-05-29

## 2017-06-05 ENCOUNTER — OFFICE VISIT (OUTPATIENT)
Dept: FAMILY MEDICINE CLINIC | Age: 62
End: 2017-06-05

## 2017-06-05 VITALS
HEIGHT: 64 IN | DIASTOLIC BLOOD PRESSURE: 73 MMHG | BODY MASS INDEX: 37.54 KG/M2 | RESPIRATION RATE: 18 BRPM | WEIGHT: 219.9 LBS | OXYGEN SATURATION: 96 % | HEART RATE: 73 BPM | TEMPERATURE: 98 F | SYSTOLIC BLOOD PRESSURE: 110 MMHG

## 2017-06-05 DIAGNOSIS — R60.0 LEG EDEMA, RIGHT: ICD-10-CM

## 2017-06-05 DIAGNOSIS — M05.752 RHEUMATOID ARTHRITIS INVOLVING BOTH HIPS WITH POSITIVE RHEUMATOID FACTOR (HCC): ICD-10-CM

## 2017-06-05 DIAGNOSIS — M25.552 HIP PAIN, BILATERAL: ICD-10-CM

## 2017-06-05 DIAGNOSIS — M05.751 RHEUMATOID ARTHRITIS INVOLVING BOTH HIPS WITH POSITIVE RHEUMATOID FACTOR (HCC): ICD-10-CM

## 2017-06-05 DIAGNOSIS — M76.61 ACHILLES TENDINITIS OF RIGHT LOWER EXTREMITY: ICD-10-CM

## 2017-06-05 DIAGNOSIS — E55.9 VITAMIN D DEFICIENCY: ICD-10-CM

## 2017-06-05 DIAGNOSIS — M25.551 HIP PAIN, BILATERAL: ICD-10-CM

## 2017-06-05 DIAGNOSIS — E66.9 OBESITY, CLASS II, BMI 35-39.9: ICD-10-CM

## 2017-06-05 DIAGNOSIS — S99.921A TOE INJURY, RIGHT, INITIAL ENCOUNTER: ICD-10-CM

## 2017-06-05 DIAGNOSIS — E06.3 HASHIMOTO'S THYROIDITIS: ICD-10-CM

## 2017-06-05 DIAGNOSIS — F41.1 GENERALIZED ANXIETY DISORDER: ICD-10-CM

## 2017-06-05 DIAGNOSIS — R74.8 ELEVATED ALKALINE PHOSPHATASE LEVEL: ICD-10-CM

## 2017-06-05 DIAGNOSIS — M25.561 RECURRENT PAIN OF RIGHT KNEE: Primary | ICD-10-CM

## 2017-06-05 RX ORDER — PHENTERMINE HYDROCHLORIDE 37.5 MG/1
37.5 TABLET ORAL DAILY
Qty: 30 TAB | Refills: 0 | Status: SHIPPED | OUTPATIENT
Start: 2017-06-05 | End: 2017-07-14 | Stop reason: SDUPTHER

## 2017-06-05 RX ORDER — DICLOFENAC SODIUM 75 MG/1
75 TABLET, DELAYED RELEASE ORAL 2 TIMES DAILY
Qty: 60 TAB | Refills: 2 | Status: SHIPPED | OUTPATIENT
Start: 2017-06-05 | End: 2017-08-24 | Stop reason: SDUPTHER

## 2017-06-05 NOTE — PROGRESS NOTES
Chief Complaint   Patient presents with    Blood Pressure Check    Weight Management    Leg Pain     right     1. Have you been to the ER, urgent care clinic since your last visit? Hospitalized since your last visit? No    2. Have you seen or consulted any other health care providers outside of the [address] [place] [address] since your last visit? Include any pap smears or colon screening.  No     Weight Metrics 6/5/2017 6/5/2017 5/9/2017 5/9/2017 5/5/2017 3/28/2017 3/28/2017   Weight - 219 lb 14.4 oz - 216 lb 6.4 oz 215 lb - 214 lb 1.6 oz   Waist Measure Inches 39 - 38 - - 45.4 -   Body Fat % 45.2 - 46.1 - - 39.5 -   BMI - 37.72 kg/m2 - 37.13 kg/m2 36.89 kg/m2 - 36.73 kg/m2

## 2017-06-05 NOTE — MR AVS SNAPSHOT
Visit Information Date & Time Provider Department Dept. Phone Encounter #  
 6/5/2017  8:30 AM DO Diomedes Riosgate-Palmtri [phone] [id number] Follow-up Instructions Return in about 1 month (around 7/5/2017) for weight, bp check, med refill. Your Appointments 7/14/2017  8:30 AM  
Office Visit with Valerie Kelley DO [employer] ([address] [place]) Appt Note: weight check [address] [facility] [id number]  
[phone]  
  
   
 [address] [place] [address] 8/14/2017  8:30 AM  
Office Visit with Valerie Kelley DO [facility] ([address] [place]) Appt Note: weight check [address] [facility] [address]  
[phone] Upcoming Health Maintenance Date Due INFLUENZA AGE 9 TO ADULT 8/1/2017 PAP AKA CERVICAL CYTOLOGY 12/12/2018 BREAST CANCER SCRN MAMMOGRAM 12/14/2018 COLONOSCOPY 9/27/2021 DTaP/Tdap/Td series (2 - Td) 6/30/2026 Allergies as of 6/5/2017  Review Complete On: 6/5/2017 By: Valerie Kelley DO Severity Noted Reaction Type Reactions Advil Pm [Ibuprofen-diphenhydramine Hcl]  07/18/2011    Other (comments) Slightly elevated Creaitnine 1.02 Aleve [Naproxen Sodium]  12/28/2011    Other (comments) Due to kidneys Bactrim [Sulfamethoxazole-trimethoprim]  09/11/2009    Nausea and Vomiting  
 Sulfa (Sulfonamide Antibiotics)  09/11/2009    Nausea and Vomiting Current Immunizations  Reviewed on 6/5/2017 Name Date Influenza Vaccine 9/18/2014, 10/22/2013, 12/14/2012 Influenza Vaccine Comfort Mash) 10/29/2015 Influenza Vaccine (Quad) PF 9/29/2016 Influenza Vaccine Split 11/7/2011, 11/12/2010 TD Vaccine 8/18/2000 Tdap 6/30/2016  9:35 AM  
  
 Reviewed by Valerie Kelley DO on 6/5/2017 at  9:09 AM  
You Were Diagnosed With   
  
 Codes Comments Recurrent pain of right knee    -  Primary ICD-10-CM: M25.561 ICD-9-CM: 719.46 Leg edema, right     ICD-10-CM: R60.0 ICD-9-CM: 782.3 due to R knee pain vs other Rheumatoid arthritis involving both hips with positive rheumatoid factor ([facility] [address]     ICD-10-CM: M05.751, G28.942 ICD-9-CM: 714.0 stable Vitamin D deficiency     ICD-10-CM: E55.9 ICD-9-CM: 268.9 stable Hip pain, bilateral     ICD-10-CM: M25.551, M25.552 ICD-9-CM: 719.45 L>R, due to RA and OA vs other Obesity, Class II, BMI 35-39.9 (HCC)     ICD-10-CM: E66.9 ICD-9-CM: 278.00 Hashimoto's thyroiditis     ICD-10-CM: E06.3 ICD-9-CM: 245.2 stable Elevated alkaline phosphatase level     ICD-10-CM: R74.8 ICD-9-CM: 790.5 resolved Achilles tendinitis of right lower extremity     ICD-10-CM: M76.61 
ICD-9-CM: 726.71 improved after surgery on it 2002 Toe injury, right, initial encounter     ICD-10-CM: J57.127Q ICD-9-CM: 959.7 due to trauma with bathroom chair, resolved Vitals BP Pulse Temp Resp Height(growth percentile) Weight(growth percentile) 110/73 (BP 1 Location: Left arm, BP Patient Position: Sitting) 73 98 °F (36.7 °C) (Oral) 18 5' 4.02\" (1.626 m) 219 lb 14.4 oz (99.7 kg) SpO2 BMI OB Status Smoking Status 96% 37.72 kg/m2 Hysterectomy Never Smoker BMI and BSA Data Body Mass Index Body Surface Area  
 37.72 kg/m 2 2.12 m 2 Preferred Pharmacy Pharmacy Name Phone [facility], [facility] [phone] Your Updated Medication List  
  
   
This list is accurate as of: 6/5/17  9:22 AM.  Always use your most recent med list.  
  
  
  
  
 albuterol 90 mcg/actuation inhaler Commonly known as:  PROVENTIL HFA, VENTOLIN HFA, PROAIR HFA Take 2 Puffs by inhalation every four (4) hours as needed for Wheezing (cough). baclofen 10 mg tablet Commonly known as:  LIORESAL  
 Take 1 Tab by mouth nightly as needed. cetirizine 10 mg tablet Commonly known as:  ZYRTEC Take 1 Tab by mouth daily. Compression Socks, Large Misc  
custom-fit compression stockings, knee- high, 20-30 mmHg pressure. Pls fit/measure pt's legs. 1 pair. diclofenac EC 75 mg EC tablet Commonly known as:  VOLTAREN Take 1 Tab by mouth two (2) times a day. Indications: OSTEOARTHRITIS, RHEUMATOID ARTHRITIS  
  
 eszopiclone 2 mg tablet Commonly known as:  Nada Dina Take 1 Tab by mouth nightly. Max Daily Amount: 2 mg. fluticasone 50 mcg/actuation nasal spray Commonly known as:  Tima Bumpers 2 Sprays by Both Nostrils route daily. Indications: ALLERGIC RHINITIS  
  
 furosemide 40 mg tablet Commonly known as:  LASIX TAKE 1 TABLET BY MOUTH ONCE DAILY FOR EDEMA  
  
 MINIVELLE 0.1 mg/24 hr  
Generic drug:  estradiol 1 Patch by TransDERmal route every Monday. montelukast 10 mg tablet Commonly known as:  SINGULAIR Take 1 Tab by mouth daily. Indications: ALLERGIC RHINITIS, breathing MYRBETRIQ 25 mg ER tablet Generic drug:  mirabegron ER Take 25 mg by mouth daily. phentermine 37.5 mg tablet Commonly known as:  ADIPEX-P Take 1 Tab by mouth daily. Max Daily Amount: 37.5 mg.  
  
 sertraline 50 mg tablet Commonly known as:  ZOLOFT Take 1 Tab by mouth daily. Indications: GENERALIZED ANXIETY DISORDER  
  
 VITAMIN D3 2,000 unit Tab Generic drug:  cholecalciferol (vitamin D3) Take 2,000 Units by mouth daily. Prescriptions Printed Refills  
 phentermine (ADIPEX-P) 37.5 mg tablet 0 Sig: Take 1 Tab by mouth daily. Max Daily Amount: 37.5 mg.  
 Class: Print Route: Oral  
  
Prescriptions Sent to Pharmacy Refills  
 diclofenac EC (VOLTAREN) 75 mg EC tablet 2 Sig: Take 1 Tab by mouth two (2) times a day. Indications: OSTEOARTHRITIS, RHEUMATOID ARTHRITIS  Class: Normal  
 Pharmacy: [email or web address] [address] AT [address] [place] [address] #: [phone] Route: Oral  
  
Follow-up Instructions Return in about 1 month (around 7/5/2017) for weight, bp check, med refill. Patient Instructions Leg and Ankle Edema: Care Instructions Your Care Instructions Swelling in the legs, ankles, and feet is called edema. It is common after you sit or stand for a while. Long plane flights or car rides often cause swelling in the legs and feet. You may also have swelling if you have to stand for long periods of time at your job. Problems with the veins in the legs (varicose veins) and changes in hormones can also cause swelling. Sometimes the swelling in the ankles and feet is caused by a more serious problem, such as heart failure, infection, blood clots, or liver or kidney disease. Follow-up care is a key part of your treatment and safety. Be sure to make and go to all appointments, and call your doctor if you are having problems. Its also a good idea to know your test results and keep a list of the medicines you take. How can you care for yourself at home? · If your doctor gave you medicine, take it as prescribed. Call your doctor if you think you are having a problem with your medicine. · Whenever you are resting, raise your legs up. Try to keep the swollen area higher than the level of your heart. · Take breaks from standing or sitting in one position. ¨ Walk around to increase the blood flow in your lower legs. ¨ Move your feet and ankles often while you stand, or tighten and relax your leg muscles. · Wear support stockings. Put them on in the morning, before swelling gets worse. · Eat a balanced diet. Lose weight if you need to. · Limit the amount of salt (sodium) in your diet. Salt holds fluid in the body and may increase swelling. When should you call for help? Call 911 anytime you think you may need emergency care. For example, call if: 
· You have symptoms of a blood clot in your lung (called a pulmonary embolism). These may include: 
¨ Sudden chest pain. ¨ Trouble breathing. ¨ Coughing up blood. Call your doctor now or seek immediate medical care if: 
· You have signs of a blood clot, such as: 
¨ Pain in your calf, back of the knee, thigh, or groin. ¨ Redness and swelling in your leg or groin. · You have symptoms of infection, such as: 
¨ Increased pain, swelling, warmth, or redness. ¨ Red streaks or pus. ¨ A fever. Watch closely for changes in your health, and be sure to contact your doctor if: 
· Your swelling is getting worse. · You have new or worsening pain in your legs. · You do not get better as expected. Where can you learn more? Go to http://romulo-daron.info/. Enter K496 in the search box to learn more about \"Leg and Ankle Edema: Care Instructions. \" Current as of: May 27, 2016 Content Version: 11.2 © 2763-5327 [employer]. Care instructions adapted under license by [employer] (which disclaims liability or warranty for this information). If you have questions about a medical condition or this instruction, always ask your healthcare professional. [place] [address] any warranty or liability for your use of this information. Rheumatoid Arthritis Diet: Care Instructions Your Care Instructions The best diet for people with rheumatoid arthritis is a healthy, balanced diet that is low in saturated fat and salt and high in fiber and complex carbohydrate (whole grains, beans, fruits, and vegetables). Fish oil (omega-3 fatty acids) has a modest effect in reducing inflammation, and eating fish may improve symptoms. People who have rheumatoid arthritis have a high risk of developing osteoporosis.  To help prevent this disease, get plenty of calcium and vitamin D. 
 Follow-up care is a key part of your treatment and safety. Be sure to make and go to all appointments, and call your doctor if you are having problems. It's also a good idea to know your test results and keep a list of the medicines you take. How can you care for yourself at home? · Try to eat at least 2 servings of fish each week. Oily fish, which contain omega-3 fatty acids, include: ¨ Tuna. ¨ Hollis. ¨ Mackerel. [address] trout. ¨ Herring. ¨ Sardines. · If you're pregnant, talk to your doctor about eating fish. Pregnant women shouldn't eat certain types of fish that have high mercury content. · You can get calcium and vitamin D by drinking milk fortified with vitamin D. Four glasses of milk a day provide about 1,200 milligrams (mg) of calcium. Other common foods with calcium: ¨ Yogurt (plain or low-fat). An 8-ounce serving provides 415 mg of calcium. ¨ Cheddar cheese. A 1½-ounce serving provides 306 mg. 
¨ Milk (skim, 2%, or whole). A 1-cup serving provides about 300 mg. [address] (1% milk fat). A 1-cup serving provides 138 mg. 
· If you can't eat or drink dairy foods, you can get calcium and vitamin D from: ¨ Calcium-fortified orange juice. A 1-cup serving provides 500 mg of calcium. ¨ Calcium-enriched soy milk. A 1-cup serving provides 282 mg of calcium. ¨ Almonds. A 1-ounce serving (about 24 nuts) provides 75 mg of calcium. ¨ Canned salmon. A 3-ounce serving provides 180 mg of calcium. ¨ Tofu (firm, made with calcium sulfate). A ½-cup serving provides 204 mg. · You may need to take a calcium supplement to make sure you are getting the calcium you need. Where can you learn more? Go to http://romulo-daron.info/. Enter Q201 in the search box to learn more about \"Rheumatoid Arthritis Diet: Care Instructions. \" Current as of: July 26, 2016 Content Version: 11.2 © 3485-9375 [employer].  Care instructions adapted under license by [address] (which disclaims liability or warranty for this information). If you have questions about a medical condition or this instruction, always ask your healthcare professional. [address] any warranty or liability for your use of this information. Knee Arthritis: Exercises Your Care Instructions Here are some examples of exercises for knee arthritis. Start each exercise slowly. Ease off the exercise if you start to have pain. Your doctor or physical therapist will tell you when you can start these exercises and which ones will work best for you. How to do the exercises Knee flexion with heel slide 1. Lie on your back with your knees bent. 2. Slide your heel back by bending your affected knee as far as you can. Then hook your other foot around your ankle to help pull your heel even farther back. 3. Hold for about 6 seconds, then rest for up to 10 seconds. 4. Repeat 8 to 12 times. 5. Switch legs and repeat steps 1 through 4, even if only one knee is sore. [employer] 1. Sit with your affected leg straight and supported on the floor or a firm bed. Place a small, rolled-up towel under your knee. Your other leg should be bent, with that foot flat on the floor. 2. Tighten the thigh muscles of your affected leg by pressing the back of your knee down into the towel. 3. Hold for about 6 seconds, then rest for up to 10 seconds. 4. Repeat 8 to 12 times. 5. Switch legs and repeat steps 1 through 4, even if only one knee is sore. Straight-leg raises to the front 1. Lie on your back with your good knee bent so that your foot rests flat on the floor. Your affected leg should be straight. Make sure that your low back has a normal curve. You should be able to slip your hand in between the floor and the small of your back, with your palm touching the floor and your back touching the back of your hand. 2. Tighten the thigh muscles in your affected leg by pressing the back of your knee flat down to the floor. Hold your knee straight. 3. Keeping the thigh muscles tight and your leg straight, lift your affected leg up so that your heel is about 12 inches off the floor. Hold for about 6 seconds, then lower slowly. 4. Relax for up to 10 seconds between repetitions. 5. Repeat 8 to 12 times. 6. Switch legs and repeat steps 1 through 5, even if only one knee is sore. Active knee flexion 1. Lie on your stomach with your knees straight. If your kneecap is uncomfortable, roll up a washcloth and put it under your leg just above your kneecap. 2. Lift the foot of your affected leg by bending the knee so that you bring the foot up toward your buttock. If this motion hurts, try it without bending your knee quite as far. This may help you avoid any painful motion. 3. Slowly move your leg up and down. 4. Repeat 8 to 12 times. 5. Switch legs and repeat steps 1 through 4, even if only one knee is sore. Quadriceps stretch (facedown) 1. Lie flat on your stomach, and rest your face on the floor. 2. Wrap a towel or belt strap around the lower part of your affected leg. Then use the towel or belt strap to slowly pull your heel toward your buttock until you feel a stretch. 3. Hold for about 15 to 30 seconds, then relax your leg against the towel or belt strap. 4. Repeat 2 to 4 times. 5. Switch legs and repeat steps 1 through 4, even if only one knee is sore. Stationary exercise bike If you do not have a stationary exercise bike at home, you can find one to ride at your local health club or community center. 1. Adjust the height of the bike seat so that your knee is slightly bent when your leg is extended downward. If your knee hurts when the pedal reaches the top, you can raise the seat so that your knee does not bend as much. 2. Start slowly.  At first, try to do 5 to 10 minutes of cycling with little to no resistance. Then increase your time and the resistance bit by bit until you can do 20 to 30 minutes without pain. 3. If you start to have pain, rest your knee until your pain gets back to the level that is normal for you. Or cycle for less time or with less effort. Follow-up care is a key part of your treatment and safety. Be sure to make and go to all appointments, and call your doctor if you are having problems. It's also a good idea to know your test results and keep a list of the medicines you take. Where can you learn more? Go to http://romulo-daron.info/. Enter C159 in the search box to learn more about \"Knee Arthritis: Exercises. \" Current as of: May 23, 2016 Content Version: 11.2 © 3325-6019 [employer]. Care instructions adapted under license by [employer] (which disclaims liability or warranty for this information). If you have questions about a medical condition or this instruction, always ask your healthcare professional. [place] [address] any warranty or liability for your use of this information. Use Tylenol prn pain, fever, or headache. Never exceed more than 3 grams daily, if you are over 72years old. Never exceed more than 4 grams daily, if you are under 72years old. Knee Arthritis: Exercises Your Care Instructions Here are some examples of exercises for knee arthritis. Start each exercise slowly. Ease off the exercise if you start to have pain. Your doctor or physical therapist will tell you when you can start these exercises and which ones will work best for you. How to do the exercises Knee flexion with heel slide 6. Lie on your back with your knees bent. 7. Slide your heel back by bending your affected knee as far as you can. Then hook your other foot around your ankle to help pull your heel even farther back. 8. Hold for about 6 seconds, then rest for up to 10 seconds. 9. Repeat 8 to 12 times. 10. Switch legs and repeat steps 1 through 4, even if only one knee is sore. [facility] [employer] 6. Sit with your affected leg straight and supported on the floor or a firm bed. Place a small, rolled-up towel under your knee. Your other leg should be bent, with that foot flat on the floor. 7. Tighten the thigh muscles of your affected leg by pressing the back of your knee down into the towel. 8. Hold for about 6 seconds, then rest for up to 10 seconds. 9. Repeat 8 to 12 times. 10. Switch legs and repeat steps 1 through 4, even if only one knee is sore. Straight-leg raises to the front 7. Lie on your back with your good knee bent so that your foot rests flat on the floor. Your affected leg should be straight. Make sure that your low back has a normal curve. You should be able to slip your hand in between the floor and the small of your back, with your palm touching the floor and your back touching the back of your hand. 8. Tighten the thigh muscles in your affected leg by pressing the back of your knee flat down to the floor. Hold your knee straight. 9. Keeping the thigh muscles tight and your leg straight, lift your affected leg up so that your heel is about 12 inches off the floor. Hold for about 6 seconds, then lower slowly. 10. Relax for up to 10 seconds between repetitions. 11. Repeat 8 to 12 times. 12. Switch legs and repeat steps 1 through 5, even if only one knee is sore. Active knee flexion 6. Lie on your stomach with your knees straight. If your kneecap is uncomfortable, roll up a washcloth and put it under your leg just above your kneecap. 7. Lift the foot of your affected leg by bending the knee so that you bring the foot up toward your buttock. If this motion hurts, try it without bending your knee quite as far. This may help you avoid any painful motion. 8. Slowly move your leg up and down. 9. Repeat 8 to 12 times. 10. Switch legs and repeat steps 1 through 4, even if only one knee is sore. Quadriceps stretch (facedown) 6. Lie flat on your stomach, and rest your face on the floor. 7. Wrap a towel or belt strap around the lower part of your affected leg. Then use the towel or belt strap to slowly pull your heel toward your buttock until you feel a stretch. 8. Hold for about 15 to 30 seconds, then relax your leg against the towel or belt strap. 9. Repeat 2 to 4 times. 10. Switch legs and repeat steps 1 through 4, even if only one knee is sore. Stationary exercise bike If you do not have a stationary exercise bike at home, you can find one to ride at your local health club or community center. 4. Adjust the height of the bike seat so that your knee is slightly bent when your leg is extended downward. If your knee hurts when the pedal reaches the top, you can raise the seat so that your knee does not bend as much. 5. Start slowly. At first, try to do 5 to 10 minutes of cycling with little to no resistance. Then increase your time and the resistance bit by bit until you can do 20 to 30 minutes without pain. 6. If you start to have pain, rest your knee until your pain gets back to the level that is normal for you. Or cycle for less time or with less effort. Follow-up care is a key part of your treatment and safety. Be sure to make and go to all appointments, and call your doctor if you are having problems. It's also a good idea to know your test results and keep a list of the medicines you take. Where can you learn more? Go to http://romulo-daron.info/. Enter C159 in the search box to learn more about \"Knee Arthritis: Exercises. \" Current as of: May 23, 2016 Content Version: 11.2 © 9475-8260 [employer]. Care instructions adapted under license by [employer] (which disclaims liability or warranty for this information).  If you have questions about a medical condition or this instruction, always ask your healthcare professional. [place] [address] any warranty or liability for your use of this information. Introducing [facility]! Micki Robert introduces Dime patient portal. Now you can access parts of your medical record, email your doctor's office, and request medication refills online. 1. In your internet browser, go to [email or web address] 2. Click on the First Time User? Click Here link in the Sign In box. You will see the New Member Sign Up page. 3. Enter your Dime Access Code exactly as it appears below. You will not need to use this code after youve completed the sign-up process. If you do not sign up before the expiration date, you must request a new code. · Dime Access Code: [email or web address] Expires: 8/3/2017 12:42 PM 
 
4. Enter the last four digits of your Social Security Number (xxxx) and Date of Birth (mm/dd/yyyy) as indicated and click Submit. You will be taken to the next sign-up page. 5. Create a Dime ID. This will be your Dime login ID and cannot be changed, so think of one that is secure and easy to remember. 6. Create a Dime password. You can change your password at any time. 7. Enter your Password Reset Question and Answer. This can be used at a later time if you forget your password. 8. Enter your e-mail address. You will receive e-mail notification when new information is available in [address] 9. Click Sign Up. You can now view and download portions of your medical record. 10. Click the Download Summary menu link to download a portable copy of your medical information. If you have questions, please visit the Frequently Asked Questions section of the Dime website. Remember, Dime is NOT to be used for urgent needs. For medical emergencies, dial 911. Now available from your iPhone and Android! Please provide this summary of care documentation to your next provider. Your primary care clinician is listed as Lynne Hubbard. If you have any questions after today's visit, please call [phone].

## 2017-06-05 NOTE — PROGRESS NOTES
HISTORY OF PRESENT ILLNESS  Lenard Moody is a 64 y.o. female presents with Blood Pressure Check; Weight Management; Leg Pain (right); Medication Refill; and Knee Pain (R)      Agree with nurse note. Pt with Obesity Class II, Hashimoto's thyroiditis, Vit D deficiency, and elevated ALP presents to the office with a BP of 110/73. She is tolerating Adipex-P 37.5 mg well, month 1 of 3. Last prescribed on 05/09/17. She has noticed an increase of energy and decreased appetite since starting the medication. She gained 3 pounds since the last visit. Percent body fat has decreased from 46.1% to 45.2%, waist circumference measurement has changed from 38\" to 39\". Overall, patient is pleased and requests a refill of the medication today. Patient denies fatigue, sleep disturbance, chest pain, heart palpitations, nausea, dry mouth, constipation, signs of depression. Pt with hx of RA. As the story unfolds, she stubbed her R toe on a chair in her bathroom and later began to have R leg pain and swelling. She does not believe that her toe injury caused her leg pain but notes she did not have the leg pain prior to the injury. She also has R knee pain. Last night she tried walking on the treadmill but had to stop after 15 minutes to due R inner aspect knee pain. Pain worsens with walking. She took 2 pills of OTC Ibuprofen twice per day with minimal relief. Resting, icing, and elevating improved her pain. Wearing an OTC knee brace did not help. She took Baclofen 10 mg last night and occasionally Lasix 40 mg wondering if she should increase her dose. Denies ankle pain, SOB, or chest pain or other associated symptoms. No known trauma. Of note, she has a hx of RLE achilles tendonisits and previously had surgery with Dr. Armando Valle in the 1990s. She has BL hip pain (L>R) off and on depending on how she sleeps. Previously diagnosed with RA in her hips. No recent trauma. No pain today.  She saw Dr. Pedro Mcguire in 2010 for this.    She has a hx of MARGI. No worse while taking Adipex but she occasionally uses Zoloft 50 mg daily as needed. Written by hyun Linares, as dictated by DO. DANIEL Merino    Review of Systems negative except as noted above in HPI. ALLERGIES:    Allergies   Allergen Reactions    Advil Pm [Ibuprofen-Diphenhydramine Hcl] Other (comments)     Slightly elevated Creaitnine 1.02    Aleve [Naproxen Sodium] Other (comments)     Due to kidneys    Bactrim [Sulfamethoxazole-Trimethoprim] Nausea and Vomiting    Sulfa (Sulfonamide Antibiotics) Nausea and Vomiting       CURRENT MEDICATIONS:      PAST MEDICAL HISTORY:    Past Medical History:   Diagnosis Date    Achilles tendonitis 12/2004    Right. Dr. Obdulio Carlisle Arthritis 2010    hips, knees. Dr. Katie De Jesus    Chest pain 08/2013    Dr. Kai Talamantes.  Chickenpox childhood    Endometriosis 1990    Dr. Singh Mom Environmental allergies     MARGI (generalized anxiety disorder)     Hip pain, bilateral 2010    due to severe OA. Iliopsoas bursitis. Dr. Vick Imus    History of breast lump/mass excision 1998    Left breast.  Dr. Maya Bowman Hyperlipidemia     Iron deficiency anemia     iron deficiency    Menopausal and postmenopausal disorder 2007    Dr. Singh Mom Uterine fibroid     Dr. Leung Parents.  Vitamin D deficiency 03/2008       PAST SURGICAL HISTORY:    Past Surgical History:   Procedure Laterality Date    BIOPSY BREAST  Rt 1993;Lt 1998    Dr. Hilda Rivera Right 2002    Right achiles tendon. Dr. Charlotte Staton.  HX BREAST BIOPSY  2001    Left. benign. Dr. Bozena Estevez HX COLONOSCOPY  09/27/2016    Dr. Laura Brooks.  due q 5 yrs due to fam hx   Allyson Rico. due to endometriosis    HX PELVIC LAPAROSCOPY  1995    due to endometriosis Dr. Arias Said  04/2011    due to fibroids. Dr. Leung Parents.        FAMILY HISTORY: Family History   Problem Relation Age of Onset    Hypertension Mother     Other Mother      hearing loss/vision loss    Heart Disease Father     Lung Disease Father     Diabetes Sister     Heart Attack Sister 54     March 2014    Stroke Sister      after MI    Thyroid Disease Sister     Colon Polyps Sister     Heart Disease Brother      Defibrillator placed    Heart Attack Brother 48    Diabetes Maternal Grandmother        SOCIAL HISTORY:    Social History     Social History    Marital status: SINGLE     Spouse name: N/A    Number of children: 2    Years of education: N/A     Occupational History    [employer]      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      Social History Main Topics    Smoking status: Never Smoker    Smokeless tobacco: Never Used      Comment: lived with smoker mom x 18 yrs    Alcohol use No    Drug use: No    Sexual activity: Not Currently     Partners: Male     Birth control/ protection: Abstinence, Surgical     Other Topics Concern    None     Social History Narrative       IMMUNIZATIONS:    Immunization History   Administered Date(s) Administered    Influenza Vaccine 12/14/2012, 10/22/2013, 09/18/2014    Influenza Vaccine (Quad) 10/29/2015    Influenza Vaccine (Quad) PF 09/29/2016    Influenza Vaccine Split 11/12/2010, 11/07/2011    TD Vaccine 08/18/2000    Tdap 06/30/2016       PHYSICAL EXAMINATION    Vital Signs    Visit Vitals    /73 (BP 1 Location: Left arm, BP Patient Position: Sitting)    Pulse 73    Temp 98 °F (36.7 °C) (Oral)    Resp 18    Ht 5' 4.02\" (1.626 m)    Wt 219 lb 14.4 oz (99.7 kg)    SpO2 96%    BMI 37.72 kg/m2       Weight Metrics 6/5/2017 6/5/2017 5/9/2017 5/9/2017 5/5/2017 3/28/2017 3/28/2017   Weight - 219 lb 14.4 oz - 216 lb 6.4 oz 215 lb - 214 lb 1.6 oz   Waist Measure Inches 39 - 38 - - 45.4 -   Body Fat % 45.2 - 46.1 - - 39.5 -   BMI - 37.72 kg/m2 - 37.13 kg/m2 36.89 kg/m2 - 36.73 kg/m2       General appearance - Well nourished. Well appearing. Well developed. No acute distress. Overweight. Head - Normocephalic. Atraumatic. Eyes - pupils equal and reactive, extraocular eye movements intact, sclera anicteric  Ears - Hearing is grossly normal bilaterally. Nose - normal and patent, no erythema, discharge or polyps   Mouth - mucous membranes moist, pharynx normal with cobblestone appearance. No erythema, white exudate or obstruction. Neck - supple. Midline trachea. No carotid bruits are noted. No thyromegaly noted. Chest - clear to auscultation bilaterally anterriorly and posteriorly. No wheezes, rales or rhonchi. Breath sounds are symmetrical and unlabored bilaterally. Heart - normal rate. Regular rhythm, normal S1, S2. No murmur. No rubs, clicks or gallops noted. Abdomen - soft and distended. No masses or organomegaly. No rebound, rigidity or guarding. Bowel sounds normal x 4 quadrants. No tenderness noted. Neurological - awake, alert and oriented to person, place, and time and event. Cranial nerves II through XII intact. Muscle strength is +5/5 x 4 extremities. Sensation is intact to light touch bilaterally. Steady gait. Heme/Lymph - peripheral pulses normal x 4 extremities. No peripheral edema is noted. No cervical adenopathy noted. R leg is a trace larger than L. Musculoskeletal - Intact x 4 extremities. Full ROM x 4 extremities. R knee and R leg pain with movement. No tenderness in the pelvis, pubic bone, bilateral hips, knees, ankles. Back exam - normal range of motion. No pain on palpation of the spinous processes in the cervical, thoracic, lumbar, sacral regions. No CVA tenderness. Skin - no rashes, erythema, ecchymosis, lacerations, abrasions, suspicious moles noted. No skin tags. No acanthosis nigricans noted in the axilla or neck. Psychological -   normal behavior, speech, dress and thought processes. Good insight. Good eye contact. Normal affect. Appropriate mood.       DATA REVIEWED    Lab Results   Component Value Date/Time    WBC 3.6 03/28/2017 09:32 AM    HGB 13.0 03/28/2017 09:32 AM    HCT 40.8 03/28/2017 09:32 AM    PLATELET 544 09/22/3872 09:32 AM    MCV 92 03/28/2017 09:32 AM     Lab Results   Component Value Date/Time    Sodium 139 03/28/2017 09:32 AM    Potassium 4.0 03/28/2017 09:32 AM    Chloride 98 03/28/2017 09:32 AM    CO2 26 03/28/2017 09:32 AM    Anion gap 5 04/23/2011 05:20 AM    Glucose 80 03/28/2017 09:32 AM    BUN 17 03/28/2017 09:32 AM    Creatinine 0.98 03/28/2017 09:32 AM    BUN/Creatinine ratio 17 03/28/2017 09:32 AM    GFR est AA 72 03/28/2017 09:32 AM    GFR est non-AA 62 03/28/2017 09:32 AM    Calcium 9.1 03/28/2017 09:32 AM    Bilirubin, total 0.5 03/28/2017 09:32 AM    AST (SGOT) 22 03/28/2017 09:32 AM    Alk. phosphatase 91 03/28/2017 09:32 AM    Protein, total 7.0 03/28/2017 09:32 AM    Albumin 4.4 03/28/2017 09:32 AM    A-G Ratio 1.7 03/28/2017 09:32 AM    ALT (SGPT) 17 03/28/2017 09:32 AM     Lab Results   Component Value Date/Time    Cholesterol, total 169 03/28/2017 09:32 AM    HDL Cholesterol 68 03/28/2017 09:32 AM    LDL, calculated 92 03/28/2017 09:32 AM    VLDL, calculated 9 03/28/2017 09:32 AM    Triglyceride 44 03/28/2017 09:32 AM     Lab Results   Component Value Date/Time    Vitamin D 25-Hydroxy 26.3 07/18/2011 10:22 AM    VITAMIN D, 25-HYDROXY 34.7 03/28/2017 09:32 AM       Lab Results   Component Value Date/Time    Hemoglobin A1c 5.5 03/28/2017 09:32 AM     Lab Results   Component Value Date/Time    TSH 1.900 02/24/2017 10:03 AM    TSH 2.36 08/21/2014 07:50 AM    T4, Free 1.29 02/24/2017 10:03 AM       ASSESSMENT and PLAN    ICD-10-CM ICD-9-CM    1. Recurrent pain of right knee M25.561 719.46 diclofenac EC (VOLTAREN) 75 mg EC tablet      REFERRAL TO ORTHOPEDIC SURGERY   2. Leg edema, right R60.0 782.3 REFERRAL TO ORTHOPEDIC SURGERY    due to R knee pain vs other   3.  Rheumatoid arthritis involving both hips with positive rheumatoid factor ([facility] [place] [facility].) M05.751 714.0 diclofenac EC (VOLTAREN) 75 mg EC tablet    M05.752  REFERRAL TO ORTHOPEDIC SURGERY    stable   4. Vitamin D deficiency E55.9 268.9     stable   5. Hip pain, bilateral M25.551 719.45 diclofenac EC (VOLTAREN) 75 mg EC tablet    M25.552  REFERRAL TO ORTHOPEDIC SURGERY    L>R, due to RA and OA vs other   6. Obesity, Class II, BMI 35-39.9 (HCC) E66.9 278.00 phentermine (ADIPEX-P) 37.5 mg tablet   7. Hashimoto's thyroiditis E06.3 245.2     stable   8. Elevated alkaline phosphatase level R74.8 790.5     resolved   9. Achilles tendinitis of right lower extremity M76.61 726.71     improved after surgery on it 2002   10. Toe injury, right, initial encounter S99.921A 959.7     due to trauma with bathroom chair, resolved   11. Generalized anxiety disorder F41.1 300.02     stable on Adipex       Continue current medications and care. Take Diclofenac 75 mg up to BID pc prn and avoid other NSAID use, monitor bp with use. Prescriptions written and given to patient (Diclofenac 75 mg and Adipex-P 37.5 mg, month 2 of 3.)  Medication side effects discussed. Referrals given today. Advise pt to keep appointments with specialists. Orthopedic Surgery. Discussed the patient's BMI with her. The BMI follow up plan is as follows: I have counseled this patient on diet and exercise regimens. Diet recommendations:  Decrease carbohydrates (white foods including flour, white bread, white rice, white pasta, white potatoes; corn, sweet foods, sweet drinks and alcohol), increase green leafy vegetables and protein with each meal.  Avoid fried foods. Eat 3-5 small meals daily. Do not skip meals. Ok to use meal replacements up to twice daily with protein goal of 60 mg per meal.   Recommend OTC Premiere Protein bars or shakes. Increase water intake. Increase physical activity to 30 minutes daily for health benefit or 60 minutes daily to prevent weight regain, as tolerated.     Physical Activity prescription:  A goal of 30 minutes physical activity daily is recommended for health benefit and at least 60 minutes daily to prevent weight regain, as tolerated. Recommend non weight bearing exercises (i.e. Aquatic therapy, bike riding). Sleep prescription:    A goal of 7-8 hours of uninterrupted sleep is recommended to turn off the Grehlin hormone to be released from the stomach and triggers appetite while promoting weight gain. Proper rest turns on Leptin hormone to be released from white adipose tissue and promotes weight loss. Counseled patient on: weight loss goals, emphasizing a 5-10% weight loss in 6-12 months and strategies. Knee care, edema, and RA. RICE therapy. Relevant handouts given and discussed with patient. Immunizations noted. Praised pt for weight loss efforts and progress. Patient was offered a choice/choices in the treatment plan today. Patient expresses understanding of the plan and agrees with recommendations. Follow-up Disposition:  Return in about 1 month (around 7/5/2017) for weight, bp check, med refill. Written by hyun Linares, as dictated by Dr. Alanna Alamo DO. Documentation True and Accepted by Aamir Avalos. Angelic Hernandez. Patient Instructions        Leg and Ankle Edema: Care Instructions  Your Care Instructions  Swelling in the legs, ankles, and feet is called edema. It is common after you sit or stand for a while. Long plane flights or car rides often cause swelling in the legs and feet. You may also have swelling if you have to stand for long periods of time at your job. Problems with the veins in the legs (varicose veins) and changes in hormones can also cause swelling. Sometimes the swelling in the ankles and feet is caused by a more serious problem, such as heart failure, infection, blood clots, or liver or kidney disease. Follow-up care is a key part of your treatment and safety.  Be sure to make and go to all appointments, and call your doctor if you are having problems. Its also a good idea to know your test results and keep a list of the medicines you take. How can you care for yourself at home? · If your doctor gave you medicine, take it as prescribed. Call your doctor if you think you are having a problem with your medicine. · Whenever you are resting, raise your legs up. Try to keep the swollen area higher than the level of your heart. · Take breaks from standing or sitting in one position. ¨ Walk around to increase the blood flow in your lower legs. ¨ Move your feet and ankles often while you stand, or tighten and relax your leg muscles. · Wear support stockings. Put them on in the morning, before swelling gets worse. · Eat a balanced diet. Lose weight if you need to. · Limit the amount of salt (sodium) in your diet. Salt holds fluid in the body and may increase swelling. When should you call for help? Call 911 anytime you think you may need emergency care. For example, call if:  · You have symptoms of a blood clot in your lung (called a pulmonary embolism). These may include:  ¨ Sudden chest pain. ¨ Trouble breathing. ¨ Coughing up blood. Call your doctor now or seek immediate medical care if:  · You have signs of a blood clot, such as:  ¨ Pain in your calf, back of the knee, thigh, or groin. ¨ Redness and swelling in your leg or groin. · You have symptoms of infection, such as:  ¨ Increased pain, swelling, warmth, or redness. ¨ Red streaks or pus. ¨ A fever. Watch closely for changes in your health, and be sure to contact your doctor if:  · Your swelling is getting worse. · You have new or worsening pain in your legs. · You do not get better as expected. Where can you learn more? Go to http://romulo-daron.info/. Enter V683 in the search box to learn more about \"Leg and Ankle Edema: Care Instructions. \"  Current as of: May 27, 2016  Content Version: 11.2  © 3119-4492 [employer].  Care instructions adapted under license by [employer] (which disclaims liability or warranty for this information). If you have questions about a medical condition or this instruction, always ask your healthcare professional. [address] any warranty or liability for your use of this information. Rheumatoid Arthritis Diet: Care Instructions  Your Care Instructions    The best diet for people with rheumatoid arthritis is a healthy, balanced diet that is low in saturated fat and salt and high in fiber and complex carbohydrate (whole grains, beans, fruits, and vegetables). Fish oil (omega-3 fatty acids) has a modest effect in reducing inflammation, and eating fish may improve symptoms. People who have rheumatoid arthritis have a high risk of developing osteoporosis. To help prevent this disease, get plenty of calcium and vitamin D. Follow-up care is a key part of your treatment and safety. Be sure to make and go to all appointments, and call your doctor if you are having problems. It's also a good idea to know your test results and keep a list of the medicines you take. How can you care for yourself at home? · Try to eat at least 2 servings of fish each week. Oily fish, which contain omega-3 fatty acids, include:  ¨ Tuna. ¨ [place]. ¨ Mackerel. [address] trout. ¨ Herring. ¨ Sardines. · If you're pregnant, talk to your doctor about eating fish. Pregnant women shouldn't eat certain types of fish that have high mercury content. · You can get calcium and vitamin D by drinking milk fortified with vitamin D. Four glasses of milk a day provide about 1,200 milligrams (mg) of calcium. Other common foods with calcium:  ¨ Yogurt (plain or low-fat). An 8-ounce serving provides 415 mg of calcium. ¨ Cheddar cheese. A 1½-ounce serving provides 306 mg.  ¨ Milk (skim, 2%, or whole). A 1-cup serving provides about 300 mg. [address] (1% milk fat).  A 1-cup serving provides 138 mg.  · If you can't eat or drink dairy foods, you can get calcium and vitamin D from:  ¨ Calcium-fortified orange juice. A 1-cup serving provides 500 mg of calcium. ¨ Calcium-enriched soy milk. A 1-cup serving provides 282 mg of calcium. ¨ Almonds. A 1-ounce serving (about 24 nuts) provides 75 mg of calcium. ¨ Canned salmon. A 3-ounce serving provides 180 mg of calcium. ¨ Tofu (firm, made with calcium sulfate). A ½-cup serving provides 204 mg. · You may need to take a calcium supplement to make sure you are getting the calcium you need. Where can you learn more? Go to http://romuloShoes4youdaron.info/. Enter Q201 in the search box to learn more about \"Rheumatoid Arthritis Diet: Care Instructions. \"  Current as of: July 26, 2016  Content Version: 11.2  © 1632-7788 [employer]. Care instructions adapted under license by [employer] (which disclaims liability or warranty for this information). If you have questions about a medical condition or this instruction, always ask your healthcare professional. [place] [address] any warranty or liability for your use of this information. Knee Arthritis: Exercises  Your Care Instructions  Here are some examples of exercises for knee arthritis. Start each exercise slowly. Ease off the exercise if you start to have pain. Your doctor or physical therapist will tell you when you can start these exercises and which ones will work best for you. How to do the exercises  Knee flexion with heel slide    1. Lie on your back with your knees bent. 2. Slide your heel back by bending your affected knee as far as you can. Then hook your other foot around your ankle to help pull your heel even farther back. 3. Hold for about 6 seconds, then rest for up to 10 seconds. 4. Repeat 8 to 12 times. 5. Switch legs and repeat steps 1 through 4, even if only one knee is sore. Quad sets    1.  Sit with your affected leg straight and supported on the floor or a firm bed. Place a small, rolled-up towel under your knee. Your other leg should be bent, with that foot flat on the floor. 2. Tighten the thigh muscles of your affected leg by pressing the back of your knee down into the towel. 3. Hold for about 6 seconds, then rest for up to 10 seconds. 4. Repeat 8 to 12 times. 5. Switch legs and repeat steps 1 through 4, even if only one knee is sore. Straight-leg raises to the front    1. Lie on your back with your good knee bent so that your foot rests flat on the floor. Your affected leg should be straight. Make sure that your low back has a normal curve. You should be able to slip your hand in between the floor and the small of your back, with your palm touching the floor and your back touching the back of your hand. 2. Tighten the thigh muscles in your affected leg by pressing the back of your knee flat down to the floor. Hold your knee straight. 3. Keeping the thigh muscles tight and your leg straight, lift your affected leg up so that your heel is about 12 inches off the floor. Hold for about 6 seconds, then lower slowly. 4. Relax for up to 10 seconds between repetitions. 5. Repeat 8 to 12 times. 6. Switch legs and repeat steps 1 through 5, even if only one knee is sore. Active knee flexion    1. Lie on your stomach with your knees straight. If your kneecap is uncomfortable, roll up a washcloth and put it under your leg just above your kneecap. 2. Lift the foot of your affected leg by bending the knee so that you bring the foot up toward your buttock. If this motion hurts, try it without bending your knee quite as far. This may help you avoid any painful motion. 3. Slowly move your leg up and down. 4. Repeat 8 to 12 times. 5. Switch legs and repeat steps 1 through 4, even if only one knee is sore. Quadriceps stretch (facedown)    1. Lie flat on your stomach, and rest your face on the floor.   2. Wrap a towel or belt strap around the lower part of your affected leg. Then use the towel or belt strap to slowly pull your heel toward your buttock until you feel a stretch. 3. Hold for about 15 to 30 seconds, then relax your leg against the towel or belt strap. 4. Repeat 2 to 4 times. 5. Switch legs and repeat steps 1 through 4, even if only one knee is sore. Stationary exercise bike    If you do not have a stationary exercise bike at home, you can find one to ride at your local health club or community center. 1. Adjust the height of the bike seat so that your knee is slightly bent when your leg is extended downward. If your knee hurts when the pedal reaches the top, you can raise the seat so that your knee does not bend as much. 2. Start slowly. At first, try to do 5 to 10 minutes of cycling with little to no resistance. Then increase your time and the resistance bit by bit until you can do 20 to 30 minutes without pain. 3. If you start to have pain, rest your knee until your pain gets back to the level that is normal for you. Or cycle for less time or with less effort. Follow-up care is a key part of your treatment and safety. Be sure to make and go to all appointments, and call your doctor if you are having problems. It's also a good idea to know your test results and keep a list of the medicines you take. Where can you learn more? Go to http://romulo-daron.info/. Enter C159 in the search box to learn more about \"Knee Arthritis: Exercises. \"  Current as of: May 23, 2016  Content Version: 11.2  © 0484-4568 [employer]. Care instructions adapted under license by [employer] (which disclaims liability or warranty for this information). If you have questions about a medical condition or this instruction, always ask your healthcare professional. [place] [address] any warranty or liability for your use of this information. Use Tylenol prn pain, fever, or headache.   Never exceed more than 3 grams daily, if you are over 72years old. Never exceed more than 4 grams daily, if you are under 72years old. Knee Arthritis: Exercises  Your Care Instructions  Here are some examples of exercises for knee arthritis. Start each exercise slowly. Ease off the exercise if you start to have pain. Your doctor or physical therapist will tell you when you can start these exercises and which ones will work best for you. How to do the exercises  Knee flexion with heel slide    6. Lie on your back with your knees bent. 7. Slide your heel back by bending your affected knee as far as you can. Then hook your other foot around your ankle to help pull your heel even farther back. 8. Hold for about 6 seconds, then rest for up to 10 seconds. 9. Repeat 8 to 12 times. 10. Switch legs and repeat steps 1 through 4, even if only one knee is sore. Quad sets    6. Sit with your affected leg straight and supported on the floor or a firm bed. Place a small, rolled-up towel under your knee. Your other leg should be bent, with that foot flat on the floor. 7. Tighten the thigh muscles of your affected leg by pressing the back of your knee down into the towel. 8. Hold for about 6 seconds, then rest for up to 10 seconds. 9. Repeat 8 to 12 times. 10. Switch legs and repeat steps 1 through 4, even if only one knee is sore. Straight-leg raises to the front    7. Lie on your back with your good knee bent so that your foot rests flat on the floor. Your affected leg should be straight. Make sure that your low back has a normal curve. You should be able to slip your hand in between the floor and the small of your back, with your palm touching the floor and your back touching the back of your hand. 8. Tighten the thigh muscles in your affected leg by pressing the back of your knee flat down to the floor. Hold your knee straight.   9. Keeping the thigh muscles tight and your leg straight, lift your affected leg up so that your heel is about 12 inches off the floor. Hold for about 6 seconds, then lower slowly. 10. Relax for up to 10 seconds between repetitions. 11. Repeat 8 to 12 times. 12. Switch legs and repeat steps 1 through 5, even if only one knee is sore. Active knee flexion    6. Lie on your stomach with your knees straight. If your kneecap is uncomfortable, roll up a washcloth and put it under your leg just above your kneecap. 7. Lift the foot of your affected leg by bending the knee so that you bring the foot up toward your buttock. If this motion hurts, try it without bending your knee quite as far. This may help you avoid any painful motion. 8. Slowly move your leg up and down. 9. Repeat 8 to 12 times. 10. Switch legs and repeat steps 1 through 4, even if only one knee is sore. Quadriceps stretch (facedown)    6. Lie flat on your stomach, and rest your face on the floor. 7. Wrap a towel or belt strap around the lower part of your affected leg. Then use the towel or belt strap to slowly pull your heel toward your buttock until you feel a stretch. 8. Hold for about 15 to 30 seconds, then relax your leg against the towel or belt strap. 9. Repeat 2 to 4 times. 10. Switch legs and repeat steps 1 through 4, even if only one knee is sore. Stationary exercise bike    If you do not have a stationary exercise bike at home, you can find one to ride at your local health club or community center. 4. Adjust the height of the bike seat so that your knee is slightly bent when your leg is extended downward. If your knee hurts when the pedal reaches the top, you can raise the seat so that your knee does not bend as much. 5. Start slowly. At first, try to do 5 to 10 minutes of cycling with little to no resistance. Then increase your time and the resistance bit by bit until you can do 20 to 30 minutes without pain. 6. If you start to have pain, rest your knee until your pain gets back to the level that is normal for you.  Or cycle for less time or with less effort. Follow-up care is a key part of your treatment and safety. Be sure to make and go to all appointments, and call your doctor if you are having problems. It's also a good idea to know your test results and keep a list of the medicines you take. Where can you learn more? Go to http://romulo-daron.info/. Enter C159 in the search box to learn more about \"Knee Arthritis: Exercises. \"  Current as of: May 23, 2016  Content Version: 11.2  © 9905-6366 [employer]. Care instructions adapted under license by [employer] (which disclaims liability or warranty for this information). If you have questions about a medical condition or this instruction, always ask your healthcare professional. [address] any warranty or liability for your use of this information.

## 2017-06-05 NOTE — PATIENT INSTRUCTIONS
Leg and Ankle Edema: Care Instructions  Your Care Instructions  Swelling in the legs, ankles, and feet is called edema. It is common after you sit or stand for a while. Long plane flights or car rides often cause swelling in the legs and feet. You may also have swelling if you have to stand for long periods of time at your job. Problems with the veins in the legs (varicose veins) and changes in hormones can also cause swelling. Sometimes the swelling in the ankles and feet is caused by a more serious problem, such as heart failure, infection, blood clots, or liver or kidney disease. Follow-up care is a key part of your treatment and safety. Be sure to make and go to all appointments, and call your doctor if you are having problems. Its also a good idea to know your test results and keep a list of the medicines you take. How can you care for yourself at home? · If your doctor gave you medicine, take it as prescribed. Call your doctor if you think you are having a problem with your medicine. · Whenever you are resting, raise your legs up. Try to keep the swollen area higher than the level of your heart. · Take breaks from standing or sitting in one position. ¨ Walk around to increase the blood flow in your lower legs. ¨ Move your feet and ankles often while you stand, or tighten and relax your leg muscles. · Wear support stockings. Put them on in the morning, before swelling gets worse. · Eat a balanced diet. Lose weight if you need to. · Limit the amount of salt (sodium) in your diet. Salt holds fluid in the body and may increase swelling. When should you call for help? Call 911 anytime you think you may need emergency care. For example, call if:  · You have symptoms of a blood clot in your lung (called a pulmonary embolism). These may include:  ¨ Sudden chest pain. ¨ Trouble breathing. ¨ Coughing up blood.   Call your doctor now or seek immediate medical care if:  · You have signs of a blood clot, such as:  ¨ Pain in your calf, back of the knee, thigh, or groin. ¨ Redness and swelling in your leg or groin. · You have symptoms of infection, such as:  ¨ Increased pain, swelling, warmth, or redness. ¨ Red streaks or pus. ¨ A fever. Watch closely for changes in your health, and be sure to contact your doctor if:  · Your swelling is getting worse. · You have new or worsening pain in your legs. · You do not get better as expected. Where can you learn more? Go to http://romulo-daron.info/. Enter K431 in the search box to learn more about \"Leg and Ankle Edema: Care Instructions. \"  Current as of: May 27, 2016  Content Version: 11.2  © 3208-7724 [employer]. Care instructions adapted under license by [employer] (which disclaims liability or warranty for this information). If you have questions about a medical condition or this instruction, always ask your healthcare professional. [place] [address] any warranty or liability for your use of this information. Rheumatoid Arthritis Diet: Care Instructions  Your Care Instructions    The best diet for people with rheumatoid arthritis is a healthy, balanced diet that is low in saturated fat and salt and high in fiber and complex carbohydrate (whole grains, beans, fruits, and vegetables). Fish oil (omega-3 fatty acids) has a modest effect in reducing inflammation, and eating fish may improve symptoms. People who have rheumatoid arthritis have a high risk of developing osteoporosis. To help prevent this disease, get plenty of calcium and vitamin D. Follow-up care is a key part of your treatment and safety. Be sure to make and go to all appointments, and call your doctor if you are having problems. It's also a good idea to know your test results and keep a list of the medicines you take. How can you care for yourself at home? · Try to eat at least 2 servings of fish each week.  Oily fish, which contain omega-3 fatty acids, include:  ¨ Tuna. ¨ [place]. ¨ Mackerel. [address] trout. ¨ Herring. ¨ Sardines. · If you're pregnant, talk to your doctor about eating fish. Pregnant women shouldn't eat certain types of fish that have high mercury content. · You can get calcium and vitamin D by drinking milk fortified with vitamin D. Four glasses of milk a day provide about 1,200 milligrams (mg) of calcium. Other common foods with calcium:  ¨ Yogurt (plain or low-fat). An 8-ounce serving provides 415 mg of calcium. ¨ Cheddar cheese. A 1½-ounce serving provides 306 mg.  ¨ Milk (skim, 2%, or whole). A 1-cup serving provides about 300 mg. [address] (1% milk fat). A 1-cup serving provides 138 mg.  · If you can't eat or drink dairy foods, you can get calcium and vitamin D from:  ¨ Calcium-fortified orange juice. A 1-cup serving provides 500 mg of calcium. ¨ Calcium-enriched soy milk. A 1-cup serving provides 282 mg of calcium. ¨ Almonds. A 1-ounce serving (about 24 nuts) provides 75 mg of calcium. ¨ Canned salmon. A 3-ounce serving provides 180 mg of calcium. ¨ Tofu (firm, made with calcium sulfate). A ½-cup serving provides 204 mg. · You may need to take a calcium supplement to make sure you are getting the calcium you need. Where can you learn more? Go to http://romulo-daron.info/. Enter Q201 in the search box to learn more about \"Rheumatoid Arthritis Diet: Care Instructions. \"  Current as of: July 26, 2016  Content Version: 11.2  © 2941-5347 [employer]. Care instructions adapted under license by [employer] (which disclaims liability or warranty for this information). If you have questions about a medical condition or this instruction, always ask your healthcare professional. [address]  any warranty or liability for your use of this information.        Knee Arthritis: Exercises  Your Care Instructions  Here are some examples of exercises for knee arthritis. Start each exercise slowly. Ease off the exercise if you start to have pain. Your doctor or physical therapist will tell you when you can start these exercises and which ones will work best for you. How to do the exercises  Knee flexion with heel slide    1. Lie on your back with your knees bent. 2. Slide your heel back by bending your affected knee as far as you can. Then hook your other foot around your ankle to help pull your heel even farther back. 3. Hold for about 6 seconds, then rest for up to 10 seconds. 4. Repeat 8 to 12 times. 5. Switch legs and repeat steps 1 through 4, even if only one knee is sore. Quad sets    1. Sit with your affected leg straight and supported on the floor or a firm bed. Place a small, rolled-up towel under your knee. Your other leg should be bent, with that foot flat on the floor. 2. Tighten the thigh muscles of your affected leg by pressing the back of your knee down into the towel. 3. Hold for about 6 seconds, then rest for up to 10 seconds. 4. Repeat 8 to 12 times. 5. Switch legs and repeat steps 1 through 4, even if only one knee is sore. Straight-leg raises to the front    1. Lie on your back with your good knee bent so that your foot rests flat on the floor. Your affected leg should be straight. Make sure that your low back has a normal curve. You should be able to slip your hand in between the floor and the small of your back, with your palm touching the floor and your back touching the back of your hand. 2. Tighten the thigh muscles in your affected leg by pressing the back of your knee flat down to the floor. Hold your knee straight. 3. Keeping the thigh muscles tight and your leg straight, lift your affected leg up so that your heel is about 12 inches off the floor. Hold for about 6 seconds, then lower slowly. 4. Relax for up to 10 seconds between repetitions. 5. Repeat 8 to 12 times.   6. Switch legs and repeat steps 1 through 5, even if only one knee is sore. Active knee flexion    1. Lie on your stomach with your knees straight. If your kneecap is uncomfortable, roll up a washcloth and put it under your leg just above your kneecap. 2. Lift the foot of your affected leg by bending the knee so that you bring the foot up toward your buttock. If this motion hurts, try it without bending your knee quite as far. This may help you avoid any painful motion. 3. Slowly move your leg up and down. 4. Repeat 8 to 12 times. 5. Switch legs and repeat steps 1 through 4, even if only one knee is sore. Quadriceps stretch (facedown)    1. Lie flat on your stomach, and rest your face on the floor. 2. Wrap a towel or belt strap around the lower part of your affected leg. Then use the towel or belt strap to slowly pull your heel toward your buttock until you feel a stretch. 3. Hold for about 15 to 30 seconds, then relax your leg against the towel or belt strap. 4. Repeat 2 to 4 times. 5. Switch legs and repeat steps 1 through 4, even if only one knee is sore. Stationary exercise bike    If you do not have a stationary exercise bike at home, you can find one to ride at your local health club or community center. 1. Adjust the height of the bike seat so that your knee is slightly bent when your leg is extended downward. If your knee hurts when the pedal reaches the top, you can raise the seat so that your knee does not bend as much. 2. Start slowly. At first, try to do 5 to 10 minutes of cycling with little to no resistance. Then increase your time and the resistance bit by bit until you can do 20 to 30 minutes without pain. 3. If you start to have pain, rest your knee until your pain gets back to the level that is normal for you. Or cycle for less time or with less effort. Follow-up care is a key part of your treatment and safety. Be sure to make and go to all appointments, and call your doctor if you are having problems.  It's also a good idea to know your test results and keep a list of the medicines you take. Where can you learn more? Go to http://romulo-daron.info/. Enter C159 in the search box to learn more about \"Knee Arthritis: Exercises. \"  Current as of: May 23, 2016  Content Version: 11.2  © 0838-7149 [employer]. Care instructions adapted under license by [employer] (which disclaims liability or warranty for this information). If you have questions about a medical condition or this instruction, always ask your healthcare professional. [address] any warranty or liability for your use of this information. Use Tylenol prn pain, fever, or headache. Never exceed more than 3 grams daily, if you are over 72years old. Never exceed more than 4 grams daily, if you are under 72years old. Knee Arthritis: Exercises  Your Care Instructions  Here are some examples of exercises for knee arthritis. Start each exercise slowly. Ease off the exercise if you start to have pain. Your doctor or physical therapist will tell you when you can start these exercises and which ones will work best for you. How to do the exercises  Knee flexion with heel slide    6. Lie on your back with your knees bent. 7. Slide your heel back by bending your affected knee as far as you can. Then hook your other foot around your ankle to help pull your heel even farther back. 8. Hold for about 6 seconds, then rest for up to 10 seconds. 9. Repeat 8 to 12 times. 10. Switch legs and repeat steps 1 through 4, even if only one knee is sore. Quad sets    6. Sit with your affected leg straight and supported on the floor or a firm bed. Place a small, rolled-up towel under your knee. Your other leg should be bent, with that foot flat on the floor. 7. Tighten the thigh muscles of your affected leg by pressing the back of your knee down into the towel.   8. Hold for about 6 seconds, then rest for up to 10 seconds. 9. Repeat 8 to 12 times. 10. Switch legs and repeat steps 1 through 4, even if only one knee is sore. Straight-leg raises to the front    7. Lie on your back with your good knee bent so that your foot rests flat on the floor. Your affected leg should be straight. Make sure that your low back has a normal curve. You should be able to slip your hand in between the floor and the small of your back, with your palm touching the floor and your back touching the back of your hand. 8. Tighten the thigh muscles in your affected leg by pressing the back of your knee flat down to the floor. Hold your knee straight. 9. Keeping the thigh muscles tight and your leg straight, lift your affected leg up so that your heel is about 12 inches off the floor. Hold for about 6 seconds, then lower slowly. 10. Relax for up to 10 seconds between repetitions. 11. Repeat 8 to 12 times. 12. Switch legs and repeat steps 1 through 5, even if only one knee is sore. Active knee flexion    6. Lie on your stomach with your knees straight. If your kneecap is uncomfortable, roll up a washcloth and put it under your leg just above your kneecap. 7. Lift the foot of your affected leg by bending the knee so that you bring the foot up toward your buttock. If this motion hurts, try it without bending your knee quite as far. This may help you avoid any painful motion. 8. Slowly move your leg up and down. 9. Repeat 8 to 12 times. 10. Switch legs and repeat steps 1 through 4, even if only one knee is sore. Quadriceps stretch (facedown)    6. Lie flat on your stomach, and rest your face on the floor. 7. Wrap a towel or belt strap around the lower part of your affected leg. Then use the towel or belt strap to slowly pull your heel toward your buttock until you feel a stretch. 8. Hold for about 15 to 30 seconds, then relax your leg against the towel or belt strap. 9. Repeat 2 to 4 times.   10. Switch legs and repeat steps 1 through 4, even if only one knee is sore. Stationary exercise bike    If you do not have a stationary exercise bike at home, you can find one to ride at your local health club or community center. 4. Adjust the height of the bike seat so that your knee is slightly bent when your leg is extended downward. If your knee hurts when the pedal reaches the top, you can raise the seat so that your knee does not bend as much. 5. Start slowly. At first, try to do 5 to 10 minutes of cycling with little to no resistance. Then increase your time and the resistance bit by bit until you can do 20 to 30 minutes without pain. 6. If you start to have pain, rest your knee until your pain gets back to the level that is normal for you. Or cycle for less time or with less effort. Follow-up care is a key part of your treatment and safety. Be sure to make and go to all appointments, and call your doctor if you are having problems. It's also a good idea to know your test results and keep a list of the medicines you take. Where can you learn more? Go to http://romulo-daron.info/. Enter C159 in the search box to learn more about \"Knee Arthritis: Exercises. \"  Current as of: May 23, 2016  Content Version: 11.2  © 8194-5523 [employer]. Care instructions adapted under license by [employer] (which disclaims liability or warranty for this information). If you have questions about a medical condition or this instruction, always ask your healthcare professional. [place] [address] any warranty or liability for your use of this information.

## 2017-07-14 ENCOUNTER — OFFICE VISIT (OUTPATIENT)
Dept: FAMILY MEDICINE CLINIC | Age: 62
End: 2017-07-14

## 2017-07-14 VITALS
BODY MASS INDEX: 36.16 KG/M2 | HEART RATE: 61 BPM | DIASTOLIC BLOOD PRESSURE: 68 MMHG | TEMPERATURE: 97.5 F | SYSTOLIC BLOOD PRESSURE: 102 MMHG | OXYGEN SATURATION: 100 % | HEIGHT: 64 IN | WEIGHT: 211.8 LBS | RESPIRATION RATE: 16 BRPM

## 2017-07-14 DIAGNOSIS — R60.9 PERIPHERAL EDEMA: Primary | ICD-10-CM

## 2017-07-14 DIAGNOSIS — E66.9 OBESITY, CLASS II, BMI 35-39.9: ICD-10-CM

## 2017-07-14 DIAGNOSIS — F51.04 CHRONIC INSOMNIA: ICD-10-CM

## 2017-07-14 DIAGNOSIS — E06.3 HASHIMOTO'S THYROIDITIS: ICD-10-CM

## 2017-07-14 DIAGNOSIS — R63.4 WEIGHT LOSS: ICD-10-CM

## 2017-07-14 DIAGNOSIS — M25.561 RECURRENT PAIN OF RIGHT KNEE: ICD-10-CM

## 2017-07-14 RX ORDER — FUROSEMIDE 40 MG/1
40 TABLET ORAL DAILY
Qty: 90 TAB | Refills: 1 | Status: SHIPPED | OUTPATIENT
Start: 2017-07-14 | End: 2018-01-07 | Stop reason: SDUPTHER

## 2017-07-14 RX ORDER — PHENTERMINE HYDROCHLORIDE 37.5 MG/1
37.5 TABLET ORAL DAILY
Qty: 30 TAB | Refills: 0 | Status: SHIPPED | OUTPATIENT
Start: 2017-07-14 | End: 2017-09-21 | Stop reason: ALTCHOICE

## 2017-07-14 RX ORDER — PHENDIMETRAZINE TARTRATE 105 MG/1
CAPSULE, EXTENDED RELEASE ORAL
COMMUNITY
Start: 2017-04-09 | End: 2017-07-14 | Stop reason: ALTCHOICE

## 2017-07-14 NOTE — PROGRESS NOTES
HISTORY OF PRESENT ILLNESS  Donaciano Skiff is a 58 y.o. female presents with Weight Management; Blood Pressure Check; Medication Refill; and Referral Follow Up      Agree with nurse note. Pt with Hashimoto's thyroiditis and peripheral edema presents to the office with a BP of 102/68. She requests a refill of Lasix 40 mg which she takes every other day. She tends to swell in her her hands, feet, and ankles. Pt with chronic insomnia. She sleeps x6 hours and notes her R knee wakes her up. She is tolerating Adipex-P 37.5 mg well, month 2 of 3. Last prescribed on 06/05/17. She has noticed an increase of energy and decreased appetite since starting the medication. She is going to PT for her R knee x2 days weekly x60 minutes each time. She also performs the home exercises for her knee. She lost 8 pounds since the last visit. Percent body fat has increased from 45.2% to 45.4%, waist circumference measurement has changed from 39\" to 37\". Overall, patient is pleased and requests a refill of the medication today. Patient denies chest pain, heart palpitations, nausea, dry mouth, constipation, signs of depression. Written by hyun Walter, as dictated by Dr. Daquan Jung DO.    ROS    Review of Systems negative except as noted above in HPI. ALLERGIES:    Allergies   Allergen Reactions    Advil Pm [Ibuprofen-Diphenhydramine Hcl] Other (comments)     Slightly elevated Creaitnine 1.02    Aleve [Naproxen Sodium] Other (comments)     Due to kidneys    Bactrim [Sulfamethoxazole-Trimethoprim] Nausea and Vomiting    Sulfa (Sulfonamide Antibiotics) Nausea and Vomiting    Sulfasalazine Nausea and Vomiting       CURRENT MEDICATIONS:    Outpatient Prescriptions Marked as Taking for the 7/14/17 encounter (Office Visit) with Kai Vasquez DO   Medication Sig Dispense Refill    phentermine (ADIPEX-P) 37.5 mg tablet Take 1 Tab by mouth daily.  Max Daily Amount: 37.5 mg. 30 Tab 0    furosemide (LASIX) 40 mg tablet Take 1 Tab by mouth daily. Indications: Edema 90 Tab 1    diclofenac EC (VOLTAREN) 75 mg EC tablet Take 1 Tab by mouth two (2) times a day. Indications: OSTEOARTHRITIS, RHEUMATOID ARTHRITIS 60 Tab 2    baclofen (LIORESAL) 10 mg tablet Take 1 Tab by mouth nightly as needed. 30 Tab 0    MYRBETRIQ 25 mg ER tablet Take 25 mg by mouth daily. 1    sertraline (ZOLOFT) 50 mg tablet Take 1 Tab by mouth daily. Indications: GENERALIZED ANXIETY DISORDER 135 Tab 3    eszopiclone (LUNESTA) 2 mg tablet Take 1 Tab by mouth nightly. Max Daily Amount: 2 mg. 30 Tab 5    MINIVELLE 0.1 mg/24 hr 1 Patch by TransDERmal route every Monday. 0    cetirizine (ZYRTEC) 10 mg tablet Take 1 Tab by mouth daily. (Patient taking differently: Take 10 mg by mouth daily as needed.) 30 Tab 3    fluticasone (FLONASE) 50 mcg/actuation nasal spray 2 Sprays by Both Nostrils route daily. Indications: ALLERGIC RHINITIS 1 Bottle 5    albuterol (PROVENTIL HFA, VENTOLIN HFA) 90 mcg/actuation inhaler Take 2 Puffs by inhalation every four (4) hours as needed for Wheezing (cough). 1 Inhaler 1    montelukast (SINGULAIR) 10 mg tablet Take 1 Tab by mouth daily. Indications: ALLERGIC RHINITIS, breathing 30 Tab 11    cholecalciferol, vitamin D3, (VITAMIN D3) 2,000 unit Tab Take 2,000 Units by mouth daily. PAST MEDICAL HISTORY:    Past Medical History:   Diagnosis Date    Achilles tendonitis 12/2004    Right. Dr. Webb Najjar Arthritis 2010    hips, knees. Dr. Airam Cheung    Chest pain 08/2013    Dr. Liseth Junior.  Chickenpox childhood    Endometriosis 1990    Dr. Xiomy Ding Environmental allergies     MARGI (generalized anxiety disorder)     Hip pain, bilateral 2010    due to severe OA. Iliopsoas bursitis.   Dr. Stein [facility]    History of breast lump/mass excision 1998    Left breast.  Dr. Antonieta Jett Hyperlipidemia     Iron deficiency anemia     iron deficiency    Menopausal and postmenopausal disorder 2007    Dr. Anupama Gore Uterine fibroid     Dr. Fang Guillen.  Vitamin D deficiency 03/2008       PAST SURGICAL HISTORY:    Past Surgical History:   Procedure Laterality Date    BIOPSY BREAST  Rt 1993;Lt 1998    Dr. Brush Abts Right 2002    Right achiles tendon. Dr. Eric Snowden.  HX BREAST BIOPSY  2001    Left. benign. Dr. Ansari Redo HX COLONOSCOPY  09/27/2016    Dr. Ramy Vargas.  due q 5 yrs due to fam hx   Jovany Pratt. due to endometriosis    HX PELVIC LAPAROSCOPY  1995    due to endometriosis Dr. Gala Oviedo  04/2011    due to fibroids. Dr. Fang Guillen.        FAMILY HISTORY:    Family History   Problem Relation Age of Onset    Hypertension Mother     Other Mother      hearing loss/vision loss    Heart Disease Father     Lung Disease Father     Diabetes Sister     Heart Attack Sister 54     March 2014    Stroke Sister      after MI    Thyroid Disease Sister     Colon Polyps Sister     Heart Disease Brother      Defibrillator placed    Heart Attack Brother 48    Diabetes Maternal Grandmother        SOCIAL HISTORY:    Social History     Social History    Marital status: SINGLE     Spouse name: N/A    Number of children: 2    Years of education: N/A     Occupational History    [employer]      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      Social History Main Topics    Smoking status: Never Smoker    Smokeless tobacco: Never Used      Comment: lived with smoker mom x 18 yrs    Alcohol use No    Drug use: No    Sexual activity: Not Currently     Partners: Male     Birth control/ protection: Abstinence, Surgical     Other Topics Concern    None     Social History Narrative       IMMUNIZATIONS:    Immunization History   Administered Date(s) Administered    Influenza Vaccine 12/14/2012, 10/22/2013, 09/18/2014    Influenza Vaccine (Quad) 10/29/2015    Influenza Vaccine (Quad) PF 09/29/2016    Influenza Vaccine Split 11/12/2010, 11/07/2011    TD Vaccine 08/18/2000    Tdap 06/30/2016       PHYSICAL EXAMINATION    Vital Signs    Visit Vitals    /68 (BP 1 Location: Left arm, BP Patient Position: Sitting)    Pulse 61    Temp 97.5 °F (36.4 °C) (Oral)    Resp 16    Ht 5' 4.02\" (1.626 m)    Wt 211 lb 12.8 oz (96.1 kg)    SpO2 100%    BMI 36.34 kg/m2       Weight Metrics 7/14/2017 7/14/2017 6/5/2017 6/5/2017 5/9/2017 5/9/2017 5/5/2017   Weight - 211 lb 12.8 oz - 219 lb 14.4 oz - 216 lb 6.4 oz 215 lb   Waist Measure Inches 37 - 39 - 38 - -   Body Fat % 45.4 - 45.2 - 46.1 - -   BMI - 36.34 kg/m2 - 37.72 kg/m2 - 37.13 kg/m2 36.89 kg/m2       General appearance - Well nourished. Well appearing. Well developed. No acute distress. Obese. Head - Normocephalic. Atraumatic. Eyes - pupils equal and reactive, extraocular eye movements intact, sclera anicteric  Ears - Hearing is grossly normal bilaterally. Nose - normal and patent, no erythema, discharge or polyps   Mouth - mucous membranes moist, pharynx normal with cobblestone appearance. No erythema, white exudate or obstruction. Neck - supple. Midline trachea. No carotid bruits are noted. No thyromegaly noted. Chest - clear to auscultation bilaterally anterriorly and posteriorly. No wheezes, rales or rhonchi. Breath sounds are symmetrical and unlabored bilaterally. Heart - normal rate. Regular rhythm, normal S1, S2. No murmur. No rubs, clicks or gallops noted. Abdomen - soft and distended. No masses or organomegaly. No rebound, rigidity or guarding. Bowel sounds normal x 4 quadrants. No tenderness noted. Neurological - awake, alert and oriented to person, place, and time and event. Cranial nerves II through XII intact. Muscle strength is +5/5 x 4 extremities. Sensation is intact to light touch bilaterally. Steady gait. Heme/Lymph - peripheral pulses normal x 4 extremities. No peripheral edema is noted.   No cervical adenopathy noted.  Skin - no rashes, erythema, ecchymosis, lacerations, abrasions, suspicious moles noted. No skin tags. No acanthosis nigricans noted in the axilla or neck. Psychological -   normal behavior, speech, dress and thought processes. Good insight. Good eye contact. Normal affect. Appropriate mood. DATA REVIEWED    Lab Results   Component Value Date/Time    WBC 3.6 03/28/2017 09:32 AM    HGB 13.0 03/28/2017 09:32 AM    HCT 40.8 03/28/2017 09:32 AM    PLATELET 861 43/36/2146 09:32 AM    MCV 92 03/28/2017 09:32 AM     Lab Results   Component Value Date/Time    Sodium 139 03/28/2017 09:32 AM    Potassium 4.0 03/28/2017 09:32 AM    Chloride 98 03/28/2017 09:32 AM    CO2 26 03/28/2017 09:32 AM    Anion gap 5 04/23/2011 05:20 AM    Glucose 80 03/28/2017 09:32 AM    BUN 17 03/28/2017 09:32 AM    Creatinine 0.98 03/28/2017 09:32 AM    BUN/Creatinine ratio 17 03/28/2017 09:32 AM    GFR est AA 72 03/28/2017 09:32 AM    GFR est non-AA 62 03/28/2017 09:32 AM    Calcium 9.1 03/28/2017 09:32 AM    Bilirubin, total 0.5 03/28/2017 09:32 AM    AST (SGOT) 22 03/28/2017 09:32 AM    Alk. phosphatase 91 03/28/2017 09:32 AM    Protein, total 7.0 03/28/2017 09:32 AM    Albumin 4.4 03/28/2017 09:32 AM    A-G Ratio 1.7 03/28/2017 09:32 AM    ALT (SGPT) 17 03/28/2017 09:32 AM     Lab Results   Component Value Date/Time    Cholesterol, total 169 03/28/2017 09:32 AM    HDL Cholesterol 68 03/28/2017 09:32 AM    LDL, calculated 92 03/28/2017 09:32 AM    VLDL, calculated 9 03/28/2017 09:32 AM    Triglyceride 44 03/28/2017 09:32 AM     Lab Results   Component Value Date/Time    Vitamin D 25-Hydroxy 26.3 07/18/2011 10:22 AM    VITAMIN D, 25-HYDROXY 34.7 03/28/2017 09:32 AM       Lab Results   Component Value Date/Time    Hemoglobin A1c 5.5 03/28/2017 09:32 AM     Lab Results   Component Value Date/Time    TSH 1.900 02/24/2017 10:03 AM    TSH 2.36 08/21/2014 07:50 AM         ASSESSMENT and PLAN    ICD-10-CM ICD-9-CM    1.  Peripheral edema R60.9 782.3 furosemide (LASIX) 40 mg tablet    hands, feet, ankles   2. Hashimoto's thyroiditis E06.3 245.2    3. Recurrent pain of right knee M25.561 719.46    4. Obesity, Class II, BMI 35-39.9 ([facility]) E66.9 278.00 phentermine (ADIPEX-P) 37.5 mg tablet   5. Weight loss R63.4 783.21     8# due to efforts, Adipex, exercise   6. Chronic insomnia F51.04 780.52        Continue current medications and care. Prescriptions written and given to patient (Lasix 40 mg and Adipex-P 37.5 mg, month 3 of 3.)  Medication side effects discussed. Discussed the patient's BMI with her. The BMI follow up plan is as follows: I have counseled this patient on diet and exercise regimens. Diet recommendations:  Decrease carbohydrates (white foods including flour, white bread, white rice, white pasta, white potatoes; corn, sweet foods, sweet drinks and alcohol), increase green leafy vegetables and protein with each meal.  Avoid fried foods. Eat 3-5 small meals daily. Do not skip meals. Ok to use meal replacements up to twice daily with protein goal of 60 mg per meal.   Recommend OTC Premiere Protein bars or shakes. Increase water intake. Increase physical activity to 30 minutes daily for health benefit or 60 minutes daily to prevent weight regain, as tolerated. Physical Activity prescription:  A goal of 30 minutes physical activity daily is recommended for health benefit and at least 60 minutes daily to prevent weight regain. For weight loss, no less than 75% needs to be aerobic (i.e. Walking) and no more than 25% resistance exercising (i.e. Weight lifting). For weight maintenance phase, 50% aerobic and 50% resistance exercises. Sleep prescription:    A goal of 7-8 hours of uninterrupted sleep is recommended to turn off the Grehlin hormone to be released from the stomach and triggers appetite while promoting weight gain.   Proper rest turns on Leptin hormone to be released from white adipose tissue and promotes weight loss.  Counseled patient on: weight loss goals, emphasizing a 5-10% weight loss in 6-12 months and strategies. Immunizations noted. Praised pt for weight loss efforts and progress. Patient was offered a choice/choices in the treatment plan today. Patient expresses understanding of the plan and agrees with recommendations. Patient declines any additional handouts. Patient is satisfied with previous handouts received from our office    Follow-up Disposition:  Return in about 1 month (around 8/14/2017) for weight, bp check, med refill . Written by hyun Steinberg, as dictated by Dr. Pamella Sosa DO. Documentation True and Accepted by Jenn Caruso.  Chichi Mina.

## 2017-07-14 NOTE — PROGRESS NOTES
Chief Complaint   Patient presents with    Weight Management    Blood Pressure Check    Medication Refill     1. Have you been to the ER, urgent care clinic since your last visit? Hospitalized since your last visit? No    2. Have you seen or consulted any other health care providers outside of the [address] [place] [address] since your last visit? Include any pap smears or colon screening. No    In the event something were to happen to you and you were unable to speak on your behalf, do you have an Advance Directive/ Living Will in place stating your wishes? YES    If yes, do we have a copy on file NO    If no, would you like information:   Pt encouraged to bring into office to be scanned into her chart.

## 2017-07-14 NOTE — MR AVS SNAPSHOT
Visit Information Date & Time Provider Department Dept. Phone Encounter #  
 7/14/2017  8:30 AM DO Carlie Ennis [phone] [id number] Follow-up Instructions Return in about 1 month (around 8/14/2017) for weight, bp check, med refill . Your Appointments 8/14/2017  8:30 AM  
Office Visit with DO Carlie Ennis (ALLAN Robles) Appt Note: weight check [address] [facility] [address]  
[phone]  
  
   
 [address] [facility] [address] [facility] [address] [facility] Upcoming Health Maintenance Date Due INFLUENZA AGE 9 TO ADULT 8/1/2017 PAP AKA CERVICAL CYTOLOGY 12/12/2018 BREAST CANCER SCRN MAMMOGRAM 12/14/2018 COLONOSCOPY 9/27/2021 DTaP/Tdap/Td series (2 - Td) 6/30/2026 Allergies as of 7/14/2017  Review Complete On: 7/14/2017 By: Maximo Osorio DO Severity Noted Reaction Type Reactions Advil Pm [Ibuprofen-diphenhydramine Hcl]  07/18/2011    Other (comments) Slightly elevated Creaitnine 1.02 Aleve [Naproxen Sodium]  12/28/2011    Other (comments) Due to kidneys Bactrim [Sulfamethoxazole-trimethoprim]  09/11/2009    Nausea and Vomiting  
 Sulfa (Sulfonamide Antibiotics)  09/11/2009    Nausea and Vomiting  
 Sulfasalazine  09/11/2009    Nausea and Vomiting Current Immunizations  Reviewed on 6/5/2017 Name Date Influenza Vaccine 9/18/2014, 10/22/2013, 12/14/2012 Influenza Vaccine Tim Toth) 10/29/2015 Influenza Vaccine (Quad) PF 9/29/2016 Influenza Vaccine Split 11/7/2011, 11/12/2010 TD Vaccine 8/18/2000 Tdap 6/30/2016  9:35 AM  
  
 Not reviewed this visit You Were Diagnosed With   
  
 Codes Comments Peripheral edema    -  Primary ICD-10-CM: R60.9 ICD-9-CM: 782.3 hands, feet, ankles Hashimoto's thyroiditis     ICD-10-CM: E06.3 ICD-9-CM: 245.2  Recurrent pain of right knee     ICD-10-CM: M25.561 
 ICD-9-CM: 719.46 Obesity, Class II, BMI 35-39.9 (HCC)     ICD-10-CM: E66.9 ICD-9-CM: 278.00 Weight loss     ICD-10-CM: R63.4 ICD-9-CM: 783.21 8# due to efforts, Adipex, exercise Chronic insomnia     ICD-10-CM: F51.04 
ICD-9-CM: 780.52 Vitals BP Pulse Temp Resp Height(growth percentile) Weight(growth percentile) 102/68 (BP 1 Location: Left arm, BP Patient Position: Sitting) 61 97.5 °F (36.4 °C) (Oral) 16 5' 4.02\" (1.626 m) 211 lb 12.8 oz (96.1 kg) SpO2 BMI OB Status Smoking Status 100% 36.34 kg/m2 Hysterectomy Never Smoker BMI and BSA Data Body Mass Index Body Surface Area  
 36.34 kg/m 2 2.08 m 2 Preferred Pharmacy Pharmacy Name Phone [facility] [address] AT [address] [place] [address] Drive [phone] Your Updated Medication List  
  
   
This list is accurate as of: 7/14/17  9:07 AM.  Always use your most recent med list.  
  
  
  
  
 albuterol 90 mcg/actuation inhaler Commonly known as:  PROVENTIL HFA, VENTOLIN HFA, PROAIR HFA Take 2 Puffs by inhalation every four (4) hours as needed for Wheezing (cough). baclofen 10 mg tablet Commonly known as:  LIORESAL Take 1 Tab by mouth nightly as needed. cetirizine 10 mg tablet Commonly known as:  ZYRTEC Take 1 Tab by mouth daily. Compression Socks, Large Misc  
custom-fit compression stockings, knee- high, 20-30 mmHg pressure. Pls fit/measure pt's legs. 1 pair. diclofenac EC 75 mg EC tablet Commonly known as:  VOLTAREN Take 1 Tab by mouth two (2) times a day. Indications: OSTEOARTHRITIS, RHEUMATOID ARTHRITIS  
  
 eszopiclone 2 mg tablet Commonly known as:  Eluterio Harrington Take 1 Tab by mouth nightly. Max Daily Amount: 2 mg. fluticasone 50 mcg/actuation nasal spray Commonly known as:  Marcine Infield 2 Sprays by Both Nostrils route daily. Indications: ALLERGIC RHINITIS  
  
 furosemide 40 mg tablet Commonly known as:  LASIX Take 1 Tab by mouth daily. Indications: Edema MINIVELLE 0.1 mg/24 hr  
Generic drug:  estradiol 1 Patch by TransDERmal route every Monday. montelukast 10 mg tablet Commonly known as:  SINGULAIR Take 1 Tab by mouth daily. Indications: ALLERGIC RHINITIS, breathing MYRBETRIQ 25 mg ER tablet Generic drug:  mirabegron ER Take 25 mg by mouth daily. phentermine 37.5 mg tablet Commonly known as:  ADIPEX-P Take 1 Tab by mouth daily. Max Daily Amount: 37.5 mg.  
  
 sertraline 50 mg tablet Commonly known as:  ZOLOFT Take 1 Tab by mouth daily. Indications: GENERALIZED ANXIETY DISORDER  
  
 VITAMIN D3 2,000 unit Tab Generic drug:  cholecalciferol (vitamin D3) Take 2,000 Units by mouth daily. Prescriptions Printed Refills  
 phentermine (ADIPEX-P) 37.5 mg tablet 0 Sig: Take 1 Tab by mouth daily. Max Daily Amount: 37.5 mg.  
 Class: Print Route: Oral  
  
Prescriptions Sent to Pharmacy Refills  
 furosemide (LASIX) 40 mg tablet 1 Sig: Take 1 Tab by mouth daily. Indications: Edema Class: Normal  
 [facility] [address] AT [address] [place] [address] #: [phone] Route: Oral  
  
Follow-up Instructions Return in about 1 month (around 8/14/2017) for weight, bp check, med refill . Introducing [facility]! [facility] introduces [employer] patient portal. Now you can access parts of your medical record, email your doctor's office, and request medication refills online. 1. In your internet browser, go to [email or web address] 2. Click on the First Time User? Click Here link in the Sign In box. You will see the New Member Sign Up page. 3. Enter your [employer] Access Code exactly as it appears below. You will not need to use this code after youve completed the sign-up process.  If you do not sign up before the expiration date, you must request a new code. · [employer] Access Code: [email or web address] Expires: 8/3/2017 12:42 PM 
 
4. Enter the last four digits of your Social Security Number (xxxx) and Date of Birth (mm/dd/yyyy) as indicated and click Submit. You will be taken to the next sign-up page. 5. Create a [employer] ID. This will be your [employer] login ID and cannot be changed, so think of one that is secure and easy to remember. 6. Create a [employer] password. You can change your password at any time. 7. Enter your Password Reset Question and Answer. This can be used at a later time if you forget your password. 8. Enter your e-mail address. You will receive e-mail notification when new information is available in [address]. 9. Click Sign Up. You can now view and download portions of your medical record. 10. Click the Download Summary menu link to download a portable copy of your medical information. If you have questions, please visit the Frequently Asked Questions section of the [employer] website. Remember, [employer] is NOT to be used for urgent needs. For medical emergencies, dial 911. Now available from your iPhone and Android! Please provide this summary of care documentation to your next provider. Your primary care clinician is listed as Thera Goodell. If you have any questions after today's visit, please call [phone].

## 2017-09-21 ENCOUNTER — OFFICE VISIT (OUTPATIENT)
Dept: FAMILY MEDICINE CLINIC | Age: 62
End: 2017-09-21

## 2017-09-21 VITALS
SYSTOLIC BLOOD PRESSURE: 117 MMHG | WEIGHT: 212.7 LBS | BODY MASS INDEX: 36.31 KG/M2 | TEMPERATURE: 96.3 F | OXYGEN SATURATION: 100 % | HEIGHT: 64 IN | DIASTOLIC BLOOD PRESSURE: 74 MMHG | RESPIRATION RATE: 16 BRPM | HEART RATE: 60 BPM

## 2017-09-21 DIAGNOSIS — M25.561 CHRONIC PAIN OF RIGHT KNEE: ICD-10-CM

## 2017-09-21 DIAGNOSIS — E06.3 HASHIMOTO'S THYROIDITIS: ICD-10-CM

## 2017-09-21 DIAGNOSIS — E55.9 VITAMIN D DEFICIENCY: ICD-10-CM

## 2017-09-21 DIAGNOSIS — F51.04 CHRONIC INSOMNIA: ICD-10-CM

## 2017-09-21 DIAGNOSIS — E78.00 PURE HYPERCHOLESTEROLEMIA: ICD-10-CM

## 2017-09-21 DIAGNOSIS — E66.9 OBESITY, CLASS II, BMI 35-39.9: ICD-10-CM

## 2017-09-21 DIAGNOSIS — N95.9 MENOPAUSAL AND POSTMENOPAUSAL DISORDER: ICD-10-CM

## 2017-09-21 DIAGNOSIS — G89.29 CHRONIC PAIN OF RIGHT KNEE: ICD-10-CM

## 2017-09-21 DIAGNOSIS — F43.22 ADJUSTMENT DISORDER WITH ANXIOUS MOOD: Primary | ICD-10-CM

## 2017-09-21 RX ORDER — PEN NEEDLE, DIABETIC 31 GX3/16"
NEEDLE, DISPOSABLE MISCELLANEOUS
Qty: 50 PEN NEEDLE | Refills: 5 | Status: SHIPPED | OUTPATIENT
Start: 2017-09-21 | End: 2019-08-16 | Stop reason: CLARIF

## 2017-09-21 RX ORDER — ASPIRIN 81 MG/1
81 TABLET ORAL DAILY
Qty: 90 TAB | Refills: 3 | Status: SHIPPED | OUTPATIENT
Start: 2017-09-21 | End: 2022-08-12

## 2017-09-21 RX ORDER — PHENDIMETRAZINE TARTRATE 105 MG/1
1 CAPSULE, EXTENDED RELEASE ORAL
Qty: 30 CAP | Refills: 0 | Status: SHIPPED | OUTPATIENT
Start: 2017-09-21 | End: 2017-10-19 | Stop reason: SDUPTHER

## 2017-09-21 NOTE — PROGRESS NOTES
HISTORY OF PRESENT ILLNESS  Aroldo Stubbs is a 58 y.o. female presents with Weight Management; Blood Pressure Check; Medication Refill; and Stress      Agree with nurse note. Pt with hypercholesterolemia, elevated ALP, Hashimoto's thyroidits and Vit D deficiency presents to the office with a BP of 117/74. She takes Lasix 40 mg daily with relief of edema. She sees orthopedist, Dr. Marylee Kluver for her chronic R knee pain. It really hurts. Pt with chronic insomnia. She sleeps x5 hours then wakes up thinking about things. She falls back asleep but then has to wake up for work. She still takes Lunesta 2 mg, tolerating well. She takes Zoloft 50 mg prn and has not taken it x3 weeks. Stressors: sister in rehab, daughter in college, and busy daughter in 10th grade    She is tolerating Adipex-P 37.5 mg well, month 3 of 3. Last prescribed on 07/14/17. She has noticed an increase of energy and decreased appetite since starting the medication. She gained 1 pound since the last visit. Percent body fat has increased from 45.4% to 46.4%, waist circumference measurement has changed from 37\" to 35\". Overall, patient is pleased and requests to change to Phendimetrazine 105 mg. She is willing to try an injection like Saxenda. Denies person or family hx of medullary thyroid cancer and gall stones. Patient denies chest pain, heart palpitations, nausea, dry mouth, constipation, signs of depression. Written by hyun Savage, as dictated by DO. DANIEL Owen    Review of Systems negative except as noted above in HPI.     ALLERGIES:    Allergies   Allergen Reactions    Advil Pm [Ibuprofen-Diphenhydramine Hcl] Other (comments)     Slightly elevated Creaitnine 1.02    Aleve [Naproxen Sodium] Other (comments)     Due to kidneys    Bactrim [Sulfamethoxazole-Trimethoprim] Nausea and Vomiting    Sulfa (Sulfonamide Antibiotics) Nausea and Vomiting    Sulfasalazine Nausea and Vomiting CURRENT MEDICATIONS:    Outpatient Prescriptions Marked as Taking for the 9/21/17 encounter (Office Visit) with Matthew Beal, DO   Medication Sig Dispense Refill    hyaluronate (HYLAN) 48 mg/6 mL syrg injection Inject one prefilled syringe into the right knee, for a quantity of 1 injection      phendimetrazine tartrate 105 mg cpER Take 1 Cap by mouth Daily (before breakfast). Max Daily Amount: 1 Cap. Indications: WEIGHT LOSS MANAGEMENT FOR OBESE PATIENT (BMI >= 30) 30 Cap 0    liraglutide (SAXENDA) 3 mg/0.5 mL (18 mg/3 mL) pen 0.5 mL by SubCUTAneous route daily. 0.6 mg daily x 1 wk then 1.2 mg daily x 1 wk then 1.8 mg daily x 1 wk then 2.4 mg daily x 1 wk then 3.0 mg daily  Indications: WT LOSS MGMT, PT WITH BMI 27-29 & WT-RELATED COMORBIDITY 5 Pen 1    Insulin Needles, Disposable, (NAVID PEN NEEDLE) 32 gauge x 5/32\" ndle Use daily as directed 50 Pen Needle 5    aspirin delayed-release 81 mg tablet Take 1 Tab by mouth daily. Indications: prevention of transient ischemic attack 90 Tab 3    diclofenac EC (VOLTAREN) 75 mg EC tablet TAKE 1 TABLET BY MOUTH TWICE DAILY 60 Tab 2    furosemide (LASIX) 40 mg tablet Take 1 Tab by mouth daily. Indications: Edema 90 Tab 1    sertraline (ZOLOFT) 50 mg tablet Take 1 Tab by mouth daily. Indications: GENERALIZED ANXIETY DISORDER 135 Tab 3    eszopiclone (LUNESTA) 2 mg tablet Take 1 Tab by mouth nightly. Max Daily Amount: 2 mg. 30 Tab 5    MINIVELLE 0.1 mg/24 hr 1 Patch by TransDERmal route every Monday. 0    cetirizine (ZYRTEC) 10 mg tablet Take 1 Tab by mouth daily. (Patient taking differently: Take 10 mg by mouth daily as needed.) 30 Tab 3    fluticasone (FLONASE) 50 mcg/actuation nasal spray 2 Sprays by Both Nostrils route daily. Indications: ALLERGIC RHINITIS 1 Bottle 5    albuterol (PROVENTIL HFA, VENTOLIN HFA) 90 mcg/actuation inhaler Take 2 Puffs by inhalation every four (4) hours as needed for Wheezing (cough).  1 Inhaler 1    montelukast (SINGULAIR) 10 mg tablet Take 1 Tab by mouth daily. Indications: ALLERGIC RHINITIS, breathing 30 Tab 11    cholecalciferol, vitamin D3, (VITAMIN D3) 2,000 unit Tab Take 2,000 Units by mouth daily. PAST MEDICAL HISTORY:    Past Medical History:   Diagnosis Date    Achilles tendonitis 12/2004    Right. Dr. Yeni Smith Arthritis 2010    hips, knees. Dr. Tabares Lunch    Chest pain 08/2013    Dr. Joy Becerra.  Chickenpox childhood    Endometriosis 1990    Dr. Marge Gamble Environmental allergies     MARGI (generalized anxiety disorder)     Hip pain, bilateral 2010    due to severe OA. Iliopsoas bursitis. Dr. Cheryle Lover    History of breast lump/mass excision 1998    Left breast.  Dr. Farrukh Parham Hyperlipidemia     Iron deficiency anemia     iron deficiency    Menopausal and postmenopausal disorder 2007    Dr. Marge Gamble Uterine fibroid     Dr. Maira Nye.  Vitamin D deficiency 03/2008       PAST SURGICAL HISTORY:    Past Surgical History:   Procedure Laterality Date    BIOPSY BREAST  Rt 1993;Lt 1998    Dr. Fabiana Choi Right 2002    Right achiles tendon. Dr. Davida Caraballo.  HX BREAST BIOPSY  2001    Left. benign. Dr. Germaine Gonzalez HX COLONOSCOPY  09/27/2016    Dr. Haider German.  due q 5 yrs due to fam hx   Gary Jasmeet    Dr. Hola Mayer. due to endometriosis    HX PELVIC LAPAROSCOPY  1995    due to endometriosis Dr. Julianna Felix  04/2011    due to fibroids. Dr. Maira Nye.        FAMILY HISTORY:    Family History   Problem Relation Age of Onset    Hypertension Mother     Other Mother      hearing loss/vision loss    Heart Disease Father     Lung Disease Father     Diabetes Sister     Heart Attack Sister 54     March 2014    Stroke Sister      after MI    Thyroid Disease Sister     Colon Polyps Sister     Heart Disease Brother      Defibrillator placed    Heart Attack Brother 48    Diabetes Maternal Grandmother        SOCIAL HISTORY:    Social History     Social History    Marital status: SINGLE     Spouse name: N/A    Number of children: 2    Years of education: N/A     Occupational History    [employer]      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      Social History Main Topics    Smoking status: Never Smoker    Smokeless tobacco: Never Used      Comment: lived with smoker mom x 18 yrs    Alcohol use No    Drug use: No    Sexual activity: Not Currently     Partners: Male     Birth control/ protection: Abstinence, Surgical     Other Topics Concern    None     Social History Narrative       IMMUNIZATIONS:    Immunization History   Administered Date(s) Administered    Influenza Vaccine 12/14/2012, 10/22/2013, 09/18/2014    Influenza Vaccine (Quad) 10/29/2015    Influenza Vaccine (Quad) PF 09/29/2016    Influenza Vaccine Split 11/12/2010, 11/07/2011    TD Vaccine 08/18/2000    Tdap 06/30/2016       PHYSICAL EXAMINATION    Vital Signs    Visit Vitals    /74 (BP 1 Location: Right arm, BP Patient Position: Sitting)    Pulse 60    Temp 96.3 °F (35.7 °C) (Oral)    Resp 16    Ht 5' 4.02\" (1.626 m)    Wt 212 lb 11.2 oz (96.5 kg)    SpO2 100%    BMI 36.49 kg/m2       Weight Metrics 9/21/2017 9/21/2017 7/14/2017 7/14/2017 6/5/2017 6/5/2017 5/9/2017   Weight - 212 lb 11.2 oz - 211 lb 12.8 oz - 219 lb 14.4 oz -   Waist Measure Inches 35 - 37 - 39 - 38   Body Fat % 46.4 - 45.4 - 45.2 - 46.1   BMI - 36.49 kg/m2 - 36.34 kg/m2 - 37.72 kg/m2 -       General appearance - Well nourished. Well appearing. Well developed. No acute distress. Obese   Head - Normocephalic. Atraumatic. Eyes - pupils equal and reactive, extraocular eye movements intact, sclera anicteric  Ears - Hearing is grossly normal bilaterally. Nose - normal and patent, no erythema, discharge or polyps   Mouth - mucous membranes moist, pharynx normal with cobblestone appearance. No erythema, white exudate or obstruction.   Neck - supple. Midline trachea. No carotid bruits are noted. No thyromegaly noted. Chest - clear to auscultation bilaterally anterriorly and posteriorly. No wheezes, rales or rhonchi. Breath sounds are symmetrical and unlabored bilaterally. Heart - normal rate. Regular rhythm, normal S1, S2. No murmur. No rubs, clicks or gallops noted. Abdomen - soft and distended. No masses or organomegaly. No rebound, rigidity or guarding. Bowel sounds normal x 4 quadrants. No tenderness noted. Neurological - awake, alert and oriented to person, place, and time and event. Cranial nerves II through XII intact. Muscle strength is +5/5 x 4 extremities. Sensation is intact to light touch bilaterally. Steady gait. Heme/Lymph - peripheral pulses normal x 4 extremities. No peripheral edema is noted. No cervical adenopathy noted. Skin - no rashes, erythema, ecchymosis, lacerations, abrasions, suspicious moles noted. No skin tags. No acanthosis nigricans noted in the axilla or neck. Psychological -   normal behavior, speech, dress and thought processes. Good insight. Good eye contact. Normal affect. Appropriate mood. DATA REVIEWED  Lab Results   Component Value Date/Time    WBC 3.6 03/28/2017 09:32 AM    HGB 13.0 03/28/2017 09:32 AM    HCT 40.8 03/28/2017 09:32 AM    PLATELET 929 32/66/6378 09:32 AM    MCV 92 03/28/2017 09:32 AM     Lab Results   Component Value Date/Time    Sodium 139 03/28/2017 09:32 AM    Potassium 4.0 03/28/2017 09:32 AM    Chloride 98 03/28/2017 09:32 AM    CO2 26 03/28/2017 09:32 AM    Anion gap 5 04/23/2011 05:20 AM    Glucose 80 03/28/2017 09:32 AM    BUN 17 03/28/2017 09:32 AM    Creatinine 0.98 03/28/2017 09:32 AM    BUN/Creatinine ratio 17 03/28/2017 09:32 AM    GFR est AA 72 03/28/2017 09:32 AM    GFR est non-AA 62 03/28/2017 09:32 AM    Calcium 9.1 03/28/2017 09:32 AM    Bilirubin, total 0.5 03/28/2017 09:32 AM    AST (SGOT) 22 03/28/2017 09:32 AM    Alk.  phosphatase 91 03/28/2017 09:32 AM    Protein, total 7.0 03/28/2017 09:32 AM    Albumin 4.4 03/28/2017 09:32 AM    A-G Ratio 1.7 03/28/2017 09:32 AM    ALT (SGPT) 17 03/28/2017 09:32 AM     Lab Results   Component Value Date/Time    Cholesterol, total 169 03/28/2017 09:32 AM    HDL Cholesterol 68 03/28/2017 09:32 AM    LDL, calculated 92 03/28/2017 09:32 AM    VLDL, calculated 9 03/28/2017 09:32 AM    Triglyceride 44 03/28/2017 09:32 AM     Lab Results   Component Value Date/Time    Vitamin D 25-Hydroxy 26.3 07/18/2011 10:22 AM    VITAMIN D, 25-HYDROXY 34.7 03/28/2017 09:32 AM       Lab Results   Component Value Date/Time    Hemoglobin A1c 5.5 03/28/2017 09:32 AM     Lab Results   Component Value Date/Time    TSH 1.900 02/24/2017 10:03 AM    TSH 2.36 08/21/2014 07:50 AM       ASSESSMENT and PLAN    ICD-10-CM ICD-9-CM    1. Adjustment disorder with anxious mood F43.22 309.24     stable off Zoloft, slightly worse with increased family stress   2. Chronic insomnia F51.04 780.52    3. Chronic pain of right knee M25.561 719.46     G89.29 338.29    4. Pure hypercholesterolemia E78.00 272.0     stable   5. Obesity, Class II, BMI 35-39.9 E66.9 278.00 phendimetrazine tartrate 105 mg cpER      liraglutide (SAXENDA) 3 mg/0.5 mL (18 mg/3 mL) pen      Insulin Needles, Disposable, (NAVID PEN NEEDLE) 32 gauge x 5/32\" ndle      REFERRAL TO PHARMACIST   6. Vitamin D deficiency E55.9 268.9    7. Hashimoto's thyroiditis E06.3 245.2    8. Menopausal and postmenopausal disorder N95.9 627.9        Continue current medications and care. Start Saxenda if affordable. If not then ok to start Phendimetrazine. Prescriptions written and given to patient Marianne Kenney and Phendimetrazine 105 mg, month 1 of 3.)  Medication side effects discussed. Referral to PharmD for [facility] consultation. Discussed the patient's BMI with her. The BMI follow up plan is as follows: I have counseled this patient on diet and exercise regimens.    Diet recommendations:  Decrease carbohydrates (white foods including flour, white bread, white rice, white pasta, white potatoes; corn, sweet foods, sweet drinks and alcohol), increase green leafy vegetables and protein with each meal.  Avoid fried foods. Eat 3-5 small meals daily. Do not skip meals. Ok to use meal replacements up to twice daily with protein goal of 60 mg per meal.   Recommend OTC Premiere Protein bars or shakes. Increase water intake. Increase physical activity to 30 minutes daily for health benefit or 60 minutes daily to prevent weight regain, as tolerated. Physical Activity prescription:  A goal of 30 minutes physical activity daily is recommended for health benefit and at least 60 minutes daily to prevent weight regain. For weight loss, no less than 75% needs to be aerobic (i.e. Walking) and no more than 25% resistance exercising (i.e. Weight lifting). For weight maintenance phase, 50% aerobic and 50% resistance exercises. Sleep prescription:    A goal of 7-8 hours of uninterrupted sleep is recommended to turn off the Grehlin hormone to be released from the stomach and triggers appetite while promoting weight gain. Proper rest turns on Leptin hormone to be released from white adipose tissue and promotes weight loss. Counseled patient on: weight loss goals, emphasizing a 5-10% weight loss in 6-12 months and strategies. Stressors and sleep hygiene. Discussed ADRs and benefits of Saxenda. Relevant handouts given and discussed with patient. Immunizations noted. Advise flu vaccine between the months September to December. Praised pt for weight loss efforts and progress. Patient was offered a choice/choices in the treatment plan today. Patient expresses understanding of the plan and agrees with recommendations. Follow-up Disposition:  Return in about 1 month (around 10/21/2017) for weight, bp. Written by hyun Peña, as dictated by Dr. Jennifer Barclay DO.     Documentation True and Accepted by Junior Bubba Walls. Patient Instructions        Starting a Weight Loss Plan: Care Instructions  Your Care Instructions  If you are thinking about losing weight, it can be hard to know where to start. Your doctor can help you set up a weight loss plan that best meets your needs. You may want to take a class on nutrition or exercise, or join a weight loss support group. If you have questions about how to make changes to your eating or exercise habits, ask your doctor about seeing a registered dietitian or an exercise specialist.  It can be a big challenge to lose weight. But you do not have to make huge changes at once. Make small changes, and stick with them. When those changes become habit, add a few more changes. If you do not think you are ready to make changes right now, try to pick a date in the future. Make an appointment to see your doctor to discuss whether the time is right for you to start a plan. Follow-up care is a key part of your treatment and safety. Be sure to make and go to all appointments, and call your doctor if you are having problems. Its also a good idea to know your test results and keep a list of the medicines you take. How can you care for yourself at home? · Set realistic goals. Many people expect to lose much more weight than is likely. A weight loss of 5% to 10% of your body weight may be enough to improve your health. · Get family and friends involved to provide support. Talk to them about why you are trying to lose weight, and ask them to help. They can help by participating in exercise and having meals with you, even if they may be eating something different. · Find what works best for you. If you do not have time or do not like to cook, a program that offers meal replacement bars or shakes may be better for you.  Or if you like to prepare meals, finding a plan that includes daily menus and recipes may be best.  · Ask your doctor about other health professionals who can help you achieve your weight loss goals. ¨ A dietitian can help you make healthy changes in your diet. ¨ An exercise specialist or  can help you develop a safe and effective exercise program.  ¨ A counselor or psychiatrist can help you cope with issues such as depression, anxiety, or family problems that can make it hard to focus on weight loss. · Consider joining a support group for people who are trying to lose weight. Your doctor can suggest groups in your area. Where can you learn more? Go to http://romulo-daron.info/. Enter Y380 in the search box to learn more about \"Starting a Weight Loss Plan: Care Instructions. \"  Current as of: October 13, 2016  Content Version: 11.3  © 5935-0045 [employer]. Care instructions adapted under license by "[employer] (which disclaims liability or warranty for this information). If you have questions about a medical condition or this instruction, always ask your healthcare professional. [place] [address] any warranty or liability for your use of this information. WEIGHT LOSS PLAN    1. Read food labels and count calories. Goal 2011-2146 calories daily for women and 0663-8309 calories daily for men. Achieve this by decreasing caloric intake by 500 calories by weekly increments. NOTE:  1 pound = 3500 calories! Www.loseit. com  Www.SolidFirenesspal.com  Www.triptap. [facility]  Www.healthfinder. gov  Weight watchers mobile    Calories needed to lose 1-2 pounds a week = 10 x your weight in pounds    2. Increase water intake. Per [facility], it is important to drink 1/2 your body weight in ounces of water daily. Decrease your water consumption 2-3 hours before bedtime to prevent sleep disturbance from frequent urination. 3.  Decrease sugary beverages. Each can or glass of soda increases your risk of obesity by 60%.   Can lose 10 pounds in a month by avoiding any soda.     12 oz can of soda = 140 calories, 16 oz cup of sweet tea = 200 calories    16 oz orange juice = 200 calories, 10 oz apple juice = 150 calories   32 oz sports drink = 200 calories, 16 oz punch = 240 calories   3.5 oz alcohol = 100-150 calories     4. Avoid High Fructose Corn Syrup products. This ingredient makes products highly addictive! 5. Exercise 30 minutes daily 5 days weekly, minimally. If you burn 3500 calories that equals a pound! Use a pedometer to count steps. Visit www. [employer] for a free pedometer and diet recommendations. Your maximum heart rate = 220 - your age    Never exercise at your maximum heart rate. See handout for target heart rate. 6.  Decrease carbohydrates (white bread, pasta, rice, potatoes, sweet foods and sweet drinks like soda, tea, coffee, juice and sports drinks). Increase fiber and protein. Goal:   calories daily of carbohydrates     Try CHROMIUM PICONATE 200 MG THREE TIMES DAILY,    to decrease Premenstrual carbohydrate cravings. 7.  Eat 3-5 small meals daily, include lots of protein (beans/legumes, nuts, lean meat, eggs) and green vegetables with each. (Breakfast, lunch, dinner with 2 healthy snacks)    8. Get proper rest 7-8 hours uninterrupted. When you get less than 6 hours, it triggers hunger by affecting your Grehlin:Leptin ratio and this results in weight gain. 9.  Watch your food portions. Green leafy vegetables should cover 1/2 of the plate, lean meat 1/4 of the plate, starchy vegetable 1/4 of the plate. Use smaller plates. 10.  Do not eat until you are full. Eat until you are no longer hungry. If you are not sure, try drinking a glass of water before getting your second serving of food. 11.  Do not weigh yourself daily. Wait until your next office visit. Use how you feel and  how your clothes fit as measurements of success. 12.  Address your spirituality to draw strength from above during your journey. Remember \"I am fearfully and wonderfully made. Marvelous in His eyes. \"    13. Set realistic, appropriate and achievable weight loss goals:     RECOMMENDED TARGET WEIGHT LOSS:  Initial weight loss of 5-10% of your initial body weight achieved over 6 months or a decrease of 2 BMI units. MINIMUM GOAL OF WEIGHT LOSS:  Reduce body weight and maintain a lower body weight. Prevent weight gain. RELATED WEBSITES:      Www.obesityaction. org  (and consider joining the Obesity Action Coalition for $25/year. The [facility] mission is to elevated and empower those affected by obesity through education, advocacy and support. Quarterly journals included in membership fee. )    Www.[employer]  www.Ensighten.com     RELATED DIETS:  Dr. Ayaka Jackson Weight loss challenge, Mediterranean diet, [address] [place] [address] Watchers, Paleo Diet (anti-inflammatory diet)        EXERCISE 150 MINUTES WEEKLY. THIS CAN BE ACHIEVED BY WORKING OUT OR WALKING A MINIMUM OF 30 MINUTES FOR 5 DAYS WEEKLY. YOU CAN EXERCISE IN INCREMENTS OF 10-15 MINUTES UP TO 3 TIMES A DAY. Consider performing \"brainless exercise. \"  Choose your favorite tv program.  Five minutes before and for 5 minutes after the tv program, stretch your body. While the program is on, walk in place watching the show. When commercials come on, rest or walk around the house to do other things. When the program begins, return to walking in place. When you are able keep walking during the commercials and add light weights to your ankles or hands. By the end of the show, you would have walked 30 minutes. If you need shorter spurts of exercise, walk during the commercials and rest during the show. Drink to glasses of water prior to any exercise to prevent dehydration and to improve the results of the work out. Your RESTING HEART RATE is the number of times your heart beats per minute when you are not exerting yourself.   The more fit you are, the lower your resting heart rate will be. Your MAXIMUM HEART RATE is the number of times per minute your heart pumps when it is working at 100% capacity. NEVER EXERCISE AT YOUR MAXIMUM HEART RATE. MAX HEART RATE = 220 - your age    For example, in a 48year old, the maximum heart rate is 220-50 = 170 beats per minute    Your [place] is the number of beats per minute our heart should pump during aerobic exercise. Reaching your target heart rate indicates that your body is receiving maximum cardiovascular and fat burning benefits. If you are fit, your TARGET HEART RATE = 70-85% of your maximum Heart rate      For a 48year old with vigorous intensity physical activity,         Target heart rate= 170 bpm x .70 = 119 bpm     Target heart rate = 170 bpm x .85=  145 bpm        Therefore, your target heart rate during physical activity is 119-145      If you are not fit, TARGET HEART RATE = 50-70% of your maximum Heart rate    MODERATE CONSISTENT AEROBIC EXERCISE OR WALKING FOR AT LEAST 150 MINUTES WEEKLY IS AN ESSENTIAL PART OF ANY WEIGHT LOSS OF WEIGHT MAINTENANCE PROGRAM.     During WEIGHT LOSS PHASE, at least 75% of your exercise needs to be walking or moderate aerobic activity and 25% or 0% can be Isometric or Resistance type exercises (the latter can be deferred to the weight maintenance phase.)     During WEIGHT MAINTENANCE PHASE, at least 50% of your exercise needs to be aerobic and 50% Isometric or Resistance type exercises. BENEFITS OF MODERATE INTENSITY EXERCISE MOST DAYS OF THE WEEK:     1. Insulin Resistance improves 30-85%   2. Abdominal Fat decreases by 30%   3. Inflammatory Markers decrease by 30% (therefore pain decreases)   4. Systolic and Diastolic Blood Pressure decrease by 4 mmHg   5. HDL improves by 5%   6. Triglycerides decrease by 15%   7. Shift from small dense LDL to large dense LDL (therefore decrease Insulin Resistance)   8.   Decrease coagulability Starting a Weight Loss Plan: Care Instructions  Your Care Instructions  If you are thinking about losing weight, it can be hard to know where to start. Your doctor can help you set up a weight loss plan that best meets your needs. You may want to take a class on nutrition or exercise, or join a weight loss support group. If you have questions about how to make changes to your eating or exercise habits, ask your doctor about seeing a registered dietitian or an exercise specialist.  It can be a big challenge to lose weight. But you do not have to make huge changes at once. Make small changes, and stick with them. When those changes become habit, add a few more changes. If you do not think you are ready to make changes right now, try to pick a date in the future. Make an appointment to see your doctor to discuss whether the time is right for you to start a plan. Follow-up care is a key part of your treatment and safety. Be sure to make and go to all appointments, and call your doctor if you are having problems. Its also a good idea to know your test results and keep a list of the medicines you take. How can you care for yourself at home? · Set realistic goals. Many people expect to lose much more weight than is likely. A weight loss of 5% to 10% of your body weight may be enough to improve your health. · Get family and friends involved to provide support. Talk to them about why you are trying to lose weight, and ask them to help. They can help by participating in exercise and having meals with you, even if they may be eating something different. · Find what works best for you. If you do not have time or do not like to cook, a program that offers meal replacement bars or shakes may be better for you. Or if you like to prepare meals, finding a plan that includes daily menus and recipes may be best.  · Ask your doctor about other health professionals who can help you achieve your weight loss goals.   ¨ A dietitian can help you make healthy changes in your diet. ¨ An exercise specialist or  can help you develop a safe and effective exercise program.  ¨ A counselor or psychiatrist can help you cope with issues such as depression, anxiety, or family problems that can make it hard to focus on weight loss. · Consider joining a support group for people who are trying to lose weight. Your doctor can suggest groups in your area. Where can you learn more? Go to http://romulo-daron.info/. Enter L337 in the search box to learn more about \"Starting a Weight Loss Plan: Care Instructions. \"  Current as of: October 13, 2016  Content Version: 11.3  © 3067-9612 [employer] [place]. Care instructions adapted under license by [employer] (which disclaims liability or warranty for this information). If you have questions about a medical condition or this instruction, always ask your healthcare professional. [address] any warranty or liability for your use of this information.

## 2017-09-21 NOTE — PATIENT INSTRUCTIONS
Starting a Weight Loss Plan: Care Instructions  Your Care Instructions  If you are thinking about losing weight, it can be hard to know where to start. Your doctor can help you set up a weight loss plan that best meets your needs. You may want to take a class on nutrition or exercise, or join a weight loss support group. If you have questions about how to make changes to your eating or exercise habits, ask your doctor about seeing a registered dietitian or an exercise specialist.  It can be a big challenge to lose weight. But you do not have to make huge changes at once. Make small changes, and stick with them. When those changes become habit, add a few more changes. If you do not think you are ready to make changes right now, try to pick a date in the future. Make an appointment to see your doctor to discuss whether the time is right for you to start a plan. Follow-up care is a key part of your treatment and safety. Be sure to make and go to all appointments, and call your doctor if you are having problems. Its also a good idea to know your test results and keep a list of the medicines you take. How can you care for yourself at home? · Set realistic goals. Many people expect to lose much more weight than is likely. A weight loss of 5% to 10% of your body weight may be enough to improve your health. · Get family and friends involved to provide support. Talk to them about why you are trying to lose weight, and ask them to help. They can help by participating in exercise and having meals with you, even if they may be eating something different. · Find what works best for you. If you do not have time or do not like to cook, a program that offers meal replacement bars or shakes may be better for you. Or if you like to prepare meals, finding a plan that includes daily menus and recipes may be best.  · Ask your doctor about other health professionals who can help you achieve your weight loss goals.   ¨ A dietitian can help you make healthy changes in your diet. ¨ An exercise specialist or  can help you develop a safe and effective exercise program.  ¨ A counselor or psychiatrist can help you cope with issues such as depression, anxiety, or family problems that can make it hard to focus on weight loss. · Consider joining a support group for people who are trying to lose weight. Your doctor can suggest groups in your area. Where can you learn more? Go to http://romulo-daron.info/. Enter T292 in the search box to learn more about \"Starting a Weight Loss Plan: Care Instructions. \"  Current as of: October 13, 2016  Content Version: 11.3  © 3203-0642 [employer]. Care instructions adapted under license by [employer] (which disclaims liability or warranty for this information). If you have questions about a medical condition or this instruction, always ask your healthcare professional. [place] [address] any warranty or liability for your use of this information. WEIGHT LOSS PLAN    1. Read food labels and count calories. Goal 1555-4029 calories daily for women and 8337-2254 calories daily for men. Achieve this by decreasing caloric intake by 500 calories by weekly increments. NOTE:  1 pound = 3500 calories! Www.loseit. com  Www.myjobandtalentnesspal.com  Www.FotoSwipe. [employer]  Www.CRVder. gov  Weight watchers mobile    Calories needed to lose 1-2 pounds a week = 10 x your weight in pounds    2. Increase water intake. Per [facility], it is important to drink 1/2 your body weight in ounces of water daily. Decrease your water consumption 2-3 hours before bedtime to prevent sleep disturbance from frequent urination. 3.  Decrease sugary beverages. Each can or glass of soda increases your risk of obesity by 60%. Can lose 10 pounds in a month by avoiding any soda.      12 oz can of soda = 140 calories, 16 oz cup of sweet tea = 200 calories    16 oz orange juice = 200 calories, 10 oz apple juice = 150 calories   32 oz sports drink = 200 calories, 16 oz punch = 240 calories   3.5 oz alcohol = 100-150 calories     4. Avoid High Fructose Corn Syrup products. This ingredient makes products highly addictive! 5. Exercise 30 minutes daily 5 days weekly, minimally. If you burn 3500 calories that equals a pound! Use a pedometer to count steps. Visit www. Rue89 for a free pedometer and diet recommendations. Your maximum heart rate = 220 - your age    Never exercise at your maximum heart rate. See handout for target heart rate. 6.  Decrease carbohydrates (white bread, pasta, rice, potatoes, sweet foods and sweet drinks like soda, tea, coffee, juice and sports drinks). Increase fiber and protein. Goal:   calories daily of carbohydrates     Try CHROMIUM PICONATE 200 MG THREE TIMES DAILY,    to decrease Premenstrual carbohydrate cravings. 7.  Eat 3-5 small meals daily, include lots of protein (beans/legumes, nuts, lean meat, eggs) and green vegetables with each. (Breakfast, lunch, dinner with 2 healthy snacks)    8. Get proper rest 7-8 hours uninterrupted. When you get less than 6 hours, it triggers hunger by affecting your [place]:Leptin ratio and this results in weight gain. 9.  Watch your food portions. Green leafy vegetables should cover 1/2 of the plate, lean meat 1/4 of the plate, starchy vegetable 1/4 of the plate. Use smaller plates. 10.  Do not eat until you are full. Eat until you are no longer hungry. If you are not sure, try drinking a glass of water before getting your second serving of food. 11.  Do not weigh yourself daily. Wait until your next office visit. Use how you feel and  how your clothes fit as measurements of success. 12.  Address your spirituality to draw strength from above during your journey. Remember \"I am fearfully and wonderfully made. [place] in His eyes. \"    13. Set realistic, appropriate and achievable weight loss goals:     RECOMMENDED TARGET WEIGHT LOSS:  Initial weight loss of 5-10% of your initial body weight achieved over 6 months or a decrease of 2 BMI units. MINIMUM GOAL OF WEIGHT LOSS:  Reduce body weight and maintain a lower body weight. Prevent weight gain. RELATED WEBSITES:      Www.obesityaction. org  (and consider joining the Obesity [employer] Coalition for $25/year. The [address] mission is to elevated and empower those affected by obesity through education, advocacy and support. Quarterly journals included in membership fee. )    Www.MolecuLight. [employer]  www.Votigo.com     RELATED DIETS:  Dr. Kel Nichols Weight loss challenge, Mediterranean diet, [address] [place] [address] Watchers, Paleo Diet (anti-inflammatory diet)        EXERCISE 150 MINUTES WEEKLY. THIS CAN BE ACHIEVED BY WORKING OUT OR WALKING A MINIMUM OF 30 MINUTES FOR 5 DAYS WEEKLY. YOU CAN EXERCISE IN INCREMENTS OF 10-15 MINUTES UP TO 3 TIMES A DAY. Consider performing \"brainless exercise. \"  Choose your favorite tv program.  Five minutes before and for 5 minutes after the tv program, stretch your body. While the program is on, walk in place watching the show. When commercials come on, rest or walk around the house to do other things. When the program begins, return to walking in place. When you are able keep walking during the commercials and add light weights to your ankles or hands. By the end of the show, you would have walked 30 minutes. If you need shorter spurts of exercise, walk during the commercials and rest during the show. Drink to glasses of water prior to any exercise to prevent dehydration and to improve the results of the work out. Your RESTING HEART RATE is the number of times your heart beats per minute when you are not exerting yourself. The more fit you are, the lower your resting heart rate will be.     Your MAXIMUM HEART RATE is the number of times per minute your heart pumps when it is working at 100% capacity. NEVER EXERCISE AT YOUR MAXIMUM HEART RATE. MAX HEART RATE = 220 - your age    For example, in a 48year old, the maximum heart rate is 220-50 = 170 beats per minute    Your [place] is the number of beats per minute our heart should pump during aerobic exercise. Reaching your target heart rate indicates that your body is receiving maximum cardiovascular and fat burning benefits. If you are fit, your TARGET HEART RATE = 70-85% of your maximum Heart rate      For a 48year old with vigorous intensity physical activity,         Target heart rate= 170 bpm x .70 = 119 bpm     Target heart rate = 170 bpm x .85=  145 bpm        Therefore, your target heart rate during physical activity is 119-145      If you are not fit, TARGET HEART RATE = 50-70% of your maximum Heart rate    MODERATE CONSISTENT AEROBIC EXERCISE OR WALKING FOR AT LEAST 150 MINUTES WEEKLY IS AN ESSENTIAL PART OF ANY WEIGHT LOSS OF WEIGHT MAINTENANCE PROGRAM.     During WEIGHT LOSS PHASE, at least 75% of your exercise needs to be walking or moderate aerobic activity and 25% or 0% can be Isometric or Resistance type exercises (the latter can be deferred to the weight maintenance phase.)     During WEIGHT MAINTENANCE PHASE, at least 50% of your exercise needs to be aerobic and 50% Isometric or Resistance type exercises. BENEFITS OF MODERATE INTENSITY EXERCISE MOST DAYS OF THE WEEK:     1. Insulin Resistance improves 30-85%   2. Abdominal Fat decreases by 30%   3. Inflammatory Markers decrease by 30% (therefore pain decreases)   4. Systolic and Diastolic Blood Pressure decrease by 4 mmHg   5. HDL improves by 5%   6. Triglycerides decrease by 15%   7. Shift from small dense LDL to large dense LDL (therefore decrease Insulin Resistance)   8.   Decrease coagulability                  Starting a Weight Loss Plan: Care Instructions  Your Care Instructions  If you are thinking about losing weight, it can be hard to know where to start. Your doctor can help you set up a weight loss plan that best meets your needs. You may want to take a class on nutrition or exercise, or join a weight loss support group. If you have questions about how to make changes to your eating or exercise habits, ask your doctor about seeing a registered dietitian or an exercise specialist.  It can be a big challenge to lose weight. But you do not have to make huge changes at once. Make small changes, and stick with them. When those changes become habit, add a few more changes. If you do not think you are ready to make changes right now, try to pick a date in the future. Make an appointment to see your doctor to discuss whether the time is right for you to start a plan. Follow-up care is a key part of your treatment and safety. Be sure to make and go to all appointments, and call your doctor if you are having problems. Its also a good idea to know your test results and keep a list of the medicines you take. How can you care for yourself at home? · Set realistic goals. Many people expect to lose much more weight than is likely. A weight loss of 5% to 10% of your body weight may be enough to improve your health. · Get family and friends involved to provide support. Talk to them about why you are trying to lose weight, and ask them to help. They can help by participating in exercise and having meals with you, even if they may be eating something different. · Find what works best for you. If you do not have time or do not like to cook, a program that offers meal replacement bars or shakes may be better for you. Or if you like to prepare meals, finding a plan that includes daily menus and recipes may be best.  · Ask your doctor about other health professionals who can help you achieve your weight loss goals.   ¨ A dietitian can help you make healthy changes in your diet. ¨ An exercise specialist or  can help you develop a safe and effective exercise program.  ¨ A counselor or psychiatrist can help you cope with issues such as depression, anxiety, or family problems that can make it hard to focus on weight loss. · Consider joining a support group for people who are trying to lose weight. Your doctor can suggest groups in your area. Where can you learn more? Go to http://romulo-daron.info/. Enter G134 in the search box to learn more about \"Starting a Weight Loss Plan: Care Instructions. \"  Current as of: October 13, 2016  Content Version: 11.3  © 5984-3083 [employer]. Care instructions adapted under license by [employer] (which disclaims liability or warranty for this information). If you have questions about a medical condition or this instruction, always ask your healthcare professional. [address] any warranty or liability for your use of this information.

## 2017-09-21 NOTE — PROGRESS NOTES
Chief Complaint   Patient presents with    Weight Management    Blood Pressure Check    Medication Refill     change diet medication     1. Have you been to the ER, urgent care clinic since your last visit? Hospitalized since your last visit? No    2. Have you seen or consulted any other health care providers outside of the [facility] since your last visit? Include any pap smears or colon screening. No    In the event something were to happen to you and you were unable to speak on your behalf, do you have an Advance Directive/ Living Will in place stating your wishes? YES    If yes, do we have a copy on file NO    If no, would you like information:   Pt encouraged to bring into office to be scanned into her chart.

## 2017-09-21 NOTE — MR AVS SNAPSHOT
Visit Information Date & Time Provider Department Dept. Phone Encounter #  
 9/21/2017  8:45 AM Danielle Varelaanamariamukesh DO [facility] [phone] [id number] Follow-up Instructions Return in about 1 month (around 10/21/2017) for weight, bp. Your Appointments 10/19/2017  8:45 AM  
Office Visit with Danielle Nicholegiorgio DO [facility] ([facility] [place]) Appt Note: 1 MO F/U Weight, BP Check, Med Refill [address] [facility] [address]  
[phone]  
  
   
 [address]  
  
    
 11/17/2017  8:45 AM  
Office Visit with Danielle Medina DO [facility] ([facility] [place]) Appt Note: 1 MO F/U Weight, BP Check, Med Refill [address] [facility] [id number] [phone]  
  
    
 12/18/2017  8:45 AM  
Office Visit with Danielle Medina DO [facility] ([facility] [place]) Appt Note: 1 MO F/U Weight, BP Check, Med Refill [address] [facility] [address] [phone]  
  
    
 1/18/2018  8:45 AM  
Office Visit with Danielle Medina DO [facility] ([facility] [place]) Appt Note: 1 MO F/U Weight, BP Check, Med Refill [address] [facility] [address] [phone]  
  
    
 2/15/2018  8:45 AM  
Office Visit with Danielle Nicholegiorgio DO [facility] ([facility] [place]) Appt Note: 1 MO F/U Weight, BP Check, Med Refill [address] [facility] [address] [phone] Upcoming Health Maintenance Date Due INFLUENZA AGE 9 TO ADULT 10/2/2017* PAP AKA CERVICAL CYTOLOGY 12/12/2018 BREAST CANCER SCRN MAMMOGRAM 12/14/2018 COLONOSCOPY 9/27/2021 DTaP/Tdap/Td series (2 - Td) 6/30/2026 *Topic was postponed. The date shown is not the original due date. Allergies as of 9/21/2017  Review Complete On: 9/21/2017 By: Matthew Beal DO Severity Noted Reaction Type Reactions Advil Pm [Ibuprofen-diphenhydramine Hcl]  07/18/2011    Other (comments) Slightly elevated Creaitnine 1.02 Aleve [Naproxen Sodium]  12/28/2011    Other (comments) Due to kidneys Bactrim [Sulfamethoxazole-trimethoprim]  09/11/2009    Nausea and Vomiting  
 Sulfa (Sulfonamide Antibiotics)  09/11/2009    Nausea and Vomiting  
 Sulfasalazine  09/11/2009    Nausea and Vomiting Current Immunizations  Reviewed on 6/5/2017 Name Date Influenza Vaccine 9/18/2014, 10/22/2013, 12/14/2012 Influenza Vaccine Donney Gum) 10/29/2015 Influenza Vaccine (Quad) PF 9/29/2016 Influenza Vaccine Split 11/7/2011, 11/12/2010 TD Vaccine 8/18/2000 Tdap 6/30/2016  9:35 AM  
  
 Not reviewed this visit You Were Diagnosed With   
  
 Codes Comments Adjustment disorder with anxious mood    -  Primary ICD-10-CM: F43.22 
ICD-9-CM: 309.24 stable off Zoloft, slightly worse with increased family stress Chronic insomnia     ICD-10-CM: F51.04 
ICD-9-CM: 780.52 Chronic pain of right knee     ICD-10-CM: M25.561, G89.29 ICD-9-CM: 719.46, 338.29 Pure hypercholesterolemia     ICD-10-CM: E78.00 ICD-9-CM: 272.0 stable Obesity, Class II, BMI 35-39.9     ICD-10-CM: E66.9 ICD-9-CM: 278.00 Vitamin D deficiency     ICD-10-CM: E55.9 ICD-9-CM: 268.9 Hashimoto's thyroiditis     ICD-10-CM: E06.3 ICD-9-CM: 245.2 Menopausal and postmenopausal disorder     ICD-10-CM: N95.9 ICD-9-CM: 627.9 Vitals BP Pulse Temp Resp Height(growth percentile) Weight(growth percentile) 117/74 (BP 1 Location: Right arm, BP Patient Position: Sitting) 60 96.3 °F (35.7 °C) (Oral) 16 5' 4.02\" (1.626 m) 212 lb 11.2 oz (96.5 kg) SpO2 BMI OB Status Smoking Status 100% 36.49 kg/m2 Hysterectomy Never Smoker Vitals History BMI and BSA Data Body Mass Index Body Surface Area  
 36.49 kg/m 2 2.09 m 2 Preferred Pharmacy Pharmacy Name Phone [facility], [address] AT [address] [place] [address] Drive [phone] Your Updated Medication List  
  
   
This list is accurate as of: 9/21/17 10:22 AM.  Always use your most recent med list.  
  
  
  
  
 albuterol 90 mcg/actuation inhaler Commonly known as:  PROVENTIL HFA, VENTOLIN HFA, PROAIR HFA Take 2 Puffs by inhalation every four (4) hours as needed for Wheezing (cough). aspirin delayed-release 81 mg tablet Take 1 Tab by mouth daily. Indications: prevention of transient ischemic attack  
  
 cetirizine 10 mg tablet Commonly known as:  ZYRTEC Take 1 Tab by mouth daily. diclofenac EC 75 mg EC tablet Commonly known as:  VOLTAREN  
TAKE 1 TABLET BY MOUTH TWICE DAILY  
  
 eszopiclone 2 mg tablet Commonly known as:  Brionna Schwab Take 1 Tab by mouth nightly. Max Daily Amount: 2 mg. fluticasone 50 mcg/actuation nasal spray Commonly known as:  Megan Pipe 2 Sprays by Both Nostrils route daily. Indications: ALLERGIC RHINITIS  
  
 furosemide 40 mg tablet Commonly known as:  LASIX Take 1 Tab by mouth daily. Indications: Edema  
  
 hyaluronate 48 mg/6 mL Syrg injection Commonly known as:  HYLAN Inject one prefilled syringe into the right knee, for a quantity of 1 injection Insulin Needles (Disposable) 32 gauge x 5/32\" Ndle Commonly known as:  Catina Pen Needle Use daily as directed  
  
 liraglutide 3 mg/0.5 mL (18 mg/3 mL) pen Commonly known as:  SAXENDA  
0.5 mL by SubCUTAneous route daily. 0.6 mg daily x 1 wk then 1.2 mg daily x 1 wk then 1.8 mg daily x 1 wk then 2.4 mg daily x 1 wk then 3.0 mg daily  Indications: WT LOSS MGMT, PT WITH BMI 27-29 & WT-RELATED COMORBIDITY  
  
 MINIVELLE 0.1 mg/24 hr  
Generic drug:  estradiol 1 Patch by TransDERmal route every Monday. montelukast 10 mg tablet Commonly known as:  SINGULAIR Take 1 Tab by mouth daily. Indications: ALLERGIC RHINITIS, breathing  
  
 phendimetrazine tartrate 105 mg Cper Take 1 Cap by mouth Daily (before breakfast). Max Daily Amount: 1 Cap. Indications: WEIGHT LOSS MANAGEMENT FOR OBESE PATIENT (BMI >= 30)  
  
 sertraline 50 mg tablet Commonly known as:  ZOLOFT Take 1 Tab by mouth daily. Indications: GENERALIZED ANXIETY DISORDER  
  
 VITAMIN D3 2,000 unit Tab Generic drug:  cholecalciferol (vitamin D3) Take 2,000 Units by mouth daily. Prescriptions Printed Refills  
 phendimetrazine tartrate 105 mg cpER 0 Sig: Take 1 Cap by mouth Daily (before breakfast). Max Daily Amount: 1 Cap. Indications: WEIGHT LOSS MANAGEMENT FOR OBESE PATIENT (BMI >= 30) Class: Print Route: Oral  
 liraglutide (SAXENDA) 3 mg/0.5 mL (18 mg/3 mL) pen 1 Si.5 mL by SubCUTAneous route daily. 0.6 mg daily x 1 wk then 1.2 mg daily x 1 wk then 1.8 mg daily x 1 wk then 2.4 mg daily x 1 wk then 3.0 mg daily  Indications: WT LOSS MGMT, PT WITH BMI 27-29 & WT-RELATED COMORBIDITY Class: Print Route: SubCUTAneous  
 aspirin delayed-release 81 mg tablet 3 Sig: Take 1 Tab by mouth daily. Indications: prevention of transient ischemic attack Class: Print Route: Oral  
  
Prescriptions Sent to Pharmacy Refills Insulin Needles, Disposable, (NAVID PEN NEEDLE) 32 gauge x /32\" ndle 5 Sig: Use daily as directed Class: Normal  
 [facility]  AT [address] Ph #: [phone] We Performed the Following REFERRAL TO PHARMACIST [[id number] Custom] Comments:  
 Please evaluate patient for saxenda use and demonstration Follow-up Instructions Return in about 1 month (around 10/21/2017) for weight, bp. Referral Information Referral ID Referred By Referred To [id number] [address] [facility], [address] [place] [address] Street Visits Status Start Date End Date 1 New Request 9/21/17 9/21/18 If your referral has a status of pending review or denied, additional information will be sent to support the outcome of this decision. Patient Instructions Starting a Weight Loss Plan: Care Instructions Your Care Instructions If you are thinking about losing weight, it can be hard to know where to start. Your doctor can help you set up a weight loss plan that best meets your needs. You may want to take a class on nutrition or exercise, or join a weight loss support group. If you have questions about how to make changes to your eating or exercise habits, ask your doctor about seeing a registered dietitian or an exercise specialist. 
It can be a big challenge to lose weight. But you do not have to make huge changes at once. Make small changes, and stick with them. When those changes become habit, add a few more changes. If you do not think you are ready to make changes right now, try to pick a date in the future. Make an appointment to see your doctor to discuss whether the time is right for you to start a plan. Follow-up care is a key part of your treatment and safety. Be sure to make and go to all appointments, and call your doctor if you are having problems. Its also a good idea to know your test results and keep a list of the medicines you take. How can you care for yourself at home? · Set realistic goals. Many people expect to lose much more weight than is likely. A weight loss of 5% to 10% of your body weight may be enough to improve your health. · Get family and friends involved to provide support. Talk to them about why you are trying to lose weight, and ask them to help. They can help by participating in exercise and having meals with you, even if they may be eating something different. · Find what works best for you. If you do not have time or do not like to cook, a program that offers meal replacement bars or shakes may be better for you. Or if you like to prepare meals, finding a plan that includes daily menus and recipes may be best. 
· Ask your doctor about other health professionals who can help you achieve your weight loss goals. ¨ A dietitian can help you make healthy changes in your diet. ¨ An exercise specialist or  can help you develop a safe and effective exercise program. 
¨ A counselor or psychiatrist can help you cope with issues such as depression, anxiety, or family problems that can make it hard to focus on weight loss. · Consider joining a support group for people who are trying to lose weight. Your doctor can suggest groups in your area. Where can you learn more? Go to http://romuloEvolve Vacation Rental Networkdaron.info/. Enter B711 in the search box to learn more about \"Starting a Weight Loss Plan: Care Instructions. \" Current as of: October 13, 2016 Content Version: 11.3 © 5074-5242 [employer]. Care instructions adapted under license by [employer] (which disclaims liability or warranty for this information). If you have questions about a medical condition or this instruction, always ask your healthcare professional. [place] [address] any warranty or liability for your use of this information. WEIGHT LOSS PLAN 1. Read food labels and count calories. Goal 8068-4048 calories daily for women and 8630-5641 calories daily for men. Achieve this by decreasing caloric intake by 500 calories by weekly increments. NOTE:  1 pound = 3500 calories! Www.loseit. com Www.mySmall Demonsnesspal.com Www.finalsite. "[employer] Www.healthfinder. gov Weight watchers mobile Calories needed to lose 1-2 pounds a week = 10 x your weight in pounds 2. Increase water intake.     Per [employer] loss Program, it is important to drink 1/2 your body weight in ounces of water daily. Decrease your water consumption 2-3 hours before bedtime to prevent sleep disturbance from frequent urination. 3.  Decrease sugary beverages. Each can or glass of soda increases your risk of obesity by 60%. Can lose 10 pounds in a month by avoiding any soda. 12 oz can of soda = 140 calories, 16 oz cup of sweet tea = 200 calories 16 oz orange juice = 200 calories, 10 oz apple juice = 150 calories 32 oz sports drink = 200 calories, 16 oz punch = 240 calories 3.5 oz alcohol = 100-150 calories 4. Avoid High Fructose Corn Syrup products. This ingredient makes products highly addictive! 5. Exercise 30 minutes daily 5 days weekly, minimally. If you burn 3500 calories that equals a pound! Use a pedometer to count steps. Visit www. [employer] for a free pedometer and diet recommendations. Your maximum heart rate = 220 - your age Never exercise at your maximum heart rate. See handout for target heart rate. 6.  Decrease carbohydrates (white bread, pasta, rice, potatoes, sweet foods and sweet drinks like soda, tea, coffee, juice and sports drinks). Increase fiber and protein. Goal:   calories daily of carbohydrates Try CHROMIUM PICONATE 200 MG THREE TIMES DAILY,  
 to decrease Premenstrual carbohydrate cravings. 7.  Eat 3-5 small meals daily, include lots of protein (beans/legumes, nuts, lean meat, eggs) and green vegetables with each. (Breakfast, lunch, dinner with 2 healthy snacks) 8. Get proper rest 7-8 hours uninterrupted. When you get less than 6 hours, it triggers hunger by affecting your Grehlin:Leptin ratio and this results in weight gain. 9.  Watch your food portions. Green leafy vegetables should cover 1/2 of the plate, lean meat 1/4 of the plate, starchy vegetable 1/4 of the plate. Use smaller plates. 10.  Do not eat until you are full. Eat until you are no longer hungry. If you are not sure, try drinking a glass of water before getting your second serving of food. 11.  Do not weigh yourself daily. Wait until your next office visit. Use how you feel and  how your clothes fit as measurements of success. 12.  Address your spirituality to draw strength from above during your journey. Remember \"I am fearfully and wonderfully made. Marvelous in His eyes. \" 
 
13. Set realistic, appropriate and achievable weight loss goals: 
 
 RECOMMENDED TARGET WEIGHT LOSS:  Initial weight loss of 5-10% of your initial body weight achieved over 6 months or a decrease of 2 BMI units. MINIMUM GOAL OF WEIGHT LOSS:  Reduce body weight and maintain a lower body weight. Prevent weight gain. RELATED WEBSITES:     
Www.obesityaction. org 
(and consider joining the Obesity Action Coalition for $25/year. The [address] [place] [address] [address] mission is to elevated and empower those affected by obesity through education, advocacy and support. Quarterly journals included in membership fee. ) Www.[employer]m Systems 
www.PEAR [employer]ORTS RELATED DIETS:  Dr. Scot Palencia Weight loss challenge, Mediterranean diet, [place] Diet, [employer] Watchers, Paleo Diet (anti-inflammatory diet) EXERCISE 150 MINUTES WEEKLY. THIS CAN BE ACHIEVED BY WORKING OUT OR WALKING A MINIMUM OF 30 MINUTES FOR 5 DAYS WEEKLY. YOU CAN EXERCISE IN INCREMENTS OF 10-15 MINUTES UP TO 3 TIMES A DAY. Consider performing \"brainless exercise. \"  Choose your favorite tv program.  Five minutes before and for 5 minutes after the tv program, stretch your body. While the program is on, walk in place watching the show. When commercials come on, rest or walk around the house to do other things. When the program begins, return to walking in place.   When you are able keep walking during the commercials and add light weights to your ankles or hands. By the end of the show, you would have walked 30 minutes. If you need shorter spurts of exercise, walk during the commercials and rest during the show. Drink to glasses of water prior to any exercise to prevent dehydration and to improve the results of the work out. Your RESTING HEART RATE is the number of times your heart beats per minute when you are not exerting yourself. The more fit you are, the lower your resting heart rate will be. Your MAXIMUM HEART RATE is the number of times per minute your heart pumps when it is working at 100% capacity. NEVER EXERCISE AT YOUR MAXIMUM HEART RATE. MAX HEART RATE = 220 - your age For example, in a 48year old, the maximum heart rate is 220-50 = 170 beats per minute Your TARGET HEART RATE is the number of beats per minute our heart should pump during aerobic exercise. Reaching your target heart rate indicates that your body is receiving maximum cardiovascular and fat burning benefits. If you are fit, your TARGET HEART RATE = 70-85% of your maximum Heart rate For a 48year old with vigorous intensity physical activity, Target heart rate= 170 bpm x .70 = 119 bpm 
   Target heart rate = 170 bpm x .85=  145 bpm 
 
    Therefore, your target heart rate during physical activity is 119-145 If you are not fit, TARGET HEART RATE = 50-70% of your maximum Heart rate MODERATE CONSISTENT AEROBIC EXERCISE OR WALKING FOR AT LEAST 150 MINUTES WEEKLY IS AN ESSENTIAL PART OF ANY WEIGHT LOSS OF WEIGHT MAINTENANCE PROGRAM. During WEIGHT LOSS PHASE, at least 75% of your exercise needs to be walking or moderate aerobic activity and 25% or 0% can be Isometric or Resistance type exercises (the latter can be deferred to the weight maintenance phase.) During WEIGHT MAINTENANCE PHASE, at least 50% of your exercise needs to be aerobic and 50% Isometric or Resistance type exercises. BENEFITS OF MODERATE INTENSITY EXERCISE MOST DAYS OF THE WEEK: 
 
 1. Insulin Resistance improves 30-85% 2. Abdominal Fat decreases by 30% 3. Inflammatory Markers decrease by 30% (therefore pain decreases) 4. Systolic and Diastolic Blood Pressure decrease by 4 mmHg 5. HDL improves by 5% 6. Triglycerides decrease by 15% 7. Shift from small dense LDL to large dense LDL (therefore decrease Insulin Resistance) 8. Decrease coagulability Starting a Weight Loss Plan: Care Instructions Your Care Instructions If you are thinking about losing weight, it can be hard to know where to start. Your doctor can help you set up a weight loss plan that best meets your needs. You may want to take a class on nutrition or exercise, or join a weight loss support group. If you have questions about how to make changes to your eating or exercise habits, ask your doctor about seeing a registered dietitian or an exercise specialist. 
It can be a big challenge to lose weight. But you do not have to make huge changes at once. Make small changes, and stick with them. When those changes become habit, add a few more changes. If you do not think you are ready to make changes right now, try to pick a date in the future. Make an appointment to see your doctor to discuss whether the time is right for you to start a plan. Follow-up care is a key part of your treatment and safety. Be sure to make and go to all appointments, and call your doctor if you are having problems. Its also a good idea to know your test results and keep a list of the medicines you take. How can you care for yourself at home? · Set realistic goals. Many people expect to lose much more weight than is likely. A weight loss of 5% to 10% of your body weight may be enough to improve your health. · Get family and friends involved to provide support. Talk to them about why you are trying to lose weight, and ask them to help.  They can help by participating in exercise and having meals with you, even if they may be eating something different. · Find what works best for you. If you do not have time or do not like to cook, a program that offers meal replacement bars or shakes may be better for you. Or if you like to prepare meals, finding a plan that includes daily menus and recipes may be best. 
· Ask your doctor about other health professionals who can help you achieve your weight loss goals. ¨ A dietitian can help you make healthy changes in your diet. ¨ An exercise specialist or  can help you develop a safe and effective exercise program. 
¨ A counselor or psychiatrist can help you cope with issues such as depression, anxiety, or family problems that can make it hard to focus on weight loss. · Consider joining a support group for people who are trying to lose weight. Your doctor can suggest groups in your area. Where can you learn more? Go to http://romulo-daron.info/. Enter N253 in the search box to learn more about \"Starting a Weight Loss Plan: Care Instructions. \" Current as of: October 13, 2016 Content Version: 11.3 © 1174-2214 [employer]. Care instructions adapted under license by [employer] (which disclaims liability or warranty for this information). If you have questions about a medical condition or this instruction, always ask your healthcare professional. [address] any warranty or liability for your use of this information. Introducing [facility]! Virgilio Becerril introduces People Pattern patient portal. Now you can access parts of your medical record, email your doctor's office, and request medication refills online. 1. In your internet browser, go to [email or web address]. [email or web address] 2. Click on the First Time User? Click Here link in the Sign In box. You will see the New Member Sign Up page. 3. Enter your [facility] Access Code exactly as it appears below. You will not need to use this code after youve completed the sign-up process. If you do not sign up before the expiration date, you must request a new code. · [facility] Access Code: [email or web address] Expires: 12/20/2017 10:14 AM 
 
4. Enter the last four digits of your Social Security Number (xxxx) and Date of Birth (mm/dd/yyyy) as indicated and click Submit. You will be taken to the next sign-up page. 5. Create a [facility] ID. This will be your [facility] login ID and cannot be changed, so think of one that is secure and easy to remember. 6. Create a [facility] password. You can change your password at any time. 7. Enter your Password Reset Question and Answer. This can be used at a later time if you forget your password. 8. Enter your e-mail address. You will receive e-mail notification when new information is available in [address]. 9. Click Sign Up. You can now view and download portions of your medical record. 10. Click the Download Summary menu link to download a portable copy of your medical information. If you have questions, please visit the Frequently Asked Questions section of the [facility] website. Remember, [facility] is NOT to be used for urgent needs. For medical emergencies, dial 911. Now available from your iPhone and Android! Please provide this summary of care documentation to your next provider. Your primary care clinician is listed as Mark Obregon. If you have any questions after today's visit, please call [phone].

## 2017-09-25 ENCOUNTER — OFFICE VISIT (OUTPATIENT)
Dept: FAMILY MEDICINE CLINIC | Age: 62
End: 2017-09-25

## 2017-09-25 VITALS
HEART RATE: 59 BPM | WEIGHT: 214.6 LBS | SYSTOLIC BLOOD PRESSURE: 99 MMHG | DIASTOLIC BLOOD PRESSURE: 62 MMHG | BODY MASS INDEX: 36.82 KG/M2

## 2017-09-25 DIAGNOSIS — Z71.89 ENCOUNTER FOR MEDICATION REVIEW AND COUNSELING: Primary | ICD-10-CM

## 2017-09-25 NOTE — PATIENT INSTRUCTIONS
Watch the video on the internet before you inject it as a refresher    Activate and take in the savings card so you can use it on your next refill    To decrease the chance of nausea/vomiting:   Use the titration schedule Dr. Imelda Morales gave you (increasing your dose every week)  Stop eating when you are full and eat smaller meals more frequently  If you miss more than 3 days in a row, call Dr. Imelda Morales as you will need to restart the dose titration    Call Dr. Imelda Morales if you have severe nausea/vomiting with/without back pain

## 2017-09-25 NOTE — MR AVS SNAPSHOT
Visit Information Date & Time Provider Department Dept. Phone Encounter #  
 9/25/2017  9:00 AM CURTIS Wagner 74 [phone] [id number] Your Appointments 10/19/2017  8:45 AM  
Office Visit with Bettyfelisha Law DO [address] (ALLANDEMUNDO [place]) Appt Note: 1 MO F/U Weight, BP Check, Med Refill [address] [facility] [address] [facility] [address]  
[phone]  
  
   
 [address] [facility] [address] [place] [address]  
  
    
 11/17/2017  8:45 AM  
Office Visit with Bettyfelisha Law DO [place] (ALLAN [place]) Appt Note: 1 MO F/U Weight, BP Check, Med Refill [address] [place] [address]  
[phone]  
  
    
 12/18/2017  8:45 AM  
Office Visit with Betty Law DO [place] ([place]) Appt Note: 1 MO F/U Weight, BP Check, Med Refill [address] [place] [address]  
[phone]  
  
    
 1/18/2018  8:45 AM  
Office Visit with Betty Law DO [place] (ALLAN [place]) Appt Note: 1 MO F/U Weight, BP Check, Med Refill [address] [place] [address]  
[phone]  
  
    
 2/15/2018  8:45 AM  
Office Visit with Betty Law DO [place] (ALLAN [place]) Appt Note: 1 MO F/U Weight, BP Check, Med Refill [address] [place] [address]  
[phone] Upcoming Health Maintenance Date Due INFLUENZA AGE 9 TO ADULT 10/2/2017* PAP AKA CERVICAL CYTOLOGY 12/12/2018 BREAST CANCER SCRN MAMMOGRAM 12/14/2018 COLONOSCOPY 9/27/2021 DTaP/Tdap/Td series (2 - Td) 6/30/2026 *Topic was postponed. The date shown is not the original due date. Allergies as of 9/25/2017  Review Complete On: 9/25/2017 By: Matthew Cantu, PHARMD  
  
 Severity Noted Reaction Type Reactions Advil Pm [Ibuprofen-diphenhydramine Hcl]  07/18/2011    Other (comments) Slightly elevated Creaitnine 1.02 Aleve [Naproxen Sodium]  12/28/2011    Other (comments) Due to kidneys Bactrim [Sulfamethoxazole-trimethoprim]  09/11/2009    Nausea and Vomiting  
 Sulfa (Sulfonamide Antibiotics)  09/11/2009    Nausea and Vomiting  
 Sulfasalazine  09/11/2009    Nausea and Vomiting Current Immunizations  Reviewed on 6/5/2017 Name Date Influenza Vaccine 9/18/2014, 10/22/2013, 12/14/2012 Influenza Vaccine Donney Gum) 10/29/2015 Influenza Vaccine (Quad) PF 9/29/2016 Influenza Vaccine Split 11/7/2011, 11/12/2010 TD Vaccine 8/18/2000 Tdap 6/30/2016  9:35 AM  
  
 Not reviewed this visit Vitals BP Pulse Weight(growth percentile) BMI OB Status Smoking Status 99/62 (!) 59 214 lb 9.6 oz (97.3 kg) 36.82 kg/m2 Hysterectomy Never Smoker Vitals History BMI and BSA Data Body Mass Index Body Surface Area  
 36.82 kg/m 2 2.1 m 2 Preferred Pharmacy Pharmacy Name Phone [facility], [address] [place] [address] AT [facility] [facility] [address] [phone] Your Updated Medication List  
  
   
This list is accurate as of: 9/25/17  9:51 AM.  Always use your most recent med list.  
  
  
  
  
 albuterol 90 mcg/actuation inhaler Commonly known as:  PROVENTIL HFA, VENTOLIN HFA, PROAIR HFA Take 2 Puffs by inhalation every four (4) hours as needed for Wheezing (cough). aspirin delayed-release 81 mg tablet Take 1 Tab by mouth daily. Indications: prevention of transient ischemic attack  
  
 cetirizine 10 mg tablet Commonly known as:  ZYRTEC Take 1 Tab by mouth daily. diclofenac EC 75 mg EC tablet Commonly known as:  VOLTAREN  
TAKE 1 TABLET BY MOUTH TWICE DAILY  
  
 eszopiclone 2 mg tablet Commonly known as:  [place] Noun Take 1 Tab by mouth nightly. Max Daily Amount: 2 mg. fluticasone 50 mcg/actuation nasal spray Commonly known as:  Hai Kaity 2 Sprays by Both Nostrils route daily. Indications: ALLERGIC RHINITIS  
  
 furosemide 40 mg tablet Commonly known as:  LASIX Take 1 Tab by mouth daily. Indications: Edema  
  
 hyaluronate 48 mg/6 mL Syrg injection Commonly known as:  HYLAN Inject one prefilled syringe into the right knee, for a quantity of 1 injection Insulin Needles (Disposable) 32 gauge x 5/32\" Ndle Commonly known as:  [place] Pen Needle Use daily as directed  
  
 liraglutide 3 mg/0.5 mL (18 mg/3 mL) pen Commonly known as:  SAXENDA  
0.5 mL by SubCUTAneous route daily. 0.6 mg daily x 1 wk then 1.2 mg daily x 1 wk then 1.8 mg daily x 1 wk then 2.4 mg daily x 1 wk then 3.0 mg daily  Indications: WT LOSS MGMT, PT WITH BMI 27-29 & WT-RELATED COMORBIDITY  
  
 MINIVELLE 0.1 mg/24 hr  
Generic drug:  estradiol 1 Patch by TransDERmal route two (2) times a week. Changes Sunday and Wednesday  
  
 montelukast 10 mg tablet Commonly known as:  SINGULAIR Take 1 Tab by mouth daily. Indications: ALLERGIC RHINITIS, breathing  
  
 phendimetrazine tartrate 105 mg Cper Take 1 Cap by mouth Daily (before breakfast). Max Daily Amount: 1 Cap. Indications: WEIGHT LOSS MANAGEMENT FOR OBESE PATIENT (BMI >= 30)  
  
 sertraline 50 mg tablet Commonly known as:  ZOLOFT Take 1 Tab by mouth daily. Indications: GENERALIZED ANXIETY DISORDER  
  
 VITAMIN D3 2,000 unit Tab Generic drug:  cholecalciferol (vitamin D3) Take 2,000 Units by mouth daily. Patient Instructions Watch the video on the internet before you inject it as a refresher Activate and take in the savings card so you can use it on your next refill To decrease the chance of nausea/vomiting:  
Use the titration schedule Dr. Marie Marie gave you (increasing your dose every week) Stop eating when you are full and eat smaller meals more frequently If you miss more than 3 days in a row, call Dr. Marie Marie as you will need to restart the dose titration Call Dr. Parish Daily if you have severe nausea/vomiting with/without back pain Introducing [facility]! Colette Payan introduces [employer] patient portal. Now you can access parts of your medical record, email your doctor's office, and request medication refills online. 1. In your internet browser, go to [email or web address] 2. Click on the First Time User? Click Here link in the Sign In box. You will see the New Member Sign Up page. 3. Enter your [employer] Access Code exactly as it appears below. You will not need to use this code after youve completed the sign-up process. If you do not sign up before the expiration date, you must request a new code. · [employer] Access Code: [email or web address] Expires: 12/20/2017 10:14 AM 
 
4. Enter the last four digits of your Social Security Number (xxxx) and Date of Birth (mm/dd/yyyy) as indicated and click Submit. You will be taken to the next sign-up page. 5. Create a [employer] ID. This will be your [employer] login ID and cannot be changed, so think of one that is secure and easy to remember. 6. Create a [employer] password. You can change your password at any time. 7. Enter your Password Reset Question and Answer. This can be used at a later time if you forget your password. 8. Enter your e-mail address. You will receive e-mail notification when new information is available in [address]. 9. Click Sign Up. You can now view and download portions of your medical record. 10. Click the Download Summary menu link to download a portable copy of your medical information. If you have questions, please visit the Frequently Asked Questions section of the [employer] website. Remember, [employer] is NOT to be used for urgent needs. For medical emergencies, dial 911. Now available from your iPhone and Android! Please provide this summary of care documentation to your next provider. Your primary care clinician is listed as Abbe Maciel. If you have any questions after today's visit, please call [phone].

## 2017-09-25 NOTE — PROGRESS NOTES
Pharmacy Note:  Saxenda Teaching    S/O:  RM is a 58 y.o. female who was seen today for a saxenda teaching. Patient has never given herself an injection but states that she is not worried about it. She read the saxenda pamphlet given to her in the office on Friday and states she doesn't have any concerns about using it. Patient was able to pick it up from the pharmacy but forgot the savings card given to her. The prescription cost $45 but patient states that is affordable to her. Patient didn't bring it to the office today. She continues to work on her diet. Vitals  Wt Readings from Last 3 Encounters:   09/25/17 214 lb 9.6 oz (97.3 kg)   09/21/17 212 lb 11.2 oz (96.5 kg)   07/14/17 211 lb 12.8 oz (96.1 kg)     Temp Readings from Last 3 Encounters:   09/21/17 96.3 °F (35.7 °C) (Oral)   07/14/17 97.5 °F (36.4 °C) (Oral)   06/05/17 98 °F (36.7 °C) (Oral)     BP Readings from Last 3 Encounters:   09/25/17 99/62   09/21/17 117/74   07/14/17 102/68     Pulse Readings from Last 3 Encounters:   09/25/17 (!) 59   09/21/17 60   07/14/17 61       Past Medical History:   Diagnosis Date    Achilles tendonitis 12/2004    Right. Dr. Jessica Mark Arthritis 2010    hips, knees. Dr. Shonna English    Chest pain 08/2013    Dr. Rafael Santiago.  Chickenpox childhood    Endometriosis 1990    Dr. Kirsty Hernández Environmental allergies     MARGI (generalized anxiety disorder)     Hip pain, bilateral 2010    due to severe OA. Iliopsoas bursitis. Dr. Soniya Greene    History of breast lump/mass excision 1998    Left breast.  Dr. Doni Almaguer Hyperlipidemia     Iron deficiency anemia     iron deficiency    Menopausal and postmenopausal disorder 2007    Dr. Kirsty Hernández Uterine fibroid     Dr. Marisol Chi.     Vitamin D deficiency 03/2008       Allergies   Allergen Reactions    Advil Pm [Ibuprofen-Diphenhydramine Hcl] Other (comments)     Slightly elevated Creaitnine 1.02    Aleve [Naproxen Sodium] Other (comments)     Due to kidneys    Bactrim [Sulfamethoxazole-Trimethoprim] Nausea and Vomiting    Sulfa (Sulfonamide Antibiotics) Nausea and Vomiting    Sulfasalazine Nausea and Vomiting       Current Outpatient Prescriptions   Medication Sig    aspirin delayed-release 81 mg tablet Take 1 Tab by mouth daily. Indications: prevention of transient ischemic attack    diclofenac EC (VOLTAREN) 75 mg EC tablet TAKE 1 TABLET BY MOUTH TWICE DAILY (Patient taking differently: TAKE 1 TABLET BY MOUTH DAILY)    furosemide (LASIX) 40 mg tablet Take 1 Tab by mouth daily. Indications: Edema    sertraline (ZOLOFT) 50 mg tablet Take 1 Tab by mouth daily. Indications: GENERALIZED ANXIETY DISORDER    eszopiclone (LUNESTA) 2 mg tablet Take 1 Tab by mouth nightly. Max Daily Amount: 2 mg.  MINIVELLE 0.1 mg/24 hr 1 Patch by TransDERmal route two (2) times a week. Changes Sunday and Wednesday    cholecalciferol, vitamin D3, (VITAMIN D3) 2,000 unit Tab Take 2,000 Units by mouth daily.  hyaluronate (HYLAN) 48 mg/6 mL syrg injection Inject one prefilled syringe into the right knee, for a quantity of 1 injection    phendimetrazine tartrate 105 mg cpER Take 1 Cap by mouth Daily (before breakfast). Max Daily Amount: 1 Cap. Indications: WEIGHT LOSS MANAGEMENT FOR OBESE PATIENT (BMI >= 30)    liraglutide (SAXENDA) 3 mg/0.5 mL (18 mg/3 mL) pen 0.5 mL by SubCUTAneous route daily. 0.6 mg daily x 1 wk then 1.2 mg daily x 1 wk then 1.8 mg daily x 1 wk then 2.4 mg daily x 1 wk then 3.0 mg daily  Indications: WT LOSS MGMT, PT WITH BMI 27-29 & WT-RELATED COMORBIDITY    Insulin Needles, Disposable, (NAVID PEN NEEDLE) 32 gauge x 5/32\" ndle Use daily as directed    cetirizine (ZYRTEC) 10 mg tablet Take 1 Tab by mouth daily. (Patient taking differently: Take 10 mg by mouth daily as needed.)    fluticasone (FLONASE) 50 mcg/actuation nasal spray 2 Sprays by Both Nostrils route daily.  Indications: ALLERGIC RHINITIS    albuterol (PROVENTIL HFA, VENTOLIN HFA) 90 mcg/actuation inhaler Take 2 Puffs by inhalation every four (4) hours as needed for Wheezing (cough).  montelukast (SINGULAIR) 10 mg tablet Take 1 Tab by mouth daily. Indications: ALLERGIC RHINITIS, breathing     No current facility-administered medications for this visit. There are no discontinued medications. A/P:   1. Obesity:  Discussed the following with patient during visit with regards to saxenda:  · Mechanism of action and how it differs from phendimetrizine  · Adverse effects (common/rare but serious)  · Adverse effect avoidance (small meals more frequently/stop eating when full/dose titration)  · Dose titration  · Adminstration using pen and injection sites  · What to do if she misses a dose, or 3 consecutive doses  · When to call the office (severe n/v/abdominal pain/problems swallowing/lump in throat)  · Storage and pen needle disposal    Using demo pen, demonstrated how to inject using demo pen which patient was able to successfully demonstrate. Recommended that she watch the video on the website prior to her first injection. Encouraged patient to activate savings card and use it for her next refill. Patient verbalized understanding of information presented. Answered all of the patient's questions. AVS handed and reviewed with patient. Medication reconciliation was completed during the visit. Patient verbalized understanding of the information presented and all of the patients questions were answered. AVS was handed to the patient. Patient advised to call the office with any additional questions or concerns.     Follow-up: Notifications of recommendations will be sent to Dr. Atiya Collins DO for review    Thank you for the consult,  CAROLINA DillardD  BCACP  Time Spent:  40 min

## 2017-10-19 ENCOUNTER — OFFICE VISIT (OUTPATIENT)
Dept: FAMILY MEDICINE CLINIC | Age: 62
End: 2017-10-19

## 2017-10-19 VITALS
WEIGHT: 216.8 LBS | HEIGHT: 64 IN | BODY MASS INDEX: 37.01 KG/M2 | OXYGEN SATURATION: 100 % | RESPIRATION RATE: 16 BRPM | DIASTOLIC BLOOD PRESSURE: 65 MMHG | TEMPERATURE: 97.8 F | SYSTOLIC BLOOD PRESSURE: 96 MMHG | HEART RATE: 58 BPM

## 2017-10-19 DIAGNOSIS — Z23 ENCOUNTER FOR IMMUNIZATION: ICD-10-CM

## 2017-10-19 DIAGNOSIS — R63.5 WEIGHT GAIN: ICD-10-CM

## 2017-10-19 DIAGNOSIS — E06.3 HASHIMOTO'S THYROIDITIS: Primary | ICD-10-CM

## 2017-10-19 DIAGNOSIS — F51.04 CHRONIC INSOMNIA: ICD-10-CM

## 2017-10-19 DIAGNOSIS — K59.09 CHRONIC CONSTIPATION: ICD-10-CM

## 2017-10-19 DIAGNOSIS — M25.561 RECURRENT PAIN OF RIGHT KNEE: ICD-10-CM

## 2017-10-19 DIAGNOSIS — F41.1 GENERALIZED ANXIETY DISORDER: ICD-10-CM

## 2017-10-19 DIAGNOSIS — E66.9 OBESITY, CLASS II, BMI 35-39.9: ICD-10-CM

## 2017-10-19 DIAGNOSIS — E55.9 VITAMIN D DEFICIENCY: ICD-10-CM

## 2017-10-19 RX ORDER — PHENDIMETRAZINE TARTRATE 105 MG/1
1 CAPSULE, EXTENDED RELEASE ORAL
Qty: 30 CAP | Refills: 0 | Status: SHIPPED | OUTPATIENT
Start: 2017-10-19 | End: 2017-11-17 | Stop reason: SDUPTHER

## 2017-10-19 NOTE — PROGRESS NOTES
Chief Complaint   Patient presents with    Weight Management    Blood Pressure Check    Medication Refill     1. Have you been to the ER, urgent care clinic since your last visit? Hospitalized since your last visit? No    2. Have you seen or consulted any other health care providers outside of the [address] [place] [address] since your last visit? Include any pap smears or colon screening. No    In the event something were to happen to you and you were unable to speak on your behalf, do you have an Advance Directive/ Living Will in place stating your wishes? NO    If yes, do we have a copy on file NO    If no, would you like information:   Pt offered and accepted.

## 2017-10-19 NOTE — PATIENT INSTRUCTIONS
Constipation: Care Instructions  Your Care Instructions  Constipation means that you have a hard time passing stools (bowel movements). People pass stools from 3 times a day to once every 3 days. What is normal for you may be different. Constipation may occur with pain in the rectum and cramping. The pain may get worse when you try to pass stools. Sometimes there are small amounts of bright red blood on toilet paper or the surface of stools. This is because of enlarged veins near the rectum (hemorrhoids). A few changes in your diet and lifestyle may help you avoid ongoing constipation. Your doctor may also prescribe medicine to help loosen your stool. Some medicines can cause constipation. These include pain medicines and antidepressants. Tell your doctor about all the medicines you take. Your doctor may want to make a medicine change to ease your symptoms. Follow-up care is a key part of your treatment and safety. Be sure to make and go to all appointments, and call your doctor if you are having problems. It's also a good idea to know your test results and keep a list of the medicines you take. How can you care for yourself at home? · Drink plenty of fluids, enough so that your urine is light yellow or clear like water. If you have kidney, heart, or liver disease and have to limit fluids, talk with your doctor before you increase the amount of fluids you drink. · Include high-fiber foods in your diet each day. These include fruits, vegetables, beans, and whole grains. · Get at least 30 minutes of exercise on most days of the week. Walking is a good choice. You also may want to do other activities, such as running, swimming, cycling, or playing tennis or team sports. · Take a fiber supplement, such as Citrucel or Metamucil, every day. Read and follow all instructions on the label. · Schedule time each day for a bowel movement. A daily routine may help.  Take your time having your bowel movement. · Support your feet with a small step stool when you sit on the toilet. This helps flex your hips and places your pelvis in a squatting position. · Your doctor may recommend an over-the-counter laxative to relieve your constipation. Examples are Milk of Magnesia and MiraLax. Read and follow all instructions on the label. Do not use laxatives on a long-term basis. When should you call for help? Call your doctor now or seek immediate medical care if:  · You have new or worse belly pain. · You have new or worse nausea or vomiting. · You have blood in your stools. Watch closely for changes in your health, and be sure to contact your doctor if:  · Your constipation is getting worse. · You do not get better as expected. Where can you learn more? Go to http://romulo-daron.info/. Enter 21 [phone] in the search box to learn more about \"Constipation: Care Instructions. \"  Current as of: March 20, 2017  Content Version: 11.3  © 6894-7522 [employer]. Care instructions adapted under license by [employer] (which disclaims liability or warranty for this information). If you have questions about a medical condition or this instruction, always ask your healthcare professional. [place] [address] any warranty or liability for your use of this information. To relieve or prevent constipation, increase your water intake, fiber, and walking to prevent constipation. Use over the counter Smooth Move Tea ([employer] tea section),  fiber or stool softener as needed. Consider OTC Miralax or Milk of Magnesia if no bowel movement in 3 days. Hashimoto's Thyroiditis: Care Instructions  Your Care Instructions    Hashimoto's thyroiditis is a problem with the thyroid gland. The thyroid gland, which is in your neck, controls the way your body uses energy. Sometimes the disease causes the gland to make too much thyroid hormone (thyrotoxicosis). This can make you feel nervous, lose weight, and have many loose bowel movements. You may also have a fast heartbeat. But as the disease progresses, the gland usually does not make enough thyroid hormone. This can cause you to feel tired and have dry skin and thinning hair. Most people with Hashimoto's are diagnosed when they have these symptoms. You may need to take medicine if you have symptoms or if your thyroid hormone level is not normal. Most people with Hashimoto's thyroiditis need to take medicine for the rest of their lives. Follow-up care is a key part of your treatment and safety. Be sure to make and go to all appointments, and call your doctor if you are having problems. Its also a good idea to know your test results and keep a list of the medicines you take. How can you care for yourself at home? · Take your medicines exactly as prescribed. Call your doctor if you think you are having a problem with your medicine. You will get more details on the specific medicines your doctor prescribes. When should you call for help? Call 911 anytime you think you may need emergency care. For example, call if:  · You passed out (lost consciousness). · You have severe trouble breathing. · You have a very slow heartbeat (less than 60 beats a minute). · You have a low body temperature (95°F or below). Watch closely for changes in your health, and be sure to contact your doctor if:  · You gain weight even though you are eating normally or less than usual.  · You feel extremely weak or tired. · You have new changes in your skin, nails, or hair, or the changes get worse. · You notice that your thyroid gland has grown or changed in size. · You have constipation that is new or that gets worse. · You cannot stand cold temperatures. · You have heavy or irregular menstrual periods. · You have other new symptoms. Where can you learn more? Go to http://romulo-daron.info/.   Enter K454 in the search box to learn more about \"Hashimoto's Thyroiditis: Care Instructions. \"  Current as of: July 28, 2016  Content Version: 11.3  © 9988-7321 [employer]. Care instructions adapted under license by [employer] (which disclaims liability or warranty for this information). If you have questions about a medical condition or this instruction, always ask your healthcare professional. [place] [address] any warranty or liability for your use of this information.

## 2017-10-19 NOTE — MR AVS SNAPSHOT
Visit Information Date & Time Provider Department Dept. Phone Encounter #  
 10/19/2017  8:45 AM DO Susan Espinosae-Palmtri [phone] [id number] Follow-up Instructions Return in about 1 month (around 11/19/2017) for weight, bp. Your Appointments 11/17/2017  8:45 AM  
Office Visit with Manish Urbina DO [facility] ([facility] [place]) Appt Note: 1 MO F/U Weight, BP Check, Med Refill [address] [facility] [address]  
[phone]  
  
   
 [address] [place] [address]  
  
    
 12/18/2017  8:45 AM  
Office Visit with Manish Urbina DO [facility] ([facility] [place]) Appt Note: 1 MO F/U Weight, BP Check, Med Refill [address] [facility] [address]  
[phone]  
  
    
 1/18/2018  8:45 AM  
Office Visit with DO [facility] ([facility] [place] Appt Note: 1 MO F/U Weight, BP Check, Med Refill [address] [facility] [address]  
[phone]  
  
    
 2/15/2018  8:45 AM  
Office Visit with DO [facility]es [facility] ([facility] [place] Appt Note: 1 MO F/U Weight, BP Check, Med Refill [address] [facility] [address]  
[phone] Upcoming Health Maintenance Date Due  
 PAP AKA CERVICAL CYTOLOGY 12/12/2018 BREAST CANCER SCRN MAMMOGRAM 12/14/2018 COLONOSCOPY 9/27/2021 DTaP/Tdap/Td series (2 - Td) 6/30/2026 Allergies as of 10/19/2017  Review Complete On: 10/19/2017 By: Manish Urbina DO Severity Noted Reaction Type Reactions Advil Pm [Ibuprofen-diphenhydramine Hcl]  07/18/2011    Other (comments) Slightly elevated Creaitnine 1.02 Aleve [Naproxen Sodium]  12/28/2011    Other (comments) Due to kidneys Bactrim [Sulfamethoxazole-trimethoprim]  09/11/2009    Nausea and Vomiting Sulfa (Sulfonamide Antibiotics)  09/11/2009    Nausea and Vomiting  
 Sulfasalazine  09/11/2009    Nausea and Vomiting Current Immunizations  Reviewed on 6/5/2017 Name Date Influenza Vaccine 9/18/2014, 10/22/2013, 12/14/2012 Influenza Vaccine [place] Going) 10/29/2015 Influenza Vaccine (Quad) PF  Incomplete, 9/29/2016 Influenza Vaccine Split 11/7/2011, 11/12/2010 TD Vaccine 8/18/2000 Tdap 6/30/2016  9:35 AM  
  
 Not reviewed this visit You Were Diagnosed With   
  
 Codes Comments Hashimoto's thyroiditis    -  Primary ICD-10-CM: E06.3 ICD-9-CM: 873. 2 Chronic constipation     ICD-10-CM: K59.09 
ICD-9-CM: 564.00 Chronic insomnia     ICD-10-CM: F51.04 
ICD-9-CM: 780.52 stable on Lunesta Recurrent pain of right knee     ICD-10-CM: M25.561 ICD-9-CM: 719.46 Generalized anxiety disorder     ICD-10-CM: F41.1 ICD-9-CM: 300.02 Vitamin D deficiency     ICD-10-CM: E55.9 ICD-9-CM: 268.9 Obesity, Class II, BMI 35-39.9     ICD-10-CM: E66.9 ICD-9-CM: 278.00 Encounter for immunization     ICD-10-CM: A02 ICD-9-CM: V03.89 Weight gain     ICD-10-CM: R63.5 ICD-9-CM: 783.1 Vitals BP Pulse Temp Resp Height(growth percentile) Weight(growth percentile) 96/65 (BP 1 Location: Left arm, BP Patient Position: Sitting) (!) 58 97.8 °F (36.6 °C) (Oral) 16 5' 4.02\" (1.626 m) 216 lb 12.8 oz (98.3 kg) SpO2 BMI OB Status Smoking Status 100% 37.2 kg/m2 Hysterectomy Never Smoker Vitals History BMI and BSA Data Body Mass Index Body Surface Area  
 37.2 kg/m 2 2.11 m 2 Preferred Pharmacy Pharmacy Name Phone [facility], [address] [place] [address] AT [address] [place] [address] Drive [phone] Your Updated Medication List  
  
   
This list is accurate as of: 10/19/17  9:21 AM.  Always use your most recent med list.  
  
  
  
  
 albuterol 90 mcg/actuation inhaler Commonly known as:  PROVENTIL HFA, VENTOLIN HFA, PROAIR HFA Take 2 Puffs by inhalation every four (4) hours as needed for Wheezing (cough). aspirin delayed-release 81 mg tablet Take 1 Tab by mouth daily. Indications: prevention of transient ischemic attack  
  
 cetirizine 10 mg tablet Commonly known as:  ZYRTEC Take 1 Tab by mouth daily. diclofenac EC 75 mg EC tablet Commonly known as:  VOLTAREN  
TAKE 1 TABLET BY MOUTH TWICE DAILY  
  
 eszopiclone 2 mg tablet Commonly known as:  Savi Piper Take 1 Tab by mouth nightly. Max Daily Amount: 2 mg. fluticasone 50 mcg/actuation nasal spray Commonly known as:  Selvin Sampson 2 Sprays by Both Nostrils route daily. Indications: ALLERGIC RHINITIS  
  
 furosemide 40 mg tablet Commonly known as:  LASIX Take 1 Tab by mouth daily. Indications: Edema  
  
 hyaluronate 48 mg/6 mL Syrg injection Commonly known as:  HYLAN Inject one prefilled syringe into the right knee, for a quantity of 1 injection Insulin Needles (Disposable) 32 gauge x 5/32\" Ndle Commonly known as:  Catina Pen Needle Use daily as directed  
  
 liraglutide 3 mg/0.5 mL (18 mg/3 mL) pen Commonly known as:  SAXENDA  
0.5 mL by SubCUTAneous route daily. 0.6 mg daily x 1 wk then 1.2 mg daily x 1 wk then 1.8 mg daily x 1 wk then 2.4 mg daily x 1 wk then 3.0 mg daily  Indications: WEIGHT LOSS MANAGEMENT FOR OBESE PATIENT (BMI >= 30) MINIVELLE 0.1 mg/24 hr  
Generic drug:  estradiol 1 Patch by TransDERmal route two (2) times a week. Changes Sunday and Wednesday  
  
 montelukast 10 mg tablet Commonly known as:  SINGULAIR Take 1 Tab by mouth daily. Indications: ALLERGIC RHINITIS, breathing  
  
 phendimetrazine tartrate 105 mg Cper Take 1 Cap by mouth Daily (before breakfast). Max Daily Amount: 1 Cap. Indications: WEIGHT LOSS MANAGEMENT FOR OBESE PATIENT (BMI >= 30)  
  
 sertraline 50 mg tablet Commonly known as:  ZOLOFT  
 Take 1 Tab by mouth daily. Indications: GENERALIZED ANXIETY DISORDER  
  
 VITAMIN D3 2,000 unit Tab Generic drug:  cholecalciferol (vitamin D3) Take 2,000 Units by mouth daily. Prescriptions Printed Refills  
 phendimetrazine tartrate 105 mg cpER 0 Sig: Take 1 Cap by mouth Daily (before breakfast). Max Daily Amount: 1 Cap. Indications: WEIGHT LOSS MANAGEMENT FOR OBESE PATIENT (BMI >= 30) Class: Print Route: Oral  
 liraglutide (SAXENDA) 3 mg/0.5 mL (18 mg/3 mL) pen 1 Si.5 mL by SubCUTAneous route daily. 0.6 mg daily x 1 wk then 1.2 mg daily x 1 wk then 1.8 mg daily x 1 wk then 2.4 mg daily x 1 wk then 3.0 mg daily  Indications: WEIGHT LOSS MANAGEMENT FOR OBESE PATIENT (BMI >= 30) Class: Print Route: SubCUTAneous We Performed the Following INFLUENZA VIRUS VAC QUAD,SPLIT,PRESV FREE SYRINGE IM [id number] CPT(R)] CT IMMUNIZ ADMIN,1 SINGLE/COMB VAC/TOXOID [id number] CPT(R)] Follow-up Instructions Return in about 1 month (around 2017) for weight, bp. Patient Instructions Constipation: Care Instructions Your Care Instructions Constipation means that you have a hard time passing stools (bowel movements). People pass stools from 3 times a day to once every 3 days. What is normal for you may be different. Constipation may occur with pain in the rectum and cramping. The pain may get worse when you try to pass stools. Sometimes there are small amounts of bright red blood on toilet paper or the surface of stools. This is because of enlarged veins near the rectum (hemorrhoids). A few changes in your diet and lifestyle may help you avoid ongoing constipation. Your doctor may also prescribe medicine to help loosen your stool. Some medicines can cause constipation. These include pain medicines and antidepressants. Tell your doctor about all the medicines you take. Your doctor may want to make a medicine change to ease your symptoms. Follow-up care is a key part of your treatment and safety. Be sure to make and go to all appointments, and call your doctor if you are having problems. It's also a good idea to know your test results and keep a list of the medicines you take. How can you care for yourself at home? · Drink plenty of fluids, enough so that your urine is light yellow or clear like water. If you have kidney, heart, or liver disease and have to limit fluids, talk with your doctor before you increase the amount of fluids you drink. · Include high-fiber foods in your diet each day. These include fruits, vegetables, beans, and whole grains. · Get at least 30 minutes of exercise on most days of the week. Walking is a good choice. You also may want to do other activities, such as running, swimming, cycling, or playing tennis or team sports. · Take a fiber supplement, such as Citrucel or Metamucil, every day. Read and follow all instructions on the label. · Schedule time each day for a bowel movement. A daily routine may help. Take your time having your bowel movement. · Support your feet with a small step stool when you sit on the toilet. This helps flex your hips and places your pelvis in a squatting position. · Your doctor may recommend an over-the-counter laxative to relieve your constipation. Examples are Milk of Magnesia and MiraLax. Read and follow all instructions on the label. Do not use laxatives on a long-term basis. When should you call for help? Call your doctor now or seek immediate medical care if: 
· You have new or worse belly pain. · You have new or worse nausea or vomiting. · You have blood in your stools. Watch closely for changes in your health, and be sure to contact your doctor if: 
· Your constipation is getting worse. · You do not get better as expected. Where can you learn more? Go to http://romulo-daron.info/.  
Enter [phone] in the search box to learn more about \"Constipation: Care Instructions. \" Current as of: March 20, 2017 Content Version: 11.3 © 7251-1709 [email or web address]. Care instructions adapted under license by [employer] (which disclaims liability or warranty for this information). If you have questions about a medical condition or this instruction, always ask your healthcare professional. [address] any warranty or liability for your use of this information. To relieve or prevent constipation, increase your water intake, fiber, and walking to prevent constipation. Use over the counter Smooth Move Tea ([employer] international tea section),  fiber or stool softener as needed. Consider OTC Miralax or Milk of Magnesia if no bowel movement in 3 days. Hashimoto's Thyroiditis: Care Instructions Your Care Instructions Hashimoto's thyroiditis is a problem with the thyroid gland. The thyroid gland, which is in your neck, controls the way your body uses energy. Sometimes the disease causes the gland to make too much thyroid hormone (thyrotoxicosis). This can make you feel nervous, lose weight, and have many loose bowel movements. You may also have a fast heartbeat. But as the disease progresses, the gland usually does not make enough thyroid hormone. This can cause you to feel tired and have dry skin and thinning hair. Most people with Hashimoto's are diagnosed when they have these symptoms. You may need to take medicine if you have symptoms or if your thyroid hormone level is not normal. Most people with Hashimoto's thyroiditis need to take medicine for the rest of their lives. Follow-up care is a key part of your treatment and safety. Be sure to make and go to all appointments, and call your doctor if you are having problems. Its also a good idea to know your test results and keep a list of the medicines you take. How can you care for yourself at home? · Take your medicines exactly as prescribed. Call your doctor if you think you are having a problem with your medicine. You will get more details on the specific medicines your doctor prescribes. When should you call for help? Call 911 anytime you think you may need emergency care. For example, call if: 
· You passed out (lost consciousness). · You have severe trouble breathing. · You have a very slow heartbeat (less than 60 beats a minute). · You have a low body temperature (95°F or below). Watch closely for changes in your health, and be sure to contact your doctor if: 
· You gain weight even though you are eating normally or less than usual. 
· You feel extremely weak or tired. · You have new changes in your skin, nails, or hair, or the changes get worse. · You notice that your thyroid gland has grown or changed in size. · You have constipation that is new or that gets worse. · You cannot stand cold temperatures. · You have heavy or irregular menstrual periods. · You have other new symptoms. Where can you learn more? Go to http://romulo-daron.info/. Enter K454 in the search box to learn more about \"Hashimoto's Thyroiditis: Care Instructions. \" Current as of: July 28, 2016 Content Version: 11.3 © 2781-2738 [employer]. Care instructions adapted under license by [employer] (which disclaims liability or warranty for this information). If you have questions about a medical condition or this instruction, always ask your healthcare professional. [place] [address] any warranty or liability for your use of this information. Introducing [facility]! [employer] introduces [employer]XI patient portal. Now you can access parts of your medical record, email your doctor's office, and request medication refills online. 1. In your internet browser, go to [email or web address] 2. Click on the First Time User? Click Here link in the Sign In box. You will see the New Member Sign Up page. 3. Enter your Whiteyboard Access Code exactly as it appears below. You will not need to use this code after youve completed the sign-up process. If you do not sign up before the expiration date, you must request a new code. · Whiteyboard Access Code: [email or web address] Expires: 12/20/2017 10:14 AM 
 
4. Enter the last four digits of your Social Security Number (xxxx) and Date of Birth (mm/dd/yyyy) as indicated and click Submit. You will be taken to the next sign-up page. 5. Create a Whiteyboard ID. This will be your Whiteyboard login ID and cannot be changed, so think of one that is secure and easy to remember. 6. Create a Whiteyboard password. You can change your password at any time. 7. Enter your Password Reset Question and Answer. This can be used at a later time if you forget your password. 8. Enter your e-mail address. You will receive e-mail notification when new information is available in [address] 9. Click Sign Up. You can now view and download portions of your medical record. 10. Click the Download Summary menu link to download a portable copy of your medical information. If you have questions, please visit the Frequently Asked Questions section of the Whiteyboard website. Remember, Whiteyboard is NOT to be used for urgent needs. For medical emergencies, dial 911. Now available from your iPhone and Android! Please provide this summary of care documentation to your next provider. Your primary care clinician is listed as Trae Marcum. If you have any questions after today's visit, please call [phone].

## 2017-10-19 NOTE — PROGRESS NOTES
HISTORY OF PRESENT ILLNESS  Rachid Cook is a 58 y.o. female presents with Weight Management; Blood Pressure Check; Medication Refill; Immunization/Injection; and Stress      Agree with nurse note. Pt with Hashimoto's, Vit D deficiency, and chronic insomnia presents to the office with a BP of 96/65 and HR of 58 bpm. She is sleeping x8 hours nightly because she is\" ready for bed\" every night. Her insurance would not cover Peola Ochoa, but they did cover the pen needles. She is tolerating Phendimetrazine 105 mg well, month 1 of 3. Last prescribed on 09/21/17. She has noticed an increase of energy and decreased appetite since starting the medication. She enjoys walking in the colder weather and is excited for winter. She gained 4 pounds since the last visit. Percent body fat has slightly decreased from 46.4% to 46.3%, waist circumference measurement has changed from 35\" to 37.5\". Overall, patient is pleased and requests a refill of the medication today. She has about 2 BMs weekly but would like to be more regular. Pt with recurrent R knee pain; stable today. She went to PT x6 or 12 sessions. She is followed by orthopedist, Dr. Nila Yuen and received a Hyaluronate injection on 09/21/17. Pt with MARGI; stable on Zoloft daily. Stressors: sister just returned home from rehab, daughter in college, and traveling     Patient denies chest pain, heart palpitations, nausea, dry mouth, signs of depression. Written by hyun Mata, as dictated by DO. DANIEL Tariq    Review of Systems negative except as noted above in HPI.     ALLERGIES:    Allergies   Allergen Reactions    Advil Pm [Ibuprofen-Diphenhydramine Hcl] Other (comments)     Slightly elevated Creaitnine 1.02    Aleve [Naproxen Sodium] Other (comments)     Due to kidneys    Bactrim [Sulfamethoxazole-Trimethoprim] Nausea and Vomiting    Sulfa (Sulfonamide Antibiotics) Nausea and Vomiting    Sulfasalazine Nausea and Vomiting       CURRENT MEDICATIONS:    Outpatient Prescriptions Marked as Taking for the 10/19/17 encounter (Office Visit) with Bebe Rodriguez DO   Medication Sig Dispense Refill    phendimetrazine tartrate 105 mg cpER Take 1 Cap by mouth Daily (before breakfast). Max Daily Amount: 1 Cap. Indications: WEIGHT LOSS MANAGEMENT FOR OBESE PATIENT (BMI >= 30) 30 Cap 0    liraglutide (SAXENDA) 3 mg/0.5 mL (18 mg/3 mL) pen 0.5 mL by SubCUTAneous route daily. 0.6 mg daily x 1 wk then 1.2 mg daily x 1 wk then 1.8 mg daily x 1 wk then 2.4 mg daily x 1 wk then 3.0 mg daily  Indications: WEIGHT LOSS MANAGEMENT FOR OBESE PATIENT (BMI >= 30) 5 Pen 1    hyaluronate (HYLAN) 48 mg/6 mL syrg injection Inject one prefilled syringe into the right knee, for a quantity of 1 injection      aspirin delayed-release 81 mg tablet Take 1 Tab by mouth daily. Indications: prevention of transient ischemic attack 90 Tab 3    diclofenac EC (VOLTAREN) 75 mg EC tablet TAKE 1 TABLET BY MOUTH TWICE DAILY (Patient taking differently: TAKE 1 TABLET BY MOUTH DAILY) 60 Tab 2    furosemide (LASIX) 40 mg tablet Take 1 Tab by mouth daily. Indications: Edema 90 Tab 1    sertraline (ZOLOFT) 50 mg tablet Take 1 Tab by mouth daily. Indications: GENERALIZED ANXIETY DISORDER 135 Tab 3    eszopiclone (LUNESTA) 2 mg tablet Take 1 Tab by mouth nightly. Max Daily Amount: 2 mg. 30 Tab 5    MINIVELLE 0.1 mg/24 hr 1 Patch by TransDERmal route two (2) times a week. Changes Sunday and Wednesday  0    cetirizine (ZYRTEC) 10 mg tablet Take 1 Tab by mouth daily. (Patient taking differently: Take 10 mg by mouth daily as needed.) 30 Tab 3    fluticasone (FLONASE) 50 mcg/actuation nasal spray 2 Sprays by Both Nostrils route daily. Indications: ALLERGIC RHINITIS 1 Bottle 5    albuterol (PROVENTIL HFA, VENTOLIN HFA) 90 mcg/actuation inhaler Take 2 Puffs by inhalation every four (4) hours as needed for Wheezing (cough).  1 Inhaler 1    montelukast (SINGULAIR) 10 mg tablet Take 1 Tab by mouth daily. Indications: ALLERGIC RHINITIS, breathing 30 Tab 11    cholecalciferol, vitamin D3, (VITAMIN D3) 2,000 unit Tab Take 2,000 Units by mouth daily. PAST MEDICAL HISTORY:    Past Medical History:   Diagnosis Date    Achilles tendonitis 12/2004    Right. Dr. Rani Barragan Arthritis 2010    hips, knees. Dr. Rafa Prescott    Chest pain 08/2013    Dr. Norbert Burns.  Chickenpox childhood    Endometriosis 1990    Dr. Lieutenant Lopez Environmental allergies     MARGI (generalized anxiety disorder)     Hip pain, bilateral 2010    due to severe OA. Iliopsoas bursitis. Dr. Bharti Clayton    History of breast lump/mass excision 1998    Left breast.  Dr. Brook Sykes Hyperlipidemia     Iron deficiency anemia     iron deficiency    Menopausal and postmenopausal disorder 2007    Dr. Lieutenant Lopez Uterine fibroid     Dr. Elonda Merlin.  Vitamin D deficiency 03/2008       PAST SURGICAL HISTORY:    Past Surgical History:   Procedure Laterality Date    BIOPSY BREAST  Rt 1993;Lt 1998    Dr. Juanito Gordon Right 2002    Right achiles tendon. Dr. Rose Zarate.  HX BREAST BIOPSY  2001    Left. benign. Dr. Natividad Esteves HX COLONOSCOPY  09/27/2016    Dr. Lindy Recio.  due q 5 yrs due to fam hx   Yuriy Garcia. due to endometriosis    HX PELVIC LAPAROSCOPY  1995    due to endometriosis Dr. Suhail Strauss  04/2011    due to fibroids. Dr. Elonda Merlin.        FAMILY HISTORY:    Family History   Problem Relation Age of Onset    Hypertension Mother     Other Mother      hearing loss/vision loss    Heart Disease Father     Lung Disease Father     Diabetes Sister     Heart Attack Sister 54     March 2014    Stroke Sister      after MI    Thyroid Disease Sister     Colon Polyps Sister     Heart Disease Brother      Defibrillator placed    Heart Attack Brother 48    Diabetes Maternal Grandmother SOCIAL HISTORY:    Social History     Social History    Marital status: SINGLE     Spouse name: N/A    Number of children: 2    Years of education: N/A     Occupational History    [employer]      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      Social History Main Topics    Smoking status: Never Smoker    Smokeless tobacco: Never Used      Comment: lived with smoker mom x 18 yrs    Alcohol use No    Drug use: No    Sexual activity: Not Currently     Partners: Male     Birth control/ protection: Abstinence, Surgical     Other Topics Concern    None     Social History Narrative       IMMUNIZATIONS:    Immunization History   Administered Date(s) Administered    Influenza Vaccine 12/14/2012, 10/22/2013, 09/18/2014    Influenza Vaccine (Quad) 10/29/2015    Influenza Vaccine (Quad) PF 09/29/2016    Influenza Vaccine Split 11/12/2010, 11/07/2011    TD Vaccine 08/18/2000    Tdap 06/30/2016       PHYSICAL EXAMINATION    Vital Signs    Visit Vitals    BP 96/65 (BP 1 Location: Left arm, BP Patient Position: Sitting)    Pulse (!) 58    Temp 97.8 °F (36.6 °C) (Oral)    Resp 16    Ht 5' 4.02\" (1.626 m)    Wt 216 lb 12.8 oz (98.3 kg)    SpO2 100%    BMI 37.2 kg/m2       Weight Metrics 10/19/2017 10/19/2017 9/25/2017 9/21/2017 9/21/2017 7/14/2017 7/14/2017   Weight - 216 lb 12.8 oz 214 lb 9.6 oz - 212 lb 11.2 oz - 211 lb 12.8 oz   Waist Measure Inches 37.5 - - 35 - 37 -   Body Fat % 46.3 - - 46.4 - 45.4 -   BMI - 37.2 kg/m2 36.82 kg/m2 - 36.49 kg/m2 - 36.34 kg/m2       General appearance - Well nourished. Well appearing. Well developed. No acute distress. Obese. Head - Normocephalic. Atraumatic. Eyes - pupils equal and reactive, extraocular eye movements intact, sclera anicteric  Ears - Hearing is grossly normal bilaterally. Nose - normal and patent, no erythema, discharge or polyps   Mouth - mucous membranes moist, pharynx normal with cobblestone appearance. No erythema, white exudate or obstruction. Neck - supple. Midline trachea. No carotid bruits are noted. No thyromegaly noted. Chest - clear to auscultation bilaterally anterriorly and posteriorly. No wheezes, rales or rhonchi. Breath sounds are symmetrical and unlabored bilaterally. Heart - normal rate. Regular rhythm, normal S1, S2. No murmur. No rubs, clicks or gallops noted. Abdomen - soft and distended. No masses or organomegaly. No rebound, rigidity or guarding. Bowel sounds normal x 4 quadrants. No tenderness noted. Neurological - awake, alert and oriented to person, place, and time and event. Cranial nerves II through XII intact. Muscle strength is +5/5 x 4 extremities. Sensation is intact to light touch bilaterally. Steady gait. Heme/Lymph - peripheral pulses normal x 4 extremities. No peripheral edema is noted. No cervical adenopathy noted. Skin - no rashes, erythema, ecchymosis, lacerations, abrasions, suspicious moles noted. No skin tags. No acanthosis nigricans noted in the axilla or neck. Psychological -   normal behavior, speech, dress and thought processes. Good insight. Good eye contact. Normal affect. Appropriate mood. DATA REVIEWED  Lab Results   Component Value Date/Time    WBC 3.6 03/28/2017 09:32 AM    HGB 13.0 03/28/2017 09:32 AM    HCT 40.8 03/28/2017 09:32 AM    PLATELET 212 44/00/3232 09:32 AM    MCV 92 03/28/2017 09:32 AM     Lab Results   Component Value Date/Time    Sodium 139 03/28/2017 09:32 AM    Potassium 4.0 03/28/2017 09:32 AM    Chloride 98 03/28/2017 09:32 AM    CO2 26 03/28/2017 09:32 AM    Anion gap 5 04/23/2011 05:20 AM    Glucose 80 03/28/2017 09:32 AM    BUN 17 03/28/2017 09:32 AM    Creatinine 0.98 03/28/2017 09:32 AM    BUN/Creatinine ratio 17 03/28/2017 09:32 AM    GFR est AA 72 03/28/2017 09:32 AM    GFR est non-AA 62 03/28/2017 09:32 AM    Calcium 9.1 03/28/2017 09:32 AM    Bilirubin, total 0.5 03/28/2017 09:32 AM    AST (SGOT) 22 03/28/2017 09:32 AM    Alk.  phosphatase 91 03/28/2017 09:32 AM    Protein, total 7.0 03/28/2017 09:32 AM    Albumin 4.4 03/28/2017 09:32 AM    A-G Ratio 1.7 03/28/2017 09:32 AM    ALT (SGPT) 17 03/28/2017 09:32 AM     Lab Results   Component Value Date/Time    Cholesterol, total 169 03/28/2017 09:32 AM    HDL Cholesterol 68 03/28/2017 09:32 AM    LDL, calculated 92 03/28/2017 09:32 AM    VLDL, calculated 9 03/28/2017 09:32 AM    Triglyceride 44 03/28/2017 09:32 AM     Lab Results   Component Value Date/Time    Vitamin D 25-Hydroxy 26.3 07/18/2011 10:22 AM    VITAMIN D, 25-HYDROXY 34.7 03/28/2017 09:32 AM       Lab Results   Component Value Date/Time    Hemoglobin A1c 5.5 03/28/2017 09:32 AM     Lab Results   Component Value Date/Time    TSH 1.900 02/24/2017 10:03 AM    TSH 2.36 08/21/2014 07:50 AM    T4, Free 1.29 02/24/2017 10:03 AM       ASSESSMENT and PLAN    ICD-10-CM ICD-9-CM    1. Hashimoto's thyroiditis E06.3 245.2    2. Chronic constipation K59.09 564.00    3. Chronic insomnia F51.04 780.52     stable on Lunesta   4. Recurrent pain of right knee M25.561 719.46    5. Generalized anxiety disorder F41.1 300.02    6. Vitamin D deficiency E55.9 268.9    7. Obesity, Class II, BMI 35-39.9 E66.9 278.00 phendimetrazine tartrate 105 mg cpER      liraglutide (SAXENDA) 3 mg/0.5 mL (18 mg/3 mL) pen   8. Encounter for immunization Z23 V03.89 INFLUENZA VIRUS VAC QUAD,SPLIT,PRESV FREE SYRINGE IM      ND IMMUNIZ ADMIN,1 SINGLE/COMB VAC/TOXOID   9. Weight gain R63.5 783.1        Continue current medications and care. Prescriptions written and given to patient (Saxenda 3 mg and Phendimetrazine 105 mg, month 2 of 3.)  Medication side effects discussed. New voucher and Rx for Saxenda given so pt can check with her insurance. Discussed the patient's BMI with her. The BMI follow up plan is as follows: I have counseled this patient on diet and exercise regimens.    Diet recommendations:  Decrease carbohydrates (white foods including flour, white bread, white rice, white pasta, white potatoes; corn, sweet foods, sweet drinks and alcohol), increase green leafy vegetables and protein with each meal.  Avoid fried foods. Eat 3-5 small meals daily. Do not skip meals. Ok to use meal replacements up to twice daily with protein goal of 60 mg per meal.   Recommend OTC Premiere Protein bars or shakes. Increase water intake. Increase physical activity to 30 minutes daily for health benefit or 60 minutes daily to prevent weight regain, as tolerated. Physical Activity prescription:  A goal of 30 minutes physical activity daily is recommended for health benefit and at least 60 minutes daily to prevent weight regain. For weight loss, no less than 75% needs to be aerobic (i.e. Walking) and no more than 25% resistance exercising (i.e. Weight lifting). For weight maintenance phase, 50% aerobic and 50% resistance exercises. Sleep prescription:    A goal of 7-8 hours of uninterrupted sleep is recommended to turn off the Grehlin hormone to be released from the stomach and triggers appetite while promoting weight gain. Proper rest turns on Leptin hormone to be released from white adipose tissue and promotes weight loss. Counseled patient on: weight loss goals, emphasizing a 5-10% weight loss in 6-12 months and strategies. Constipation. Stressors. Relevant handouts given and discussed with patient. Immunizations noted. Administered flu vaccine today. Praised pt for weight loss efforts and progress. Patient was offered a choice/choices in the treatment plan today. Patient expresses understanding of the plan and agrees with recommendations. Follow-up Disposition:  Return in about 1 month (around 11/19/2017) for weight, bp. Written by hyun Moreno, as dictated by Dr. Brittany Patel DO. Documentation True and Accepted by Erwin Rodriguez. [facility].     Patient Instructions        Constipation: Care Instructions  Your Care Instructions  Constipation means that you have a hard time passing stools (bowel movements). People pass stools from 3 times a day to once every 3 days. What is normal for you may be different. Constipation may occur with pain in the rectum and cramping. The pain may get worse when you try to pass stools. Sometimes there are small amounts of bright red blood on toilet paper or the surface of stools. This is because of enlarged veins near the rectum (hemorrhoids). A few changes in your diet and lifestyle may help you avoid ongoing constipation. Your doctor may also prescribe medicine to help loosen your stool. Some medicines can cause constipation. These include pain medicines and antidepressants. Tell your doctor about all the medicines you take. Your doctor may want to make a medicine change to ease your symptoms. Follow-up care is a key part of your treatment and safety. Be sure to make and go to all appointments, and call your doctor if you are having problems. It's also a good idea to know your test results and keep a list of the medicines you take. How can you care for yourself at home? · Drink plenty of fluids, enough so that your urine is light yellow or clear like water. If you have kidney, heart, or liver disease and have to limit fluids, talk with your doctor before you increase the amount of fluids you drink. · Include high-fiber foods in your diet each day. These include fruits, vegetables, beans, and whole grains. · Get at least 30 minutes of exercise on most days of the week. Walking is a good choice. You also may want to do other activities, such as running, swimming, cycling, or playing tennis or team sports. · Take a fiber supplement, such as Citrucel or Metamucil, every day. Read and follow all instructions on the label. · Schedule time each day for a bowel movement. A daily routine may help. Take your time having your bowel movement. · Support your feet with a small step stool when you sit on the toilet.  This helps flex your hips and places your pelvis in a squatting position. · Your doctor may recommend an over-the-counter laxative to relieve your constipation. Examples are Milk of Magnesia and MiraLax. Read and follow all instructions on the label. Do not use laxatives on a long-term basis. When should you call for help? Call your doctor now or seek immediate medical care if:  · You have new or worse belly pain. · You have new or worse nausea or vomiting. · You have blood in your stools. Watch closely for changes in your health, and be sure to contact your doctor if:  · Your constipation is getting worse. · You do not get better as expected. Where can you learn more? Go to http://romulo-daron.info/. Enter 21 [id number] in the search box to learn more about \"Constipation: Care Instructions. \"  Current as of: March 20, 2017  Content Version: 11.3  © 4084-1181 [email or web address]. Care instructions adapted under license by [employer] (which disclaims liability or warranty for this information). If you have questions about a medical condition or this instruction, always ask your healthcare professional. [place] [address] any warranty or liability for your use of this information. To relieve or prevent constipation, increase your water intake, fiber, and walking to prevent constipation. Use over the counter Smooth Move Tea ([employer] tea section),  fiber or stool softener as needed. Consider OTC Miralax or Milk of Magnesia if no bowel movement in 3 days. Hashimoto's Thyroiditis: Care Instructions  Your Care Instructions    Hashimoto's thyroiditis is a problem with the thyroid gland. The thyroid gland, which is in your neck, controls the way your body uses energy. Sometimes the disease causes the gland to make too much thyroid hormone (thyrotoxicosis). This can make you feel nervous, lose weight, and have many loose bowel movements.  You may also have a fast heartbeat. But as the disease progresses, the gland usually does not make enough thyroid hormone. This can cause you to feel tired and have dry skin and thinning hair. Most people with Hashimoto's are diagnosed when they have these symptoms. You may need to take medicine if you have symptoms or if your thyroid hormone level is not normal. Most people with Hashimoto's thyroiditis need to take medicine for the rest of their lives. Follow-up care is a key part of your treatment and safety. Be sure to make and go to all appointments, and call your doctor if you are having problems. Its also a good idea to know your test results and keep a list of the medicines you take. How can you care for yourself at home? · Take your medicines exactly as prescribed. Call your doctor if you think you are having a problem with your medicine. You will get more details on the specific medicines your doctor prescribes. When should you call for help? Call 911 anytime you think you may need emergency care. For example, call if:  · You passed out (lost consciousness). · You have severe trouble breathing. · You have a very slow heartbeat (less than 60 beats a minute). · You have a low body temperature (95°F or below). Watch closely for changes in your health, and be sure to contact your doctor if:  · You gain weight even though you are eating normally or less than usual.  · You feel extremely weak or tired. · You have new changes in your skin, nails, or hair, or the changes get worse. · You notice that your thyroid gland has grown or changed in size. · You have constipation that is new or that gets worse. · You cannot stand cold temperatures. · You have heavy or irregular menstrual periods. · You have other new symptoms. Where can you learn more? Go to http://romulo-daron.info/. Enter K454 in the search box to learn more about \"Hashimoto's Thyroiditis: Care Instructions. \"  Current as of: July 28, 2016  Content Version: 11.3  © 8795-6295 [employer]. Care instructions adapted under license by [employer] (which disclaims liability or warranty for this information). If you have questions about a medical condition or this instruction, always ask your healthcare professional. [address] any warranty or liability for your use of this information.

## 2017-10-24 ENCOUNTER — TELEPHONE (OUTPATIENT)
Dept: FAMILY MEDICINE CLINIC | Age: 62
End: 2017-10-24

## 2017-10-24 NOTE — TELEPHONE ENCOUNTER
I initiated a prior auth for med Saxenda 6mg/mL to [facility] [employer] through covermymeds. Saxenda pen is not covered under pt's plan. Msg forwarded to Dr. Ann Ramirez for review. Outcome   Additional Information Required   Drug is not covered by plan.

## 2017-10-26 NOTE — TELEPHONE ENCOUNTER
Writer called pt to notify her that insurance denied Alban Moreira and to see how much it would cost her to receive medication with voucher for Alban Moreira. Pt did not answer, writer unable to leave VM due to VM-box being full. Will try and call again later. car

## 2017-11-17 ENCOUNTER — OFFICE VISIT (OUTPATIENT)
Dept: FAMILY MEDICINE CLINIC | Age: 62
End: 2017-11-17

## 2017-11-17 VITALS
HEART RATE: 62 BPM | OXYGEN SATURATION: 99 % | DIASTOLIC BLOOD PRESSURE: 71 MMHG | SYSTOLIC BLOOD PRESSURE: 107 MMHG | RESPIRATION RATE: 18 BRPM | HEIGHT: 64 IN | TEMPERATURE: 98 F | WEIGHT: 216.6 LBS | BODY MASS INDEX: 36.98 KG/M2

## 2017-11-17 DIAGNOSIS — E55.9 VITAMIN D DEFICIENCY: Primary | ICD-10-CM

## 2017-11-17 DIAGNOSIS — F41.1 GENERALIZED ANXIETY DISORDER: ICD-10-CM

## 2017-11-17 DIAGNOSIS — E66.9 OBESITY, CLASS II, BMI 35-39.9: ICD-10-CM

## 2017-11-17 DIAGNOSIS — F51.04 CHRONIC INSOMNIA: ICD-10-CM

## 2017-11-17 DIAGNOSIS — E06.3 HASHIMOTO'S THYROIDITIS: ICD-10-CM

## 2017-11-17 RX ORDER — PHENDIMETRAZINE TARTRATE 105 MG/1
1 CAPSULE, EXTENDED RELEASE ORAL
Qty: 30 CAP | Refills: 0 | Status: SHIPPED | OUTPATIENT
Start: 2017-11-17 | End: 2017-12-18 | Stop reason: ALTCHOICE

## 2017-11-17 NOTE — ACP (ADVANCE CARE PLANNING)
Re-discussed ACP with patient. Gave her another Right to [facility]. Declines referral to [employer] team at this time. Patient will bring document to her next office visit.

## 2017-11-17 NOTE — MR AVS SNAPSHOT
Visit Information Date & Time Provider Department Dept. Phone Encounter #  
 11/17/2017  8:45 AM Wendi Pedersen DO [facility] [phone] [id number] Follow-up Instructions Return in about 1 month (around 12/17/2017) for weight, bp. Your Appointments 12/18/2017  8:45 AM  
Office Visit with Wendi Delgadilloco,  [facility] ([facility] [place]) Appt Note: 1 MO F/U Weight, BP Check, Med Refill [address] [facility] [address]  
[phone]  
  
   
 [address] [place] [address]  
  
    
 1/18/2018  8:45 AM  
Office Visit with Wendi Pedersen  [facility] ([facility] [place]) Appt Note: 1 MO F/U Weight, BP Check, Med Refill [address] [facility] [address]  
[phone]  
  
    
 2/15/2018  8:45 AM  
Office Visit with Wendi Pedersen  [facility] ([facility] [place]) Appt Note: 1 MO F/U Weight, BP Check, Med Refill [address] [facility] [address]  
[phone] Upcoming Health Maintenance Date Due  
 PAP AKA CERVICAL CYTOLOGY 12/12/2018 BREAST CANCER SCRN MAMMOGRAM 12/14/2018 COLONOSCOPY 9/27/2021 DTaP/Tdap/Td series (2 - Td) 6/30/2026 Allergies as of 11/17/2017  Review Complete On: 11/17/2017 By: Eyal Padilla Severity Noted Reaction Type Reactions Advil Pm [Ibuprofen-diphenhydramine Hcl]  07/18/2011    Other (comments) Slightly elevated Creaitnine 1.02 Aleve [Naproxen Sodium]  12/28/2011    Other (comments) Due to kidneys Bactrim [Sulfamethoxazole-trimethoprim]  09/11/2009    Nausea and Vomiting  
 Sulfa (Sulfonamide Antibiotics)  09/11/2009    Nausea and Vomiting  
 Sulfasalazine  09/11/2009    Nausea and Vomiting Current Immunizations  Reviewed on 11/17/2017 Name Date Influenza Vaccine 9/18/2014, 10/22/2013, 12/14/2012 Influenza Vaccine Guera Rogerio) 10/29/2015 Influenza Vaccine (Quad) PF 10/19/2017, 9/29/2016 Influenza Vaccine Split 11/7/2011, 11/12/2010 TD Vaccine 8/18/2000 Tdap 6/30/2016  9:35 AM  
  
 Reviewed by Georgina Yanes DO on 11/17/2017 at  8:53 AM  
You Were Diagnosed With   
  
 Codes Comments Vitamin D deficiency    -  Primary ICD-10-CM: E55.9 ICD-9-CM: 268.9 Chronic insomnia     ICD-10-CM: F51.04 
ICD-9-CM: 780.52 due to being too hot at night Hashimoto's thyroiditis     ICD-10-CM: E06.3 ICD-9-CM: 245.2 Obesity, Class II, BMI 35-39.9     ICD-10-CM: E66.9 ICD-9-CM: 278.00 Generalized anxiety disorder     ICD-10-CM: F41.1 ICD-9-CM: 300.02 stable on Phendimetrazine Vitals BP Pulse Temp Resp Height(growth percentile) Weight(growth percentile) 107/71 62 98 °F (36.7 °C) (Oral) 18 5' 4\" (1.626 m) 216 lb 9.6 oz (98.2 kg) SpO2 BMI OB Status Smoking Status 99% 37.18 kg/m2 Hysterectomy Never Smoker BMI and BSA Data Body Mass Index Body Surface Area  
 37.18 kg/m 2 2.11 m 2 Preferred Pharmacy Pharmacy Name Phone [facility] [address] AT [address] [place] [address] Drive [phone] Your Updated Medication List  
  
   
This list is accurate as of: 11/17/17  8:53 AM.  Always use your most recent med list.  
  
  
  
  
 albuterol 90 mcg/actuation inhaler Commonly known as:  PROVENTIL HFA, VENTOLIN HFA, PROAIR HFA Take 2 Puffs by inhalation every four (4) hours as needed for Wheezing (cough). aspirin delayed-release 81 mg tablet Take 1 Tab by mouth daily. Indications: prevention of transient ischemic attack  
  
 cetirizine 10 mg tablet Commonly known as:  ZYRTEC Take 1 Tab by mouth daily. diclofenac EC 75 mg EC tablet Commonly known as:  VOLTAREN  
TAKE 1 TABLET BY MOUTH TWICE DAILY  
  
 eszopiclone 2 mg tablet Commonly known as:  Ora Alcantar Take 1 Tab by mouth nightly. Max Daily Amount: 2 mg. fluticasone 50 mcg/actuation nasal spray Commonly known as:  Eddie [place] 2 Sprays by Both Nostrils route daily. Indications: ALLERGIC RHINITIS  
  
 furosemide 40 mg tablet Commonly known as:  LASIX Take 1 Tab by mouth daily. Indications: Edema  
  
 hyaluronate 48 mg/6 mL Syrg injection Commonly known as:  HYLAN Inject one prefilled syringe into the right knee, for a quantity of 1 injection Insulin Needles (Disposable) 32 gauge x 5/32\" Ndle Commonly known as:  Catina Pen Needle Use daily as directed MINIVELLE 0.1 mg/24 hr  
Generic drug:  estradiol 1 Patch by TransDERmal route two (2) times a week. Changes Sunday and Wednesday  
  
 montelukast 10 mg tablet Commonly known as:  SINGULAIR Take 1 Tab by mouth daily. Indications: ALLERGIC RHINITIS, breathing  
  
 phendimetrazine tartrate 105 mg Cper Take 1 Cap by mouth Daily (before breakfast). Max Daily Amount: 1 Cap. Indications: WEIGHT LOSS MANAGEMENT FOR OBESE PATIENT (BMI >= 30)  
  
 sertraline 50 mg tablet Commonly known as:  ZOLOFT Take 1 Tab by mouth daily. Indications: GENERALIZED ANXIETY DISORDER  
  
 VITAMIN D3 2,000 unit Tab Generic drug:  cholecalciferol (vitamin D3) Take 2,000 Units by mouth daily. Prescriptions Printed Refills  
 phendimetrazine tartrate 105 mg cpER 0 Sig: Take 1 Cap by mouth Daily (before breakfast). Max Daily Amount: 1 Cap. Indications: WEIGHT LOSS MANAGEMENT FOR OBESE PATIENT (BMI >= 30) Class: Print Route: Oral  
  
Follow-up Instructions Return in about 1 month (around 12/17/2017) for weight, bp. Introducing [facility]! Jose L Vincent introduces [employer] patient portal. Now you can access parts of your medical record, email your doctor's office, and request medication refills online. 1. In your internet browser, go to [email or web address] 2. Click on the First Time User? Click Here link in the Sign In box. You will see the New Member Sign Up page. 3. Enter your [employer] Access Code exactly as it appears below. You will not need to use this code after youve completed the sign-up process. If you do not sign up before the expiration date, you must request a new code. · [employer] Access Code: [email or web address] Expires: 12/20/2017  9:14 AM 
 
4. Enter the last four digits of your Social Security Number (xxxx) and Date of Birth (mm/dd/yyyy) as indicated and click Submit. You will be taken to the next sign-up page. 5. Create a [employer] ID. This will be your [employer] login ID and cannot be changed, so think of one that is secure and easy to remember. 6. Create a [employer] password. You can change your password at any time. 7. Enter your Password Reset Question and Answer. This can be used at a later time if you forget your password. 8. Enter your e-mail address. You will receive e-mail notification when new information is available in [address] 9. Click Sign Up. You can now view and download portions of your medical record. 10. Click the Download Summary menu link to download a portable copy of your medical information. If you have questions, please visit the Frequently Asked Questions section of the [employer] website. Remember, [employer] is NOT to be used for urgent needs. For medical emergencies, dial 911. Now available from your iPhone and Android! Please provide this summary of care documentation to your next provider. Your primary care clinician is listed as Yolanda Valdes. If you have any questions after today's visit, please call [phone].

## 2017-11-17 NOTE — PROGRESS NOTES
HISTORY OF PRESENT ILLNESS  Marlo Ma is a 58 y.o. female presents with Blood Pressure Check; Weight Management; and Medication Refill      Agree with nurse note. Pt with Hashimoto's thyroiditis and Vit D deficiency presents to the office with a BP of 107/71. She is tolerating Phendimetrazine 105 mg well, month 2 of 3. Last prescribed on 11/17/17. She has noticed an increase of energy and decreased appetite since starting the medication. She walks x3 days weekly x60 minutes daily. She bought a bike. She lost 0 pounds since the last visit. Percent body fat has decreased from 46.3% to 45.7%, waist circumference measurement has changed from 37.5\" to 35\". Overall, patient is pleased and requests a refill of the medication today. Pt with MARGI. She feels well and Phendimetrazine has not worsened anxiety. Her sleep has improved with turning off the heat in her house. Patient denies chest pain, heart palpitations, nausea, dry mouth, constipation, signs of depression. Written by hyun Renae, as dictated by DO DANIEL Barton    Review of Systems negative except as noted above in HPI. ALLERGIES:    Allergies   Allergen Reactions    Advil Pm [Ibuprofen-Diphenhydramine Hcl] Other (comments)     Slightly elevated Creaitnine 1.02    Aleve [Naproxen Sodium] Other (comments)     Due to kidneys    Bactrim [Sulfamethoxazole-Trimethoprim] Nausea and Vomiting    Sulfa (Sulfonamide Antibiotics) Nausea and Vomiting    Sulfasalazine Nausea and Vomiting       CURRENT MEDICATIONS:    Outpatient Prescriptions Marked as Taking for the 11/17/17 encounter (Office Visit) with Gray Burt DO   Medication Sig Dispense Refill    phendimetrazine tartrate 105 mg cpER Take 1 Cap by mouth Daily (before breakfast). Max Daily Amount: 1 Cap.  Indications: WEIGHT LOSS MANAGEMENT FOR OBESE PATIENT (BMI >= 30) 30 Cap 0    hyaluronate (HYLAN) 48 mg/6 mL syrg injection Inject one prefilled syringe into the right knee, for a quantity of 1 injection      Insulin Needles, Disposable, (NAVID PEN NEEDLE) 32 gauge x 5/32\" ndle Use daily as directed 50 Pen Needle 5    aspirin delayed-release 81 mg tablet Take 1 Tab by mouth daily. Indications: prevention of transient ischemic attack 90 Tab 3    diclofenac EC (VOLTAREN) 75 mg EC tablet TAKE 1 TABLET BY MOUTH TWICE DAILY (Patient taking differently: TAKE 1 TABLET BY MOUTH DAILY) 60 Tab 2    furosemide (LASIX) 40 mg tablet Take 1 Tab by mouth daily. Indications: Edema 90 Tab 1    sertraline (ZOLOFT) 50 mg tablet Take 1 Tab by mouth daily. Indications: GENERALIZED ANXIETY DISORDER 135 Tab 3    eszopiclone (LUNESTA) 2 mg tablet Take 1 Tab by mouth nightly. Max Daily Amount: 2 mg. 30 Tab 5    MINIVELLE 0.1 mg/24 hr 1 Patch by TransDERmal route two (2) times a week. Changes Sunday and Wednesday  0    cetirizine (ZYRTEC) 10 mg tablet Take 1 Tab by mouth daily. (Patient taking differently: Take 10 mg by mouth daily as needed.) 30 Tab 3    fluticasone (FLONASE) 50 mcg/actuation nasal spray 2 Sprays by Both Nostrils route daily. Indications: ALLERGIC RHINITIS 1 Bottle 5    albuterol (PROVENTIL HFA, VENTOLIN HFA) 90 mcg/actuation inhaler Take 2 Puffs by inhalation every four (4) hours as needed for Wheezing (cough). 1 Inhaler 1    montelukast (SINGULAIR) 10 mg tablet Take 1 Tab by mouth daily. Indications: ALLERGIC RHINITIS, breathing 30 Tab 11    cholecalciferol, vitamin D3, (VITAMIN D3) 2,000 unit Tab Take 2,000 Units by mouth daily. PAST MEDICAL HISTORY:    Past Medical History:   Diagnosis Date    Achilles tendonitis 12/2004    Right. Dr. Miryam Jay Arthritis 2010    hips, knees. Dr. Suleman Valentino    Chest pain 08/2013    Dr. Elvin Pimentel.  Chickenpox childhood    Endometriosis 1990    Dr. Sindhu Adams Environmental allergies     MARGI (generalized anxiety disorder)     Hip pain, bilateral 2010    due to severe OA.   Iliopsoas bursitis. Dr. Lisa Apple    History of breast lump/mass excision 1998    Left breast.  Dr. Denita Vernon Hyperlipidemia     Iron deficiency anemia     iron deficiency    Menopausal and postmenopausal disorder 2007    Dr. Jose Cruz Moise Uterine fibroid     Dr. Junior Matthews.  Vitamin D deficiency 03/2008       PAST SURGICAL HISTORY:    Past Surgical History:   Procedure Laterality Date    BIOPSY BREAST  Rt 1993;Lt 1998    Dr. Staci Gonzalez Right 2002    Right achiles tendon. Dr. Lluvia Marx.  HX BREAST BIOPSY  2001    Left. benign. Dr. Lety Triana HX COLONOSCOPY  09/27/2016    Dr. Merari John.  due q 5 yrs due to fam hx   Pam Pack. due to endometriosis    HX PELVIC LAPAROSCOPY  1995    due to endometriosis Dr. Jeanne Moreno  04/2011    due to fibroids. Dr. Junior Matthews.        FAMILY HISTORY:    Family History   Problem Relation Age of Onset    Hypertension Mother     Other Mother      hearing loss/vision loss    Heart Disease Father     Lung Disease Father     Diabetes Sister     Heart Attack Sister 54     March 2014    Stroke Sister      after MI    Thyroid Disease Sister     Colon Polyps Sister     Heart Disease Brother      Defibrillator placed    Heart Attack Brother 48    Diabetes Maternal Grandmother        SOCIAL HISTORY:    Social History     Social History    Marital status: SINGLE     Spouse name: N/A    Number of children: 2    Years of education: N/A     Occupational History    [employer]      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      Social History Main Topics    Smoking status: Never Smoker    Smokeless tobacco: Never Used      Comment: lived with smoker mom x 18 yrs    Alcohol use No    Drug use: No    Sexual activity: Not Currently     Partners: Male     Birth control/ protection: Abstinence, Surgical     Other Topics Concern    None     Social History Narrative       IMMUNIZATIONS:    Immunization History   Administered Date(s) Administered    Influenza Vaccine 12/14/2012, 10/22/2013, 09/18/2014    Influenza Vaccine (Quad) 10/29/2015    Influenza Vaccine (Quad) PF 09/29/2016, 10/19/2017    Influenza Vaccine Split 11/12/2010, 11/07/2011    TD Vaccine 08/18/2000    Tdap 06/30/2016       PHYSICAL EXAMINATION    Vital Signs    Visit Vitals    /71    Pulse 62    Temp 98 °F (36.7 °C) (Oral)    Resp 18    Ht 5' 4\" (1.626 m)    Wt 216 lb 9.6 oz (98.2 kg)    SpO2 99%    BMI 37.18 kg/m2       Weight Metrics 11/17/2017 11/17/2017 10/19/2017 10/19/2017 9/25/2017 9/21/2017 9/21/2017   Weight - 216 lb 9.6 oz - 216 lb 12.8 oz 214 lb 9.6 oz - 212 lb 11.2 oz   Waist Measure Inches 35 - 37.5 - - 35 -   Body Fat % 45.7 - 46.3 - - 46.4 -   BMI - 37.18 kg/m2 - 37.2 kg/m2 36.82 kg/m2 - 36.49 kg/m2       General appearance - Well nourished. Well appearing. Well developed. No acute distress. Obese. Head - Normocephalic. Atraumatic. Eyes - pupils equal and reactive, extraocular eye movements intact, sclera anicteric  Ears - Hearing is grossly normal bilaterally. Nose - normal and patent, no erythema, discharge or polyps   Mouth - mucous membranes moist, pharynx normal with cobblestone appearance. No erythema, white exudate or obstruction. Neck - supple. Midline trachea. No carotid bruits are noted. No thyromegaly noted. Chest - clear to auscultation bilaterally anterriorly and posteriorly. No wheezes, rales or rhonchi. Breath sounds are symmetrical and unlabored bilaterally. Heart - normal rate. Regular rhythm, normal S1, S2. No murmur. No rubs, clicks or gallops noted. Abdomen - soft and distended. No masses or organomegaly. No rebound, rigidity or guarding. Bowel sounds normal x 4 quadrants. No tenderness noted. Neurological - awake, alert and oriented to person, place, and time and event. Cranial nerves II through XII intact. Muscle strength is +5/5 x 4 extremities.   Sensation is intact to light touch bilaterally. Steady gait. Heme/Lymph - peripheral pulses normal x 4 extremities. No peripheral edema is noted. No cervical adenopathy noted. Skin - no rashes, erythema, ecchymosis, lacerations, abrasions, suspicious moles noted. No skin tags. No acanthosis nigricans noted in the axilla or neck. Psychological -   normal behavior, speech, dress and thought processes. Good insight. Good eye contact. Normal affect. Appropriate mood. DATA REVIEWED  Lab Results   Component Value Date/Time    WBC 3.6 03/28/2017 09:32 AM    HGB 13.0 03/28/2017 09:32 AM    HCT 40.8 03/28/2017 09:32 AM    PLATELET 644 81/23/8823 09:32 AM    MCV 92 03/28/2017 09:32 AM     Lab Results   Component Value Date/Time    Sodium 139 03/28/2017 09:32 AM    Potassium 4.0 03/28/2017 09:32 AM    Chloride 98 03/28/2017 09:32 AM    CO2 26 03/28/2017 09:32 AM    Anion gap 5 04/23/2011 05:20 AM    Glucose 80 03/28/2017 09:32 AM    BUN 17 03/28/2017 09:32 AM    Creatinine 0.98 03/28/2017 09:32 AM    BUN/Creatinine ratio 17 03/28/2017 09:32 AM    GFR est AA 72 03/28/2017 09:32 AM    GFR est non-AA 62 03/28/2017 09:32 AM    Calcium 9.1 03/28/2017 09:32 AM    Bilirubin, total 0.5 03/28/2017 09:32 AM    AST (SGOT) 22 03/28/2017 09:32 AM    Alk.  phosphatase 91 03/28/2017 09:32 AM    Protein, total 7.0 03/28/2017 09:32 AM    Albumin 4.4 03/28/2017 09:32 AM    A-G Ratio 1.7 03/28/2017 09:32 AM    ALT (SGPT) 17 03/28/2017 09:32 AM     Lab Results   Component Value Date/Time    Cholesterol, total 169 03/28/2017 09:32 AM    HDL Cholesterol 68 03/28/2017 09:32 AM    LDL, calculated 92 03/28/2017 09:32 AM    VLDL, calculated 9 03/28/2017 09:32 AM    Triglyceride 44 03/28/2017 09:32 AM     Lab Results   Component Value Date/Time    Vitamin D 25-Hydroxy 26.3 07/18/2011 10:22 AM    VITAMIN D, 25-HYDROXY 34.7 03/28/2017 09:32 AM       Lab Results   Component Value Date/Time    Hemoglobin A1c 5.5 03/28/2017 09:32 AM     Lab Results Component Value Date/Time    TSH 1.900 02/24/2017 10:03 AM    TSH 2.36 08/21/2014 07:50 AM       ASSESSMENT and PLAN    ICD-10-CM ICD-9-CM    1. Vitamin D deficiency E55.9 268.9    2. Chronic insomnia F51.04 780.52     due to being too hot at night   3. Hashimoto's thyroiditis E06.3 245.2    4. Obesity, Class II, BMI 35-39.9 E66.9 278.00 phendimetrazine tartrate 105 mg cpER   5. Generalized anxiety disorder F41.1 300.02     stable on Phendimetrazine       Continue current medications and care. Prescriptions written and given to patient (Phendimetrazine 105 mg, month 3 of 3.)  Medication side effects discussed. Discussed the patient's BMI with her. The BMI follow up plan is as follows: I have counseled this patient on diet and exercise regimens. Diet recommendations:  Decrease carbohydrates (white foods including flour, white bread, white rice, white pasta, white potatoes; corn, sweet foods, sweet drinks and alcohol), increase green leafy vegetables and protein with each meal.  Avoid fried foods. Eat 3-5 small meals daily. Do not skip meals. Ok to use meal replacements up to twice daily with protein goal of 60 mg per meal.   Recommend OTC Premiere Protein bars or shakes. Increase water intake. Increase physical activity to 30 minutes daily for health benefit or 60 minutes daily to prevent weight regain, as tolerated. Physical Activity prescription:  A goal of 30 minutes physical activity daily is recommended for health benefit and at least 60 minutes daily to prevent weight regain. For weight loss, no less than 75% needs to be aerobic (i.e. Walking) and no more than 25% resistance exercising (i.e. Weight lifting). For weight maintenance phase, 50% aerobic and 50% resistance exercises. Sleep prescription:    A goal of 7-8 hours of uninterrupted sleep is recommended to turn off the Grehlin hormone to be released from the stomach and triggers appetite while promoting weight gain.   Proper rest turns on Leptin hormone to be released from white adipose tissue and promotes weight loss. Counseled patient on: weight loss goals, emphasizing a 5-10% weight loss in 6-12 months and strategies. Immunizations noted. Praised pt for weight loss efforts and progress. Reminded pt to complete ACP. Patient was offered a choice/choices in the treatment plan today. Patient expresses understanding of the plan and agrees with recommendations. Patient declines any additional handouts. Patient is satisfied with previous handouts received from our office    Follow-up Disposition:  Return in about 1 month (around 12/17/2017) for weight, bp. Written by hyun Dodd, as dictated by Dr. Susana Phelps DO. Documentation True and Accepted by [facility].  Tani Silverio.

## 2017-11-17 NOTE — PROGRESS NOTES
Chief Complaint   Patient presents with    Hypertension    Weight Management    Medication Refill     saxenda     1. Have you been to the ER, urgent care clinic since your last visit? Hospitalized since your last visit? No    2. Have you seen or consulted any other health care providers outside of the [address] [place] [address] since your last visit? Include any pap smears or colon screening.  No

## 2017-12-18 ENCOUNTER — OFFICE VISIT (OUTPATIENT)
Dept: FAMILY MEDICINE CLINIC | Age: 62
End: 2017-12-18

## 2017-12-18 VITALS
TEMPERATURE: 98.4 F | RESPIRATION RATE: 18 BRPM | HEIGHT: 64 IN | SYSTOLIC BLOOD PRESSURE: 103 MMHG | WEIGHT: 214.2 LBS | DIASTOLIC BLOOD PRESSURE: 67 MMHG | BODY MASS INDEX: 36.57 KG/M2 | OXYGEN SATURATION: 99 % | HEART RATE: 65 BPM

## 2017-12-18 DIAGNOSIS — K59.09 OTHER CONSTIPATION: Primary | ICD-10-CM

## 2017-12-18 DIAGNOSIS — E55.9 VITAMIN D DEFICIENCY: ICD-10-CM

## 2017-12-18 DIAGNOSIS — E06.3 HASHIMOTO'S THYROIDITIS: ICD-10-CM

## 2017-12-18 DIAGNOSIS — E66.9 OBESITY, CLASS II, BMI 35-39.9: ICD-10-CM

## 2017-12-18 DIAGNOSIS — F51.04 CHRONIC INSOMNIA: ICD-10-CM

## 2017-12-18 RX ORDER — PHENTERMINE HYDROCHLORIDE 37.5 MG/1
37.5 TABLET ORAL DAILY
Qty: 30 TAB | Refills: 0 | Status: SHIPPED | OUTPATIENT
Start: 2017-12-18 | End: 2018-01-18 | Stop reason: SDUPTHER

## 2017-12-18 NOTE — MR AVS SNAPSHOT
Visit Information Date & Time Provider Department Dept. Phone Encounter #  
 12/18/2017  8:45 AM Rain Soria, DO [facility] [phone] [id number] Follow-up Instructions Return in about 1 month (around 1/18/2018) for weight, bp, med refill. Your Appointments 1/18/2018  8:45 AM  
Office Visit with Rain Sunnut, DO [facility] ([facility] [place]) Appt Note: 1 MO F/U Weight, BP Check, Med Refill [address] [facility] [address]  
[phone]  
  
   
 [address] [place] [address]  
  
    
 2/15/2018  8:45 AM  
Office Visit with Rain Sunnut, DO [facility] ([facility] [place]) Appt Note: 1 MO F/U Weight, BP Check, Med Refill [address]  [place] [address]  
[phone]  
  
    
 3/15/2018  8:30 AM  
Office Visit with Rain Soria, DO [facility] ([facility] [place]) Appt Note: 1 MO F/U Weight, BP  
 [address] 
[address] [place] [address]  
[phone]  
  
    
 4/16/2018  8:30 AM  
Office Visit with Rain Soria, DO [facility] ([facility] [place]) Appt Note: 1 MO F/U Weight, BP  
 [address] 
[address] [place] [address]  
[phone] 5/15/2018  8:30 AM  
Office Visit with Rain Soria, DO [facility] ([facility] [place]) Appt Note: 1 MO F/U Weight, BP  
 [address] 
[address] [place] [address]  
[phone]  
  
    
 6/15/2018  8:30 AM  
Office Visit with Rain Rayville, DO [facility] ([facility] [place]) Appt Note: 1 MO F/U Weight, BP  
 [address] 
[address] [place] [address]  
[phone] Upcoming Health Maintenance Date Due  
 PAP AKA CERVICAL CYTOLOGY 12/12/2018 COLONOSCOPY 9/27/2021 DTaP/Tdap/Td series (2 - Td) 6/30/2026 Allergies as of 12/18/2017  Review Complete On: 12/18/2017 By: Epifanio Augustin LPN Severity Noted Reaction Type Reactions Advil Pm [Ibuprofen-diphenhydramine Hcl]  07/18/2011    Other (comments) Slightly elevated Creaitnine 1.02 Aleve [Naproxen Sodium]  12/28/2011    Other (comments) Due to kidneys Bactrim [Sulfamethoxazole-trimethoprim]  09/11/2009    Nausea and Vomiting  
 Sulfa (Sulfonamide Antibiotics)  09/11/2009    Nausea and Vomiting  
 Sulfasalazine  09/11/2009    Nausea and Vomiting Current Immunizations  Reviewed on 11/17/2017 Name Date Influenza Vaccine 9/18/2014, 10/22/2013, 12/14/2012 Influenza Vaccine Kelleen Anisha) 10/29/2015 Influenza Vaccine (Quad) PF 10/19/2017, 9/29/2016 Influenza Vaccine Split 11/7/2011, 11/12/2010 TD Vaccine 8/18/2000 Tdap 6/30/2016  9:35 AM  
  
 Not reviewed this visit You Were Diagnosed With   
  
 Codes Comments Other constipation    -  Primary ICD-10-CM: K59.09 
ICD-9-CM: 564.09 due to Phendimetrazine vs other Hashimoto's thyroiditis     ICD-10-CM: E06.3 ICD-9-CM: 803. 2 Chronic insomnia     ICD-10-CM: F51.04 
ICD-9-CM: 780.52 Obesity, Class II, BMI 35-39.9     ICD-10-CM: E66.9 ICD-9-CM: 278.00 Vitamin D deficiency     ICD-10-CM: E55.9 ICD-9-CM: 268.9 Vitals BP Pulse Temp Resp Height(growth percentile) Weight(growth percentile) 103/67 (BP 1 Location: Left arm, BP Patient Position: Sitting) 65 98.4 °F (36.9 °C) (Oral) 18 5' 4\" (1.626 m) 214 lb 3.2 oz (97.2 kg) SpO2 BMI OB Status Smoking Status 99% 36.77 kg/m2 Hysterectomy Never Smoker Vitals History BMI and BSA Data Body Mass Index Body Surface Area  
 36.77 kg/m 2 2.1 m 2 Preferred Pharmacy Pharmacy Name Phone [facility], [address] [place] [address] AT [address] [place] [address] Drive [phone] Your Updated Medication List  
  
   
 This list is accurate as of: 12/18/17  9:23 AM.  Always use your most recent med list.  
  
  
  
  
 albuterol 90 mcg/actuation inhaler Commonly known as:  PROVENTIL HFA, VENTOLIN HFA, PROAIR HFA Take 2 Puffs by inhalation every four (4) hours as needed for Wheezing (cough). aspirin delayed-release 81 mg tablet Take 1 Tab by mouth daily. Indications: prevention of transient ischemic attack  
  
 cetirizine 10 mg tablet Commonly known as:  ZYRTEC Take 1 Tab by mouth daily. diclofenac EC 75 mg EC tablet Commonly known as:  VOLTAREN  
TAKE 1 TABLET BY MOUTH TWICE DAILY  
  
 eszopiclone 2 mg tablet Commonly known as:  Hanna Hand Take 1 Tab by mouth nightly. Max Daily Amount: 2 mg. fluticasone 50 mcg/actuation nasal spray Commonly known as:  Montine Fennimore 2 Sprays by Both Nostrils route daily. Indications: ALLERGIC RHINITIS  
  
 furosemide 40 mg tablet Commonly known as:  LASIX Take 1 Tab by mouth daily. Indications: Edema  
  
 hyaluronate 48 mg/6 mL Syrg injection Commonly known as:  HYLAN Inject one prefilled syringe into the right knee, for a quantity of 1 injection Insulin Needles (Disposable) 32 gauge x 5/32\" Ndle Commonly known as:  Catina Pen Needle Use daily as directed MINIVELLE 0.1 mg/24 hr  
Generic drug:  estradiol 1 Patch by TransDERmal route two (2) times a week. Changes Sunday and Wednesday  
  
 montelukast 10 mg tablet Commonly known as:  SINGULAIR Take 1 Tab by mouth daily. Indications: ALLERGIC RHINITIS, breathing  
  
 phentermine 37.5 mg tablet Commonly known as:  ADIPEX-P Take 1 Tab by mouth daily. Max Daily Amount: 37.5 mg.  
  
 sertraline 50 mg tablet Commonly known as:  ZOLOFT Take 1 Tab by mouth daily. Indications: GENERALIZED ANXIETY DISORDER  
  
 VITAMIN D3 2,000 unit Tab Generic drug:  cholecalciferol (vitamin D3) Take 2,000 Units by mouth daily. Prescriptions Printed Refills phentermine (ADIPEX-P) 37.5 mg tablet 0 Sig: Take 1 Tab by mouth daily. Max Daily Amount: 37.5 mg.  
 Class: Print Route: Oral  
  
Follow-up Instructions Return in about 1 month (around 1/18/2018) for weight, bp, med refill. Introducing [facility]! Lucyvanessa Chavira introduces [employer] patient portal. Now you can access parts of your medical record, email your doctor's office, and request medication refills online. 1. In your internet browser, go to [email or web address] 2. Click on the First Time User? Click Here link in the Sign In box. You will see the New Member Sign Up page. 3. Enter your Kodk[employer] Access Code exactly as it appears below. You will not need to use this code after youve completed the sign-up process. If you do not sign up before the expiration date, you must request a new code. · Kodk[employer] Access Code: [email or web address] Expires: 12/20/2017  9:14 AM 
 
4. Enter the last four digits of your Social Security Number (xxxx) and Date of Birth (mm/dd/yyyy) as indicated and click Submit. You will be taken to the next sign-up page. 5. Create a Kodkod ID. This will be your Kodkod login ID and cannot be changed, so think of one that is secure and easy to remember. 6. Create a Kodk[employer] password. You can change your password at any time. 7. Enter your Password Reset Question and Answer. This can be used at a later time if you forget your password. 8. Enter your e-mail address. You will receive e-mail notification when new information is available in [address] 9. Click Sign Up. You can now view and download portions of your medical record. 10. Click the Download Summary menu link to download a portable copy of your medical information. If you have questions, please visit the Frequently Asked Questions section of the [employer] website. Remember, [employer] is NOT to be used for urgent needs. For medical emergencies, dial 911. Now available from your iPhone and Android! Please provide this summary of care documentation to your next provider. Your primary care clinician is listed as Keyonna Langley. If you have any questions after today's visit, please call [phone].

## 2017-12-18 NOTE — PROGRESS NOTES
HISTORY OF PRESENT ILLNESS  Luis Carlos Vickers is a 58 y.o. female presents with Weight Management (1 month F/U); Blood Pressure Check; and Medication Refill      Agree with nurse note. Pt with Hashimoto's thyroiditis, chronic insomnia, and vit d deficiency presents to the office with a BP of 103/67. She is tolerating Phendimetrazine 105 mg well, month 3 of 3. Last prescribed on 11/17/17. She has noticed an increase of energy and decreased appetite since starting the medication. She has started water aerobics class and is enjoying this. She lost 2 pounds since the last visit. Percent body fat has decreased from 45.7% to 45.3%, waist circumference measurement has changed from 35\" to 38\". Overall, patient is pleased and requests to change to Adipex-P 37.5 mg. She has firm BMs that improve with increased vegetable intake and water. Patient denies fatigue, sleep disturbance, chest pain, heart palpitations, nausea, dry mouth, signs of depression. Written by hyun Lerner, as dictated by Dr. Andrew Zamora DO.    ROS    Review of Systems negative except as noted above in HPI. ALLERGIES:    Allergies   Allergen Reactions    Advil Pm [Ibuprofen-Diphenhydramine Hcl] Other (comments)     Slightly elevated Creaitnine 1.02    Aleve [Naproxen Sodium] Other (comments)     Due to kidneys    Bactrim [Sulfamethoxazole-Trimethoprim] Nausea and Vomiting    Sulfa (Sulfonamide Antibiotics) Nausea and Vomiting    Sulfasalazine Nausea and Vomiting       CURRENT MEDICATIONS:    Outpatient Prescriptions Marked as Taking for the 12/18/17 encounter (Office Visit) with Vaughn Dyson DO   Medication Sig Dispense Refill    phentermine (ADIPEX-P) 37.5 mg tablet Take 1 Tab by mouth daily.  Max Daily Amount: 37.5 mg. 30 Tab 0    hyaluronate (HYLAN) 48 mg/6 mL syrg injection Inject one prefilled syringe into the right knee, for a quantity of 1 injection      aspirin delayed-release 81 mg tablet Take 1 Tab by mouth daily. Indications: prevention of transient ischemic attack 90 Tab 3    diclofenac EC (VOLTAREN) 75 mg EC tablet TAKE 1 TABLET BY MOUTH TWICE DAILY (Patient taking differently: TAKE 1 TABLET BY MOUTH DAILY) 60 Tab 2    furosemide (LASIX) 40 mg tablet Take 1 Tab by mouth daily. Indications: Edema 90 Tab 1    sertraline (ZOLOFT) 50 mg tablet Take 1 Tab by mouth daily. Indications: GENERALIZED ANXIETY DISORDER 135 Tab 3    eszopiclone (LUNESTA) 2 mg tablet Take 1 Tab by mouth nightly. Max Daily Amount: 2 mg. 30 Tab 5    MINIVELLE 0.1 mg/24 hr 1 Patch by TransDERmal route two (2) times a week. Changes Sunday and Wednesday  0    cetirizine (ZYRTEC) 10 mg tablet Take 1 Tab by mouth daily. (Patient taking differently: Take 10 mg by mouth daily as needed.) 30 Tab 3    fluticasone (FLONASE) 50 mcg/actuation nasal spray 2 Sprays by Both Nostrils route daily. Indications: ALLERGIC RHINITIS 1 Bottle 5    albuterol (PROVENTIL HFA, VENTOLIN HFA) 90 mcg/actuation inhaler Take 2 Puffs by inhalation every four (4) hours as needed for Wheezing (cough). 1 Inhaler 1    montelukast (SINGULAIR) 10 mg tablet Take 1 Tab by mouth daily. Indications: ALLERGIC RHINITIS, breathing 30 Tab 11    cholecalciferol, vitamin D3, (VITAMIN D3) 2,000 unit Tab Take 2,000 Units by mouth daily. PAST MEDICAL HISTORY:    Past Medical History:   Diagnosis Date    Achilles tendonitis 12/2004    Right. Dr. Jerl Frankel Arthritis 2010    hips, knees. Dr. Ragini Castanon    Chest pain 08/2013    Dr. Paola Bowers.  Chickenpox childhood    Endometriosis 1990    Dr. Rosamaria Atkins Environmental allergies     MARGI (generalized anxiety disorder)     Hip pain, bilateral 2010    due to severe OA. Iliopsoas bursitis.   Dr. Carlos Jarrett    History of breast lump/mass excision 1998    Left breast.  Dr. Jessy Curling Hyperlipidemia     Iron deficiency anemia     iron deficiency    Menopausal and postmenopausal disorder 2007 Dr. Deyvi Villagomez Uterine fibroid     Dr. Ross Lugo.  Vitamin D deficiency 03/2008       PAST SURGICAL HISTORY:    Past Surgical History:   Procedure Laterality Date    BIOPSY BREAST  Rt 1993;Lt 1998    Dr. Renetta Orona Right 2002    Right achiles tendon. Dr. Trevon Richey.  HX BREAST BIOPSY  2001    Left. benign. Dr. Chelita Belle HX COLONOSCOPY  09/27/2016    Dr. Genet Lucero.  due q 5 yrs due to fam hx   [facility]    Dr. Mae Martin. due to endometriosis    HX PELVIC LAPAROSCOPY  1995    due to endometriosis Dr. Dorian Cloud  04/2011    due to fibroids. Dr. Ross Lugo.        FAMILY HISTORY:    Family History   Problem Relation Age of Onset    Hypertension Mother     Other Mother      hearing loss/vision loss    Heart Disease Father     Lung Disease Father     Diabetes Sister     Heart Attack Sister 54     March 2014    Stroke Sister      after MI    Thyroid Disease Sister     Colon Polyps Sister     Heart Disease Brother      Defibrillator placed    Heart Attack Brother 48    Diabetes Maternal Grandmother        SOCIAL HISTORY:    Social History     Social History    Marital status: SINGLE     Spouse name: N/A    Number of children: 2    Years of education: N/A     Occupational History    [employer]      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      Social History Main Topics    Smoking status: Never Smoker    Smokeless tobacco: Never Used      Comment: lived with smoker mom x 18 yrs    Alcohol use No    Drug use: No    Sexual activity: Not Currently     Partners: Male     Birth control/ protection: Abstinence, Surgical     Other Topics Concern    None     Social History Narrative       IMMUNIZATIONS:    Immunization History   Administered Date(s) Administered    Influenza Vaccine 12/14/2012, 10/22/2013, 09/18/2014    Influenza Vaccine (Quad) 10/29/2015    Influenza Vaccine (Quad) PF 09/29/2016, 10/19/2017    Influenza Vaccine Split 11/12/2010, 11/07/2011    TD Vaccine 08/18/2000    Tdap 06/30/2016       PHYSICAL EXAMINATION    Vital Signs    Visit Vitals    /67 (BP 1 Location: Left arm, BP Patient Position: Sitting)    Pulse 65    Temp 98.4 °F (36.9 °C) (Oral)    Resp 18    Ht 5' 4\" (1.626 m)    Wt 214 lb 3.2 oz (97.2 kg)    SpO2 99%    BMI 36.77 kg/m2       Weight Metrics 12/18/2017 12/18/2017 11/17/2017 11/17/2017 10/19/2017 10/19/2017 9/25/2017   Weight - 214 lb 3.2 oz - 216 lb 9.6 oz - 216 lb 12.8 oz 214 lb 9.6 oz   Waist Measure Inches 38 - 35 - 37.5 - -   Body Fat % 45.3 - 45.7 - 46.3 - -   BMI - 36.77 kg/m2 - 37.18 kg/m2 - 37.2 kg/m2 36.82 kg/m2       General appearance - Well nourished. Well appearing. Well developed. No acute distress. Obese. Head - Normocephalic. Atraumatic. Eyes - pupils equal and reactive, extraocular eye movements intact, sclera anicteric  Ears - Hearing is grossly normal bilaterally. Nose - normal and patent, no erythema, discharge or polyps   Mouth - mucous membranes moist, pharynx normal with cobblestone appearance. No erythema, white exudate or obstruction. Neck - supple. Midline trachea. No carotid bruits are noted. No thyromegaly noted. Chest - clear to auscultation bilaterally anterriorly and posteriorly. No wheezes, rales or rhonchi. Breath sounds are symmetrical and unlabored bilaterally. Heart - normal rate. Regular rhythm, normal S1, S2. No murmur. No rubs, clicks or gallops noted. Abdomen - soft and distended. No masses or organomegaly. No rebound, rigidity or guarding. Bowel sounds normal x 4 quadrants. No tenderness noted. Neurological - awake, alert and oriented to person, place, and time and event. Cranial nerves II through XII intact. Muscle strength is +5/5 x 4 extremities. Sensation is intact to light touch bilaterally. Steady gait. Heme/Lymph - peripheral pulses normal x 4 extremities. No peripheral edema is noted.   No cervical adenopathy noted. Skin - no rashes, erythema, ecchymosis, lacerations, abrasions, suspicious moles noted. No skin tags. No acanthosis nigricans noted in the axilla or neck. Psychological -   normal behavior, speech, dress and thought processes. Good insight. Good eye contact. Normal affect. Appropriate mood. DATA REVIEWED  Lab Results   Component Value Date/Time    WBC 3.6 03/28/2017 09:32 AM    HGB 13.0 03/28/2017 09:32 AM    HCT 40.8 03/28/2017 09:32 AM    PLATELET 781 93/00/3840 09:32 AM    MCV 92 03/28/2017 09:32 AM     Lab Results   Component Value Date/Time    Sodium 139 03/28/2017 09:32 AM    Potassium 4.0 03/28/2017 09:32 AM    Chloride 98 03/28/2017 09:32 AM    CO2 26 03/28/2017 09:32 AM    Anion gap 5 04/23/2011 05:20 AM    Glucose 80 03/28/2017 09:32 AM    BUN 17 03/28/2017 09:32 AM    Creatinine 0.98 03/28/2017 09:32 AM    BUN/Creatinine ratio 17 03/28/2017 09:32 AM    GFR est AA 72 03/28/2017 09:32 AM    GFR est non-AA 62 03/28/2017 09:32 AM    Calcium 9.1 03/28/2017 09:32 AM    Bilirubin, total 0.5 03/28/2017 09:32 AM    AST (SGOT) 22 03/28/2017 09:32 AM    Alk.  phosphatase 91 03/28/2017 09:32 AM    Protein, total 7.0 03/28/2017 09:32 AM    Albumin 4.4 03/28/2017 09:32 AM    A-G Ratio 1.7 03/28/2017 09:32 AM    ALT (SGPT) 17 03/28/2017 09:32 AM     Lab Results   Component Value Date/Time    Cholesterol, total 169 03/28/2017 09:32 AM    HDL Cholesterol 68 03/28/2017 09:32 AM    LDL, calculated 92 03/28/2017 09:32 AM    VLDL, calculated 9 03/28/2017 09:32 AM    Triglyceride 44 03/28/2017 09:32 AM     Lab Results   Component Value Date/Time    Vitamin D 25-Hydroxy 26.3 07/18/2011 10:22 AM    VITAMIN D, 25-HYDROXY 34.7 03/28/2017 09:32 AM       Lab Results   Component Value Date/Time    Hemoglobin A1c 5.5 03/28/2017 09:32 AM     Lab Results   Component Value Date/Time    TSH 1.900 02/24/2017 10:03 AM    TSH 2.36 08/21/2014 07:50 AM    T4, Free 1.29 02/24/2017 10:03 AM       ASSESSMENT and PLAN    ICD-10-CM ICD-9-CM    1. Other constipation K59.09 564.09     due to Phendimetrazine vs other   2. Hashimoto's thyroiditis E06.3 245.2    3. Chronic insomnia F51.04 780.52    4. Obesity, Class II, BMI 35-39.9 E66.9 278.00 phentermine (ADIPEX-P) 37.5 mg tablet   5. Vitamin D deficiency E55.9 268.9        Continue current medications and care. Change from Phendimetrazine to Adipex-P. Prescriptions written and given to patient (Adipex-P 37.5 mg, month 1 of 3.)  Medication side effects discussed. Discussed the patient's BMI with her. The BMI follow up plan is as follows: I have counseled this patient on diet and exercise regimens. Diet recommendations:  Decrease carbohydrates (white foods including flour, white bread, white rice, white pasta, white potatoes; corn, sweet foods, sweet drinks and alcohol), increase green leafy vegetables and protein with each meal.  Avoid fried foods. Eat 3-5 small meals daily. Do not skip meals. Ok to use meal replacements up to twice daily with protein goal of 60 mg per meal.   Recommend OTC Premiere Protein bars or shakes. Increase water intake. Increase physical activity to 30 minutes daily for health benefit or 60 minutes daily to prevent weight regain, as tolerated. Physical Activity prescription:  A goal of 30 minutes physical activity daily is recommended for health benefit and at least 60 minutes daily to prevent weight regain. For weight loss, no less than 75% needs to be aerobic (i.e. Walking) and no more than 25% resistance exercising (i.e. Weight lifting). For weight maintenance phase, 50% aerobic and 50% resistance exercises. Sleep prescription:    A goal of 7-8 hours of uninterrupted sleep is recommended to turn off the Grehlin hormone to be released from the stomach and triggers appetite while promoting weight gain. Proper rest turns on Leptin hormone to be released from white adipose tissue and promotes weight loss.   Counseled patient on: weight loss goals, emphasizing a 5-10% weight loss in 6-12 months and strategies. Constipation. Immunizations noted. Praised pt for weight loss efforts and progress. Patient was offered a choice/choices in the treatment plan today. Patient expresses understanding of the plan and agrees with recommendations. Patient declines any additional handouts. Patient is satisfied with previous handouts received from our office    Follow-up Disposition:  Return in about 1 month (around 1/18/2018) for weight, bp, med refill. Written by hyun Vidal, as dictated by Dr. Luciano Davis DO. Documentation True and Accepted by Gian Malik.  Justus Lopez.

## 2017-12-18 NOTE — PROGRESS NOTES
Mervat Mckeon is a 58 y.o. female  Chief Complaint   Patient presents with    Weight Management     1 month F/U     1. Have you been to the ER, urgent care clinic since your last visit? Hospitalized since your last visit? No    2. Have you seen or consulted any other health care providers outside of the [address] [place] [address] since your last visit? Include any pap smears or colon screening.  No

## 2018-01-18 ENCOUNTER — OFFICE VISIT (OUTPATIENT)
Dept: FAMILY MEDICINE CLINIC | Age: 63
End: 2018-01-18

## 2018-01-18 VITALS
HEIGHT: 64 IN | BODY MASS INDEX: 36.09 KG/M2 | TEMPERATURE: 97.7 F | OXYGEN SATURATION: 100 % | HEART RATE: 66 BPM | WEIGHT: 211.4 LBS | RESPIRATION RATE: 18 BRPM | DIASTOLIC BLOOD PRESSURE: 70 MMHG | SYSTOLIC BLOOD PRESSURE: 107 MMHG

## 2018-01-18 DIAGNOSIS — R60.0 LEG EDEMA, RIGHT: ICD-10-CM

## 2018-01-18 DIAGNOSIS — M05.752 RHEUMATOID ARTHRITIS INVOLVING BOTH HIPS WITH POSITIVE RHEUMATOID FACTOR (HCC): ICD-10-CM

## 2018-01-18 DIAGNOSIS — M25.552 HIP PAIN, BILATERAL: ICD-10-CM

## 2018-01-18 DIAGNOSIS — M25.551 HIP PAIN, BILATERAL: ICD-10-CM

## 2018-01-18 DIAGNOSIS — E06.3 HASHIMOTO'S THYROIDITIS: ICD-10-CM

## 2018-01-18 DIAGNOSIS — E66.9 OBESITY, CLASS II, BMI 35-39.9: ICD-10-CM

## 2018-01-18 DIAGNOSIS — M05.751 RHEUMATOID ARTHRITIS INVOLVING BOTH HIPS WITH POSITIVE RHEUMATOID FACTOR (HCC): ICD-10-CM

## 2018-01-18 DIAGNOSIS — M25.561 RECURRENT PAIN OF RIGHT KNEE: ICD-10-CM

## 2018-01-18 DIAGNOSIS — E78.00 PURE HYPERCHOLESTEROLEMIA: ICD-10-CM

## 2018-01-18 DIAGNOSIS — E55.9 VITAMIN D DEFICIENCY: ICD-10-CM

## 2018-01-18 DIAGNOSIS — F51.04 CHRONIC INSOMNIA: Primary | ICD-10-CM

## 2018-01-18 RX ORDER — PHENDIMETRAZINE TARTRATE 105 MG/1
CAPSULE, EXTENDED RELEASE ORAL
Refills: 0 | COMMUNITY
Start: 2017-11-26 | End: 2018-01-18 | Stop reason: ALTCHOICE

## 2018-01-18 RX ORDER — PHENTERMINE HYDROCHLORIDE 37.5 MG/1
37.5 TABLET ORAL DAILY
Qty: 30 TAB | Refills: 0 | Status: SHIPPED | OUTPATIENT
Start: 2018-01-18 | End: 2018-02-21 | Stop reason: SDUPTHER

## 2018-01-18 NOTE — PROGRESS NOTES
Tanya Monahan is a 58 y.o. female  Chief Complaint   Patient presents with    Weight Management     1 month    Hypertension     follow up    Medication Refill     1. Have you been to the ER, urgent care clinic since your last visit? Hospitalized since your last visit? No    2. Have you seen or consulted any other health care providers outside of the [address] [place] [address] since your last visit? Include any pap smears or colon screening.  No

## 2018-01-18 NOTE — MR AVS SNAPSHOT
[address] [place] [address] 
 
 
 [address] 
[address] [place] [address] 
[phone] Patient: Michelle Maciel MRN: [id number]:[id number] Visit Information Date & Time Provider Department Dept. Phone Encounter #  
 1/18/2018  8:45 AM Sunitha Garcia DO [facility] [phone] [id number] Follow-up Instructions Return in about 1 month (around 2/18/2018) for weight, bp, med refill. Your Appointments 2/15/2018  8:45 AM  
Office Visit with Sunitha Garcia DO [facility] ([facility] [place]) Appt Note: 1 MO F/U Weight, BP Check, Med Refill [address] [phone]  
  
   
 14 [facility] [address]  
  
    
 3/15/2018  8:30 AM  
Office Visit with Sunitha Garcia DO [facility] ([facility] [place]) Appt Note: 1 MO F/U Weight, BP  
 [address] 
[address] [place] [address]  
[phone]  
  
    
 4/16/2018  8:30 AM  
Office Visit with Sunitha Garcia DO [facility] ([facility] [place]) Appt Note: 1 MO F/U Weight, BP  
 [address] 
[address] [place] [address]  
[phone] 5/15/2018  8:30 AM  
Office Visit with Sunitha Garcia DO [facility] ([facility] [place]) Appt Note: 1 MO F/U Weight, BP  
 [address] 
[address] [place] [address]  
[phone]  
  
    
 6/15/2018  8:30 AM  
Office Visit with Sunitha Garcia DO [facility] ([facility] [place]) Appt Note: 1 MO F/U Weight, BP  
 [address] 
[address] [place] [address]  
[phone] Upcoming Health Maintenance Date Due  
 PAP AKA CERVICAL CYTOLOGY 12/12/2018 BREAST CANCER SCRN MAMMOGRAM 12/14/2018 COLONOSCOPY 9/27/2021 DTaP/Tdap/Td series (2 - Td) 6/30/2026 Allergies as of 1/18/2018  Review Complete On: 1/18/2018 By: Omar Kelley LPN Severity Noted Reaction Type Reactions Advil Pm [Ibuprofen-diphenhydramine Hcl]  07/18/2011    Other (comments) Slightly elevated Creaitnine 1.02 Aleve [Naproxen Sodium]  12/28/2011    Other (comments) Due to kidneys Bactrim [Sulfamethoxazole-trimethoprim]  09/11/2009    Nausea and Vomiting  
 Sulfa (Sulfonamide Antibiotics)  09/11/2009    Nausea and Vomiting  
 Sulfasalazine  09/11/2009    Nausea and Vomiting Current Immunizations  Reviewed on 11/17/2017 Name Date Influenza Vaccine 9/18/2014, 10/22/2013, 12/14/2012 Influenza Vaccine Elana Senna) 10/29/2015 Influenza Vaccine (Quad) PF 10/19/2017, 9/29/2016 Influenza Vaccine Split 11/7/2011, 11/12/2010 TD Vaccine 8/18/2000 Tdap 6/30/2016  9:35 AM  
  
 Not reviewed this visit You Were Diagnosed With   
  
 Codes Comments Chronic insomnia    -  Primary ICD-10-CM: F51.04 
ICD-9-CM: 780.52 stable with increased exercise Hashimoto's thyroiditis     ICD-10-CM: E06.3 ICD-9-CM: 245.2 Pure hypercholesterolemia     ICD-10-CM: E78.00 ICD-9-CM: 272.0 Vitamin D deficiency     ICD-10-CM: E55.9 ICD-9-CM: 268.9 Obesity, Class II, BMI 35-39.9     ICD-10-CM: E66.9 ICD-9-CM: 278.00 Rheumatoid arthritis involving both hips with positive rheumatoid factor ([facility] [address]     ICD-10-CM: M05.751, S50.666 ICD-9-CM: 714.0 Recurrent pain of right knee     ICD-10-CM: M25.561 ICD-9-CM: 719.46 due to severe OA, worse during cold weather, improved after Synvisc injection 08/2017 Leg edema, right     ICD-10-CM: R60.0 ICD-9-CM: 782.3 Hip pain, bilateral     ICD-10-CM: M25.551, M25.552 ICD-9-CM: 719.45 due to OA, recurrent Vitals BP Pulse Temp Resp Height(growth percentile) Weight(growth percentile) 107/70 (BP 1 Location: Left arm, BP Patient Position: Sitting) 66 97.7 °F (36.5 °C) (Oral) 18 5' 4\" (1.626 m) 211 lb 6.4 oz (95.9 kg) SpO2 BMI OB Status Smoking Status 100% 36.29 kg/m2 Hysterectomy Never Smoker Vitals History BMI and BSA Data Body Mass Index Body Surface Area  
 36.29 kg/m 2 2.08 m 2 Preferred Pharmacy Pharmacy Name Phone [facility], [address] AT [address] [phone] Your Updated Medication List  
  
   
This list is accurate as of: 1/18/18  9:41 AM.  Always use your most recent med list.  
  
  
  
  
 albuterol 90 mcg/actuation inhaler Commonly known as:  PROVENTIL HFA, VENTOLIN HFA, PROAIR HFA Take 2 Puffs by inhalation every four (4) hours as needed for Wheezing (cough). aspirin delayed-release 81 mg tablet Take 1 Tab by mouth daily. Indications: prevention of transient ischemic attack  
  
 cetirizine 10 mg tablet Commonly known as:  ZYRTEC Take 1 Tab by mouth daily. diclofenac EC 75 mg EC tablet Commonly known as:  VOLTAREN  
TAKE 1 TABLET BY MOUTH TWICE DAILY  
  
 eszopiclone 2 mg tablet Commonly known as:  Genette Cable Take 1 Tab by mouth nightly. Max Daily Amount: 2 mg. fluticasone 50 mcg/actuation nasal spray Commonly known as:  Gissel Loving 2 Sprays by Both Nostrils route daily. Indications: ALLERGIC RHINITIS  
  
 furosemide 40 mg tablet Commonly known as:  LASIX TAKE 1 TABLET BY MOUTH DAILY FOR SWELLING  
  
 hyaluronate 48 mg/6 mL Syrg injection Commonly known as:  HYLAN Inject one prefilled syringe into the right knee, for a quantity of 1 injection Insulin Needles (Disposable) 32 gauge x 5/32\" Ndle Commonly known as:  Catina Pen Needle Use daily as directed MINIVELLE 0.1 mg/24 hr  
Generic drug:  estradiol 1 Patch by TransDERmal route two (2) times a week. Changes Sunday and Wednesday  
  
 montelukast 10 mg tablet Commonly known as:  SINGULAIR Take 1 Tab by mouth daily. Indications: ALLERGIC RHINITIS, breathing  
  
 phentermine 37.5 mg tablet Commonly known as:  ADIPEX-P Take 1 Tab by mouth daily. Max Daily Amount: 37.5 mg.  
  
 sertraline 50 mg tablet Commonly known as:  ZOLOFT Take 1 Tab by mouth daily. Indications: GENERALIZED ANXIETY DISORDER  
  
 VITAMIN D3 2,000 unit Tab Generic drug:  cholecalciferol (vitamin D3) Take 2,000 Units by mouth daily. Prescriptions Printed Refills  
 phentermine (ADIPEX-P) 37.5 mg tablet 0 Sig: Take 1 Tab by mouth daily. Max Daily Amount: 37.5 mg.  
 Class: Print Route: Oral  
  
Follow-up Instructions Return in about 1 month (around 2/18/2018) for weight, bp, med refill. Patient Instructions Knee Arthritis: Exercises Your Care Instructions Here are some examples of exercises for knee arthritis. Start each exercise slowly. Ease off the exercise if you start to have pain. Your doctor or physical therapist will tell you when you can start these exercises and which ones will work best for you. How to do the exercises Knee flexion with heel slide 1. Lie on your back with your knees bent. 2. Slide your heel back by bending your affected knee as far as you can. Then hook your other foot around your ankle to help pull your heel even farther back. 3. Hold for about 6 seconds, then rest for up to 10 seconds. 4. Repeat 8 to 12 times. 5. Switch legs and repeat steps 1 through 4, even if only one knee is sore. [employer] 1. Sit with your affected leg straight and supported on the floor or a firm bed. Place a small, rolled-up towel under your knee. Your other leg should be bent, with that foot flat on the floor. 2. Tighten the thigh muscles of your affected leg by pressing the back of your knee down into the towel. 3. Hold for about 6 seconds, then rest for up to 10 seconds. 4. Repeat 8 to 12 times. 5. Switch legs and repeat steps 1 through 4, even if only one knee is sore. Straight-leg raises to the front 1. Lie on your back with your good knee bent so that your foot rests flat on the floor. Your affected leg should be straight. Make sure that your low back has a normal curve. You should be able to slip your hand in between the floor and the small of your back, with your palm touching the floor and your back touching the back of your hand. 2. Tighten the thigh muscles in your affected leg by pressing the back of your knee flat down to the floor. Hold your knee straight. 3. Keeping the thigh muscles tight and your leg straight, lift your affected leg up so that your heel is about 12 inches off the floor. Hold for about 6 seconds, then lower slowly. 4. Relax for up to 10 seconds between repetitions. 5. Repeat 8 to 12 times. 6. Switch legs and repeat steps 1 through 5, even if only one knee is sore. Active knee flexion 1. Lie on your stomach with your knees straight. If your kneecap is uncomfortable, roll up a washcloth and put it under your leg just above your kneecap. 2. Lift the foot of your affected leg by bending the knee so that you bring the foot up toward your buttock. If this motion hurts, try it without bending your knee quite as far. This may help you avoid any painful motion. 3. Slowly move your leg up and down. 4. Repeat 8 to 12 times. 5. Switch legs and repeat steps 1 through 4, even if only one knee is sore. Quadriceps stretch (facedown) 1. Lie flat on your stomach, and rest your face on the floor. 2. Wrap a towel or belt strap around the lower part of your affected leg. Then use the towel or belt strap to slowly pull your heel toward your buttock until you feel a stretch. 3. Hold for about 15 to 30 seconds, then relax your leg against the towel or belt strap. 4. Repeat 2 to 4 times. 5. Switch legs and repeat steps 1 through 4, even if only one knee is sore. Stationary exercise bike 1.  If you do not have a stationary exercise bike at home, you can find one to ride at your local health club or community center. 2. Adjust the height of the bike seat so that your knee is slightly bent when your leg is extended downward. If your knee hurts when the pedal reaches the top, you can raise the seat so that your knee does not bend as much. 3. Start slowly. At first, try to do 5 to 10 minutes of cycling with little to no resistance. Then increase your time and the resistance bit by bit until you can do 20 to 30 minutes without pain. 4. If you start to have pain, rest your knee until your pain gets back to the level that is normal for you. Or cycle for less time or with less effort. Follow-up care is a key part of your treatment and safety. Be sure to make and go to all appointments, and call your doctor if you are having problems. It's also a good idea to know your test results and keep a list of the medicines you take. Where can you learn more? Go to http://romulo-daron.info/. Enter C159 in the search box to learn more about \"Knee Arthritis: Exercises. \" Current as of: March 21, 2017 Content Version: 11.4 © 8598-2276 [employer]. Care instructions adapted under license by [employer] (which disclaims liability or warranty for this information). If you have questions about a medical condition or this instruction, always ask your healthcare professional. [address] any warranty or liability for your use of this information. Hip Arthritis: Exercises Your Care Instructions Here are some examples of exercises for hip arthritis. Start each exercise slowly. Ease off the exercise if you start to have pain. Your doctor or physical therapist will tell you when you can start these exercises and which ones will work best for you. How to do the exercises Straight-leg raises to the outside 6. Lie on your side, with your affected hip on top. 7. Tighten the front thigh muscles of your top leg to keep your knee straight. 8. Keep your hip and your leg straight in line with the rest of your body, and keep your knee pointing forward. Do not drop your hip back. 9. Lift your top leg straight up toward the ceiling, about 12 inches off the floor. Hold for about 6 seconds, then slowly lower your leg. 10. Repeat 8 to 12 times. 11. Switch legs and repeat steps 1 through 5, even if only one hip is sore. Straight-leg raises to the inside 6. Lie on your side with your affected hip on the floor. 7. You can either prop up your other leg on a chair, or you can bend that knee and put that foot in front of your other knee. Do not drop your hip back. 8. Tighten the muscles on the front thigh of your bottom leg to straighten that knee. 9. Keep your kneecap pointing forward and your leg straight, and lift your bottom leg up toward the ceiling about 6 inches. Hold for about 6 seconds, then lower slowly. 10. Repeat 8 to 12 times. 11. Switch legs and repeat steps 1 through 5, even if only one hip is sore. Hip hike 7. Stand sideways on the bottom step of a staircase, and hold on to the banister or wall. 8. Keeping both knees straight, lift your good leg off the step and let it hang down. Then hike your good hip up to the same level as your affected hip or a little higher. 9. Repeat 8 to 12 times. 10. Switch legs and repeat steps 1 through 3, even if only one hip is sore. Bridging 6. Lie on your back with both knees bent. Your knees should be bent about 90 degrees. 7. Then push your feet into the floor, squeeze your buttocks, and lift your hips off the floor until your shoulders, hips, and knees are all in a straight line. 8. Hold for about 6 seconds as you continue to breathe normally, and then slowly lower your hips back down to the floor and rest for up to 10 seconds. 9. Repeat 8 to 12 times. Hamstring stretch (lying down) 6. Lie flat on your back with your legs straight. If you feel discomfort in your back, place a small towel roll under your lower back. 7. Holding the back of your affected leg, lift your leg straight up and toward your body until you feel a stretch at the back of your thigh. 8. Hold the stretch for at least 30 seconds. 9. Repeat 2 to 4 times. 10. Switch legs and repeat steps 1 through 4, even if only one hip is sore. Standing quadriceps stretch 5. If you are not steady on your feet, hold on to a chair, counter, or wall. You can also lie on your stomach or your side to do this exercise. 6. Bend the knee of the leg you want to stretch, and reach behind you to grab the front of your foot or ankle with the hand on the same side. For example, if you are stretching your right leg, use your right hand. 7. Keeping your knees next to each other, pull your foot toward your buttock until you feel a gentle stretch across the front of your hip and down the front of your thigh. Your knee should be pointed directly to the ground, and not out to the side. 8. Hold the stretch for at least 15 to 30 seconds. 9. Repeat 2 to 4 times. 10. Switch legs and repeat steps 1 through 5, even if only one hip is sore. Hip rotator stretch 1. Lie on your back with both knees bent and your feet flat on the floor. 2. Put the ankle of your affected leg on your opposite thigh near your knee. 3. Use your hand to gently push your knee away from your body until you feel a gentle stretch around your hip. 4. Hold the stretch for 15 to 30 seconds. 5. Repeat 2 to 4 times. 6. Repeat steps 1 through 5, but this time use your hand to gently pull your knee toward your opposite shoulder. 7. Switch legs and repeat steps 1 through 6, even if only one hip is sore. Knee-to-chest 
 
1. Lie on your back with your knees bent and your feet flat on the floor.  
2. Bring your affected leg to your chest, keeping the other foot flat on the floor (or keeping the other leg straight, whichever feels better on your lower back). 3. Keep your lower back pressed to the floor. Hold for at least 15 to 30 seconds. 4. Relax, and lower the knee to the starting position. 5. Repeat 2 to 4 times. 6. Switch legs and repeat steps 1 through 5, even if only one hip is sore. 7. To get more stretch, put your other leg flat on the floor while pulling your knee to your chest. 
Clamshell 1. Lie on your side, with your affected hip on top. Keep your feet and knees together and your knees bent. 2. Raise your top knee, but keep your feet together. Do not let your hips roll back. Your legs should open up like a clamshell. 3. Hold for 6 seconds. 4. Slowly lower your knee back down. Rest for 10 seconds. 5. Repeat 8 to 12 times. 6. Switch legs and repeat steps 1 through 5, even if only one hip is sore. Follow-up care is a key part of your treatment and safety. Be sure to make and go to all appointments, and call your doctor if you are having problems. It's also a good idea to know your test results and keep a list of the medicines you take. Where can you learn more? Go to http://romulo-daron.info/. Enter A586 in the search box to learn more about \"Hip Arthritis: Exercises. \" Current as of: March 21, 2017 Content Version: 11.4 © 3138-7394 [employer] Care instructions adapted under license by [employer] (which disclaims liability or warranty for this information). If you have questions about a medical condition or this instruction, always ask your healthcare professional. [address] [address] any warranty or liability for your use of this information. Introducing \[facility]\"" & HEALTH SERVICES! Nicolle Edwards introduces Tagged patient portal. Now you can access parts of your medical record, email your doctor's office, and request medication refills online.    
 
1. In your internet browser, go to [email or web address] 2. Click on the First Time User? Click Here link in the Sign In box. You will see the New Member Sign Up page. 3. Enter your [employer] Access Code exactly as it appears below. You will not need to use this code after youve completed the sign-up process. If you do not sign up before the expiration date, you must request a new code. · [employer] Access Code: [email or web address] Expires: 4/18/2018  9:41 AM 
 
4. Enter the last four digits of your Social Security Number (xxxx) and Date of Birth (mm/dd/yyyy) as indicated and click Submit. You will be taken to the next sign-up page. 5. Create a [employer] ID. This will be your [employer] login ID and cannot be changed, so think of one that is secure and easy to remember. 6. Create a [employer] password. You can change your password at any time. 7. Enter your Password Reset Question and Answer. This can be used at a later time if you forget your password. 8. Enter your e-mail address. You will receive e-mail notification when new information is available in [address] 9. Click Sign Up. You can now view and download portions of your medical record. 10. Click the Download Summary menu link to download a portable copy of your medical information. If you have questions, please visit the Frequently Asked Questions section of the [employer] website. Remember, [employer] is NOT to be used for urgent needs. For medical emergencies, dial 911. Now available from your iPhone and Android! Please provide this summary of care documentation to your next provider. Your primary care clinician is listed as Wendy Gonzalez. If you have any questions after today's visit, please call [phone].

## 2018-01-18 NOTE — PROGRESS NOTES
HISTORY OF PRESENT ILLNESS  Rodriguez Trejo is a 58 y.o. female presents with Weight Management (1 month); Hypertension (follow up); Medication Refill; and Referral Follow Up      Agree with nurse note. Pt with Hashimoto's thyroiditis, hypercholesterolemia, and vit d deficiency presents to the office with a BP of 107/70. She is tolerating Adipex-P 37.5 mg well, month 1 of 3. Last prescribed on 17. She reports that her pharmacy carries a different  of Adipex-P and she does not feel it is as effective. She is exercising x4 days weekly x60 mins a day. She lost 3 pounds since the last visit. Percent body fat has decreased from 45.3% to 44.8%, waist circumference measurement has changed from 38\" to 37.3\". Overall, patient is pleased and requests a refill of the medication today. Pt with RA, BL hip pain, recurrent R knee pain, and R edema. Her R knee pain has been worse in the colder weather. She had a Synvisc injection in her R knee performed by her orthopedist, Dr. Dionisio Apgar. Pt with hx of chronic insomnia. She sleeps x7-8 hours nightly now. Patient denies fatigue, sleep disturbance, chest pain, heart palpitations, nausea, dry mouth, constipation, signs of depression. Written by hyun Sepulveda, as dictated by Dr. Avinash Mcguire DO.    ROS    Review of Systems negative except as noted above in HPI.     ALLERGIES:    Allergies   Allergen Reactions    Advil Pm [Ibuprofen-Diphenhydramine Hcl] Other (comments)     Slightly elevated Creaitnine 1.02    Aleve [Naproxen Sodium] Other (comments)     Due to kidneys    Bactrim [Sulfamethoxazole-Trimethoprim] Nausea and Vomiting    Sulfa (Sulfonamide Antibiotics) Nausea and Vomiting    Sulfasalazine Nausea and Vomiting       CURRENT MEDICATIONS:    Outpatient Prescriptions Marked as Taking for the 18 encounter (Office Visit) with Ivania George DO   Medication Sig Dispense Refill    phentermine (ADIPEX-P) 37.5 mg tablet Take 1 Tab by mouth daily. Max Daily Amount: 37.5 mg. 30 Tab 0    furosemide (LASIX) 40 mg tablet TAKE 1 TABLET BY MOUTH DAILY FOR SWELLING 90 Tab 1    hyaluronate (HYLAN) 48 mg/6 mL syrg injection Inject one prefilled syringe into the right knee, for a quantity of 1 injection      aspirin delayed-release 81 mg tablet Take 1 Tab by mouth daily. Indications: prevention of transient ischemic attack 90 Tab 3    diclofenac EC (VOLTAREN) 75 mg EC tablet TAKE 1 TABLET BY MOUTH TWICE DAILY (Patient taking differently: TAKE 1 TABLET BY MOUTH DAILY) 60 Tab 2    sertraline (ZOLOFT) 50 mg tablet Take 1 Tab by mouth daily. Indications: GENERALIZED ANXIETY DISORDER 135 Tab 3    eszopiclone (LUNESTA) 2 mg tablet Take 1 Tab by mouth nightly. Max Daily Amount: 2 mg. 30 Tab 5    MINIVELLE 0.1 mg/24 hr 1 Patch by TransDERmal route two (2) times a week. Changes Sunday and Wednesday  0    cetirizine (ZYRTEC) 10 mg tablet Take 1 Tab by mouth daily. (Patient taking differently: Take 10 mg by mouth daily as needed.) 30 Tab 3    fluticasone (FLONASE) 50 mcg/actuation nasal spray 2 Sprays by Both Nostrils route daily. Indications: ALLERGIC RHINITIS 1 Bottle 5    albuterol (PROVENTIL HFA, VENTOLIN HFA) 90 mcg/actuation inhaler Take 2 Puffs by inhalation every four (4) hours as needed for Wheezing (cough). 1 Inhaler 1    montelukast (SINGULAIR) 10 mg tablet Take 1 Tab by mouth daily. Indications: ALLERGIC RHINITIS, breathing 30 Tab 11    cholecalciferol, vitamin D3, (VITAMIN D3) 2,000 unit Tab Take 2,000 Units by mouth daily. PAST MEDICAL HISTORY:    Past Medical History:   Diagnosis Date    Achilles tendonitis 12/2004    Right. Dr. Zach Osorio Arthritis 2010    hips, knees. Dr. Harrington Cushing    Chest pain 08/2013    Dr. Albino Lake.      Chickenpox childhood    Endometriosis 1990    Dr. Judy Marcial Environmental allergies     MARGI (generalized anxiety disorder)     Hip pain, bilateral 2010 due to severe OA. Iliopsoas bursitis. Dr. Madiha Martinez    History of breast lump/mass excision 1998    Left breast.  Dr. Aleyda Crane Hyperlipidemia     Iron deficiency anemia     iron deficiency    Knee pain, bilateral 2017    Dr. Yudi Pereira    Menopausal and postmenopausal disorder 2007    Dr. Lee Reddy Uterine fibroid     Dr. Konstantin Mack.  Vitamin D deficiency 03/2008       PAST SURGICAL HISTORY:    Past Surgical History:   Procedure Laterality Date    BIOPSY BREAST  Rt 1993;Lt 1998    Dr. Isabel Currie Right 2002    Right achiles tendon. Dr. Nathanael Marte.  HX BREAST BIOPSY  2001    Left. benign. Dr. Joelle Vasquez HX COLONOSCOPY  09/27/2016    Dr. Ramos Nguyen.  due q 5 yrs due to fam hx   [place]    Dr. Rosie Roy. due to endometriosis    HX PELVIC LAPAROSCOPY  1995    due to endometriosis Dr. Graciela Guerrero  04/2011    due to fibroids. Dr. Konstantin Mack.        FAMILY HISTORY:    Family History   Problem Relation Age of Onset    Hypertension Mother     Other Mother      hearing loss/vision loss    Heart Disease Father     Lung Disease Father     Diabetes Sister     Heart Attack Sister 54     March 2014    Stroke Sister      after MI    Thyroid Disease Sister     Colon Polyps Sister     Heart Disease Brother      Defibrillator placed    Heart Attack Brother 48    Diabetes Maternal Grandmother        SOCIAL HISTORY:    Social History     Social History    Marital status: SINGLE     Spouse name: N/A    Number of children: 2    Years of education: N/A     Occupational History    [employer]      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      Social History Main Topics    Smoking status: Never Smoker    Smokeless tobacco: Never Used      Comment: lived with smoker mom x 18 yrs    Alcohol use No    Drug use: No    Sexual activity: Not Currently     Partners: Male     Birth control/ protection: Abstinence, Surgical     Other Topics Concern    None     Social History Narrative       IMMUNIZATIONS:    Immunization History   Administered Date(s) Administered    Influenza Vaccine 12/14/2012, 10/22/2013, 09/18/2014    Influenza Vaccine (Quad) 10/29/2015    Influenza Vaccine (Quad) PF 09/29/2016, 10/19/2017    Influenza Vaccine Split 11/12/2010, 11/07/2011    TD Vaccine 08/18/2000    Tdap 06/30/2016       PHYSICAL EXAMINATION    Vital Signs    Visit Vitals    /70 (BP 1 Location: Left arm, BP Patient Position: Sitting)    Pulse 66    Temp 97.7 °F (36.5 °C) (Oral)    Resp 18    Ht 5' 4\" (1.626 m)    Wt 211 lb 6.4 oz (95.9 kg)    SpO2 100%    BMI 36.29 kg/m2       Weight Metrics 1/18/2018 1/18/2018 12/18/2017 12/18/2017 11/17/2017 11/17/2017 10/19/2017   Weight - 211 lb 6.4 oz - 214 lb 3.2 oz - 216 lb 9.6 oz -   Waist Measure Inches 37.3 - 38 - 35 - 37.5   Body Fat % 44.8 - 45.3 - 45.7 - 46.3   BMI - 36.29 kg/m2 - 36.77 kg/m2 - 37.18 kg/m2 -       General appearance - Well nourished. Well appearing. Well developed. No acute distress. Obese. Head - Normocephalic. Atraumatic. Eyes - pupils equal and reactive, extraocular eye movements intact, sclera anicteric  Ears - Hearing is grossly normal bilaterally. Nose - normal and patent, no erythema, discharge or polyps   Mouth - mucous membranes moist, pharynx normal with cobblestone appearance. No erythema, white exudate or obstruction. Neck - supple. Midline trachea. No carotid bruits are noted. No thyromegaly noted. Chest - clear to auscultation bilaterally anterriorly and posteriorly. No wheezes, rales or rhonchi. Breath sounds are symmetrical and unlabored bilaterally. Heart - normal rate. Regular rhythm, normal S1, S2. No murmur. No rubs, clicks or gallops noted. Abdomen - soft and distended. No masses or organomegaly. No rebound, rigidity or guarding. Bowel sounds normal x 4 quadrants. No tenderness noted.   Neurological - awake, alert and oriented to person, place, and time and event. Cranial nerves II through XII intact. Muscle strength is +5/5 x 4 extremities. Sensation is intact to light touch bilaterally. Steady gait. Heme/Lymph - peripheral pulses normal x 4 extremities. No peripheral edema is noted. No cervical adenopathy noted. Skin - no rashes, erythema, ecchymosis, lacerations, abrasions, suspicious moles noted. No skin tags. No acanthosis nigricans noted in the axilla or neck. Psychological -   normal behavior, speech, dress and thought processes. Good insight. Good eye contact. Normal affect. Appropriate mood. DATA REVIEWED  Lab Results   Component Value Date/Time    WBC 3.6 03/28/2017 09:32 AM    HGB 13.0 03/28/2017 09:32 AM    HCT 40.8 03/28/2017 09:32 AM    PLATELET 060 53/82/2097 09:32 AM    MCV 92 03/28/2017 09:32 AM     Lab Results   Component Value Date/Time    Sodium 139 03/28/2017 09:32 AM    Potassium 4.0 03/28/2017 09:32 AM    Chloride 98 03/28/2017 09:32 AM    CO2 26 03/28/2017 09:32 AM    Anion gap 5 04/23/2011 05:20 AM    Glucose 80 03/28/2017 09:32 AM    BUN 17 03/28/2017 09:32 AM    Creatinine 0.98 03/28/2017 09:32 AM    BUN/Creatinine ratio 17 03/28/2017 09:32 AM    GFR est AA 72 03/28/2017 09:32 AM    GFR est non-AA 62 03/28/2017 09:32 AM    Calcium 9.1 03/28/2017 09:32 AM    Bilirubin, total 0.5 03/28/2017 09:32 AM    AST (SGOT) 22 03/28/2017 09:32 AM    Alk.  phosphatase 91 03/28/2017 09:32 AM    Protein, total 7.0 03/28/2017 09:32 AM    Albumin 4.4 03/28/2017 09:32 AM    A-G Ratio 1.7 03/28/2017 09:32 AM    ALT (SGPT) 17 03/28/2017 09:32 AM     Lab Results   Component Value Date/Time    Cholesterol, total 169 03/28/2017 09:32 AM    HDL Cholesterol 68 03/28/2017 09:32 AM    LDL, calculated 92 03/28/2017 09:32 AM    VLDL, calculated 9 03/28/2017 09:32 AM    Triglyceride 44 03/28/2017 09:32 AM     Lab Results   Component Value Date/Time    Vitamin D 25-Hydroxy 26.3 07/18/2011 10:22 AM    VITAMIN D, 25-HYDROXY 34.7 03/28/2017 09:32 AM       Lab Results   Component Value Date/Time    Hemoglobin A1c 5.5 03/28/2017 09:32 AM     Lab Results   Component Value Date/Time    TSH 1.900 02/24/2017 10:03 AM    TSH 2.36 08/21/2014 07:50 AM        ASSESSMENT and PLAN    ICD-10-CM ICD-9-CM    1. Chronic insomnia F51.04 780.52     stable with increased exercise   2. Hashimoto's thyroiditis E06.3 245.2    3. Pure hypercholesterolemia E78.00 272.0    4. Vitamin D deficiency E55.9 268.9    5. Obesity, Class II, BMI 35-39.9 E66.9 278.00 phentermine (ADIPEX-P) 37.5 mg tablet   6. Rheumatoid arthritis involving both hips with positive rheumatoid factor (HCC) M05.751 714.0     M05.752     7. Recurrent pain of right knee M25.561 719.46     due to severe OA, worse during cold weather, improved after Synvisc injection 08/2017   8. Leg edema, right R60.0 782.3    9. Hip pain, bilateral M25.551 719.45     M25.552      due to OA, recurrent       Continue current medications and care. Prescriptions written and given to patient (Adipex-P 37.5 mg, month 2 of 3.)  Medication side effects discussed. Get [facility]. Advise pt to sign release today. Discussed the patient's BMI with her. The BMI follow up plan is as follows: I have counseled this patient on diet and exercise regimens. Diet recommendations:  Decrease carbohydrates (white foods including flour, white bread, white rice, white pasta, white potatoes; corn, sweet foods, sweet drinks and alcohol), increase green leafy vegetables and protein with each meal.  Avoid fried foods. Eat 3-5 small meals daily. Do not skip meals. Ok to use meal replacements up to twice daily with protein goal of 60 mg per meal.   Recommend OTC Premiere Protein bars or shakes. Increase water intake. Increase physical activity to 30 minutes daily for health benefit or 60 minutes daily to prevent weight regain, as tolerated.     Physical Activity prescription:  A goal of 30 minutes physical activity daily is recommended for health benefit and at least 60 minutes daily to prevent weight regain. For weight loss, no less than 75% needs to be aerobic (i.e. Walking) and no more than 25% resistance exercising (i.e. Weight lifting). For weight maintenance phase, 50% aerobic and 50% resistance exercises. Sleep prescription:    A goal of 7-8 hours of uninterrupted sleep is recommended to turn off the Grehlin hormone to be released from the stomach and triggers appetite while promoting weight gain. Proper rest turns on Leptin hormone to be released from white adipose tissue and promotes weight loss. Counseled patient on: weight loss goals, emphasizing a 5-10% weight loss in 6-12 months and strategies. Knee care and hip care. Relevant handouts given and discussed with patient. Immunizations noted. Praised pt for weight loss efforts and progress. Patient was offered a choice/choices in the treatment plan today. Patient expresses understanding of the plan and agrees with recommendations. Follow-up Disposition:  Return in about 1 month (around 2/18/2018) for weight, bp, med refill. Written by hyun Cárdenas, as dictated by Dr. Quique Morillo DO. Documentation True and Accepted by Radha Miller. Santiago Wilcox. Patient Instructions        Knee Arthritis: Exercises  Your Care Instructions  Here are some examples of exercises for knee arthritis. Start each exercise slowly. Ease off the exercise if you start to have pain. Your doctor or physical therapist will tell you when you can start these exercises and which ones will work best for you. How to do the exercises  Knee flexion with heel slide    1. Lie on your back with your knees bent. 2. Slide your heel back by bending your affected knee as far as you can. Then hook your other foot around your ankle to help pull your heel even farther back. 3. Hold for about 6 seconds, then rest for up to 10 seconds.   4. Repeat 8 to 12 times.  5. Switch legs and repeat steps 1 through 4, even if only one knee is sore. Quad sets    1. Sit with your affected leg straight and supported on the floor or a firm bed. Place a small, rolled-up towel under your knee. Your other leg should be bent, with that foot flat on the floor. 2. Tighten the thigh muscles of your affected leg by pressing the back of your knee down into the towel. 3. Hold for about 6 seconds, then rest for up to 10 seconds. 4. Repeat 8 to 12 times. 5. Switch legs and repeat steps 1 through 4, even if only one knee is sore. Straight-leg raises to the front    1. Lie on your back with your good knee bent so that your foot rests flat on the floor. Your affected leg should be straight. Make sure that your low back has a normal curve. You should be able to slip your hand in between the floor and the small of your back, with your palm touching the floor and your back touching the back of your hand. 2. Tighten the thigh muscles in your affected leg by pressing the back of your knee flat down to the floor. Hold your knee straight. 3. Keeping the thigh muscles tight and your leg straight, lift your affected leg up so that your heel is about 12 inches off the floor. Hold for about 6 seconds, then lower slowly. 4. Relax for up to 10 seconds between repetitions. 5. Repeat 8 to 12 times. 6. Switch legs and repeat steps 1 through 5, even if only one knee is sore. Active knee flexion    1. Lie on your stomach with your knees straight. If your kneecap is uncomfortable, roll up a washcloth and put it under your leg just above your kneecap. 2. Lift the foot of your affected leg by bending the knee so that you bring the foot up toward your buttock. If this motion hurts, try it without bending your knee quite as far. This may help you avoid any painful motion. 3. Slowly move your leg up and down. 4. Repeat 8 to 12 times.   5. Switch legs and repeat steps 1 through 4, even if only one knee is sore.  Quadriceps stretch (facedown)    1. Lie flat on your stomach, and rest your face on the floor. 2. Wrap a towel or belt strap around the lower part of your affected leg. Then use the towel or belt strap to slowly pull your heel toward your buttock until you feel a stretch. 3. Hold for about 15 to 30 seconds, then relax your leg against the towel or belt strap. 4. Repeat 2 to 4 times. 5. Switch legs and repeat steps 1 through 4, even if only one knee is sore. Stationary exercise bike    1. If you do not have a stationary exercise bike at home, you can find one to ride at your local health club or community center. 2. Adjust the height of the bike seat so that your knee is slightly bent when your leg is extended downward. If your knee hurts when the pedal reaches the top, you can raise the seat so that your knee does not bend as much. 3. Start slowly. At first, try to do 5 to 10 minutes of cycling with little to no resistance. Then increase your time and the resistance bit by bit until you can do 20 to 30 minutes without pain. 4. If you start to have pain, rest your knee until your pain gets back to the level that is normal for you. Or cycle for less time or with less effort. Follow-up care is a key part of your treatment and safety. Be sure to make and go to all appointments, and call your doctor if you are having problems. It's also a good idea to know your test results and keep a list of the medicines you take. Where can you learn more? Go to http://romulo-daron.info/. Enter C159 in the search box to learn more about \"Knee Arthritis: Exercises. \"  Current as of: March 21, 2017  Content Version: 11.4  © 5561-4309 [employer]. Care instructions adapted under license by [employer] (which disclaims liability or warranty for this information).  If you have questions about a medical condition or this instruction, always ask your healthcare professional. [employer] disclaims any warranty or liability for your use of this information. Hip Arthritis: Exercises  Your Care Instructions  Here are some examples of exercises for hip arthritis. Start each exercise slowly. Ease off the exercise if you start to have pain. Your doctor or physical therapist will tell you when you can start these exercises and which ones will work best for you. How to do the exercises  Straight-leg raises to the outside    6. Lie on your side, with your affected hip on top. 7. Tighten the front thigh muscles of your top leg to keep your knee straight. 8. Keep your hip and your leg straight in line with the rest of your body, and keep your knee pointing forward. Do not drop your hip back. 9. Lift your top leg straight up toward the ceiling, about 12 inches off the floor. Hold for about 6 seconds, then slowly lower your leg. 10. Repeat 8 to 12 times. 11. Switch legs and repeat steps 1 through 5, even if only one hip is sore. Straight-leg raises to the inside    6. Lie on your side with your affected hip on the floor. 7. You can either prop up your other leg on a chair, or you can bend that knee and put that foot in front of your other knee. Do not drop your hip back. 8. Tighten the muscles on the front thigh of your bottom leg to straighten that knee. 9. Keep your kneecap pointing forward and your leg straight, and lift your bottom leg up toward the ceiling about 6 inches. Hold for about 6 seconds, then lower slowly. 10. Repeat 8 to 12 times. 11. Switch legs and repeat steps 1 through 5, even if only one hip is sore. Hip hike    7. Stand sideways on the bottom step of a staircase, and hold on to the banister or wall. 8. Keeping both knees straight, lift your good leg off the step and let it hang down. Then hike your good hip up to the same level as your affected hip or a little higher. 9. Repeat 8 to 12 times.   10. Switch legs and repeat steps 1 through 3, even if only one hip is sore. Bridging    6. Lie on your back with both knees bent. Your knees should be bent about 90 degrees. 7. Then push your feet into the floor, squeeze your buttocks, and lift your hips off the floor until your shoulders, hips, and knees are all in a straight line. 8. Hold for about 6 seconds as you continue to breathe normally, and then slowly lower your hips back down to the floor and rest for up to 10 seconds. 9. Repeat 8 to 12 times. Hamstring stretch (lying down)    6. Lie flat on your back with your legs straight. If you feel discomfort in your back, place a small towel roll under your lower back. 7. Holding the back of your affected leg, lift your leg straight up and toward your body until you feel a stretch at the back of your thigh. 8. Hold the stretch for at least 30 seconds. 9. Repeat 2 to 4 times. 10. Switch legs and repeat steps 1 through 4, even if only one hip is sore. Standing quadriceps stretch    5. If you are not steady on your feet, hold on to a chair, counter, or wall. You can also lie on your stomach or your side to do this exercise. 6. Bend the knee of the leg you want to stretch, and reach behind you to grab the front of your foot or ankle with the hand on the same side. For example, if you are stretching your right leg, use your right hand. 7. Keeping your knees next to each other, pull your foot toward your buttock until you feel a gentle stretch across the front of your hip and down the front of your thigh. Your knee should be pointed directly to the ground, and not out to the side. 8. Hold the stretch for at least 15 to 30 seconds. 9. Repeat 2 to 4 times. 10. Switch legs and repeat steps 1 through 5, even if only one hip is sore. Hip rotator stretch    1. Lie on your back with both knees bent and your feet flat on the floor. 2. Put the ankle of your affected leg on your opposite thigh near your knee.   3. Use your hand to gently push your knee away from your body until you feel a gentle stretch around your hip. 4. Hold the stretch for 15 to 30 seconds. 5. Repeat 2 to 4 times. 6. Repeat steps 1 through 5, but this time use your hand to gently pull your knee toward your opposite shoulder. 7. Switch legs and repeat steps 1 through 6, even if only one hip is sore. Knee-to-chest    1. Lie on your back with your knees bent and your feet flat on the floor. 2. Bring your affected leg to your chest, keeping the other foot flat on the floor (or keeping the other leg straight, whichever feels better on your lower back). 3. Keep your lower back pressed to the floor. Hold for at least 15 to 30 seconds. 4. Relax, and lower the knee to the starting position. 5. Repeat 2 to 4 times. 6. Switch legs and repeat steps 1 through 5, even if only one hip is sore. 7. To get more stretch, put your other leg flat on the floor while pulling your knee to your chest.  Clamshell    1. Lie on your side, with your affected hip on top. Keep your feet and knees together and your knees bent. 2. Raise your top knee, but keep your feet together. Do not let your hips roll back. Your legs should open up like a clamshell. 3. Hold for 6 seconds. 4. Slowly lower your knee back down. Rest for 10 seconds. 5. Repeat 8 to 12 times. 6. Switch legs and repeat steps 1 through 5, even if only one hip is sore. Follow-up care is a key part of your treatment and safety. Be sure to make and go to all appointments, and call your doctor if you are having problems. It's also a good idea to know your test results and keep a list of the medicines you take. Where can you learn more? Go to http://romulo-daron.info/. Enter W433 in the search box to learn more about \"Hip Arthritis: Exercises. \"  Current as of: March 21, 2017  Content Version: 11.4  © 1021-6516 [employer].  Care instructions adapted under license by [employer] (which disclaims liability or warranty for this information). If you have questions about a medical condition or this instruction, always ask your healthcare professional. [place] [address] any warranty or liability for your use of this information.

## 2018-01-18 NOTE — PATIENT INSTRUCTIONS
Knee Arthritis: Exercises  Your Care Instructions  Here are some examples of exercises for knee arthritis. Start each exercise slowly. Ease off the exercise if you start to have pain. Your doctor or physical therapist will tell you when you can start these exercises and which ones will work best for you. How to do the exercises  Knee flexion with heel slide    1. Lie on your back with your knees bent. 2. Slide your heel back by bending your affected knee as far as you can. Then hook your other foot around your ankle to help pull your heel even farther back. 3. Hold for about 6 seconds, then rest for up to 10 seconds. 4. Repeat 8 to 12 times. 5. Switch legs and repeat steps 1 through 4, even if only one knee is sore. Quad sets    1. Sit with your affected leg straight and supported on the floor or a firm bed. Place a small, rolled-up towel under your knee. Your other leg should be bent, with that foot flat on the floor. 2. Tighten the thigh muscles of your affected leg by pressing the back of your knee down into the towel. 3. Hold for about 6 seconds, then rest for up to 10 seconds. 4. Repeat 8 to 12 times. 5. Switch legs and repeat steps 1 through 4, even if only one knee is sore. Straight-leg raises to the front    1. Lie on your back with your good knee bent so that your foot rests flat on the floor. Your affected leg should be straight. Make sure that your low back has a normal curve. You should be able to slip your hand in between the floor and the small of your back, with your palm touching the floor and your back touching the back of your hand. 2. Tighten the thigh muscles in your affected leg by pressing the back of your knee flat down to the floor. Hold your knee straight. 3. Keeping the thigh muscles tight and your leg straight, lift your affected leg up so that your heel is about 12 inches off the floor. Hold for about 6 seconds, then lower slowly.   4. Relax for up to 10 seconds between repetitions. 5. Repeat 8 to 12 times. 6. Switch legs and repeat steps 1 through 5, even if only one knee is sore. Active knee flexion    1. Lie on your stomach with your knees straight. If your kneecap is uncomfortable, roll up a washcloth and put it under your leg just above your kneecap. 2. Lift the foot of your affected leg by bending the knee so that you bring the foot up toward your buttock. If this motion hurts, try it without bending your knee quite as far. This may help you avoid any painful motion. 3. Slowly move your leg up and down. 4. Repeat 8 to 12 times. 5. Switch legs and repeat steps 1 through 4, even if only one knee is sore. Quadriceps stretch (facedown)    1. Lie flat on your stomach, and rest your face on the floor. 2. Wrap a towel or belt strap around the lower part of your affected leg. Then use the towel or belt strap to slowly pull your heel toward your buttock until you feel a stretch. 3. Hold for about 15 to 30 seconds, then relax your leg against the towel or belt strap. 4. Repeat 2 to 4 times. 5. Switch legs and repeat steps 1 through 4, even if only one knee is sore. Stationary exercise bike    1. If you do not have a stationary exercise bike at home, you can find one to ride at your local health club or community center. 2. Adjust the height of the bike seat so that your knee is slightly bent when your leg is extended downward. If your knee hurts when the pedal reaches the top, you can raise the seat so that your knee does not bend as much. 3. Start slowly. At first, try to do 5 to 10 minutes of cycling with little to no resistance. Then increase your time and the resistance bit by bit until you can do 20 to 30 minutes without pain. 4. If you start to have pain, rest your knee until your pain gets back to the level that is normal for you. Or cycle for less time or with less effort. Follow-up care is a key part of your treatment and safety.  Be sure to make and go to all appointments, and call your doctor if you are having problems. It's also a good idea to know your test results and keep a list of the medicines you take. Where can you learn more? Go to http://romulo-daron.info/. Enter C159 in the search box to learn more about \"Knee Arthritis: Exercises. \"  Current as of: March 21, 2017  Content Version: 11.4  © 2006-2017 [employer]. Care instructions adapted under license by [employer] (which disclaims liability or warranty for this information). If you have questions about a medical condition or this instruction, always ask your healthcare professional. [place] [address] any warranty or liability for your use of this information. Hip Arthritis: Exercises  Your Care Instructions  Here are some examples of exercises for hip arthritis. Start each exercise slowly. Ease off the exercise if you start to have pain. Your doctor or physical therapist will tell you when you can start these exercises and which ones will work best for you. How to do the exercises  Straight-leg raises to the outside    6. Lie on your side, with your affected hip on top. 7. Tighten the front thigh muscles of your top leg to keep your knee straight. 8. Keep your hip and your leg straight in line with the rest of your body, and keep your knee pointing forward. Do not drop your hip back. 9. Lift your top leg straight up toward the ceiling, about 12 inches off the floor. Hold for about 6 seconds, then slowly lower your leg. 10. Repeat 8 to 12 times. 11. Switch legs and repeat steps 1 through 5, even if only one hip is sore. Straight-leg raises to the inside    6. Lie on your side with your affected hip on the floor. 7. You can either prop up your other leg on a chair, or you can bend that knee and put that foot in front of your other knee. Do not drop your hip back.   8. Tighten the muscles on the front thigh of your bottom leg to straighten that knee. 9. Keep your kneecap pointing forward and your leg straight, and lift your bottom leg up toward the ceiling about 6 inches. Hold for about 6 seconds, then lower slowly. 10. Repeat 8 to 12 times. 11. Switch legs and repeat steps 1 through 5, even if only one hip is sore. Hip hike    7. Stand sideways on the bottom step of a staircase, and hold on to the banister or wall. 8. Keeping both knees straight, lift your good leg off the step and let it hang down. Then hike your good hip up to the same level as your affected hip or a little higher. 9. Repeat 8 to 12 times. 10. Switch legs and repeat steps 1 through 3, even if only one hip is sore. Bridging    6. Lie on your back with both knees bent. Your knees should be bent about 90 degrees. 7. Then push your feet into the floor, squeeze your buttocks, and lift your hips off the floor until your shoulders, hips, and knees are all in a straight line. 8. Hold for about 6 seconds as you continue to breathe normally, and then slowly lower your hips back down to the floor and rest for up to 10 seconds. 9. Repeat 8 to 12 times. Hamstring stretch (lying down)    6. Lie flat on your back with your legs straight. If you feel discomfort in your back, place a small towel roll under your lower back. 7. Holding the back of your affected leg, lift your leg straight up and toward your body until you feel a stretch at the back of your thigh. 8. Hold the stretch for at least 30 seconds. 9. Repeat 2 to 4 times. 10. Switch legs and repeat steps 1 through 4, even if only one hip is sore. Standing quadriceps stretch    5. If you are not steady on your feet, hold on to a chair, counter, or wall. You can also lie on your stomach or your side to do this exercise. 6. Bend the knee of the leg you want to stretch, and reach behind you to grab the front of your foot or ankle with the hand on the same side.  For example, if you are stretching your right leg, use your right hand. 7. Keeping your knees next to each other, pull your foot toward your buttock until you feel a gentle stretch across the front of your hip and down the front of your thigh. Your knee should be pointed directly to the ground, and not out to the side. 8. Hold the stretch for at least 15 to 30 seconds. 9. Repeat 2 to 4 times. 10. Switch legs and repeat steps 1 through 5, even if only one hip is sore. Hip rotator stretch    1. Lie on your back with both knees bent and your feet flat on the floor. 2. Put the ankle of your affected leg on your opposite thigh near your knee. 3. Use your hand to gently push your knee away from your body until you feel a gentle stretch around your hip. 4. Hold the stretch for 15 to 30 seconds. 5. Repeat 2 to 4 times. 6. Repeat steps 1 through 5, but this time use your hand to gently pull your knee toward your opposite shoulder. 7. Switch legs and repeat steps 1 through 6, even if only one hip is sore. Knee-to-chest    1. Lie on your back with your knees bent and your feet flat on the floor. 2. Bring your affected leg to your chest, keeping the other foot flat on the floor (or keeping the other leg straight, whichever feels better on your lower back). 3. Keep your lower back pressed to the floor. Hold for at least 15 to 30 seconds. 4. Relax, and lower the knee to the starting position. 5. Repeat 2 to 4 times. 6. Switch legs and repeat steps 1 through 5, even if only one hip is sore. 7. To get more stretch, put your other leg flat on the floor while pulling your knee to your chest.  Clamshell    1. Lie on your side, with your affected hip on top. Keep your feet and knees together and your knees bent. 2. Raise your top knee, but keep your feet together. Do not let your hips roll back. Your legs should open up like a clamshell. 3. Hold for 6 seconds. 4. Slowly lower your knee back down. Rest for 10 seconds. 5. Repeat 8 to 12 times.   6. Switch legs and repeat steps 1 through 5, even if only one hip is sore. Follow-up care is a key part of your treatment and safety. Be sure to make and go to all appointments, and call your doctor if you are having problems. It's also a good idea to know your test results and keep a list of the medicines you take. Where can you learn more? Go to http://romulo-daron.info/. Enter U205 in the search box to learn more about \"Hip Arthritis: Exercises. \"  Current as of: March 21, 2017  Content Version: 11.4  © 9289-0564 [employer]. Care instructions adapted under license by [employer] (which disclaims liability or warranty for this information). If you have questions about a medical condition or this instruction, always ask your healthcare professional. [address] any warranty or liability for your use of this information.

## 2018-02-21 ENCOUNTER — OFFICE VISIT (OUTPATIENT)
Dept: FAMILY MEDICINE CLINIC | Age: 63
End: 2018-02-21

## 2018-02-21 VITALS
TEMPERATURE: 97.7 F | HEART RATE: 60 BPM | WEIGHT: 203.7 LBS | HEIGHT: 64 IN | DIASTOLIC BLOOD PRESSURE: 77 MMHG | BODY MASS INDEX: 34.78 KG/M2 | RESPIRATION RATE: 12 BRPM | SYSTOLIC BLOOD PRESSURE: 111 MMHG | OXYGEN SATURATION: 100 %

## 2018-02-21 DIAGNOSIS — R74.8 ELEVATED ALKALINE PHOSPHATASE LEVEL: ICD-10-CM

## 2018-02-21 DIAGNOSIS — E55.9 VITAMIN D DEFICIENCY: ICD-10-CM

## 2018-02-21 DIAGNOSIS — E66.9 OBESITY, CLASS I, BMI 30-34.9: ICD-10-CM

## 2018-02-21 DIAGNOSIS — R63.4 WEIGHT LOSS: ICD-10-CM

## 2018-02-21 DIAGNOSIS — Z83.3 FAMILY HISTORY OF DIABETES MELLITUS (DM): ICD-10-CM

## 2018-02-21 DIAGNOSIS — E78.00 PURE HYPERCHOLESTEROLEMIA: Primary | ICD-10-CM

## 2018-02-21 DIAGNOSIS — E06.3 HASHIMOTO'S THYROIDITIS: ICD-10-CM

## 2018-02-21 RX ORDER — PHENTERMINE HYDROCHLORIDE 37.5 MG/1
37.5 TABLET ORAL DAILY
Qty: 30 TAB | Refills: 0 | Status: SHIPPED | OUTPATIENT
Start: 2018-02-21 | End: 2018-03-15 | Stop reason: ALTCHOICE

## 2018-02-21 NOTE — MR AVS SNAPSHOT
[address] [facility], [place] 
 
 
 [address] 
[address] [place] [address] 
[phone] Patient: Uche Neal MRN: [id number] Visit Information Date & Time Provider Department Dept. Phone Encounter #  
 2/21/2018  9:00 AM Rolf Najera DO [facility] [phone] [id number] Follow-up Instructions Return in about 1 month (around 3/21/2018) for weight, bp, med refill, results. Your Appointments 3/15/2018  8:30 AM  
Office Visit with Rolf Najera DO [facility] ([facility]) Appt Note: 1 MO F/U Weight, BP  
 [address] 
[address] [facility] [address]  
[phone]  
  
   
 [address] 4/16/2018  8:30 AM  
Office Visit with Rolf Najera DO [facility] ([facility]) Appt Note: 1 MO F/U Weight, BP  
 [address] 
[address] [place] [address]  
[phone] 5/15/2018  8:30 AM  
Office Visit with Rolf Najera DO [facility] ([facility]) Appt Note: 1 MO F/U Weight, BP  
 [address] 
[address] [place] [address]  
[phone]  
  
    
 6/15/2018  8:30 AM  
Office Visit with Rolf Najera DO [facility] ([facility]) Appt Note: 1 MO F/U Weight, BP  
 [address] 
[address] [place] [address]  
[phone] Upcoming Health Maintenance Date Due  
 PAP AKA CERVICAL CYTOLOGY 12/12/2018 BREAST CANCER SCRN MAMMOGRAM 12/15/2019 COLONOSCOPY 9/27/2021 DTaP/Tdap/Td series (2 - Td) 6/30/2026 Allergies as of 2/21/2018  Review Complete On: 2/21/2018 By: Rolf Najera,  Severity Noted Reaction Type Reactions Advil Pm [Ibuprofen-diphenhydramine Hcl]  07/18/2011    Other (comments)  Slightly elevated Creaitnine 1.02  
 Aleve [Naproxen Sodium]  12/28/2011    Other (comments) Due to kidneys Bactrim [Sulfamethoxazole-trimethoprim]  09/11/2009    Nausea and Vomiting  
 Sulfa (Sulfonamide Antibiotics)  09/11/2009    Nausea and Vomiting  
 Sulfasalazine  09/11/2009    Nausea and Vomiting Current Immunizations  Reviewed on 11/17/2017 Name Date Influenza Vaccine 9/18/2014, 10/22/2013, 12/14/2012 Influenza Vaccine Judene Bark) 10/29/2015 Influenza Vaccine (Quad) PF 10/19/2017, 9/29/2016 Influenza Vaccine Split 11/7/2011, 11/12/2010 TD Vaccine 8/18/2000 Tdap 6/30/2016  9:35 AM  
  
 Not reviewed this visit You Were Diagnosed With   
  
 Codes Comments Pure hypercholesterolemia    -  Primary ICD-10-CM: E78.00 ICD-9-CM: 272.0 Hashimoto's thyroiditis     ICD-10-CM: E06.3 ICD-9-CM: 245.2 Elevated alkaline phosphatase level     ICD-10-CM: R74.8 ICD-9-CM: 790.5 Vitamin D deficiency     ICD-10-CM: E55.9 ICD-9-CM: 268.9 Obesity, Class I, BMI 30-34.9     ICD-10-CM: E66.9 ICD-9-CM: 278.00 Weight loss     ICD-10-CM: R63.4 ICD-9-CM: 783.21 13# since 11/2017 due to efforts Family history of diabetes mellitus (DM)     ICD-10-CM: Z83.3 ICD-9-CM: V18.0 Vitals BP Pulse Temp Resp Height(growth percentile) Weight(growth percentile) 111/77 (BP 1 Location: Left arm, BP Patient Position: Sitting) 60 97.7 °F (36.5 °C) (Oral) 12 5' 4\" (1.626 m) 203 lb 11.2 oz (92.4 kg) SpO2 BMI OB Status Smoking Status 100% 34.97 kg/m2 Hysterectomy Never Smoker Vitals History BMI and BSA Data Body Mass Index Body Surface Area 34.97 kg/m 2 2.04 m 2 Preferred Pharmacy Pharmacy Name Phone [facility] [address] AT [address] [place] [address] Drive [phone] Your Updated Medication List  
  
   
This list is accurate as of 2/21/18  9:44 AM.  Always use your most recent med list.  
  
  
  
  
 albuterol 90 mcg/actuation inhaler Commonly known as:  PROVENTIL HFA, VENTOLIN HFA, PROAIR HFA Take 2 Puffs by inhalation every four (4) hours as needed for Wheezing (cough). aspirin delayed-release 81 mg tablet Take 1 Tab by mouth daily. Indications: prevention of transient ischemic attack  
  
 cetirizine 10 mg tablet Commonly known as:  ZYRTEC Take 1 Tab by mouth daily. diclofenac EC 75 mg EC tablet Commonly known as:  VOLTAREN  
TAKE 1 TABLET BY MOUTH TWICE DAILY  
  
 eszopiclone 2 mg tablet Commonly known as:  Nick Oz Take 1 Tab by mouth nightly. Max Daily Amount: 2 mg. fluticasone 50 mcg/actuation nasal spray Commonly known as:  Adin Finders 2 Sprays by Both Nostrils route daily. Indications: ALLERGIC RHINITIS  
  
 furosemide 40 mg tablet Commonly known as:  LASIX TAKE 1 TABLET BY MOUTH DAILY FOR SWELLING  
  
 hyaluronate 48 mg/6 mL Syrg injection Commonly known as:  SYNVISC-ONE Inject one prefilled syringe into the right knee, for a quantity of 1 injection Insulin Needles (Disposable) 32 gauge x 5/32\" Ndle Commonly known as:  Catina Pen Needle Use daily as directed MINIVELLE 0.1 mg/24 hr  
Generic drug:  estradiol 1 Patch by TransDERmal route two (2) times a week. Changes Sunday and Wednesday  
  
 montelukast 10 mg tablet Commonly known as:  SINGULAIR Take 1 Tab by mouth daily. Indications: ALLERGIC RHINITIS, breathing  
  
 phentermine 37.5 mg tablet Commonly known as:  ADIPEX-P Take 1 Tab by mouth daily. Max Daily Amount: 37.5 mg.  
  
 sertraline 50 mg tablet Commonly known as:  ZOLOFT Take 1 Tab by mouth daily. Indications: GENERALIZED ANXIETY DISORDER  
  
 VITAMIN D3 2,000 unit Tab Generic drug:  cholecalciferol (vitamin D3) Take 2,000 Units by mouth daily. Prescriptions Printed Refills  
 phentermine (ADIPEX-P) 37.5 mg tablet 0 Sig: Take 1 Tab by mouth daily.  Max Daily Amount: 37.5 mg.  
 Class: Print Route: Oral  
  
We Performed the Following CBC W/O DIFF [74670 CPT(R)] HEMOGLOBIN A1C WITH EAG [58845 CPT(R)] LIPID PANEL [70090 CPT(R)] METABOLIC PANEL, COMPREHENSIVE [50952 CPT(R)] T4, FREE [id number] CPT(R)] TSH 3RD GENERATION [91163 CPT(R)] VITAMIN D, 25 HYDROXY [id number] CPT(R)] Follow-up Instructions Return in about 1 month (around 3/21/2018) for weight, bp, med refill, results. Introducing [facility]! [employer] introduces DocSpera patient portal. Now you can access parts of your medical record, email your doctor's office, and request medication refills online. 1. In your internet browser, go to [email or web address] 2. Click on the First Time User? Click Here link in the Sign In box. You will see the New Member Sign Up page. 3. Enter your DocSpera Access Code exactly as it appears below. You will not need to use this code after youve completed the sign-up process. If you do not sign up before the expiration date, you must request a new code. · DocSpera Access Code: [email or web address] Expires: 4/18/2018  9:41 AM 
 
4. Enter the last four digits of your Social Security Number (xxxx) and Date of Birth (mm/dd/yyyy) as indicated and click Submit. You will be taken to the next sign-up page. 5. Create a DocSpera ID. This will be your DocSpera login ID and cannot be changed, so think of one that is secure and easy to remember. 6. Create a DocSpera password. You can change your password at any time. 7. Enter your Password Reset Question and Answer. This can be used at a later time if you forget your password. 8. Enter your e-mail address. You will receive e-mail notification when new information is available in [address] 9. Click Sign Up. You can now view and download portions of your medical record. 10. Click the Download Summary menu link to download a portable copy of your medical information. If you have questions, please visit the Frequently Asked Questions section of the [employer] website. Remember, trippiece is NOT to be used for urgent needs. For medical emergencies, dial 911. Now available from your iPhone and Android! Please provide this summary of care documentation to your next provider. Your primary care clinician is listed as Nicci Alvarez. If you have any questions after today's visit, please call [phone].

## 2018-02-21 NOTE — PROGRESS NOTES
HISTORY OF PRESENT ILLNESS  Jose E Lucas is a 58 y.o. female presents with Weight Management; Blood Pressure Check; and Medication Refill      Agree with nurse note. Pt with hypercholesterolemia, vit d deficiency, hx of elevated ALP, Hashimoto's thyroiditis, and family hx of DM presents to the office with a BP of 111/77. She is tolerating Phentermine 37.5 mg well, month 2 of 3. Last prescribed on 01/18/18. She has noticed an increase of energy and decreased appetite since starting the medication. Breakfast is boiled egg whites. Lunch is baked chicken breast with spinach, cabbage, or mixed vegetables. Dinner is fish with onions and vegetables. She drinks at least 66 oz daily. She walks with her co-worker. She has upcoming trips to [place] and a cruise. She lost 8 pounds since the last visit. Percent body fat has increased from 44.8% to 46.2%, waist circumference measurement has changed from 37.3\" to 35.25\". Overall, patient is pleased and requests a refill of the medication today. Patient denies fatigue, sleep disturbance, chest pain, heart palpitations, nausea, dry mouth, constipation, signs of depression. Written by hyun Boyd, as dictated by Dr. Paul Ramirez DO.    ROS    Review of Systems negative except as noted above in HPI. ALLERGIES:    Allergies   Allergen Reactions    Advil Pm [Ibuprofen-Diphenhydramine Hcl] Other (comments)     Slightly elevated Creaitnine 1.02    Aleve [Naproxen Sodium] Other (comments)     Due to kidneys    Bactrim [Sulfamethoxazole-Trimethoprim] Nausea and Vomiting    Sulfa (Sulfonamide Antibiotics) Nausea and Vomiting    Sulfasalazine Nausea and Vomiting       CURRENT MEDICATIONS:    Outpatient Prescriptions Marked as Taking for the 2/21/18 encounter (Office Visit) with Pam Wolff DO   Medication Sig Dispense Refill    phentermine (ADIPEX-P) 37.5 mg tablet Take 1 Tab by mouth daily.  Max Daily Amount: 37.5 mg. 30 Tab 0    furosemide (LASIX) 40 mg tablet TAKE 1 TABLET BY MOUTH DAILY FOR SWELLING 90 Tab 1    hyaluronate (HYLAN) 48 mg/6 mL syrg injection Inject one prefilled syringe into the right knee, for a quantity of 1 injection      Insulin Needles, Disposable, (NAVID PEN NEEDLE) 32 gauge x 5/32\" ndle Use daily as directed 50 Pen Needle 5    aspirin delayed-release 81 mg tablet Take 1 Tab by mouth daily. Indications: prevention of transient ischemic attack 90 Tab 3    diclofenac EC (VOLTAREN) 75 mg EC tablet TAKE 1 TABLET BY MOUTH TWICE DAILY (Patient taking differently: TAKE 1 TABLET BY MOUTH DAILY) 60 Tab 2    sertraline (ZOLOFT) 50 mg tablet Take 1 Tab by mouth daily. Indications: GENERALIZED ANXIETY DISORDER 135 Tab 3    eszopiclone (LUNESTA) 2 mg tablet Take 1 Tab by mouth nightly. Max Daily Amount: 2 mg. 30 Tab 5    MINIVELLE 0.1 mg/24 hr 1 Patch by TransDERmal route two (2) times a week. Changes Sunday and Wednesday  0    cetirizine (ZYRTEC) 10 mg tablet Take 1 Tab by mouth daily. (Patient taking differently: Take 10 mg by mouth daily as needed.) 30 Tab 3    fluticasone (FLONASE) 50 mcg/actuation nasal spray 2 Sprays by Both Nostrils route daily. Indications: ALLERGIC RHINITIS 1 Bottle 5    albuterol (PROVENTIL HFA, VENTOLIN HFA) 90 mcg/actuation inhaler Take 2 Puffs by inhalation every four (4) hours as needed for Wheezing (cough). 1 Inhaler 1    montelukast (SINGULAIR) 10 mg tablet Take 1 Tab by mouth daily. Indications: ALLERGIC RHINITIS, breathing 30 Tab 11    cholecalciferol, vitamin D3, (VITAMIN D3) 2,000 unit Tab Take 2,000 Units by mouth daily. PAST MEDICAL HISTORY:    Past Medical History:   Diagnosis Date    Achilles tendonitis 12/2004    Right. Dr. Isaac Erwin Arthritis 2010    hips, knees. Dr. Gildardo Hartley    Chest pain 08/2013    Dr. Enriqueta Myers.      Chickenpox childhood    Endometriosis 1990    Dr. Pillo Álvarez Environmental allergies     MARGI (generalized anxiety disorder)     Hip pain, bilateral 2010    due to severe OA. Iliopsoas bursitis. Dr. Adrianna Pierson    History of breast lump/mass excision 1998    Left breast.  Dr. Violeta Macias Hyperlipidemia     Iron deficiency anemia     iron deficiency    Knee pain, bilateral 2017    Dr. Demarco Avalos    Menopausal and postmenopausal disorder 2007    Dr. Alfie Ocasio Uterine fibroid     Dr. David Vickers.  Vitamin D deficiency 03/2008       PAST SURGICAL HISTORY:    Past Surgical History:   Procedure Laterality Date    BIOPSY BREAST  Rt 1993;Lt 1998    Dr. Guillermina Krueger Right 2002    Right achiles tendon. Dr. Emanuel Byrnes.  HX BREAST BIOPSY  2001    Left. benign. Dr. Nazia Solano HX COLONOSCOPY  09/27/2016    Dr. Lindalee Moritz.  due q 5 yrs due to fam hx   Barbie Gerardo Smiley. due to endometriosis    HX PELVIC LAPAROSCOPY  1995    due to endometriosis Dr. Cailin Khan  04/2011    due to fibroids. Dr. David Vickers.        FAMILY HISTORY:    Family History   Problem Relation Age of Onset    Hypertension Mother     Other Mother      hearing loss/vision loss    Heart Disease Father     Lung Disease Father     Diabetes Sister     Heart Attack Sister 54     March 2014    Stroke Sister      after MI    Thyroid Disease Sister     Colon Polyps Sister     Heart Disease Brother      Defibrillator placed    Heart Attack Brother 48    Diabetes Maternal Grandmother        SOCIAL HISTORY:    Social History     Social History    Marital status: SINGLE     Spouse name: N/A    Number of children: 2    Years of education: N/A     Occupational History    [employer]      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      Social History Main Topics    Smoking status: Never Smoker    Smokeless tobacco: Never Used      Comment: lived with smoker mom x 18 yrs    Alcohol use No    Drug use: No    Sexual activity: Not Currently     Partners: Male     Birth control/ protection: Abstinence, Surgical     Other Topics Concern    None     Social History Narrative       IMMUNIZATIONS:    Immunization History   Administered Date(s) Administered    Influenza Vaccine 12/14/2012, 10/22/2013, 09/18/2014    Influenza Vaccine (Quad) 10/29/2015    Influenza Vaccine (Quad) PF 09/29/2016, 10/19/2017    Influenza Vaccine Split 11/12/2010, 11/07/2011    TD Vaccine 08/18/2000    Tdap 06/30/2016       PHYSICAL EXAMINATION    Vital Signs    Visit Vitals    /77 (BP 1 Location: Left arm, BP Patient Position: Sitting)    Pulse 60    Temp 97.7 °F (36.5 °C) (Oral)    Resp 12    Ht 5' 4\" (1.626 m)    Wt 203 lb 11.2 oz (92.4 kg)    SpO2 100%    BMI 34.97 kg/m2       Weight Metrics 2/21/2018 2/21/2018 1/18/2018 1/18/2018 12/18/2017 12/18/2017 11/17/2017   Weight - 203 lb 11.2 oz - 211 lb 6.4 oz - 214 lb 3.2 oz -   Waist Measure Inches 35.25 - 37.3 - 38 - 35   Body Fat % 46.2 - 44.8 - 45.3 - 45.7   BMI - 34.97 kg/m2 - 36.29 kg/m2 - 36.77 kg/m2 -       General appearance - Well nourished. Well appearing. Well developed. No acute distress. Obese. Head - Normocephalic. Atraumatic. Eyes - pupils equal and reactive, extraocular eye movements intact, sclera anicteric  Ears - Hearing is grossly normal bilaterally. Nose - normal and patent, no erythema, discharge or polyps   Mouth - mucous membranes moist, pharynx normal with cobblestone appearance. No erythema, white exudate or obstruction. Neck - supple. Midline trachea. No carotid bruits are noted. No thyromegaly noted. Chest - clear to auscultation bilaterally anterriorly and posteriorly. No wheezes, rales or rhonchi. Breath sounds are symmetrical and unlabored bilaterally. Heart - normal rate. Regular rhythm, normal S1, S2. No murmur. No rubs, clicks or gallops noted. Abdomen - soft and distended. No masses or organomegaly. No rebound, rigidity or guarding. Bowel sounds normal x 4 quadrants. No tenderness noted.   Neurological - awake, alert and oriented to person, place, and time and event. Cranial nerves II through XII intact. Muscle strength is +5/5 x 4 extremities. Sensation is intact to light touch bilaterally. Steady gait. Heme/Lymph - peripheral pulses normal x 4 extremities. No peripheral edema is noted. No cervical adenopathy noted. Skin - no rashes, erythema, ecchymosis, lacerations, abrasions, suspicious moles noted. No skin tags. No acanthosis nigricans noted in the axilla or neck. Psychological -   normal behavior, speech, dress and thought processes. Good insight. Good eye contact. Normal affect. Appropriate mood. DATA REVIEWED  Lab Results   Component Value Date/Time    WBC 3.6 03/28/2017 09:32 AM    HGB 13.0 03/28/2017 09:32 AM    HCT 40.8 03/28/2017 09:32 AM    PLATELET 563 29/65/2698 09:32 AM    MCV 92 03/28/2017 09:32 AM     Lab Results   Component Value Date/Time    Sodium 139 03/28/2017 09:32 AM    Potassium 4.0 03/28/2017 09:32 AM    Chloride 98 03/28/2017 09:32 AM    CO2 26 03/28/2017 09:32 AM    Anion gap 5 04/23/2011 05:20 AM    Glucose 80 03/28/2017 09:32 AM    BUN 17 03/28/2017 09:32 AM    Creatinine 0.98 03/28/2017 09:32 AM    BUN/Creatinine ratio 17 03/28/2017 09:32 AM    GFR est AA 72 03/28/2017 09:32 AM    GFR est non-AA 62 03/28/2017 09:32 AM    Calcium 9.1 03/28/2017 09:32 AM    Bilirubin, total 0.5 03/28/2017 09:32 AM    AST (SGOT) 22 03/28/2017 09:32 AM    Alk.  phosphatase 91 03/28/2017 09:32 AM    Protein, total 7.0 03/28/2017 09:32 AM    Albumin 4.4 03/28/2017 09:32 AM    A-G Ratio 1.7 03/28/2017 09:32 AM    ALT (SGPT) 17 03/28/2017 09:32 AM     Lab Results   Component Value Date/Time    Cholesterol, total 169 03/28/2017 09:32 AM    HDL Cholesterol 68 03/28/2017 09:32 AM    LDL, calculated 92 03/28/2017 09:32 AM    VLDL, calculated 9 03/28/2017 09:32 AM    Triglyceride 44 03/28/2017 09:32 AM     Lab Results   Component Value Date/Time    Vitamin D 25-Hydroxy 26.3 (L) 07/18/2011 10:22 AM    VITAMIN D, 25-HYDROXY 34.7 03/28/2017 09:32 AM        Lab Results   Component Value Date/Time    Hemoglobin A1c 5.5 03/28/2017 09:32 AM     Lab Results   Component Value Date/Time    TSH 1.900 02/24/2017 10:03 AM    TSH 2.36 08/21/2014 07:50 AM       ASSESSMENT and PLAN    ICD-10-CM ICD-9-CM    1. Pure hypercholesterolemia E78.00 272.0 LIPID PANEL   2. Hashimoto's thyroiditis E06.3 245.2 TSH 3RD GENERATION      T4, FREE   3. Elevated alkaline phosphatase level M56.1 779.9 METABOLIC PANEL, COMPREHENSIVE   4. Vitamin D deficiency E55.9 268.9 VITAMIN D, 25 HYDROXY   5. Obesity, Class I, BMI 30-34.9 E66.9 278.00 phentermine (ADIPEX-P) 37.5 mg tablet   6. Weight loss R63.4 783.21 CBC W/O DIFF    13# since 11/2017 due to efforts      7. Family history of diabetes mellitus (DM) Z83.3 V18.0 HEMOGLOBIN A1C WITH EAG       Continue current medications and care. Prescriptions written and given to patient (Phentermin 37.5 mg, month 3 of 3.)  Medication side effects discussed. Recheck pertinent labs when fasting. Discussed the patient's BMI with her. The BMI follow up plan is as follows: I have counseled this patient on diet and exercise regimens. Diet recommendations:  Decrease carbohydrates (white foods including flour, white bread, white rice, white pasta, white potatoes; corn, sweet foods, sweet drinks and alcohol), increase green leafy vegetables and protein with each meal.  Avoid fried foods. Eat 3-5 small meals daily. Do not skip meals. Ok to use meal replacements up to twice daily with protein goal of 60 mg per meal.   Recommend OTC Premiere Protein bars or shakes. Increase water intake. Increase physical activity to 30 minutes daily for health benefit or 60 minutes daily to prevent weight regain, as tolerated. Physical Activity prescription:  A goal of 30 minutes physical activity daily is recommended for health benefit and at least 60 minutes daily to prevent weight regain.   For weight loss, no less than 75% needs to be aerobic (i.e. Walking) and no more than 25% resistance exercising (i.e. Weight lifting). For weight maintenance phase, 50% aerobic and 50% resistance exercises. Sleep prescription:    A goal of 7-8 hours of uninterrupted sleep is recommended to turn off the Grehlin hormone to be released from the stomach and triggers appetite while promoting weight gain. Proper rest turns on Leptin hormone to be released from white adipose tissue and promotes weight loss. Counseled patient on: weight loss goals, emphasizing a 5-10% weight loss in 6-12 months and strategies. Immunizations noted. Praised pt for weight loss efforts and progress. Patient was offered a choice/choices in the treatment plan today. Patient expresses understanding of the plan and agrees with recommendations. Patient declines any additional handouts. Patient is satisfied with previous handouts received from our office    Follow-up Disposition:  Return in about 1 month (around 3/21/2018) for weight, bp, med refill, results. Written by hyun Fermin, as dictated by Dr. Kevin Jackson DO. Documentation True and Accepted by Paz Tejada.  Raulito Ochoa.

## 2018-02-21 NOTE — PROGRESS NOTES
Chief Complaint   Patient presents with    Weight Management    Blood Pressure Check    Medication Refill     1. Have you been to the ER, urgent care clinic since your last visit? Hospitalized since your last visit? No    2. Have you seen or consulted any other health care providers outside of the [address] [place] [address] since your last visit? Include any pap smears or colon screening. No    In the event something were to happen to you and you were unable to speak on your behalf, do you have an Advance Directive/ Living Will in place stating your wishes? YES    If yes, do we have a copy on file NO    If no, would you like information:   Pt encouraged to bring into office.

## 2018-03-15 ENCOUNTER — OFFICE VISIT (OUTPATIENT)
Dept: FAMILY MEDICINE CLINIC | Age: 63
End: 2018-03-15

## 2018-03-15 VITALS
WEIGHT: 200.3 LBS | HEIGHT: 64 IN | SYSTOLIC BLOOD PRESSURE: 102 MMHG | TEMPERATURE: 96.4 F | OXYGEN SATURATION: 99 % | RESPIRATION RATE: 20 BRPM | BODY MASS INDEX: 34.19 KG/M2 | DIASTOLIC BLOOD PRESSURE: 69 MMHG | HEART RATE: 56 BPM

## 2018-03-15 DIAGNOSIS — R74.8 ELEVATED ALKALINE PHOSPHATASE LEVEL: ICD-10-CM

## 2018-03-15 DIAGNOSIS — E55.9 VITAMIN D DEFICIENCY: ICD-10-CM

## 2018-03-15 DIAGNOSIS — R00.1 BRADYCARDIA: ICD-10-CM

## 2018-03-15 DIAGNOSIS — E78.00 PURE HYPERCHOLESTEROLEMIA: ICD-10-CM

## 2018-03-15 DIAGNOSIS — R63.4 WEIGHT LOSS: ICD-10-CM

## 2018-03-15 DIAGNOSIS — M25.561 RECURRENT PAIN OF RIGHT KNEE: ICD-10-CM

## 2018-03-15 DIAGNOSIS — F41.1 GENERALIZED ANXIETY DISORDER: Primary | ICD-10-CM

## 2018-03-15 DIAGNOSIS — E06.3 HASHIMOTO'S THYROIDITIS: ICD-10-CM

## 2018-03-15 DIAGNOSIS — E66.9 OBESITY, CLASS I, BMI 30-34.9: ICD-10-CM

## 2018-03-15 RX ORDER — PHENDIMETRAZINE TARTRATE 105 MG/1
105 CAPSULE, EXTENDED RELEASE ORAL DAILY
Qty: 30 CAP | Refills: 0 | Status: SHIPPED | OUTPATIENT
Start: 2018-03-15 | End: 2018-04-16 | Stop reason: SDUPTHER

## 2018-03-15 NOTE — PROGRESS NOTES
Chief Complaint   Patient presents with    Weight Management     1 mo    Blood Pressure Check     There are no preventive care reminders to display for this patient. Coordination of Care Questions    1. Have you been to the ER, urgent care clinic since your last visit? No       Hospitalized since your last visit? No    2. Have you seen or consulted any other health care providers outside of the [address] [place] [address] since your last visit? Include any pap smears or colon screening.  No

## 2018-03-15 NOTE — PROGRESS NOTES
HISTORY OF PRESENT ILLNESS  Joseph Hagan is a 58 y.o. female presents with Weight Management (1 mo); Blood Pressure Check; Medication Refill; and Labs      Agree with nurse note. Hypercholesterolemic pt with Hashimoto's thyroiditis, elevated ALP, and vit deficiency presents to the office with a BP of 102/69 and HR of 56 bpm.  She is tolerating Phentermine 37.5 mg well, month 3 of 3. Last prescribed on 03/15/18. She has noticed an increase of energy and decreased appetite since starting the medication. She has increased her intake of vegetables. She drinks 2 large water bottles everyday. She is sleeping x8 hours nightly. She lost 3 pounds since the last visit and down 19 lbs since 06/2017. Percent body fat has changed from 46.2% to 44.8%, waist circumference measurement has changed from 35.25\" to 33\". Overall, patient is pleased and requests to change to Phendimetrazine, previously tolerated well. Pt with recurrent R knee pain. She has good and bad days. Pt with MARGI. She requests a refill of Zoloft 50 mg. Patient denies fatigue, sleep disturbance, chest pain, heart palpitations, nausea, dry mouth, constipation, signs of depression. Written by hyun Lopez, as dictated by Dr. Norman Hatfield DO.    ROS    Review of Systems negative except as noted above in HPI. ALLERGIES:    Allergies   Allergen Reactions    Advil Pm [Ibuprofen-Diphenhydramine Hcl] Other (comments)     Slightly elevated Creaitnine 1.02    Aleve [Naproxen Sodium] Other (comments)     Due to kidneys    Bactrim [Sulfamethoxazole-Trimethoprim] Nausea and Vomiting    Sulfa (Sulfonamide Antibiotics) Nausea and Vomiting    Sulfasalazine Nausea and Vomiting       CURRENT MEDICATIONS:    Outpatient Prescriptions Marked as Taking for the 3/15/18 encounter (Office Visit) with Brandi Weiner DO   Medication Sig Dispense Refill    phendimetrazine tartrate 105 mg cpER Take 105 mg by mouth daily.  Max Daily Amount: 105 mg. 30 Cap 0    furosemide (LASIX) 40 mg tablet TAKE 1 TABLET BY MOUTH DAILY FOR SWELLING 90 Tab 1    hyaluronate (HYLAN) 48 mg/6 mL syrg injection Inject one prefilled syringe into the right knee, for a quantity of 1 injection      aspirin delayed-release 81 mg tablet Take 1 Tab by mouth daily. Indications: prevention of transient ischemic attack 90 Tab 3    diclofenac EC (VOLTAREN) 75 mg EC tablet TAKE 1 TABLET BY MOUTH TWICE DAILY (Patient taking differently: TAKE 1 TABLET BY MOUTH DAILY) 60 Tab 2    sertraline (ZOLOFT) 50 mg tablet Take 1 Tab by mouth daily. Indications: GENERALIZED ANXIETY DISORDER 135 Tab 3    eszopiclone (LUNESTA) 2 mg tablet Take 1 Tab by mouth nightly. Max Daily Amount: 2 mg. 30 Tab 5    MINIVELLE 0.1 mg/24 hr 1 Patch by TransDERmal route two (2) times a week. Changes Sunday and Wednesday  0    cetirizine (ZYRTEC) 10 mg tablet Take 1 Tab by mouth daily. (Patient taking differently: Take 10 mg by mouth daily as needed.) 30 Tab 3    fluticasone (FLONASE) 50 mcg/actuation nasal spray 2 Sprays by Both Nostrils route daily. Indications: ALLERGIC RHINITIS 1 Bottle 5    albuterol (PROVENTIL HFA, VENTOLIN HFA) 90 mcg/actuation inhaler Take 2 Puffs by inhalation every four (4) hours as needed for Wheezing (cough). 1 Inhaler 1    montelukast (SINGULAIR) 10 mg tablet Take 1 Tab by mouth daily. Indications: ALLERGIC RHINITIS, breathing 30 Tab 11    cholecalciferol, vitamin D3, (VITAMIN D3) 2,000 unit Tab Take 2,000 Units by mouth daily. PAST MEDICAL HISTORY:    Past Medical History:   Diagnosis Date    Achilles tendonitis 12/2004    Right. Dr. Angelica Schultz Arthritis 2010    hips, knees. Dr. Brooklynn Mccauleyman    Chest pain 08/2013    Dr. Romelia Hernandez.  Chickenpox childhood    Endometriosis 1990    Dr. Kostas Kay Environmental allergies     MARGI (generalized anxiety disorder)     Hip pain, bilateral 2010    due to severe OA. Iliopsoas bursitis.   Dr. Brice Acevedo Christiane    History of breast lump/mass excision 1998    Left breast.  Dr. Camron Craft    Hyperlipidemia     Iron deficiency anemia     iron deficiency    Knee pain, bilateral 2017    Dr. Wendy Bond    Menopausal and postmenopausal disorder 2007    Dr. Aminta Estrada Uterine fibroid     Dr. Kristyn Sierra.  Vitamin D deficiency 03/2008       PAST SURGICAL HISTORY:    Past Surgical History:   Procedure Laterality Date    BIOPSY BREAST  Rt 1993;Lt 1998    Dr. Brent Lenz Right 2002    Right achiles tendon. Dr. Loo Hand.  HX BREAST BIOPSY  2001    Left. benign. Dr. Justino Bear HX COLONOSCOPY  09/27/2016    Dr. Nora Valdovinos.  due q 5 yrs due to fam hx   Mar Cashing    Dr. Jose Luis Engle. due to endometriosis    HX PELVIC LAPAROSCOPY  1995    due to endometriosis Dr. Megan Feliciano  04/2011    due to fibroids. Dr. Kristyn Sierra.        FAMILY HISTORY:    Family History   Problem Relation Age of Onset    Hypertension Mother     Other Mother      hearing loss/vision loss    Heart Disease Father     Lung Disease Father     Diabetes Sister     Heart Attack Sister 54     March 2014    Stroke Sister      after MI    Thyroid Disease Sister     Colon Polyps Sister     Heart Disease Brother      Defibrillator placed    Heart Attack Brother 48    Diabetes Maternal Grandmother        SOCIAL HISTORY:    Social History     Social History    Marital status: SINGLE     Spouse name: N/A    Number of children: 2    Years of education: N/A     Occupational History    [employer]      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      Social History Main Topics    Smoking status: Never Smoker    Smokeless tobacco: Never Used      Comment: lived with smoker mom x 18 yrs    Alcohol use No    Drug use: No    Sexual activity: Not Currently     Partners: Male     Birth control/ protection: Abstinence, Surgical     Other Topics Concern    None     Social History Narrative IMMUNIZATIONS:    Immunization History   Administered Date(s) Administered    Influenza Vaccine 12/14/2012, 10/22/2013, 09/18/2014    Influenza Vaccine (Quad) 10/29/2015    Influenza Vaccine (Quad) PF 09/29/2016, 10/19/2017    Influenza Vaccine Split 11/12/2010, 11/07/2011    TD Vaccine 08/18/2000    Tdap 06/30/2016       PHYSICAL EXAMINATION    Vital Signs    Visit Vitals    /69 (BP 1 Location: Left arm, BP Patient Position: Sitting)    Pulse (!) 56    Temp 96.4 °F (35.8 °C) (Oral)    Resp 20    Ht 5' 4\" (1.626 m)    Wt 200 lb 4.8 oz (90.9 kg)    SpO2 99%    BMI 34.38 kg/m2       Weight Metrics 3/15/2018 3/15/2018 2/21/2018 2/21/2018 1/18/2018 1/18/2018 12/18/2017   Weight - 200 lb 4.8 oz - 203 lb 11.2 oz - 211 lb 6.4 oz -   Waist Measure Inches 33 - 35.25 - 37.3 - 38   Body Fat % 44.8 - 46.2 - 44.8 - 45.3   BMI - 34.38 kg/m2 - 34.97 kg/m2 - 36.29 kg/m2 -       General appearance - Well nourished. Well appearing. Well developed. No acute distress. Obese. Head - Normocephalic. Atraumatic. Eyes - pupils equal and reactive, extraocular eye movements intact, sclera anicteric  Ears - Hearing is grossly normal bilaterally. Nose - normal and patent, no erythema, discharge or polyps   Mouth - mucous membranes moist, pharynx normal with cobblestone appearance. No erythema, white exudate or obstruction. Neck - supple. Midline trachea. No carotid bruits are noted. No thyromegaly noted. Chest - clear to auscultation bilaterally anterriorly and posteriorly. No wheezes, rales or rhonchi. Breath sounds are symmetrical and unlabored bilaterally. Heart - normal rate. Regular rhythm, normal S1, S2. No murmur. No rubs, clicks or gallops noted. Abdomen - soft and distended. No masses or organomegaly. No rebound, rigidity or guarding. Bowel sounds normal x 4 quadrants. No tenderness noted. Neurological - awake, alert and oriented to person, place, and time and event.   Cranial nerves II through XII intact. Muscle strength is +5/5 x 4 extremities. Sensation is intact to light touch bilaterally. Steady gait. Heme/Lymph - peripheral pulses normal x 4 extremities. No peripheral edema is noted. No cervical adenopathy noted. Skin - no rashes, erythema, ecchymosis, lacerations, abrasions, suspicious moles noted. No skin tags. No acanthosis nigricans noted in the axilla or neck. Psychological -   normal behavior, speech, dress and thought processes. Good insight. Good eye contact. Normal affect. Appropriate mood. DATA REVIEWED  Lab Results   Component Value Date/Time    WBC 3.6 03/28/2017 09:32 AM    HGB 13.0 03/28/2017 09:32 AM    HCT 40.8 03/28/2017 09:32 AM    PLATELET 078 41/25/3524 09:32 AM    MCV 92 03/28/2017 09:32 AM     Lab Results   Component Value Date/Time    Sodium 139 03/28/2017 09:32 AM    Potassium 4.0 03/28/2017 09:32 AM    Chloride 98 03/28/2017 09:32 AM    CO2 26 03/28/2017 09:32 AM    Anion gap 5 04/23/2011 05:20 AM    Glucose 80 03/28/2017 09:32 AM    BUN 17 03/28/2017 09:32 AM    Creatinine 0.98 03/28/2017 09:32 AM    BUN/Creatinine ratio 17 03/28/2017 09:32 AM    GFR est AA 72 03/28/2017 09:32 AM    GFR est non-AA 62 03/28/2017 09:32 AM    Calcium 9.1 03/28/2017 09:32 AM    Bilirubin, total 0.5 03/28/2017 09:32 AM    AST (SGOT) 22 03/28/2017 09:32 AM    Alk.  phosphatase 91 03/28/2017 09:32 AM    Protein, total 7.0 03/28/2017 09:32 AM    Albumin 4.4 03/28/2017 09:32 AM    A-G Ratio 1.7 03/28/2017 09:32 AM    ALT (SGPT) 17 03/28/2017 09:32 AM     Lab Results   Component Value Date/Time    Cholesterol, total 169 03/28/2017 09:32 AM    HDL Cholesterol 68 03/28/2017 09:32 AM    LDL, calculated 92 03/28/2017 09:32 AM    VLDL, calculated 9 03/28/2017 09:32 AM    Triglyceride 44 03/28/2017 09:32 AM     Lab Results   Component Value Date/Time    Vitamin D 25-Hydroxy 26.3 (L) 07/18/2011 10:22 AM    VITAMIN D, 25-HYDROXY 34.7 03/28/2017 09:32 AM       Lab Results Component Value Date/Time    Hemoglobin A1c 5.5 03/28/2017 09:32 AM     Lab Results   Component Value Date/Time    TSH 1.900 02/24/2017 10:03 AM    TSH 2.36 08/21/2014 07:50 AM       ASSESSMENT and PLAN    ICD-10-CM ICD-9-CM    1. Generalized anxiety disorder F41.1 300.02    2. Recurrent pain of right knee M25.561 719.46    3. Hashimoto's thyroiditis E06.3 245.2    4. Pure hypercholesterolemia E78.00 272.0    5. Elevated alkaline phosphatase level R74.8 790.5    6. Obesity, Class I, BMI 30-34.9 E66.9 278.00 phendimetrazine tartrate 105 mg cpER   7. Bradycardia R00.1 427.89    8. Vitamin D deficiency E55.9 268.9    9. Weight loss R63.4 783.21     3# since 02/2017, #19 since 06/2017 due to efforts       Continue current medications and care. Change from Phentermine to Phendimetrazine. Prescriptions written and given to patient (Phendimetrazine 105 mg, month 1 of 3.)  Medication side effects discussed. Prescriptions sent to pharmacy today. Zoloft 50 mg.   Recheck pertinent labs when fasting. Discussed the patient's BMI with her. The BMI follow up plan is as follows: I have counseled this patient on diet and exercise regimens. Diet recommendations:  Decrease carbohydrates (white foods including flour, white bread, white rice, white pasta, white potatoes; corn, sweet foods, sweet drinks and alcohol), increase green leafy vegetables and protein with each meal.  Avoid fried foods. Eat 3-5 small meals daily. Do not skip meals. Ok to use meal replacements up to twice daily with protein goal of 60 mg per meal.   Recommend OTC Premiere Protein bars or shakes. Increase water intake. Increase physical activity to 30 minutes daily for health benefit or 60 minutes daily to prevent weight regain, as tolerated. Physical Activity prescription:  A goal of 30 minutes physical activity daily is recommended for health benefit and at least 60 minutes daily to prevent weight regain.   For weight loss, no less than 75% needs to be aerobic (i.e. Walking) and no more than 25% resistance exercising (i.e. Weight lifting). For weight maintenance phase, 50% aerobic and 50% resistance exercises. Sleep prescription:    A goal of 7-8 hours of uninterrupted sleep is recommended to turn off the Grehlin hormone to be released from the stomach and triggers appetite while promoting weight gain. Proper rest turns on Leptin hormone to be released from white adipose tissue and promotes weight loss. Counseled patient on: weight loss goals, emphasizing a 5-10% weight loss in 6-12 months and strategies. Immunizations noted. Praised pt for weight loss efforts and progress. Patient was offered a choice/choices in the treatment plan today. Patient expresses understanding of the plan and agrees with recommendations. Patient declines any additional handouts. Patient is satisfied with previous handouts received from our office    Follow-up Disposition:  Return in about 1 month (around 4/15/2018) for weight, bp, med refill, results. Written by hyun Boyd, as dictated by Dr. Paul Ramirez DO. Documentation True and Accepted by Geetha Nice.  Antonio Barnard.

## 2018-03-15 NOTE — MR AVS SNAPSHOT
[facility] 
 
 
 [address] 
[address] [place] [address] 
[phone] Patient: Kelly Gandhi MRN: [id number] [id number] Visit Information Date & Time Provider Department Dept. Phone Encounter #  
 3/15/2018  8:30 AM DO [facility] [phone] [id number] Follow-up Instructions Return in about 1 month (around 4/15/2018) for weight, bp, med refill, results. Your Appointments 4/16/2018  8:30 AM  
Office Visit with Gala Samuels DO [facility] ([facility] [address] [place]) Appt Note: 1 MO F/U Weight, BP  
 [address] 
[address] [place] [address]  
[phone]  
  
   
 [address] 5/15/2018  8:30 AM  
Office Visit with Gala Samuels DO [facility] ([facility] [address] [place]) Appt Note: 1 MO F/U Weight, BP  
 [address] 
[address] [place] [address]  
[phone]  
  
    
 6/15/2018  8:30 AM  
Office Visit with Gala Samuels DO [facility] ([facility] [address] [place]) Appt Note: 1 MO F/U Weight, BP  
 [address] 
[address] [place] [address]  
[phone] Upcoming Health Maintenance Date Due  
 PAP AKA CERVICAL CYTOLOGY 12/12/2018 BREAST CANCER SCRN MAMMOGRAM 12/15/2019 COLONOSCOPY 9/27/2021 DTaP/Tdap/Td series (2 - Td) 6/30/2026 Allergies as of 3/15/2018  Review Complete On: 3/15/2018 By: Gala Samuels DO Severity Noted Reaction Type Reactions Advil Pm [Ibuprofen-diphenhydramine Hcl]  07/18/2011    Other (comments) Slightly elevated Creaitnine 1.02 Aleve [Naproxen Sodium]  12/28/2011    Other (comments) Due to kidneys Bactrim [Sulfamethoxazole-trimethoprim]  09/11/2009    Nausea and Vomiting  
 Sulfa (Sulfonamide Antibiotics)  09/11/2009    Nausea and Vomiting  
 Sulfasalazine  09/11/2009    Nausea and Vomiting Current Immunizations  Reviewed on 3/15/2018 Name Date Influenza Vaccine 9/18/2014, 10/22/2013, 12/14/2012 Influenza Vaccine Vin Blend) 10/29/2015 Influenza Vaccine (Quad) PF 10/19/2017, 9/29/2016 Influenza Vaccine Split 11/7/2011, 11/12/2010 TD Vaccine 8/18/2000 Tdap 6/30/2016  9:35 AM  
  
 Reviewed by Eileen Garcia DO on 3/15/2018 at  9:44 AM  
You Were Diagnosed With   
  
 Codes Comments Generalized anxiety disorder    -  Primary ICD-10-CM: F41.1 ICD-9-CM: 300.02 Recurrent pain of right knee     ICD-10-CM: M25.561 ICD-9-CM: 719.46 Hashimoto's thyroiditis     ICD-10-CM: E06.3 ICD-9-CM: 245.2 Pure hypercholesterolemia     ICD-10-CM: E78.00 ICD-9-CM: 272.0 Elevated alkaline phosphatase level     ICD-10-CM: R74.8 ICD-9-CM: 790.5 Obesity, Class I, BMI 30-34.9     ICD-10-CM: E66.9 ICD-9-CM: 278.00 Bradycardia     ICD-10-CM: R00.1 ICD-9-CM: 427.89 Vitamin D deficiency     ICD-10-CM: E55.9 ICD-9-CM: 268.9 Weight loss     ICD-10-CM: R63.4 ICD-9-CM: 783.21 3# since 02/2017, #19 since 06/2017 due to efforts Vitals BP Pulse Temp Resp Height(growth percentile) Weight(growth percentile) 102/69 (BP 1 Location: Left arm, BP Patient Position: Sitting) (!) 56 96.4 °F (35.8 °C) (Oral) 20 5' 4\" (1.626 m) 200 lb 4.8 oz (90.9 kg) SpO2 BMI OB Status Smoking Status 99% 34.38 kg/m2 Hysterectomy Never Smoker Vitals History BMI and BSA Data Body Mass Index Body Surface Area  
 34.38 kg/m 2 2.03 m 2 Preferred Pharmacy Pharmacy Name Phone [facility], [address] [place] [address] AT [address] [place] [address] Drive [phone] Your Updated Medication List  
  
   
This list is accurate as of 3/15/18  9:44 AM.  Always use your most recent med list.  
  
  
  
  
 albuterol 90 mcg/actuation inhaler Commonly known as:  PROVENTIL HFA, VENTOLIN HFA, PROAIR HFA  
 Take 2 Puffs by inhalation every four (4) hours as needed for Wheezing (cough). aspirin delayed-release 81 mg tablet Take 1 Tab by mouth daily. Indications: prevention of transient ischemic attack  
  
 cetirizine 10 mg tablet Commonly known as:  ZYRTEC Take 1 Tab by mouth daily. diclofenac EC 75 mg EC tablet Commonly known as:  VOLTAREN  
TAKE 1 TABLET BY MOUTH TWICE DAILY  
  
 eszopiclone 2 mg tablet Commonly known as:  Orlena Krystin Take 1 Tab by mouth nightly. Max Daily Amount: 2 mg. fluticasone 50 mcg/actuation nasal spray Commonly known as:  Ui Link Moots 2 Sprays by Both Nostrils route daily. Indications: ALLERGIC RHINITIS  
  
 furosemide 40 mg tablet Commonly known as:  LASIX TAKE 1 TABLET BY MOUTH DAILY FOR SWELLING  
  
 hyaluronate 48 mg/6 mL Syrg injection Commonly known as:  SYNVISC-ONE Inject one prefilled syringe into the right knee, for a quantity of 1 injection Insulin Needles (Disposable) 32 gauge x 5/32\" Ndle Commonly known as:  Catina Pen Needle Use daily as directed MINIVELLE 0.1 mg/24 hr  
Generic drug:  estradiol 1 Patch by TransDERmal route two (2) times a week. Changes Sunday and Wednesday  
  
 montelukast 10 mg tablet Commonly known as:  SINGULAIR Take 1 Tab by mouth daily. Indications: ALLERGIC RHINITIS, breathing  
  
 phendimetrazine tartrate 105 mg Cper Take 105 mg by mouth daily. Max Daily Amount: 105 mg.  
  
 sertraline 50 mg tablet Commonly known as:  ZOLOFT Take 1 Tab by mouth daily. Indications: GENERALIZED ANXIETY DISORDER  
  
 VITAMIN D3 2,000 unit Tab Generic drug:  cholecalciferol (vitamin D3) Take 2,000 Units by mouth daily. Prescriptions Printed Refills  
 phendimetrazine tartrate 105 mg cpER 0 Sig: Take 105 mg by mouth daily. Max Daily Amount: 105 mg. Class: Print Route: Oral  
  
Follow-up Instructions  Return in about 1 month (around 4/15/2018) for weight, bp, med refill, results. Introducing [facility]! Brooke Benton introduces gloStream patient portal. Now you can access parts of your medical record, email your doctor's office, and request medication refills online. 1. In your internet browser, go to [email or web address] 2. Click on the First Time User? Click Here link in the Sign In box. You will see the New Member Sign Up page. 3. Enter your gloStream Access Code exactly as it appears below. You will not need to use this code after youve completed the sign-up process. If you do not sign up before the expiration date, you must request a new code. · gloStream Access Code: [email or web address] Expires: 4/18/2018 10:41 AM 
 
4. Enter the last four digits of your Social Security Number (xxxx) and Date of Birth (mm/dd/yyyy) as indicated and click Submit. You will be taken to the next sign-up page. 5. Create a gloStream ID. This will be your gloStream login ID and cannot be changed, so think of one that is secure and easy to remember. 6. Create a gloStream password. You can change your password at any time. 7. Enter your Password Reset Question and Answer. This can be used at a later time if you forget your password. 8. Enter your e-mail address. You will receive e-mail notification when new information is available in [address] 9. Click Sign Up. You can now view and download portions of your medical record. 10. Click the Download Summary menu link to download a portable copy of your medical information. If you have questions, please visit the Frequently Asked Questions section of the gloStream website. Remember, gloStream is NOT to be used for urgent needs. For medical emergencies, dial 911. Now available from your iPhone and Android! Please provide this summary of care documentation to your next provider. Your primary care clinician is listed as Sreedhar Fleming.  If you have any questions after today's visit, please call [phone].

## 2018-03-22 LAB
25(OH)D3+25(OH)D2 SERPL-MCNC: 45.8 NG/ML (ref 30–100)
ALBUMIN SERPL-MCNC: 3.9 G/DL (ref 3.6–4.8)
ALBUMIN/GLOB SERPL: 1.3 {RATIO} (ref 1.2–2.2)
ALP SERPL-CCNC: 95 IU/L (ref 39–117)
ALT SERPL-CCNC: 15 IU/L (ref 0–32)
AST SERPL-CCNC: 29 IU/L (ref 0–40)
BILIRUB SERPL-MCNC: 0.6 MG/DL (ref 0–1.2)
BUN SERPL-MCNC: 13 MG/DL (ref 8–27)
BUN/CREAT SERPL: 14 (ref 12–28)
CALCIUM SERPL-MCNC: 9.1 MG/DL (ref 8.7–10.3)
CHLORIDE SERPL-SCNC: 100 MMOL/L (ref 96–106)
CHOLEST SERPL-MCNC: 144 MG/DL (ref 100–199)
CO2 SERPL-SCNC: 28 MMOL/L (ref 18–29)
CREAT SERPL-MCNC: 0.9 MG/DL (ref 0.57–1)
ERYTHROCYTE [DISTWIDTH] IN BLOOD BY AUTOMATED COUNT: 14.6 % (ref 12.3–15.4)
EST. AVERAGE GLUCOSE BLD GHB EST-MCNC: 108 MG/DL
GFR SERPLBLD CREATININE-BSD FMLA CKD-EPI: 69 ML/MIN/1.73
GFR SERPLBLD CREATININE-BSD FMLA CKD-EPI: 79 ML/MIN/1.73
GLOBULIN SER CALC-MCNC: 3 G/DL (ref 1.5–4.5)
GLUCOSE SERPL-MCNC: 86 MG/DL (ref 65–99)
HBA1C MFR BLD: 5.4 % (ref 4.8–5.6)
HCT VFR BLD AUTO: 38.5 % (ref 34–46.6)
HDLC SERPL-MCNC: 49 MG/DL
HGB BLD-MCNC: 12 G/DL (ref 11.1–15.9)
INTERPRETATION, 910389: NORMAL
LDLC SERPL CALC-MCNC: 85 MG/DL (ref 0–99)
MCH RBC QN AUTO: 28.4 PG (ref 26.6–33)
MCHC RBC AUTO-ENTMCNC: 31.2 G/DL (ref 31.5–35.7)
MCV RBC AUTO: 91 FL (ref 79–97)
PLATELET # BLD AUTO: 224 X10E3/UL (ref 150–379)
POTASSIUM SERPL-SCNC: 4.7 MMOL/L (ref 3.5–5.2)
PROT SERPL-MCNC: 6.9 G/DL (ref 6–8.5)
RBC # BLD AUTO: 4.23 X10E6/UL (ref 3.77–5.28)
SODIUM SERPL-SCNC: 140 MMOL/L (ref 134–144)
T4 FREE SERPL-MCNC: 1.23 NG/DL (ref 0.82–1.77)
TRIGL SERPL-MCNC: 50 MG/DL (ref 0–149)
TSH SERPL DL<=0.005 MIU/L-ACNC: 1.74 UIU/ML (ref 0.45–4.5)
VLDLC SERPL CALC-MCNC: 10 MG/DL (ref 5–40)
WBC # BLD AUTO: 5.2 X10E3/UL (ref 3.4–10.8)

## 2018-04-16 ENCOUNTER — OFFICE VISIT (OUTPATIENT)
Dept: FAMILY MEDICINE CLINIC | Age: 63
End: 2018-04-16

## 2018-04-16 VITALS
HEART RATE: 61 BPM | SYSTOLIC BLOOD PRESSURE: 107 MMHG | BODY MASS INDEX: 33.19 KG/M2 | DIASTOLIC BLOOD PRESSURE: 65 MMHG | HEIGHT: 64 IN | WEIGHT: 194.4 LBS | OXYGEN SATURATION: 99 % | RESPIRATION RATE: 16 BRPM | TEMPERATURE: 97.6 F

## 2018-04-16 DIAGNOSIS — F41.1 GENERALIZED ANXIETY DISORDER: ICD-10-CM

## 2018-04-16 DIAGNOSIS — F51.04 CHRONIC INSOMNIA: Primary | ICD-10-CM

## 2018-04-16 DIAGNOSIS — E66.9 OBESITY, CLASS I, BMI 30-34.9: ICD-10-CM

## 2018-04-16 DIAGNOSIS — E06.3 HASHIMOTO'S THYROIDITIS: ICD-10-CM

## 2018-04-16 DIAGNOSIS — M05.751 RHEUMATOID ARTHRITIS INVOLVING BOTH HIPS WITH POSITIVE RHEUMATOID FACTOR (HCC): ICD-10-CM

## 2018-04-16 DIAGNOSIS — M05.752 RHEUMATOID ARTHRITIS INVOLVING BOTH HIPS WITH POSITIVE RHEUMATOID FACTOR (HCC): ICD-10-CM

## 2018-04-16 DIAGNOSIS — E55.9 VITAMIN D DEFICIENCY: ICD-10-CM

## 2018-04-16 RX ORDER — SERTRALINE HYDROCHLORIDE 50 MG/1
50 TABLET, FILM COATED ORAL DAILY
Qty: 90 TAB | Refills: 3 | Status: SHIPPED | OUTPATIENT
Start: 2018-04-16 | End: 2018-05-15 | Stop reason: SDUPTHER

## 2018-04-16 RX ORDER — MIRABEGRON 50 MG/1
50 TABLET, FILM COATED, EXTENDED RELEASE ORAL 2 TIMES WEEKLY
COMMUNITY
Start: 2018-03-12 | End: 2022-08-12

## 2018-04-16 RX ORDER — PHENDIMETRAZINE TARTRATE 105 MG/1
105 CAPSULE, EXTENDED RELEASE ORAL DAILY
Qty: 30 CAP | Refills: 0 | Status: SHIPPED | OUTPATIENT
Start: 2018-04-16 | End: 2018-05-15 | Stop reason: SDUPTHER

## 2018-04-16 RX ORDER — CONJUGATED ESTROGENS 0.62 MG/G
CREAM VAGINAL
Refills: 4 | COMMUNITY
Start: 2018-04-03

## 2018-04-16 NOTE — PROGRESS NOTES
Gia Ross is a 58 y.o. female    Chief Complaint   Patient presents with    Weight Management     1. Have you been to the ER, urgent care clinic since your last visit? Hospitalized since your last visit? No     2. Have you seen or consulted any other health care providers outside of the [address] [place] [address] since your last visit? Include any pap smears or colon screening.   No     Visit Vitals    /65 (BP 1 Location: Left arm, BP Patient Position: Sitting)    Pulse 61    Temp 97.6 °F (36.4 °C) (Oral)    Resp 16    Ht 5' 4\" (1.626 m)    Wt 194 lb 6.4 oz (88.2 kg)    SpO2 99%    BMI 33.37 kg/m2     BMI- 33.3  Body Fat 44.2  Waist -33.5 in

## 2018-04-16 NOTE — MR AVS SNAPSHOT
Subramanian Raymond 
 
 
 [address] 
[address] [place] [address] 
[phone] Patient: Sarahi Varghese MRN: [id number] WUA:[id number] Visit Information Date & Time Provider Department Dept. Phone Encounter #  
 4/16/2018  8:30 AM Aldean Pac, DO [facility] [phone] [id number] Follow-up Instructions Return for weight, blood pressure. Your Appointments 5/15/2018  8:30 AM  
Office Visit with Aldean Pac, DO [facility] ([place]) Appt Note: 1 MO F/U Weight, BP  
 [address] 
[address] [facility] [address]  
[phone]  
  
   
 [address] [facility] [address] 6/15/2018  8:30 AM  
Office Visit with Aldean Pac, DO [facility] ([place]) Appt Note: 1 MO F/U Weight, BP  
 [address] 
[address] [place] [address]  
[phone]  
  
    
 7/12/2018  8:30 AM  
Office Visit with Aldean Pac, DO [facility] ([place]) Appt Note: weight check [address] [place] [address]  
[phone]  
  
    
 8/16/2018  8:30 AM  
Office Visit with Aldean Pac, DO [facility] ([place]) Appt Note: weight check [address] [place] [address]  
[phone]  
  
    
 9/13/2018  8:45 AM  
Office Visit with Aldean Pac, DO [facility] ([place]) Appt Note: weight check [address] [place] [address]  
[phone]  
  
    
 10/18/2018  8:30 AM  
Office Visit with Aldean Pac, DO [facility] ([place]) Appt Note: weight check [address] [place] [address]  
[phone]  
  
    
 11/15/2018  8:30 AM  
Office Visit with Aldean Pac, DO [facility] ([place]) Appt Note: weight check [address] [place] [address]  
[phone]  
  
    
 12/13/2018  8:30 AM  
Office Visit with DO Brooke [address] ([place]) Appt Note: weight check [address] [place] [address]  
[phone] Upcoming Health Maintenance Date Due  
 PAP AKA CERVICAL CYTOLOGY 12/12/2018 BREAST CANCER SCRN MAMMOGRAM 12/15/2019 COLONOSCOPY 9/27/2021 DTaP/Tdap/Td series (2 - Td) 6/30/2026 Allergies as of 4/16/2018  Review Complete On: 4/16/2018 By: Atiya Collins DO Severity Noted Reaction Type Reactions Advil Pm [Ibuprofen-diphenhydramine Hcl]  07/18/2011    Other (comments) Slightly elevated Creaitnine 1.02 Aleve [Naproxen Sodium]  12/28/2011    Other (comments) Due to kidneys Bactrim [Sulfamethoxazole-trimethoprim]  09/11/2009    Nausea and Vomiting  
 Sulfa (Sulfonamide Antibiotics)  09/11/2009    Nausea and Vomiting  
 Sulfasalazine  09/11/2009    Nausea and Vomiting Current Immunizations  Reviewed on 4/16/2018 Name Date Influenza Vaccine 9/18/2014, 10/22/2013, 12/14/2012 Influenza Vaccine Emma Armando) 10/29/2015 Influenza Vaccine (Quad) PF 10/19/2017, 9/29/2016 Influenza Vaccine Split 11/7/2011, 11/12/2010 TD Vaccine 8/18/2000 Tdap 6/30/2016  9:35 AM  
  
 Reviewed by Atiya Collins DO on 4/16/2018 at  9:19 AM  
You Were Diagnosed With   
  
 Codes Comments Chronic insomnia    -  Primary ICD-10-CM: F51.04 
ICD-9-CM: 780.52 Generalized anxiety disorder     ICD-10-CM: F41.1 ICD-9-CM: 300.02 Rheumatoid arthritis involving both hips with positive rheumatoid factor ([facility]     ICD-10-CM: M05.751, W18.477 ICD-9-CM: 714.0 Hashimoto's thyroiditis     ICD-10-CM: E06.3 ICD-9-CM: 244. 2 Vitamin D deficiency     ICD-10-CM: E55.9 ICD-9-CM: 268.9 Obesity, Class I, BMI 30-34.9     ICD-10-CM: E66.9 ICD-9-CM: 278.00   
 Vitals BP Pulse Temp Resp Height(growth percentile) Weight(growth percentile) 107/65 (BP 1 Location: Left arm, BP Patient Position: Sitting) 61 97.6 °F (36.4 °C) (Oral) 16 5' 4\" (1.626 m) 194 lb 6.4 oz (88.2 kg) SpO2 BMI OB Status Smoking Status 99% 33.37 kg/m2 Hysterectomy Never Smoker Vitals History BMI and BSA Data Body Mass Index Body Surface Area  
 33.37 kg/m 2 2 m 2 Preferred Pharmacy Pharmacy Name Phone [facility] [address] [phone] Your Updated Medication List  
  
   
This list is accurate as of 4/16/18  9:19 AM.  Always use your most recent med list.  
  
  
  
  
 albuterol 90 mcg/actuation inhaler Commonly known as:  PROVENTIL HFA, VENTOLIN HFA, PROAIR HFA Take 2 Puffs by inhalation every four (4) hours as needed for Wheezing (cough). aspirin delayed-release 81 mg tablet Take 1 Tab by mouth daily. Indications: prevention of transient ischemic attack  
  
 cetirizine 10 mg tablet Commonly known as:  ZYRTEC Take 1 Tab by mouth daily. diclofenac EC 75 mg EC tablet Commonly known as:  VOLTAREN  
TAKE 1 TABLET BY MOUTH TWICE DAILY  
  
 eszopiclone 2 mg tablet Commonly known as:  Codie Blanca Take 1 Tab by mouth nightly. Max Daily Amount: 2 mg. fluticasone 50 mcg/actuation nasal spray Commonly known as:  Curtistine Paci 2 Sprays by Both Nostrils route daily. Indications: ALLERGIC RHINITIS  
  
 furosemide 40 mg tablet Commonly known as:  LASIX TAKE 1 TABLET BY MOUTH DAILY FOR SWELLING  
  
 hyaluronate 48 mg/6 mL Syrg injection Commonly known as:  SYNVISC-ONE Inject one prefilled syringe into the right knee, for a quantity of 1 injection Insulin Needles (Disposable) 32 gauge x 5/32\" Ndle Commonly known as:  Catina Pen Needle Use daily as directed MINIVELLE 0.1 mg/24 hr  
Generic drug:  estradiol 1 Patch by TransDERmal route two (2) times a week. Changes Sunday and Wednesday  
  
 montelukast 10 mg tablet Commonly known as:  SINGULAIR Take 1 Tab by mouth daily. Indications: ALLERGIC RHINITIS, breathing MYRBETRIQ 50 mg ER tablet Generic drug:  mirabegron ER  
  
 phendimetrazine tartrate 105 mg Cper Take 105 mg by mouth daily. Max Daily Amount: 105 mg. PREMARIN 0.625 mg/gram vaginal cream  
Generic drug:  conjugated estrogens  
  
 sertraline 50 mg tablet Commonly known as:  ZOLOFT Take 1 Tab by mouth daily. Indications: Generalized Anxiety Disorder VITAMIN D3 2,000 unit Tab Generic drug:  cholecalciferol (vitamin D3) Take 2,000 Units by mouth daily. Prescriptions Printed Refills  
 phendimetrazine tartrate 105 mg cpER 0 Sig: Take 105 mg by mouth daily. Max Daily Amount: 105 mg. Class: Print Route: Oral  
  
Prescriptions Sent to Pharmacy Refills  
 sertraline (ZOLOFT) 50 mg tablet 3 Sig: Take 1 Tab by mouth daily. Indications: Generalized Anxiety Disorder Class: Normal  
 Pharmacy: [facility] [address] Ph #: [phone] Route: Oral  
  
Follow-up Instructions Return for weight, blood pressure. Introducing [facility]! [employer] introduces [employer] patient portal. Now you can access parts of your medical record, email your doctor's office, and request medication refills online. 1. In your internet browser, go to [email or web address] 2. Click on the First Time User? Click Here link in the Sign In box. You will see the New Member Sign Up page. 3. Enter your [employer] Access Code exactly as it appears below. You will not need to use this code after youve completed the sign-up process. If you do not sign up before the expiration date, you must request a new code. · [employer] Access Code: [email or web address] Expires: 4/18/2018 10:41 AM 
 
4. Enter the last four digits of your Social Security Number (xxxx) and Date of Birth (mm/dd/yyyy) as indicated and click Submit. You will be taken to the next sign-up page. 5. Create a [employer] ID. This will be your [employer] login ID and cannot be changed, so think of one that is secure and easy to remember. 6. Create a [employer] password. You can change your password at any time. 7. Enter your Password Reset Question and Answer. This can be used at a later time if you forget your password. 8. Enter your e-mail address. You will receive e-mail notification when new information is available in [address] 9. Click Sign Up. You can now view and download portions of your medical record. 10. Click the Download Summary menu link to download a portable copy of your medical information. If you have questions, please visit the Frequently Asked Questions section of the [employer] website. Remember, [employer] is NOT to be used for urgent needs. For medical emergencies, dial 911. Now available from your iPhone and Android! Please provide this summary of care documentation to your next provider. Your primary care clinician is listed as Valeria James. If you have any questions after today's visit, please call [phone].

## 2018-04-16 NOTE — PROGRESS NOTES
HISTORY OF PRESENT ILLNESS  Kalyani Kim is a 58 y.o. female presents with Weight Management; Blood Pressure Check; and Medication Refill      Agree with nurse note. Pt with Hashimoto's thyroiditis, vit d deficiency, chronic insomnia, and RA presents to the office with a BP of 107/65. She is tolerating Phendimetrazine 105 mg well, month 1 of 3. Last prescribed on 03/15/18. She has noticed an increase of energy and decreased appetite since starting the medication. She drinks 2 large water bottles everyday. She is sleeping x8 hours nightly. She lost 6 pounds since the last visit. \"I can even cross my legs now. \" Percent body fat has changed from 44.8% to 44.2%, waist circumference measurement has changed from 33\" to 33.5\". Overall, patient is pleased and requests a refill of the medication today. She has questions about Contrave, which she saw on tv. Pt with MARGI. She feels well and takes Zoloft 50 mg daily. Requests refill. Denies SI/HI. Patient denies fatigue, sleep disturbance, chest pain, heart palpitations, nausea, dry mouth, constipation, signs of depression. Written by hyun Rios, as dictated by Dr. Yazan Lowe DO.    ROS    Review of Systems negative except as noted above in HPI. ALLERGIES:    Allergies   Allergen Reactions    Advil Pm [Ibuprofen-Diphenhydramine Hcl] Other (comments)     Slightly elevated Creaitnine 1.02    Aleve [Naproxen Sodium] Other (comments)     Due to kidneys    Bactrim [Sulfamethoxazole-Trimethoprim] Nausea and Vomiting    Sulfa (Sulfonamide Antibiotics) Nausea and Vomiting    Sulfasalazine Nausea and Vomiting       CURRENT MEDICATIONS:    Outpatient Prescriptions Marked as Taking for the 4/16/18 encounter (Office Visit) with Christina Kim DO   Medication Sig Dispense Refill    PREMARIN 0.625 mg/gram vaginal cream   4    MYRBETRIQ 50 mg ER tablet       phendimetrazine tartrate 105 mg cpER Take 105 mg by mouth daily.  Max Daily Amount: 105 mg. 30 Cap 0    sertraline (ZOLOFT) 50 mg tablet Take 1 Tab by mouth daily. Indications: Generalized Anxiety Disorder 90 Tab 3    furosemide (LASIX) 40 mg tablet TAKE 1 TABLET BY MOUTH DAILY FOR SWELLING 90 Tab 1    hyaluronate (HYLAN) 48 mg/6 mL syrg injection Inject one prefilled syringe into the right knee, for a quantity of 1 injection      Insulin Needles, Disposable, (NAVID PEN NEEDLE) 32 gauge x 5/32\" ndle Use daily as directed 50 Pen Needle 5    aspirin delayed-release 81 mg tablet Take 1 Tab by mouth daily. Indications: prevention of transient ischemic attack 90 Tab 3    diclofenac EC (VOLTAREN) 75 mg EC tablet TAKE 1 TABLET BY MOUTH TWICE DAILY (Patient taking differently: TAKE 1 TABLET BY MOUTH DAILY) 60 Tab 2    eszopiclone (LUNESTA) 2 mg tablet Take 1 Tab by mouth nightly. Max Daily Amount: 2 mg. 30 Tab 5    MINIVELLE 0.1 mg/24 hr 1 Patch by TransDERmal route two (2) times a week. Changes Sunday and Wednesday  0    cetirizine (ZYRTEC) 10 mg tablet Take 1 Tab by mouth daily. (Patient taking differently: Take 10 mg by mouth daily as needed.) 30 Tab 3    fluticasone (FLONASE) 50 mcg/actuation nasal spray 2 Sprays by Both Nostrils route daily. Indications: ALLERGIC RHINITIS 1 Bottle 5    albuterol (PROVENTIL HFA, VENTOLIN HFA) 90 mcg/actuation inhaler Take 2 Puffs by inhalation every four (4) hours as needed for Wheezing (cough). 1 Inhaler 1    montelukast (SINGULAIR) 10 mg tablet Take 1 Tab by mouth daily. Indications: ALLERGIC RHINITIS, breathing 30 Tab 11    cholecalciferol, vitamin D3, (VITAMIN D3) 2,000 unit Tab Take 2,000 Units by mouth daily. PAST MEDICAL HISTORY:    Past Medical History:   Diagnosis Date    Achilles tendonitis 12/2004    Right. Dr. Andrew Hopkins Arthritis 2010    hips, knees. Dr. Govind Rosen    Chest pain 08/2013    Dr. Jack Andrews.      Chickenpox childhood    Endometriosis 1990    Dr. Rivera Counts Environmental allergies     MARGI (generalized anxiety disorder)     Hip pain, bilateral 2010    due to severe OA. Iliopsoas bursitis. Dr. Rose [facility]    History of breast lump/mass excision 1998    Left breast.  Dr. Gallegos Dark Hyperlipidemia     Iron deficiency anemia     iron deficiency    Knee pain, bilateral 2017    Dr. Nalini Clarke    Menopausal and postmenopausal disorder 2007    Dr. Aidan Nascimneto Uterine fibroid     Dr. aCden Perrin.  Vitamin D deficiency 03/2008       PAST SURGICAL HISTORY:    Past Surgical History:   Procedure Laterality Date    BIOPSY BREAST  Rt 1993;Lt 1998    Dr. Avinash Medina Right 2002    Right achiles tendon. Dr. Fabiana Gabriel.  HX BREAST BIOPSY  2001    Left. benign. Dr. Hayden Flores HX COLONOSCOPY  09/27/2016    Dr. Lorraine Gaviria.  due q 5 yrs due to fam hx   Kendra Eliot    Dr. Pedro Duffy. due to endometriosis    HX PELVIC LAPAROSCOPY  1995    due to endometriosis Dr. Rolando Lopez  04/2011    due to fibroids. Dr. Caden Perrin.        FAMILY HISTORY:    Family History   Problem Relation Age of Onset    Hypertension Mother     Other Mother      hearing loss/vision loss    Heart Disease Father     Lung Disease Father     Diabetes Sister     Heart Attack Sister 54     March 2014    Stroke Sister      after MI    Thyroid Disease Sister     Colon Polyps Sister     Heart Disease Brother      Defibrillator placed    Heart Attack Brother 48    Diabetes Maternal Grandmother        SOCIAL HISTORY:    Social History     Social History    Marital status: SINGLE     Spouse name: N/A    Number of children: 2    Years of education: N/A     Occupational History    [employer]      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      Social History Main Topics    Smoking status: Never Smoker    Smokeless tobacco: Never Used      Comment: lived with smoker mom x 18 yrs    Alcohol use No    Drug use: No    Sexual activity: Not Currently     Partners: Male Birth control/ protection: Abstinence, Surgical     Other Topics Concern    None     Social History Narrative       IMMUNIZATIONS:    Immunization History   Administered Date(s) Administered    Influenza Vaccine 12/14/2012, 10/22/2013, 09/18/2014    Influenza Vaccine (Quad) 10/29/2015    Influenza Vaccine (Quad) PF 09/29/2016, 10/19/2017    Influenza Vaccine Split 11/12/2010, 11/07/2011    TD Vaccine 08/18/2000    Tdap 06/30/2016       PHYSICAL EXAMINATION    Vital Signs    Visit Vitals    /65 (BP 1 Location: Left arm, BP Patient Position: Sitting)    Pulse 61    Temp 97.6 °F (36.4 °C) (Oral)    Resp 16    Ht 5' 4\" (1.626 m)    Wt 194 lb 6.4 oz (88.2 kg)    SpO2 99%    BMI 33.37 kg/m2       Weight Metrics 4/16/2018 4/16/2018 3/15/2018 3/15/2018 2/21/2018 2/21/2018 1/18/2018   Weight - 194 lb 6.4 oz - 200 lb 4.8 oz - 203 lb 11.2 oz -   Waist Measure Inches 33.5 - 33 - 35.25 - 37.3   Body Fat % 44.2 - 44.8 - 46.2 - 44.8   BMI - 33.37 kg/m2 - 34.38 kg/m2 - 34.97 kg/m2 -       General appearance - Well nourished. Well appearing. Well developed. No acute distress. Obese. Head - Normocephalic. Atraumatic. Eyes - pupils equal and reactive, extraocular eye movements intact, sclera anicteric  Ears - Hearing is grossly normal bilaterally. Nose - normal and patent, no erythema, discharge or polyps   Mouth - mucous membranes moist, pharynx normal with cobblestone appearance. No erythema, white exudate or obstruction. Neck - supple. Midline trachea. No carotid bruits are noted. No thyromegaly noted. Chest - clear to auscultation bilaterally anterriorly and posteriorly. No wheezes, rales or rhonchi. Breath sounds are symmetrical and unlabored bilaterally. Heart - normal rate. Regular rhythm, normal S1, S2. No murmur. No rubs, clicks or gallops noted. Abdomen - soft and distended. No masses or organomegaly. No rebound, rigidity or guarding. Bowel sounds normal x 4 quadrants.   No tenderness noted. Neurological - awake, alert and oriented to person, place, and time and event. Cranial nerves II through XII intact. Muscle strength is +5/5 x 4 extremities. Sensation is intact to light touch bilaterally. Steady gait. Heme/Lymph - peripheral pulses normal x 4 extremities. No peripheral edema is noted. No cervical adenopathy noted. Skin - no rashes, erythema, ecchymosis, lacerations, abrasions, suspicious moles noted. No skin tags. No acanthosis nigricans noted in the axilla or neck. Psychological -   normal behavior, speech, dress and thought processes. Good insight. Good eye contact. Normal affect. Appropriate mood. DATA REVIEWED  Lab Results   Component Value Date/Time    WBC 5.2 03/21/2018 09:02 AM    HGB 12.0 03/21/2018 09:02 AM    HCT 38.5 03/21/2018 09:02 AM    PLATELET 394 88/83/8903 09:02 AM    MCV 91 03/21/2018 09:02 AM     Lab Results   Component Value Date/Time    Sodium 140 03/21/2018 09:02 AM    Potassium 4.7 03/21/2018 09:02 AM    Chloride 100 03/21/2018 09:02 AM    CO2 28 03/21/2018 09:02 AM    Anion gap 5 04/23/2011 05:20 AM    Glucose 86 03/21/2018 09:02 AM    BUN 13 03/21/2018 09:02 AM    Creatinine 0.90 03/21/2018 09:02 AM    BUN/Creatinine ratio 14 03/21/2018 09:02 AM    GFR est AA 79 03/21/2018 09:02 AM    GFR est non-AA 69 03/21/2018 09:02 AM    Calcium 9.1 03/21/2018 09:02 AM    Bilirubin, total 0.6 03/21/2018 09:02 AM    AST (SGOT) 29 03/21/2018 09:02 AM    Alk.  phosphatase 95 03/21/2018 09:02 AM    Protein, total 6.9 03/21/2018 09:02 AM    Albumin 3.9 03/21/2018 09:02 AM    A-G Ratio 1.3 03/21/2018 09:02 AM    ALT (SGPT) 15 03/21/2018 09:02 AM     Lab Results   Component Value Date/Time    Cholesterol, total 144 03/21/2018 09:02 AM    HDL Cholesterol 49 03/21/2018 09:02 AM    LDL, calculated 85 03/21/2018 09:02 AM    VLDL, calculated 10 03/21/2018 09:02 AM    Triglyceride 50 03/21/2018 09:02 AM     Lab Results   Component Value Date/Time    Vitamin D 25-Hydroxy 26.3 (L) 07/18/2011 10:22 AM    VITAMIN D, 25-HYDROXY 45.8 03/21/2018 09:02 AM       Lab Results   Component Value Date/Time    Hemoglobin A1c 5.4 03/21/2018 09:02 AM     Lab Results   Component Value Date/Time    TSH 1.740 03/21/2018 09:02 AM    TSH 2.36 08/21/2014 07:50 AM     Lab Results   Component Value Date/Time    Vitamin B12 947 (H) 02/10/2016 08:37 AM    Folate 13.5 02/10/2016 08:37 AM       ASSESSMENT and PLAN    ICD-10-CM ICD-9-CM    1. Chronic insomnia F51.04 780.52    2. Generalized anxiety disorder F41.1 300.02 sertraline (ZOLOFT) 50 mg tablet   3. Rheumatoid arthritis involving both hips with positive rheumatoid factor (HCC) M05.751 714.0     M05.752     4. Hashimoto's thyroiditis E06.3 245.2    5. Vitamin D deficiency E55.9 268.9    6. Obesity, Class I, BMI 30-34.9 E66.9 278.00 phendimetrazine tartrate 105 mg cpER       Continue current medications and care. Discussed benefits and ADRs of Contrave but decided to delay until after she goes her trip at the end of this month. Prescriptions written and given to patient (Phendimetrazine 105 mg, month 2 of 3.)  Medication side effects discussed. [facility]  reviewed. Prescriptions written and sent to pharmacy. Zoloft 50 mg. Discussed the patient's BMI with her. The BMI follow up plan is as follows: I have counseled this patient on diet and exercise regimens. Diet recommendations:  Decrease carbohydrates (white foods including flour, white bread, white rice, white pasta, white potatoes; corn, sweet foods, sweet drinks and alcohol), increase green leafy vegetables and protein with each meal.  Avoid fried foods. Eat 3-5 small meals daily. Do not skip meals. Ok to use meal replacements up to twice daily with protein goal of 60 mg per meal.   Recommend OTC Premiere Protein bars or shakes. Increase water intake. Increase physical activity to 30 minutes daily for health benefit or 60 minutes daily to prevent weight regain, as tolerated. Physical Activity prescription:  A goal of 30 minutes physical activity daily is recommended for health benefit and at least 60 minutes daily to prevent weight regain. For weight loss, no less than 75% needs to be aerobic (i.e. Walking) and no more than 25% resistance exercising (i.e. Weight lifting). For weight maintenance phase, 50% aerobic and 50% resistance exercises. Sleep prescription:    A goal of 7-8 hours of uninterrupted sleep is recommended to turn off the Grehlin hormone to be released from the stomach and triggers appetite while promoting weight gain. Proper rest turns on Leptin hormone to be released from white adipose tissue and promotes weight loss. Counseled patient on: weight loss goals, emphasizing a 5-10% weight loss in 6-12 months and strategies. Immunizations noted. Praised pt for weight loss efforts and progress. Advance Care Booklet given at office visit. Patient was offered a choice/choices in the treatment plan today. Patient expresses understanding of the plan and agrees with recommendations. Patient declines any additional handouts. Patient is satisfied with previous handouts received from our office    Follow-up Disposition:  Return in about 1 month (around 5/16/2018) for weight, blood pressure. Written by Candance Gear, scribe, as dictated by Dr. Gem Calderón DO. Documentation True and Accepted by Alejandro Mcdonald.  Piter Tse.

## 2018-05-15 ENCOUNTER — OFFICE VISIT (OUTPATIENT)
Dept: FAMILY MEDICINE CLINIC | Age: 63
End: 2018-05-15

## 2018-05-15 VITALS
BODY MASS INDEX: 33.44 KG/M2 | HEIGHT: 64 IN | OXYGEN SATURATION: 98 % | SYSTOLIC BLOOD PRESSURE: 111 MMHG | DIASTOLIC BLOOD PRESSURE: 75 MMHG | HEART RATE: 63 BPM | TEMPERATURE: 98 F | RESPIRATION RATE: 18 BRPM | WEIGHT: 195.9 LBS

## 2018-05-15 DIAGNOSIS — F41.1 GENERALIZED ANXIETY DISORDER: ICD-10-CM

## 2018-05-15 DIAGNOSIS — F51.04 CHRONIC INSOMNIA: ICD-10-CM

## 2018-05-15 DIAGNOSIS — E78.00 PURE HYPERCHOLESTEROLEMIA: ICD-10-CM

## 2018-05-15 DIAGNOSIS — E06.3 HASHIMOTO'S THYROIDITIS: Primary | ICD-10-CM

## 2018-05-15 DIAGNOSIS — M25.551 HIP PAIN, BILATERAL: ICD-10-CM

## 2018-05-15 DIAGNOSIS — E55.9 VITAMIN D DEFICIENCY: ICD-10-CM

## 2018-05-15 DIAGNOSIS — M25.561 RECURRENT PAIN OF RIGHT KNEE: ICD-10-CM

## 2018-05-15 DIAGNOSIS — R74.8 ELEVATED ALKALINE PHOSPHATASE LEVEL: ICD-10-CM

## 2018-05-15 DIAGNOSIS — M25.552 HIP PAIN, BILATERAL: ICD-10-CM

## 2018-05-15 DIAGNOSIS — M05.752 RHEUMATOID ARTHRITIS INVOLVING BOTH HIPS WITH POSITIVE RHEUMATOID FACTOR (HCC): ICD-10-CM

## 2018-05-15 DIAGNOSIS — E66.9 OBESITY, CLASS I, BMI 30-34.9: ICD-10-CM

## 2018-05-15 DIAGNOSIS — M05.751 RHEUMATOID ARTHRITIS INVOLVING BOTH HIPS WITH POSITIVE RHEUMATOID FACTOR (HCC): ICD-10-CM

## 2018-05-15 RX ORDER — SERTRALINE HYDROCHLORIDE 50 MG/1
50 TABLET, FILM COATED ORAL DAILY
Qty: 90 TAB | Refills: 3 | Status: SHIPPED | OUTPATIENT
Start: 2018-05-15 | End: 2019-02-11 | Stop reason: SDUPTHER

## 2018-05-15 RX ORDER — PHENDIMETRAZINE TARTRATE 105 MG/1
105 CAPSULE, EXTENDED RELEASE ORAL DAILY
Qty: 30 CAP | Refills: 0 | Status: SHIPPED | OUTPATIENT
Start: 2018-05-15 | End: 2018-06-15 | Stop reason: ALTCHOICE

## 2018-05-15 RX ORDER — DICLOFENAC SODIUM 75 MG/1
TABLET, DELAYED RELEASE ORAL
Qty: 60 TAB | Refills: 2 | Status: SHIPPED | OUTPATIENT
Start: 2018-05-15 | End: 2018-08-09 | Stop reason: SDUPTHER

## 2018-05-15 NOTE — MR AVS SNAPSHOT
[address] [facility] 
 
 
 14 [facility] 
[address] [place] [address] 
[phone] Patient: Gia Ross MRN: [id number] PFU:9/20/2865 Visit Information Date & Time Provider Department Dept. Phone Encounter #  
 5/15/2018  8:30 AM Mariam Oddi, DO [facility] [phone] [id number] Follow-up Instructions Return in about 1 month (around 6/15/2018) for weight, bp, med refill. Your Appointments 6/15/2018  8:30 AM  
Office Visit with Mariam Oddi, DO [facility] ([place]) Appt Note: 1 MO F/U Weight, BP  
 [address] 
[address] [facility]ly [facility] [address]  
[phone]  
  
   
 14 [facility] [address]  
  
    
 7/12/2018  8:30 AM  
Office Visit with Mariam Oddi, DO [facility] ([place]) Appt Note: weight check [address] [address]  
[phone]  
  
    
 8/16/2018  8:30 AM  
Office Visit with Mariam Oddi, DO [facility] ([place]) Appt Note: weight check [address] [address]  
[phone]  
  
    
 9/13/2018  8:45 AM  
Office Visit with Mariam Oddi, DO [facility] ([place]) Appt Note: weight check [address] [address]  
[phone]  
  
    
 10/18/2018  8:30 AM  
Office Visit with Mariam Oddi, DO [facility] ([place]) Appt Note: weight check [address] [address]  
[phone]  
  
    
 11/15/2018  8:30 AM  
Office Visit with Mariam Oddi, DO [facility] ([place]) Appt Note: weight check [address] [address]  
[phone]  
  
    
 12/13/2018  8:30 AM  
Office Visit with Mariam Oddi, DO  
 [address] ([place]) Appt Note: weight check [address]  [place] [address]  
[phone] Upcoming Health Maintenance Date Due Influenza Age 5 to Adult 8/1/2018 PAP AKA CERVICAL CYTOLOGY 12/12/2018 BREAST CANCER SCRN MAMMOGRAM 12/15/2019 COLONOSCOPY 9/27/2021 DTaP/Tdap/Td series (2 - Td) 6/30/2026 Allergies as of 5/15/2018  Review Complete On: 5/15/2018 By: Peter Fuller DO Severity Noted Reaction Type Reactions Advil Pm [Ibuprofen-diphenhydramine Hcl]  07/18/2011    Other (comments) Slightly elevated Creaitnine 1.02 Aleve [Naproxen Sodium]  12/28/2011    Other (comments) Due to kidneys Bactrim [Sulfamethoxazole-trimethoprim]  09/11/2009    Nausea and Vomiting  
 Sulfa (Sulfonamide Antibiotics)  09/11/2009    Nausea and Vomiting  
 Sulfasalazine  09/11/2009    Nausea and Vomiting Current Immunizations  Reviewed on 5/15/2018 Name Date Influenza Vaccine 9/18/2014, 10/22/2013, 12/14/2012 Influenza Vaccine Delores Saber) 10/29/2015 Influenza Vaccine (Quad) PF 10/19/2017, 9/29/2016 Influenza Vaccine Split 11/7/2011, 11/12/2010 TD Vaccine 8/18/2000 Tdap 6/30/2016  9:35 AM  
  
 Reviewed by Peter Fuller DO on 5/15/2018 at  9:32 AM  
You Were Diagnosed With   
  
 Codes Comments Hashimoto's thyroiditis    -  Primary ICD-10-CM: E06.3 ICD-9-CM: 245.2 stable Pure hypercholesterolemia     ICD-10-CM: E78.00 ICD-9-CM: 272.0 stable Recurrent pain of right knee     ICD-10-CM: M25.561 ICD-9-CM: 719.46 Generalized anxiety disorder     ICD-10-CM: F41.1 ICD-9-CM: 300.02 stable on Zoloft Chronic insomnia     ICD-10-CM: F51.04 
ICD-9-CM: 780.52 stable Vitamin D deficiency     ICD-10-CM: E55.9 ICD-9-CM: 268.9 Rheumatoid arthritis involving both hips with positive rheumatoid factor ([address]     ICD-10-CM: M05.751, U81.663 ICD-9-CM: 714.0 stable Hip pain, bilateral     ICD-10-CM: M25.551, M25.552 ICD-9-CM: 719.45 L>R, due to RA and OA vs other Obesity, Class I, BMI 30-34.9     ICD-10-CM: E66.9 ICD-9-CM: 278.00 Elevated alkaline phosphatase level     ICD-10-CM: R74.8 ICD-9-CM: 790.5 resolved Vitals BP Pulse Temp Resp Height(growth percentile) Weight(growth percentile) 111/75 (BP 1 Location: Left arm, BP Patient Position: Sitting) 63 98 °F (36.7 °C) (Oral) 18 5' 4\" (1.626 m) 195 lb 14.4 oz (88.9 kg) SpO2 BMI OB Status Smoking Status 98% 33.63 kg/m2 Hysterectomy Never Smoker BMI and BSA Data Body Mass Index Body Surface Area  
 33.63 kg/m 2 2 m 2 Preferred Pharmacy Pharmacy Name Phone [facility] [address] AT [address] [place] [address] Drive [phone] Your Updated Medication List  
  
   
This list is accurate as of 5/15/18  9:32 AM.  Always use your most recent med list.  
  
  
  
  
 albuterol 90 mcg/actuation inhaler Commonly known as:  PROVENTIL HFA, VENTOLIN HFA, PROAIR HFA Take 2 Puffs by inhalation every four (4) hours as needed for Wheezing (cough). aspirin delayed-release 81 mg tablet Take 1 Tab by mouth daily. Indications: prevention of transient ischemic attack  
  
 cetirizine 10 mg tablet Commonly known as:  ZYRTEC Take 1 Tab by mouth daily. diclofenac EC 75 mg EC tablet Commonly known as:  VOLTAREN  
TAKE 1 TABLET BY MOUTH TWICE DAILY  Indications: OSTEOARTHRITIS  
  
 eszopiclone 2 mg tablet Commonly known as:  Chi Flatness Take 1 Tab by mouth nightly. Max Daily Amount: 2 mg. fluticasone 50 mcg/actuation nasal spray Commonly known as:  Sherry Hoot 2 Sprays by Both Nostrils route daily. Indications: ALLERGIC RHINITIS  
  
 furosemide 40 mg tablet Commonly known as:  LASIX TAKE 1 TABLET BY MOUTH DAILY FOR SWELLING  
  
 hyaluronate 48 mg/6 mL Syrg injection Commonly known as:  SYNVISC-ONE Inject one prefilled syringe into the right knee, for a quantity of 1 injection Insulin Needles (Disposable) 32 gauge x 5/32\" Ndle Commonly known as:  Catina Pen Needle Use daily as directed MINIVELLE 0.1 mg/24 hr  
Generic drug:  estradiol 1 Patch by TransDERmal route two (2) times a week. Changes Sunday and Wednesday  
  
 montelukast 10 mg tablet Commonly known as:  SINGULAIR Take 1 Tab by mouth daily. Indications: ALLERGIC RHINITIS, breathing MYRBETRIQ 50 mg ER tablet Generic drug:  mirabegron ER  
  
 phendimetrazine tartrate 105 mg Cper Take 105 mg by mouth daily. Max Daily Amount: 105 mg. PREMARIN 0.625 mg/gram vaginal cream  
Generic drug:  conjugated estrogens  
  
 sertraline 50 mg tablet Commonly known as:  ZOLOFT Take 1 Tab by mouth daily. Indications: Generalized Anxiety Disorder VITAMIN D3 2,000 unit Tab Generic drug:  cholecalciferol (vitamin D3) Take 2,000 Units by mouth daily. Prescriptions Printed Refills  
 phendimetrazine tartrate 105 mg cpER 0 Sig: Take 105 mg by mouth daily. Max Daily Amount: 105 mg. Class: Print Route: Oral  
 sertraline (ZOLOFT) 50 mg tablet 3 Sig: Take 1 Tab by mouth daily. Indications: Generalized Anxiety Disorder Class: Print Route: Oral  
  
Prescriptions Sent to Pharmacy Refills  
 diclofenac EC (VOLTAREN) 75 mg EC tablet 2 Sig: TAKE 1 TABLET BY MOUTH TWICE DAILY  Indications: OSTEOARTHRITIS Class: Normal  
 Pharmacy: [facility] [address] Ph #: [phone] Follow-up Instructions Return in about 1 month (around 6/15/2018) for weight, bp, med refill. Introducing [address]! [employer] introduces [employer] patient portal. Now you can access parts of your medical record, email your doctor's office, and request medication refills online.    
 
1. In your internet browser, go to [email or web address] 2. Click on the First Time User? Click Here link in the Sign In box. You will see the New Member Sign Up page. 3. Enter your lifeIO Access Code exactly as it appears below. You will not need to use this code after youve completed the sign-up process. If you do not sign up before the expiration date, you must request a new code. · lifeIO Access Code: [email or web address] Expires: 8/13/2018  9:32 AM 
 
4. Enter the last four digits of your Social Security Number (xxxx) and Date of Birth (mm/dd/yyyy) as indicated and click Submit. You will be taken to the next sign-up page. 5. Create a lifeIO ID. This will be your lifeIO login ID and cannot be changed, so think of one that is secure and easy to remember. 6. Create a lifeIO password. You can change your password at any time. 7. Enter your Password Reset Question and Answer. This can be used at a later time if you forget your password. 8. Enter your e-mail address. You will receive e-mail notification when new information is available in [address] 9. Click Sign Up. You can now view and download portions of your medical record. 10. Click the Download Summary menu link to download a portable copy of your medical information. If you have questions, please visit the Frequently Asked Questions section of the lifeIO website. Remember, lifeIO is NOT to be used for urgent needs. For medical emergencies, dial 911. Now available from your iPhone and Android! Please provide this summary of care documentation to your next provider. Your primary care clinician is listed as Mark Obregon. If you have any questions after today's visit, please call [phone].

## 2018-05-15 NOTE — PROGRESS NOTES
HISTORY OF PRESENT ILLNESS  Carly Willams is a 58 y.o. female presents with Blood Pressure Check; Medication Refill (Phendimetrazine, Voltaren, Zoloft ); Weight Management; and Results      Agree with nurse note. Pt with Hashimoto's thyroiditis, hypercholesterolemia, vit d deficiency, and hx of elevated ALP presents to the office with a BP of 111/75. She had labs on 03/21/18 and would like to discuss. CBC and CMP wnl. LDL 85. Vit D 45.8. A1c 5.4. TSH 1.74. FT4 1.23. She is tolerating Phendimetrazine 105 mg well, month 2 of 3. Last prescribed on 04/16/18. She has noticed an increase of energy and decreased appetite since starting the medication. She went to [facility] for to visit a friend and enjoyed herself. She gained 1 pound since the last visit. Percent body fat has changed from 44.2% to 42.1%, waist circumference measurement has changed from 33.5\" to 30.75\". Overall, patient is pleased and requests a refill of the medication today. Pt with recurrent R knee pain, RA, and BL hip pain. She requests a refill of Voltaren 75 mg, which she tolerates well. She does not tolerated Advil or Aleve. She cannot receive another injection in her knee until 10/2018. Pt with chronic insomnia. She was sleeping well until her college aged daughter just moved back home for summer. Pt with MARGI. She requests a printed Rx for Zoloft 50 mg so she can take it to [facility]. Patient denies chest pain, heart palpitations, nausea, dry mouth, constipation, signs of depression. Written by hyun Potts, as dictated by Dr. Gonzáles January, DO.    ROS    Review of Systems negative except as noted above in HPI.     ALLERGIES:    Allergies   Allergen Reactions    Advil Pm [Ibuprofen-Diphenhydramine Hcl] Other (comments)     Slightly elevated Creaitnine 1.02    Aleve [Naproxen Sodium] Other (comments)     Due to kidneys    Bactrim [Sulfamethoxazole-Trimethoprim] Nausea and Vomiting    Sulfa (Sulfonamide Antibiotics) Nausea and Vomiting    Sulfasalazine Nausea and Vomiting       CURRENT MEDICATIONS:    Outpatient Prescriptions Marked as Taking for the 5/15/18 encounter (Office Visit) with Mariam Sargent DO   Medication Sig Dispense Refill    diclofenac EC (VOLTAREN) 75 mg EC tablet TAKE 1 TABLET BY MOUTH TWICE DAILY  Indications: OSTEOARTHRITIS 60 Tab 2    phendimetrazine tartrate 105 mg cpER Take 105 mg by mouth daily. Max Daily Amount: 105 mg. 30 Cap 0    sertraline (ZOLOFT) 50 mg tablet Take 1 Tab by mouth daily. Indications: Generalized Anxiety Disorder 90 Tab 3    PREMARIN 0.625 mg/gram vaginal cream   4    MYRBETRIQ 50 mg ER tablet       furosemide (LASIX) 40 mg tablet TAKE 1 TABLET BY MOUTH DAILY FOR SWELLING 90 Tab 1    hyaluronate (HYLAN) 48 mg/6 mL syrg injection Inject one prefilled syringe into the right knee, for a quantity of 1 injection      Insulin Needles, Disposable, (NAVID PEN NEEDLE) 32 gauge x 5/32\" ndle Use daily as directed 50 Pen Needle 5    aspirin delayed-release 81 mg tablet Take 1 Tab by mouth daily. Indications: prevention of transient ischemic attack 90 Tab 3    MINIVELLE 0.1 mg/24 hr 1 Patch by TransDERmal route two (2) times a week. Changes Sunday and Wednesday  0    cetirizine (ZYRTEC) 10 mg tablet Take 1 Tab by mouth daily. (Patient taking differently: Take 10 mg by mouth daily as needed.) 30 Tab 3    fluticasone (FLONASE) 50 mcg/actuation nasal spray 2 Sprays by Both Nostrils route daily. Indications: ALLERGIC RHINITIS 1 Bottle 5    albuterol (PROVENTIL HFA, VENTOLIN HFA) 90 mcg/actuation inhaler Take 2 Puffs by inhalation every four (4) hours as needed for Wheezing (cough). 1 Inhaler 1    montelukast (SINGULAIR) 10 mg tablet Take 1 Tab by mouth daily. Indications: ALLERGIC RHINITIS, breathing 30 Tab 11    cholecalciferol, vitamin D3, (VITAMIN D3) 2,000 unit Tab Take 2,000 Units by mouth daily.          PAST MEDICAL HISTORY:    Past Medical History:   Diagnosis Date    Achilles tendonitis 12/2004    Right. Dr. Reyne Opitz Arthritis 2010    hips, knees. Dr. Bautista Deep    Chest pain 08/2013    Dr. Clarence Pendleton.  Chickenpox childhood    Endometriosis 1990    Dr. Pastor Lobo Environmental allergies     MARGI (generalized anxiety disorder)     Hip pain, bilateral 2010    due to severe OA. Iliopsoas bursitis. Dr. Jamie Owens    History of breast lump/mass excision 1998    Left breast.  Dr. Zaheer Schuster Hyperlipidemia     Iron deficiency anemia     iron deficiency    Knee pain, bilateral 2017    Dr. Krystal Trent    Menopausal and postmenopausal disorder 2007    Dr. Pastor Lobo Uterine fibroid     Dr. Keo Hector.  Vitamin D deficiency 03/2008       PAST SURGICAL HISTORY:    Past Surgical History:   Procedure Laterality Date    BIOPSY BREAST  Rt 1993;Lt 1998    Dr. Prem Whitehead Right 2002    Right achiles tendon. Dr. Laurent Monaco.  HX BREAST BIOPSY  2001    Left. benign. Dr. Lyla Kendrick HX COLONOSCOPY  09/27/2016    Dr. Darlene Beard.  due q 5 yrs due to fam hx   Ariza Solo    Dr. Darnell Sanchez. due to endometriosis    HX PELVIC LAPAROSCOPY  1995    due to endometriosis Dr. Earnestine Russ  04/2011    due to fibroids. Dr. Keo Hector.        FAMILY HISTORY:    Family History   Problem Relation Age of Onset    Hypertension Mother     Other Mother      hearing loss/vision loss    Heart Disease Father     Lung Disease Father     Diabetes Sister     Heart Attack Sister 54     March 2014    Stroke Sister      after MI    Thyroid Disease Sister     Colon Polyps Sister     Heart Disease Brother      Defibrillator placed    Heart Attack Brother 48    Diabetes Maternal Grandmother        SOCIAL HISTORY:    Social History     Social History    Marital status: SINGLE     Spouse name: N/A    Number of children: 2    Years of education: N/A     Occupational History    [employer]      focus is [place]      Social History Main Topics    Smoking status: Never Smoker    Smokeless tobacco: Never Used      Comment: lived with smoker mom x 18 yrs    Alcohol use No    Drug use: No    Sexual activity: Not Currently     Partners: Male     Birth control/ protection: Abstinence, Surgical     Other Topics Concern    None     Social History Narrative       IMMUNIZATIONS:    Immunization History   Administered Date(s) Administered    Influenza Vaccine 12/14/2012, 10/22/2013, 09/18/2014    Influenza Vaccine (Quad) 10/29/2015    Influenza Vaccine (Quad) PF 09/29/2016, 10/19/2017    Influenza Vaccine Split 11/12/2010, 11/07/2011    TD Vaccine 08/18/2000    Tdap 06/30/2016       PHYSICAL EXAMINATION    Vital Signs    Visit Vitals    /75 (BP 1 Location: Left arm, BP Patient Position: Sitting)    Pulse 63    Temp 98 °F (36.7 °C) (Oral)    Resp 18    Ht 5' 4\" (1.626 m)    Wt 195 lb 14.4 oz (88.9 kg)    SpO2 98%    BMI 33.63 kg/m2       Weight Metrics 5/15/2018 5/15/2018 4/16/2018 4/16/2018 3/15/2018 3/15/2018 2/21/2018   Weight - 195 lb 14.4 oz - 194 lb 6.4 oz - 200 lb 4.8 oz -   Waist Measure Inches 30.75 - 33.5 - 33 - 35.25   Body Fat % 42.1 - 44.2 - 44.8 - 46.2   BMI - 33.63 kg/m2 - 33.37 kg/m2 - 34.38 kg/m2 -       General appearance - Well nourished. Well appearing. Well developed. No acute distress. Obese. Head - Normocephalic. Atraumatic. Eyes - pupils equal and reactive, extraocular eye movements intact, sclera anicteric  Ears - Hearing is grossly normal bilaterally. Nose - normal and patent, no erythema, discharge or polyps   Mouth - mucous membranes moist, pharynx normal with cobblestone appearance. No erythema, white exudate or obstruction. Neck - supple. Midline trachea. No carotid bruits are noted. No thyromegaly noted. Chest - clear to auscultation bilaterally anterriorly and posteriorly. No wheezes, rales or rhonchi.   Breath sounds are symmetrical and unlabored bilaterally. Heart - normal rate. Regular rhythm, normal S1, S2. No murmur. No rubs, clicks or gallops noted. Abdomen - soft and distended. No masses or organomegaly. No rebound, rigidity or guarding. Bowel sounds normal x 4 quadrants. No tenderness noted. Neurological - awake, alert and oriented to person, place, and time and event. Cranial nerves II through XII intact. Muscle strength is +5/5 x 4 extremities. Sensation is intact to light touch bilaterally. Steady gait. Heme/Lymph - peripheral pulses normal x 4 extremities. No peripheral edema is noted. No cervical adenopathy noted. Skin - no rashes, erythema, ecchymosis, lacerations, abrasions, suspicious moles noted. No skin tags. No acanthosis nigricans noted in the axilla or neck. Psychological -   normal behavior, speech, dress and thought processes. Good insight. Good eye contact. Normal affect. Appropriate mood. DATA REVIEWED  Lab Results   Component Value Date/Time    WBC 5.2 03/21/2018 09:02 AM    HGB 12.0 03/21/2018 09:02 AM    HCT 38.5 03/21/2018 09:02 AM    PLATELET 617 91/81/5388 09:02 AM    MCV 91 03/21/2018 09:02 AM     Lab Results   Component Value Date/Time    Sodium 140 03/21/2018 09:02 AM    Potassium 4.7 03/21/2018 09:02 AM    Chloride 100 03/21/2018 09:02 AM    CO2 28 03/21/2018 09:02 AM    Anion gap 5 04/23/2011 05:20 AM    Glucose 86 03/21/2018 09:02 AM    BUN 13 03/21/2018 09:02 AM    Creatinine 0.90 03/21/2018 09:02 AM    BUN/Creatinine ratio 14 03/21/2018 09:02 AM    GFR est AA 79 03/21/2018 09:02 AM    GFR est non-AA 69 03/21/2018 09:02 AM    Calcium 9.1 03/21/2018 09:02 AM    Bilirubin, total 0.6 03/21/2018 09:02 AM    AST (SGOT) 29 03/21/2018 09:02 AM    Alk.  phosphatase 95 03/21/2018 09:02 AM    Protein, total 6.9 03/21/2018 09:02 AM    Albumin 3.9 03/21/2018 09:02 AM    A-G Ratio 1.3 03/21/2018 09:02 AM    ALT (SGPT) 15 03/21/2018 09:02 AM     Lab Results   Component Value Date/Time    Cholesterol, total 144 03/21/2018 09:02 AM    HDL Cholesterol 49 03/21/2018 09:02 AM    LDL, calculated 85 03/21/2018 09:02 AM    VLDL, calculated 10 03/21/2018 09:02 AM    Triglyceride 50 03/21/2018 09:02 AM     Lab Results   Component Value Date/Time    Vitamin D 25-Hydroxy 26.3 (L) 07/18/2011 10:22 AM    VITAMIN D, 25-HYDROXY 45.8 03/21/2018 09:02 AM       Lab Results   Component Value Date/Time    Hemoglobin A1c 5.4 03/21/2018 09:02 AM     Lab Results   Component Value Date/Time    TSH 1.740 03/21/2018 09:02 AM    TSH 2.36 08/21/2014 07:50 AM       ASSESSMENT and PLAN    ICD-10-CM ICD-9-CM    1. Hashimoto's thyroiditis E06.3 245.2     stable   2. Pure hypercholesterolemia E78.00 272.0     stable   3. Recurrent pain of right knee M25.561 719.46 diclofenac EC (VOLTAREN) 75 mg EC tablet   4. Generalized anxiety disorder F41.1 300.02 sertraline (ZOLOFT) 50 mg tablet    stable on Zoloft   5. Chronic insomnia F51.04 780.52     stable   6. Vitamin D deficiency E55.9 268.9    7. Rheumatoid arthritis involving both hips with positive rheumatoid factor (HCC) M05.751 714.0 diclofenac EC (VOLTAREN) 75 mg EC tablet    M05.752      stable   8. Hip pain, bilateral M25.551 719.45 diclofenac EC (VOLTAREN) 75 mg EC tablet    M25.552      L>R, due to RA and OA vs other   9. Obesity, Class I, BMI 30-34.9 E66.9 278.00 phendimetrazine tartrate 105 mg cpER   10. Elevated alkaline phosphatase level R74.8 790.5     resolved       Continue current medications and care. Prescriptions written and given to patient. Zoloft 50 mg. (Phendimetrazine 105 mg, month 3 of 3.)  Medication side effects discussed. [facility]  reviewed and pt is compliant. Prescriptions written and sent to pharmacy. Voltaren 75 mg. Discussed the patient's BMI with her. The BMI follow up plan is as follows: I have counseled this patient on diet and exercise regimens.    Diet recommendations:  Decrease carbohydrates (white foods including flour, white bread, white rice, white pasta, white potatoes; corn, sweet foods, sweet drinks and alcohol), increase green leafy vegetables and protein with each meal.  Avoid fried foods. Eat 3-5 small meals daily. Do not skip meals. Ok to use meal replacements up to twice daily with protein goal of 60 mg per meal.   Recommend OTC Premiere Protein bars or shakes. Increase water intake. Increase physical activity to 30 minutes daily for health benefit or 60 minutes daily to prevent weight regain, as tolerated. Physical Activity prescription:  A goal of 30 minutes physical activity daily is recommended for health benefit and at least 60 minutes daily to prevent weight regain. For weight loss, no less than 75% needs to be aerobic (i.e. Walking) and no more than 25% resistance exercising (i.e. Weight lifting). For weight maintenance phase, 50% aerobic and 50% resistance exercises. Sleep prescription:    A goal of 7-8 hours of uninterrupted sleep is recommended to turn off the Grehlin hormone to be released from the stomach and triggers appetite while promoting weight gain. Proper rest turns on Leptin hormone to be released from white adipose tissue and promotes weight loss. Counseled patient on: weight loss goals, emphasizing a 5-10% weight loss in 6-12 months and strategies. Immunizations noted. Praised pt for weight loss efforts and progress. Patient was offered a choice/choices in the treatment plan today. Patient expresses understanding of the plan and agrees with recommendations. Patient declines any additional handouts. Patient is satisfied with previous handouts received from our office    Follow-up Disposition:  Return in about 1 month (around 6/15/2018) for weight, bp, med refill. Written by hyun Hanley, as dictated by Dr. Valencia Cole DO. Documentation True and Accepted by Yady Osei.

## 2018-05-15 NOTE — PROGRESS NOTES
Aleks Andrade is a 58 y.o. female    Chief Complaint   Patient presents with    Follow-up     b/p check, weight mangement     Medication Refill     Phendimetrazine Pt need perscription for Zoloft. 1. Have you been to the ER, urgent care clinic since your last visit? Hospitalized since your last visit? No    2. Have you seen or consulted any other health care providers outside of the [address] [place] [address] since your last visit? Include any pap smears or colon screening.  No

## 2018-06-15 ENCOUNTER — OFFICE VISIT (OUTPATIENT)
Dept: FAMILY MEDICINE CLINIC | Age: 63
End: 2018-06-15

## 2018-06-15 VITALS
WEIGHT: 189.5 LBS | RESPIRATION RATE: 14 BRPM | BODY MASS INDEX: 32.35 KG/M2 | DIASTOLIC BLOOD PRESSURE: 74 MMHG | HEART RATE: 54 BPM | SYSTOLIC BLOOD PRESSURE: 108 MMHG | TEMPERATURE: 97.8 F | HEIGHT: 64 IN | OXYGEN SATURATION: 99 %

## 2018-06-15 DIAGNOSIS — F51.04 CHRONIC INSOMNIA: ICD-10-CM

## 2018-06-15 DIAGNOSIS — R49.0 HOARSENESS: ICD-10-CM

## 2018-06-15 DIAGNOSIS — E06.3 HASHIMOTO'S THYROIDITIS: ICD-10-CM

## 2018-06-15 DIAGNOSIS — J00 ACUTE NASOPHARYNGITIS: Primary | ICD-10-CM

## 2018-06-15 DIAGNOSIS — E66.9 OBESITY, CLASS I, BMI 30-34.9: ICD-10-CM

## 2018-06-15 DIAGNOSIS — E78.00 PURE HYPERCHOLESTEROLEMIA: ICD-10-CM

## 2018-06-15 DIAGNOSIS — R74.8 ELEVATED ALKALINE PHOSPHATASE LEVEL: ICD-10-CM

## 2018-06-15 DIAGNOSIS — E55.9 VITAMIN D DEFICIENCY: ICD-10-CM

## 2018-06-15 DIAGNOSIS — J30.1 SEASONAL ALLERGIC RHINITIS DUE TO POLLEN: ICD-10-CM

## 2018-06-15 RX ORDER — MONTELUKAST SODIUM 10 MG/1
10 TABLET ORAL DAILY
Qty: 90 TAB | Refills: 3 | Status: SHIPPED | OUTPATIENT
Start: 2018-06-15 | End: 2021-05-04 | Stop reason: SDUPTHER

## 2018-06-15 RX ORDER — PHENTERMINE HYDROCHLORIDE 37.5 MG/1
37.5 TABLET ORAL
Qty: 30 TAB | Refills: 0 | Status: SHIPPED | OUTPATIENT
Start: 2018-06-15 | End: 2018-07-12 | Stop reason: SDUPTHER

## 2018-06-15 NOTE — PROGRESS NOTES
HISTORY OF PRESENT ILLNESS  Kalyani Kim is a 58 y.o. female presents with Hypertension and Weight Management      Agree with nurse note. Pt with thyroiditis, vit d deficiency, elevated ALP and hypercholesterolemia presents to the office with a BP of 108/74. She is tolerating Phendimetrazine well, month 3 of 3. Last prescribed on 05/15/2018. She has noticed an increase of energy and decreased appetite since starting the medication. Breakfast is fruit and oatmeal. Lunch consists of spinach and baked meat or vegetables. Armida Kotyk is fish or chicken. She is reducing carbs and increasing protein. She drinks 66 oz of water a day and denies drinking sugar sweetened beverages. She has not been sleeping well due to her cold. She lost 6 pounds since the last visit. Percent body fat has changed from 42.1% to 41.7%, waist circumference measurement has changed from 30.75\" to 38\". Overall, patient is pleased and requests to restart Phentermine today. She complains of congestion, cough with yellow sputum, hoarseness, chest tightness, SOB, sweats, chills, sinus pressure, sinus HA, PND, and rhinorrhea. Patient denies fever, ear pain, dizziness, sore throat, itchy or watery eyes, wheezing, GI symptoms, bladder symptoms and body aches. Patient denies fatigue, heart palpitations, nausea, dry mouth, constipation, signs of depression. Written by hyun Rios, as dictated by Dr. Yazan Lowe DO.    ROS    Review of Systems negative except as noted above in HPI.     ALLERGIES:    Allergies   Allergen Reactions    Advil Pm [Ibuprofen-Diphenhydramine Hcl] Other (comments)     Slightly elevated Creaitnine 1.02    Aleve [Naproxen Sodium] Other (comments)     Due to kidneys    Bactrim [Sulfamethoxazole-Trimethoprim] Nausea and Vomiting    Sulfa (Sulfonamide Antibiotics) Nausea and Vomiting    Sulfasalazine Nausea and Vomiting       CURRENT MEDICATIONS:      PAST MEDICAL HISTORY:    Past Medical History:   Diagnosis Date    Achilles tendonitis 12/2004    Right. Dr. Adolfo Wellington Arthritis 2010    hips, knees. Dr. Ginny Bacon    Chest pain 08/2013    Dr. Karina Mcdonnell.  Chickenpox childhood    Endometriosis 1990    Dr. Dhruv Emerson Environmental allergies     MARGI (generalized anxiety disorder)     Hip pain, bilateral 2010    due to severe OA. Iliopsoas bursitis. Dr. Vu Hubbard    History of breast lump/mass excision 1998    Left breast.  Dr. Kori Bryant Hyperlipidemia     Iron deficiency anemia     iron deficiency    Knee pain, bilateral 2017    Dr. Fred Lackey    Menopausal and postmenopausal disorder 2007    Dr. Dhruv Emerson Uterine fibroid     Dr. Najma Dash.  Vitamin D deficiency 03/2008       PAST SURGICAL HISTORY:    Past Surgical History:   Procedure Laterality Date    BIOPSY BREAST  Rt 1993;Lt 1998    Dr. Hyacinth Mckinnon Right 2002    Right achiles tendon. Dr. Kisha Garcia.  HX BREAST BIOPSY  2001    Left. benign. Dr. Kimberly Briceno HX COLONOSCOPY  09/27/2016    Dr. Tomasz Pennington.  due q 5 yrs due to fam hx   [facility]    Dr. Viji Alvarenga. due to endometriosis    HX PELVIC LAPAROSCOPY  1995    due to endometriosis Dr. Dom Mariscal  04/2011    due to fibroids. Dr. Najma Dash.        FAMILY HISTORY:    Family History   Problem Relation Age of Onset    Hypertension Mother     Other Mother      hearing loss/vision loss    Heart Disease Father     Lung Disease Father     Diabetes Sister     Heart Attack Sister 54     March 2014    Stroke Sister      after MI    Thyroid Disease Sister     Colon Polyps Sister     Heart Disease Brother      Defibrillator placed    Heart Attack Brother 48    Diabetes Maternal Grandmother        SOCIAL HISTORY:    Social History     Social History    Marital status: SINGLE     Spouse name: N/A    Number of children: 2    Years of education: N/A     Occupational History  [employer]      focus is [place]      Social History Main Topics    Smoking status: Never Smoker    Smokeless tobacco: Never Used      Comment: lived with smoker mom x 18 yrs    Alcohol use No    Drug use: No    Sexual activity: Not Currently     Partners: Male     Birth control/ protection: Abstinence, Surgical     Other Topics Concern    None     Social History Narrative       IMMUNIZATIONS:    Immunization History   Administered Date(s) Administered    Influenza Vaccine 12/14/2012, 10/22/2013, 09/18/2014    Influenza Vaccine (Quad) 10/29/2015    Influenza Vaccine (Quad) PF 09/29/2016, 10/19/2017    Influenza Vaccine Split 11/12/2010, 11/07/2011    TD Vaccine 08/18/2000    Tdap 06/30/2016       PHYSICAL EXAMINATION    Vital Signs    Visit Vitals    /74    Pulse (!) 54    Temp 97.8 °F (36.6 °C) (Oral)    Resp 14    Ht 5' 4\" (1.626 m)    Wt 189 lb 8 oz (86 kg)    SpO2 99%    BMI 32.53 kg/m2       Weight Metrics 6/15/2018 6/15/2018 5/15/2018 5/15/2018 4/16/2018 4/16/2018 3/15/2018   Weight - 189 lb 8 oz - 195 lb 14.4 oz - 194 lb 6.4 oz -   Waist Measure Inches 38 - 30.75 - 33.5 - 33   Body Fat % 41.7 - 42.1 - 44.2 - 44.8   BMI - 32.53 kg/m2 - 33.63 kg/m2 - 33.37 kg/m2 -         General appearance - Well nourished. Well appearing. Well developed. No acute distress. Obese. Head - Normocephalic. Atraumatic. Eyes - pupils equal and reactive, extraocular eye movements intact, sclera anicteric. Ears - Hearing is grossly normal bilaterally. R ear is 50% occluded with yellow cerumen, L TM is cloudy but intact. Nose - normal and patent, no erythema, discharge or polyps   Mouth - mucous membranes moist, pharynx normal with cobblestone appearance. No erythema, white exudate or obstruction. Neck - supple. Midline trachea. No carotid bruits are noted. No thyromegaly noted. Chest - clear to auscultation bilaterally anterriorly and posteriorly.   No wheezes, rales or rhonchi. Breath sounds are symmetrical and unlabored bilaterally. Heart - normal rate. Regular rhythm, normal S1, S2. No murmur. No rubs, clicks or gallops noted. Abdomen - soft and distended. No masses or organomegaly. No rebound, rigidity or guarding. Bowel sounds normal x 4 quadrants. No tenderness noted. Neurological - awake, alert and oriented to person, place, and time and event. Cranial nerves II through XII intact. Muscle strength is +5/5 x 4 extremities. Sensation is intact to light touch bilaterally. Steady gait. Heme/Lymph - peripheral pulses normal x 4 extremities. No peripheral edema is noted. No cervical adenopathy noted. Skin - no rashes, erythema, ecchymosis, lacerations, abrasions, suspicious moles noted. No skin tags. No acanthosis nigricans noted in the axilla or neck. Psychological -   normal behavior, speech, dress and thought processes. Good insight. Good eye contact. Normal affect. Appropriate mood. DATA REVIEWED  Lab Results   Component Value Date/Time    WBC 5.2 03/21/2018 09:02 AM    HGB 12.0 03/21/2018 09:02 AM    HCT 38.5 03/21/2018 09:02 AM    PLATELET 291 80/48/2540 09:02 AM    MCV 91 03/21/2018 09:02 AM     Lab Results   Component Value Date/Time    Sodium 140 03/21/2018 09:02 AM    Potassium 4.7 03/21/2018 09:02 AM    Chloride 100 03/21/2018 09:02 AM    CO2 28 03/21/2018 09:02 AM    Anion gap 5 04/23/2011 05:20 AM    Glucose 86 03/21/2018 09:02 AM    BUN 13 03/21/2018 09:02 AM    Creatinine 0.90 03/21/2018 09:02 AM    BUN/Creatinine ratio 14 03/21/2018 09:02 AM    GFR est AA 79 03/21/2018 09:02 AM    GFR est non-AA 69 03/21/2018 09:02 AM    Calcium 9.1 03/21/2018 09:02 AM    Bilirubin, total 0.6 03/21/2018 09:02 AM    AST (SGOT) 29 03/21/2018 09:02 AM    Alk.  phosphatase 95 03/21/2018 09:02 AM    Protein, total 6.9 03/21/2018 09:02 AM    Albumin 3.9 03/21/2018 09:02 AM    A-G Ratio 1.3 03/21/2018 09:02 AM    ALT (SGPT) 15 03/21/2018 09:02 AM     Lab Results   Component Value Date/Time    Cholesterol, total 144 03/21/2018 09:02 AM    HDL Cholesterol 49 03/21/2018 09:02 AM    LDL, calculated 85 03/21/2018 09:02 AM    VLDL, calculated 10 03/21/2018 09:02 AM    Triglyceride 50 03/21/2018 09:02 AM     Lab Results   Component Value Date/Time    Vitamin D 25-Hydroxy 26.3 (L) 07/18/2011 10:22 AM    VITAMIN D, 25-HYDROXY 45.8 03/21/2018 09:02 AM       Lab Results   Component Value Date/Time    Hemoglobin A1c 5.4 03/21/2018 09:02 AM     Lab Results   Component Value Date/Time    TSH 1.740 03/21/2018 09:02 AM    TSH 2.36 08/21/2014 07:50 AM         ASSESSMENT and PLAN    ICD-10-CM ICD-9-CM    1. Acute nasopharyngitis J00 460    2. Seasonal allergic rhinitis due to pollen J30.1 477.0    3. Hashimoto's thyroiditis E06.3 245.2    4. Chronic insomnia F51.04 780.52     stable   5. Hoarseness R49.0 784.42    6. Vitamin D deficiency E55.9 268.9    7. Obesity, Class I, BMI 30-34.9 E66.9 278.00 phentermine (ADIPEX-P) 37.5 mg tablet   8. Elevated alkaline phosphatase level R74.8 790.5    9. Pure hypercholesterolemia E78.00 272.0        Continue current medications and care. Change from Phendimetrazine to Phentermine. Start Singular 10 mg nightly, informed pt this is free at [facility]. Prescriptions written and given to patient (Singulair 10 mg and Phentermine 37.5 mg, month 1 of 3.)   Medication side effects discussed. Discussed the patient's BMI with her. The BMI follow up plan is as follows: I have counseled this patient on diet and exercise regimens. Diet recommendations:  Decrease carbohydrates (white foods including flour, white bread, white rice, white pasta, white potatoes; corn, sweet foods, sweet drinks and alcohol), increase green leafy vegetables and protein with each meal.  Avoid fried foods. Eat 3-5 small meals daily. Do not skip meals.   Ok to use meal replacements up to twice daily with protein goal of 60 mg per meal.   Recommend OTC Premiere Protein bars or shakes. Increase water intake. Increase physical activity to 30 minutes daily for health benefit or 60 minutes daily to prevent weight regain, as tolerated. Physical Activity prescription:  A goal of 30 minutes physical activity daily is recommended for health benefit and at least 60 minutes daily to prevent weight regain. For weight loss, no less than 75% needs to be aerobic (i.e. Walking) and no more than 25% resistance exercising (i.e. Weight lifting). For weight maintenance phase, 50% aerobic and 50% resistance exercises. Sleep prescription:    A goal of 7-8 hours of uninterrupted sleep is recommended to turn off the Grehlin hormone to be released from the stomach and triggers appetite while promoting weight gain. Proper rest turns on Leptin hormone to be released from white adipose tissue and promotes weight loss. Counseled patient on: weight loss goals, emphasizing a 5-10% weight loss in 6-12 months and strategies, Allergies. Congestion protocol/Advised pt to gargel with warm salt water with a tsp of vinegar for sore throat. Also advised pt to avoid sugar and dairy as it can contributed to hoarseness and thickens mucus. Sleep hygiene. Relevant handouts given and discussed with patient. Immunizations noted. Praised pt for weight loss efforts and progress. Patient was offered a choice/choices in the treatment plan today. Patient expresses understanding of the plan and agrees with recommendations. Follow-up Disposition:  Return in about 1 month (around 7/15/2018) for weight, blood pressure. Written by hyun Cleaning, as dictated by Dr. Taran Pardo DO. Documentation True and Accepted by Giuseppe Vines. Alejandro Walls. Patient Instructions          Seasonal Allergies: Care Instructions  Your Care Instructions  Allergies occur when your body's defense system (immune system) overreacts to certain substances.  The immune system treats a harmless substance as if it were a harmful germ or virus. Many things can cause this to happen. Examples include pollens, medicine, food, dust, animal dander, and mold. Your allergies are seasonal if you have symptoms just at certain times of the year. In that case, you are probably allergic to pollens from certain trees, grasses, or weeds. Allergies can be mild or severe. Over-the-counter allergy medicine may help with some symptoms. Read and follow all instructions on the label. Managing your allergies is an important part of staying healthy. Your doctor may suggest that you have tests to help find the cause of your allergies. When you know what things trigger your symptoms, you can avoid them. This can prevent allergy symptoms and other health problems. In some cases, immunotherapy might help. For this treatment, you get shots or use pills that have a small amount of certain allergens in them. Your body \"gets used to\" the allergen, so you react less to it over time. This kind of treatment may help prevent or reduce some allergy symptoms. Follow-up care is a key part of your treatment and safety. Be sure to make and go to all appointments, and call your doctor if you are having problems. It's also a good idea to know your test results and keep a list of the medicines you take. How can you care for yourself at home? · Be safe with medicines. Take your medicines exactly as prescribed. Call your doctor if you think you are having a problem with your medicine. · During your allergy season, keep windows closed. If you need to use air-conditioning, change or clean all filters every month. Take a shower and change your clothes after you have been outside. · Stay inside when pollen counts are high. Vacuum once or twice a week. Use a vacuum  with a HEPA filter or a double-thickness filter. When should you call for help? Give an epinephrine shot if:  ? · You think you are having a severe allergic reaction. ? After giving an epinephrine shot, call 911, even if you feel better. ?Call 911 if:  ? · You have symptoms of a severe allergic reaction. These may include:  ¨ Sudden raised, red areas (hives) all over your body. ¨ Swelling of the throat, mouth, lips, or tongue. ¨ Trouble breathing. ¨ Passing out (losing consciousness). Or you may feel very lightheaded or suddenly feel weak, confused, or restless. ? · You have been given an epinephrine shot, even if you feel better. ?Call your doctor now or seek immediate medical care if:  ? · You have symptoms of an allergic reaction, such as:  ¨ A rash or hives (raised, red areas on the skin). ¨ Itching. ¨ Swelling. ¨ Belly pain, nausea, or vomiting. ? Watch closely for changes in your health, and be sure to contact your doctor if:  ? · You do not get better as expected. Where can you learn more? Go to http://romulomobilePeopledaron.info/. Enter J912 in the search box to learn more about \"Seasonal Allergies: Care Instructions. \"  Current as of: September 29, 2016  Content Version: 11.4  © 1816-1587 [employer]. Care instructions adapted under license by [employer] (which disclaims liability or warranty for this information). If you have questions about a medical condition or this instruction, always ask your healthcare professional. [address] any warranty or liability for your use of this information. Advise patient to start taking Over The Counter allergy medication (Allegra, Zyrtec, Claritin, Xyzal or Alavert) daily. If you have itchy, watery eyes you can try OTC Zaditor or Zyrtec Allergy Eye drops. If you have a stuffy nose, please try OTC Flonase, Flonase Sensimist, Rhinocort, or Nasacort AQ 2 squirts up each nostril once a day (adults) or 1 squirt up each nostril once a day (children). Use allergy free, dye free, fragrance free products on your body and hair.   After being outdoors, brush hair vigorously, wash face and arms, rinse nostrils with a nasal saline spray and consider changing your clothing. Dust furniture frequently and wear a mask while doing it. Vacuum floors weekly. Remove stuffed animals or extra pillows from the bed. Clean bedding in hot water weekly. Sometimes allergies can be so severe that 2 nasal sprays and 2 pills may be needed to control symptoms. Advised protocol for clearing congestion:  Increase fluid intake, especially water to thin mucous and boost the immune system. Avoid sugar and dairy while congested since they thicken mucous. Get plenty of rest!  Gargle 3 times daily and as needed in Listerine or warm salt water vinegar solutions (1 tsp salt, 1 tsp vinegar in 1 cup lukewarm water.)  Use OTC nasal saline spray up each nostril twice daily. Use humidifier at bedtime. Use OTC Mucinex 600 mg twice daily to loosen mucous. Use OTC Tylenol Arthritis or Ibuprofen up to 800 mg up to 3 times daily as needed for pain, fever or headaches. Avoid decongestants and Ibuprofen if you have high blood pressure! If mucous is consistently discolored yellow or green throughout the day for more than a week, call the doctor for an evaluation. Saline Nasal Washes: Care Instructions  Your Care Instructions  Saline nasal washes help keep the nasal passages open by washing out thick or dried mucus. This simple remedy can help relieve symptoms of allergies, sinusitis, and colds. It also can make the nose feel more comfortable by keeping the mucous membranes moist. You may notice a little burning sensation in your nose the first few times you use the solution, but this usually gets better in a few days. Follow-up care is a key part of your treatment and safety. Be sure to make and go to all appointments, and call your doctor if you are having problems. It's also a good idea to know your test results and keep a list of the medicines you take. How can you care for yourself at home?   · You can buy premixed saline solution in a squeeze bottle or other sinus rinse products at a drugstore. Read and follow the instructions on the label. · You also can make your own saline solution by adding 1 teaspoon of salt and 1 teaspoon of baking soda to 2 cups of distilled water. · If you use a homemade solution, pour a small amount into a clean bowl. Using a rubber bulb syringe, squeeze the syringe and place the tip in the salt water. Pull a small amount of the salt water into the syringe by relaxing your hand. · Sit down with your head tilted slightly back. Do not lie down. Put the tip of the bulb syringe or the squeeze bottle a little way into one of your nostrils. Gently drip or squirt a few drops into the nostril. Repeat with the other nostril. Some sneezing and gagging are normal at first.  · Gently blow your nose. · Wipe the syringe or bottle tip clean after each use. · Repeat this 2 or 3 times a day. · Use nasal washes gently if you have nosebleeds often. When should you call for help? Watch closely for changes in your health, and be sure to contact your doctor if:  ? · You often get nosebleeds. ? · You have problems doing the nasal washes. Where can you learn more? Go to http://romulo-daron.info/. Enter [id number] [id number] [id number] in the search box to learn more about \"Saline Nasal Washes: Care Instructions. \"  Current as of: May 12, 2017  Content Version: 11.4  © 5374-5701 [employer]. Care instructions adapted under license by [employer] (which disclaims liability or warranty for this information). If you have questions about a medical condition or this instruction, always ask your healthcare professional. [place] [address] any warranty or liability for your use of this information. Upper Respiratory Infection: After Your Visit to the Emergency Room  Your Care Instructions  You were seen in the emergency room for an upper respiratory infection (URI).  This is an infection of the nose, sinuses, or throat. Viruses or bacteria can cause URIs. Colds, flu, and sinusitis are examples of URIs. These infections are spread by coughs, sneezes, and close contact with people who have a URI. Your doctor may have given you antibiotics to treat the infection if it was caused by bacteria. But antibiotics will not help a viral infection. You can treat most infections with home care. This may include drinking lots of fluids and taking over-the-counter medicine for your symptoms. Even though you have been released from the emergency room, you still need to watch for any problems. The doctor carefully checked you. But sometimes problems can develop later. If you have new symptoms, or if your symptoms do not get better, return to the emergency room or call your doctor right away. A visit to the emergency room is only one step in your treatment. Even if you feel better, you still need to do what your doctor recommends, such as going to all suggested follow-up appointments and taking medicines exactly as directed. This will help you recover and help prevent future problems. How can you care for yourself at home? · To prevent dehydration, drink plenty of fluids, enough so that your urine is light yellow or clear like water. Choose water and other caffeine-free clear liquids until you feel better. If you have kidney, heart, or liver disease and have to limit fluids, talk with your doctor before you increase the amount of fluids you drink. · Take acetaminophen (Tylenol) or ibuprofen (Advil, Motrin) for fever or pain. Read and follow all instructions on the label. · If your doctor prescribed antibiotics, take them as directed. Do not stop taking them just because you feel better. You need to take the full course of antibiotics. · Take cough medicine and a decongestant if your doctor suggests it.   · Get plenty of rest.  · Use saline (saltwater) nasal washes to help keep your nasal passages open and wash out mucus and bacteria. You can buy saline nose drops at a grocery store or drugstore. Or you can make your own at home by adding 1 teaspoon of salt and 1 teaspoon of baking soda to 2 cups of distilled water. If you make your own, fill a bulb syringe with the solution, insert the tip into your nostril, and squeeze gently. Jenny Courts your nose. · Use a vaporizer or humidifier to add moisture to the air in your bedroom. Follow the instructions for cleaning it. · Do not smoke or allow others to smoke around you. If you need help quitting, talk to your doctor about stop-smoking programs and medicines. These can increase your chances of quitting for good. When should you call for help? Call 911 if:  · You have severe trouble breathing. Return to the emergency room now if:  · You have a fever with stiff neck or a severe headache. · You have signs of needing more fluids. You have sunken eyes, a dry mouth, and pass only a little dark urine. · You cannot keep down fluids or medicine. Call your doctor today if:  · You have a deep cough and a lot of mucus. · You are too tired to eat or drink. · You have a new symptom, such as a sore throat, an earache, or a rash. Where can you learn more? Go to August.be  Enter A130 in the search box to learn more about \"Upper Respiratory Infection: After Your Visit to the Emergency Room. \"   © 8871-2266 [employer]. Care instructions adapted under license by Gayathri Irby (which disclaims liability or warranty for this information). This care instruction is for use with your licensed healthcare professional. If you have questions about a medical condition or this instruction, always ask your healthcare professional. [place] [address] any warranty or liability for your use of this information.   Content Version: 9.3.59143; Last Revised: February 13, 2012

## 2018-06-15 NOTE — PATIENT INSTRUCTIONS
Seasonal Allergies: Care Instructions  Your Care Instructions  Allergies occur when your body's defense system (immune system) overreacts to certain substances. The immune system treats a harmless substance as if it were a harmful germ or virus. Many things can cause this to happen. Examples include pollens, medicine, food, dust, animal dander, and mold. Your allergies are seasonal if you have symptoms just at certain times of the year. In that case, you are probably allergic to pollens from certain trees, grasses, or weeds. Allergies can be mild or severe. Over-the-counter allergy medicine may help with some symptoms. Read and follow all instructions on the label. Managing your allergies is an important part of staying healthy. Your doctor may suggest that you have tests to help find the cause of your allergies. When you know what things trigger your symptoms, you can avoid them. This can prevent allergy symptoms and other health problems. In some cases, immunotherapy might help. For this treatment, you get shots or use pills that have a small amount of certain allergens in them. Your body \"gets used to\" the allergen, so you react less to it over time. This kind of treatment may help prevent or reduce some allergy symptoms. Follow-up care is a key part of your treatment and safety. Be sure to make and go to all appointments, and call your doctor if you are having problems. It's also a good idea to know your test results and keep a list of the medicines you take. How can you care for yourself at home? · Be safe with medicines. Take your medicines exactly as prescribed. Call your doctor if you think you are having a problem with your medicine. · During your allergy season, keep windows closed. If you need to use air-conditioning, change or clean all filters every month. Take a shower and change your clothes after you have been outside. · Stay inside when pollen counts are high.  Vacuum once or twice a week. Use a vacuum  with a HEPA filter or a double-thickness filter. When should you call for help? Give an epinephrine shot if:  ? · You think you are having a severe allergic reaction. ? After giving an epinephrine shot, call 911, even if you feel better. ?Call 911 if:  ? · You have symptoms of a severe allergic reaction. These may include:  ¨ Sudden raised, red areas (hives) all over your body. ¨ Swelling of the throat, mouth, lips, or tongue. ¨ Trouble breathing. ¨ Passing out (losing consciousness). Or you may feel very lightheaded or suddenly feel weak, confused, or restless. ? · You have been given an epinephrine shot, even if you feel better. ?Call your doctor now or seek immediate medical care if:  ? · You have symptoms of an allergic reaction, such as:  ¨ A rash or hives (raised, red areas on the skin). ¨ Itching. ¨ Swelling. ¨ Belly pain, nausea, or vomiting. ? Watch closely for changes in your health, and be sure to contact your doctor if:  ? · You do not get better as expected. Where can you learn more? Go to http://romulo-daron.info/. Enter J912 in the search box to learn more about \"Seasonal Allergies: Care Instructions. \"  Current as of: September 29, 2016  Content Version: 11.4  © 9120-4277 [employer]. Care instructions adapted under license by [employer] (which disclaims liability or warranty for this information). If you have questions about a medical condition or this instruction, always ask your healthcare professional. [place] [address] any warranty or liability for your use of this information. Advise patient to start taking Over The Counter allergy medication (Allegra, Zyrtec, Claritin, Xyzal or Alavert) daily. If you have itchy, watery eyes you can try OTC Zaditor or Zyrtec Allergy Eye drops.   If you have a stuffy nose, please try OTC Flonase, Flonase Sensimist, Rhinocort, or Nasacort AQ 2 squirts up each nostril once a day (adults) or 1 squirt up each nostril once a day (children). Use allergy free, dye free, fragrance free products on your body and hair. After being outdoors, brush hair vigorously, wash face and arms, rinse nostrils with a nasal saline spray and consider changing your clothing. Dust furniture frequently and wear a mask while doing it. Vacuum floors weekly. Remove stuffed animals or extra pillows from the bed. Clean bedding in hot water weekly. Sometimes allergies can be so severe that 2 nasal sprays and 2 pills may be needed to control symptoms. Advised protocol for clearing congestion:  Increase fluid intake, especially water to thin mucous and boost the immune system. Avoid sugar and dairy while congested since they thicken mucous. Get plenty of rest!  Gargle 3 times daily and as needed in Listerine or warm salt water vinegar solutions (1 tsp salt, 1 tsp vinegar in 1 cup lukewarm water.)  Use OTC nasal saline spray up each nostril twice daily. Use humidifier at bedtime. Use OTC Mucinex 600 mg twice daily to loosen mucous. Use OTC Tylenol Arthritis or Ibuprofen up to 800 mg up to 3 times daily as needed for pain, fever or headaches. Avoid decongestants and Ibuprofen if you have high blood pressure! If mucous is consistently discolored yellow or green throughout the day for more than a week, call the doctor for an evaluation. Saline Nasal Washes: Care Instructions  Your Care Instructions  Saline nasal washes help keep the nasal passages open by washing out thick or dried mucus. This simple remedy can help relieve symptoms of allergies, sinusitis, and colds. It also can make the nose feel more comfortable by keeping the mucous membranes moist. You may notice a little burning sensation in your nose the first few times you use the solution, but this usually gets better in a few days. Follow-up care is a key part of your treatment and safety.  Be sure to make and go to all appointments, and call your doctor if you are having problems. It's also a good idea to know your test results and keep a list of the medicines you take. How can you care for yourself at home? · You can buy premixed saline solution in a squeeze bottle or other sinus rinse products at a drugstore. Read and follow the instructions on the label. · You also can make your own saline solution by adding 1 teaspoon of salt and 1 teaspoon of baking soda to 2 cups of distilled water. · If you use a homemade solution, pour a small amount into a clean bowl. Using a rubber bulb syringe, squeeze the syringe and place the tip in the salt water. Pull a small amount of the salt water into the syringe by relaxing your hand. · Sit down with your head tilted slightly back. Do not lie down. Put the tip of the bulb syringe or the squeeze bottle a little way into one of your nostrils. Gently drip or squirt a few drops into the nostril. Repeat with the other nostril. Some sneezing and gagging are normal at first.  · Gently blow your nose. · Wipe the syringe or bottle tip clean after each use. · Repeat this 2 or 3 times a day. · Use nasal washes gently if you have nosebleeds often. When should you call for help? Watch closely for changes in your health, and be sure to contact your doctor if:  ? · You often get nosebleeds. ? · You have problems doing the nasal washes. Where can you learn more? Go to http://romulo-daron.info/. Enter [id number] in the search box to learn more about \"Saline Nasal Washes: Care Instructions. \"  Current as of: May 12, 2017  Content Version: 11.4  © 5080-5887 [employer]. Care instructions adapted under license by [employer] (which disclaims liability or warranty for this information).  If you have questions about a medical condition or this instruction, always ask your healthcare professional. Katelyn Smith disclaims any warranty or liability for your use of this information. Upper Respiratory Infection: After Your Visit to the Emergency Room  Your Care Instructions  You were seen in the emergency room for an upper respiratory infection (URI). This is an infection of the nose, sinuses, or throat. Viruses or bacteria can cause URIs. Colds, flu, and sinusitis are examples of URIs. These infections are spread by coughs, sneezes, and close contact with people who have a URI. Your doctor may have given you antibiotics to treat the infection if it was caused by bacteria. But antibiotics will not help a viral infection. You can treat most infections with home care. This may include drinking lots of fluids and taking over-the-counter medicine for your symptoms. Even though you have been released from the emergency room, you still need to watch for any problems. The doctor carefully checked you. But sometimes problems can develop later. If you have new symptoms, or if your symptoms do not get better, return to the emergency room or call your doctor right away. A visit to the emergency room is only one step in your treatment. Even if you feel better, you still need to do what your doctor recommends, such as going to all suggested follow-up appointments and taking medicines exactly as directed. This will help you recover and help prevent future problems. How can you care for yourself at home? · To prevent dehydration, drink plenty of fluids, enough so that your urine is light yellow or clear like water. Choose water and other caffeine-free clear liquids until you feel better. If you have kidney, heart, or liver disease and have to limit fluids, talk with your doctor before you increase the amount of fluids you drink. · Take acetaminophen (Tylenol) or ibuprofen (Advil, Motrin) for fever or pain. Read and follow all instructions on the label. · If your doctor prescribed antibiotics, take them as directed. Do not stop taking them just because you feel better. You need to take the full course of antibiotics. · Take cough medicine and a decongestant if your doctor suggests it. · Get plenty of rest.  · Use saline (saltwater) nasal washes to help keep your nasal passages open and wash out mucus and bacteria. You can buy saline nose drops at a grocery store or drugstore. Or you can make your own at home by adding 1 teaspoon of salt and 1 teaspoon of baking soda to 2 cups of distilled water. If you make your own, fill a bulb syringe with the solution, insert the tip into your nostril, and squeeze gently. Aliya Yareli your nose. · Use a vaporizer or humidifier to add moisture to the air in your bedroom. Follow the instructions for cleaning it. · Do not smoke or allow others to smoke around you. If you need help quitting, talk to your doctor about stop-smoking programs and medicines. These can increase your chances of quitting for good. When should you call for help? Call 911 if:  · You have severe trouble breathing. Return to the emergency room now if:  · You have a fever with stiff neck or a severe headache. · You have signs of needing more fluids. You have sunken eyes, a dry mouth, and pass only a little dark urine. · You cannot keep down fluids or medicine. Call your doctor today if:  · You have a deep cough and a lot of mucus. · You are too tired to eat or drink. · You have a new symptom, such as a sore throat, an earache, or a rash. Where can you learn more? Go to Ammado.be  Enter I329 in the search box to learn more about \"Upper Respiratory Infection: After Your Visit to the Emergency Room. \"   © 0268-3128 [employer]. Care instructions adapted under license by [facility] [employer] (which disclaims liability or warranty for this information).  This care instruction is for use with your licensed healthcare professional. If you have questions about a medical condition or this instruction, always ask your healthcare professional. [employer] disclaims any warranty or liability for your use of this information.   Content Version: 9.3.42922; Last Revised: February 13, 2012

## 2018-06-15 NOTE — MR AVS SNAPSHOT
[address] [facility] 
 
 
 [address] [facility] 
[address] [facility] [address] 
[phone] Patient: Livier Sarmiento MRN: [id number] DYR:[birth year] Visit Information Date & Time Provider Department Dept. Phone Encounter #  
 6/15/2018  8:30 AM Toy Schanz, DO [facility] [phone] [id number] Follow-up Instructions Return in about 1 month (around 7/15/2018) for weight, blood pressure. Your Appointments 7/12/2018  8:30 AM  
Office Visit with Toy Schanz, DO [facility] ([facility] [place]) Appt Note: weight check 3979 [facility] [address]  
[phone]  
  
   
 [address] [facility] [address]  
  
    
 8/16/2018  8:30 AM  
Office Visit with Toy Schanz, DO [facility] ([facility] [place]) Appt Note: weight check [address] [address]  
[phone]  
  
    
 9/13/2018  8:45 AM  
Office Visit with Toy Schanz, DO [facility] ([facility] [place]) Appt Note: weight check [address] [address]  
[phone]  
  
    
 10/18/2018  8:30 AM  
Office Visit with Toy Schanz, DO [facility] ([facility] [place]) Appt Note: weight check [address] [address]  
[phone]  
  
    
 11/15/2018  8:30 AM  
Office Visit with Toy Schanz, DO [facility] ([facility] [place]) Appt Note: weight check 39[address] [address]  
[phone]  
  
    
 12/13/2018  8:30 AM  
Office Visit with Toy Schanz, DO [facility] ([facility] [place]) Appt Note: weight check 3979 [address] [address]  
[phone] Upcoming Health Maintenance Date Due Influenza Age 5 to Adult 8/1/2018 PAP AKA CERVICAL CYTOLOGY 12/12/2018 BREAST CANCER SCRN MAMMOGRAM 12/15/2019 COLONOSCOPY 9/27/2021 DTaP/Tdap/Td series (2 - Td) 6/30/2026 Allergies as of 6/15/2018  Review Complete On: 6/15/2018 By: Tiffany Rosado,  Severity Noted Reaction Type Reactions Advil Pm [Ibuprofen-diphenhydramine Hcl]  07/18/2011    Other (comments) Slightly elevated Creaitnine 1.02 Aleve [Naproxen Sodium]  12/28/2011    Other (comments) Due to kidneys Bactrim [Sulfamethoxazole-trimethoprim]  09/11/2009    Nausea and Vomiting  
 Sulfa (Sulfonamide Antibiotics)  09/11/2009    Nausea and Vomiting  
 Sulfasalazine  09/11/2009    Nausea and Vomiting Current Immunizations  Reviewed on 6/15/2018 Name Date Influenza Vaccine 9/18/2014, 10/22/2013, 12/14/2012 Influenza Vaccine Tayafelisha Du) 10/29/2015 Influenza Vaccine (Quad) PF 10/19/2017, 9/29/2016 Influenza Vaccine Split 11/7/2011, 11/12/2010 TD Vaccine 8/18/2000 Tdap 6/30/2016  9:35 AM  
  
 Reviewed by Tiffany Rosado,  on 6/15/2018 at  9:28 AM  
You Were Diagnosed With   
  
 Codes Comments Acute nasopharyngitis    -  Primary ICD-10-CM: Perry Cevallos ICD-9-CM: 198 Seasonal allergic rhinitis due to pollen     ICD-10-CM: J30.1 ICD-9-CM: 477.0 Hashimoto's thyroiditis     ICD-10-CM: E06.3 ICD-9-CM: 822. 2 Chronic insomnia     ICD-10-CM: F51.04 
ICD-9-CM: 780.52 stable Hoarseness     ICD-10-CM: R49.0 ICD-9-CM: 784.42 Vitamin D deficiency     ICD-10-CM: E55.9 ICD-9-CM: 268.9 Obesity, Class I, BMI 30-34.9     ICD-10-CM: E66.9 ICD-9-CM: 278.00 Elevated alkaline phosphatase level     ICD-10-CM: R74.8 ICD-9-CM: 790.5 Pure hypercholesterolemia     ICD-10-CM: E78.00 ICD-9-CM: 272.0 Vitals BP Pulse Temp Resp Height(growth percentile) Weight(growth percentile) 108/74 (!) 54 97.8 °F (36.6 °C) (Oral) 14 5' 4\" (1.626 m) 189 lb 8 oz (86 kg) SpO2 BMI OB Status Smoking Status 99% 32.53 kg/m2 Hysterectomy Never Smoker BMI and BSA Data Body Mass Index Body Surface Area 32.53 kg/m 2 1.97 m 2 Preferred Pharmacy Pharmacy Name Phone [facility], [address] AT [address] [place] [address] Drive [phone] Your Updated Medication List  
  
   
This list is accurate as of 6/15/18  9:30 AM.  Always use your most recent med list.  
  
  
  
  
 albuterol 90 mcg/actuation inhaler Commonly known as:  PROVENTIL HFA, VENTOLIN HFA, PROAIR HFA Take 2 Puffs by inhalation every four (4) hours as needed for Wheezing (cough). aspirin delayed-release 81 mg tablet Take 1 Tab by mouth daily. Indications: prevention of transient ischemic attack  
  
 cetirizine 10 mg tablet Commonly known as:  ZYRTEC Take 1 Tab by mouth daily. diclofenac EC 75 mg EC tablet Commonly known as:  VOLTAREN  
TAKE 1 TABLET BY MOUTH TWICE DAILY  Indications: OSTEOARTHRITIS  
  
 eszopiclone 2 mg tablet Commonly known as:  Emanuel Azul Take 1 Tab by mouth nightly. Max Daily Amount: 2 mg. fluticasone 50 mcg/actuation nasal spray Commonly known as:  Genie Massimo 2 Sprays by Both Nostrils route daily. Indications: ALLERGIC RHINITIS  
  
 furosemide 40 mg tablet Commonly known as:  LASIX TAKE 1 TABLET BY MOUTH DAILY FOR SWELLING  
  
 hyaluronate 48 mg/6 mL Syrg injection Commonly known as:  SYNVISC-ONE Inject one prefilled syringe into the right knee, for a quantity of 1 injection Insulin Needles (Disposable) 32 gauge x 5/32\" Ndle Commonly known as:  Catina Pen Needle Use daily as directed MINIVELLE 0.1 mg/24 hr  
Generic drug:  estradiol 1 Patch by TransDERmal route two (2) times a week. Changes Sunday and Wednesday  
  
 montelukast 10 mg tablet Commonly known as:  SINGULAIR Take 1 Tab by mouth daily. Indications: Allergic Rhinitis MYRBETRIQ 50 mg ER tablet Generic drug:  mirabegron ER  
  
 phentermine 37.5 mg tablet Commonly known as:  ADIPEX-P Take 1 Tab by mouth every morning. Max Daily Amount: 37.5 mg. PREMARIN 0.625 mg/gram vaginal cream  
Generic drug:  conjugated estrogens  
  
 sertraline 50 mg tablet Commonly known as:  ZOLOFT Take 1 Tab by mouth daily. Indications: Generalized Anxiety Disorder VITAMIN D3 2,000 unit Tab Generic drug:  cholecalciferol (vitamin D3) Take 2,000 Units by mouth daily. Prescriptions Printed Refills  
 phentermine (ADIPEX-P) 37.5 mg tablet 0 Sig: Take 1 Tab by mouth every morning. Max Daily Amount: 37.5 mg.  
 Class: Print Route: Oral  
 montelukast (SINGULAIR) 10 mg tablet 3 Sig: Take 1 Tab by mouth daily. Indications: Allergic Rhinitis Class: Print Route: Oral  
  
Follow-up Instructions Return in about 1 month (around 7/15/2018) for weight, blood pressure. Patient Instructions Seasonal Allergies: Care Instructions Your Care Instructions Allergies occur when your body's defense system (immune system) overreacts to certain substances. The immune system treats a harmless substance as if it were a harmful germ or virus. Many things can cause this to happen. Examples include pollens, medicine, food, dust, animal dander, and mold. Your allergies are seasonal if you have symptoms just at certain times of the year. In that case, you are probably allergic to pollens from certain trees, grasses, or weeds. Allergies can be mild or severe. Over-the-counter allergy medicine may help with some symptoms. Read and follow all instructions on the label. Managing your allergies is an important part of staying healthy. Your doctor may suggest that you have tests to help find the cause of your allergies. When you know what things trigger your symptoms, you can avoid them. This can prevent allergy symptoms and other health problems. In some cases, immunotherapy might help. For this treatment, you get shots or use pills that have a small amount of certain allergens in them. Your body \"gets used to\" the allergen, so you react less to it over time. This kind of treatment may help prevent or reduce some allergy symptoms. Follow-up care is a key part of your treatment and safety. Be sure to make and go to all appointments, and call your doctor if you are having problems. It's also a good idea to know your test results and keep a list of the medicines you take. How can you care for yourself at home? · Be safe with medicines. Take your medicines exactly as prescribed. Call your doctor if you think you are having a problem with your medicine. · During your allergy season, keep windows closed. If you need to use air-conditioning, change or clean all filters every month. Take a shower and change your clothes after you have been outside. · Stay inside when pollen counts are high. Vacuum once or twice a week. Use a vacuum  with a HEPA filter or a double-thickness filter. When should you call for help? Give an epinephrine shot if: 
? · You think you are having a severe allergic reaction. ? After giving an epinephrine shot, call 911, even if you feel better. ?Call 911 if: 
? · You have symptoms of a severe allergic reaction. These may include: 
¨ Sudden raised, red areas (hives) all over your body. ¨ Swelling of the throat, mouth, lips, or tongue. ¨ Trouble breathing. ¨ Passing out (losing consciousness). Or you may feel very lightheaded or suddenly feel weak, confused, or restless. ? · You have been given an epinephrine shot, even if you feel better. ?Call your doctor now or seek immediate medical care if: 
? · You have symptoms of an allergic reaction, such as: ¨ A rash or hives (raised, red areas on the skin). ¨ Itching. ¨ Swelling. ¨ Belly pain, nausea, or vomiting. ?Watch closely for changes in your health, and be sure to contact your doctor if: 
? · You do not get better as expected. Where can you learn more? Go to http://romulo-daron.info/. Enter J912 in the search box to learn more about \"Seasonal Allergies: Care Instructions. \" Current as of: September 29, 2016 Content Version: 11.4 © 2382-4176 Kudoala. Care instructions adapted under license by [employer] (which disclaims liability or warranty for this information). If you have questions about a medical condition or this instruction, always ask your healthcare professional. [address] any warranty or liability for your use of this information. Advise patient to start taking Over The Counter allergy medication (Allegra, Zyrtec, Claritin, Xyzal or Alavert) daily. If you have itchy, watery eyes you can try OTC Zaditor or Zyrtec Allergy Eye drops. If you have a stuffy nose, please try OTC Flonase, Flonase Sensimist, Rhinocort, or Nasacort AQ 2 squirts up each nostril once a day (adults) or 1 squirt up each nostril once a day (children). Use allergy free, dye free, fragrance free products on your body and hair. After being outdoors, brush hair vigorously, wash face and arms, rinse nostrils with a nasal saline spray and consider changing your clothing. Dust furniture frequently and wear a mask while doing it. Vacuum floors weekly. Remove stuffed animals or extra pillows from the bed. Clean bedding in hot water weekly. Sometimes allergies can be so severe that 2 nasal sprays and 2 pills may be needed to control symptoms. Advised protocol for clearing congestion:  Increase fluid intake, especially water to thin mucous and boost the immune system. Avoid sugar and dairy while congested since they thicken mucous.   Get plenty of rest!  Gargle 3 times daily and as needed in Listerine or warm salt water vinegar solutions (1 tsp salt, 1 tsp vinegar in 1 cup lukewarm water.)  Use OTC nasal saline spray up each nostril twice daily. Use humidifier at bedtime. Use OTC Mucinex 600 mg twice daily to loosen mucous. Use OTC Tylenol Arthritis or Ibuprofen up to 800 mg up to 3 times daily as needed for pain, fever or headaches. Avoid decongestants and Ibuprofen if you have high blood pressure! If mucous is consistently discolored yellow or green throughout the day for more than a week, call the doctor for an evaluation. Saline Nasal Washes: Care Instructions Your Care Instructions Saline nasal washes help keep the nasal passages open by washing out thick or dried mucus. This simple remedy can help relieve symptoms of allergies, sinusitis, and colds. It also can make the nose feel more comfortable by keeping the mucous membranes moist. You may notice a little burning sensation in your nose the first few times you use the solution, but this usually gets better in a few days. Follow-up care is a key part of your treatment and safety. Be sure to make and go to all appointments, and call your doctor if you are having problems. It's also a good idea to know your test results and keep a list of the medicines you take. How can you care for yourself at home? · You can buy premixed saline solution in a squeeze bottle or other sinus rinse products at a drugstore. Read and follow the instructions on the label. · You also can make your own saline solution by adding 1 teaspoon of salt and 1 teaspoon of baking soda to 2 cups of distilled water. · If you use a homemade solution, pour a small amount into a clean bowl. Using a rubber bulb syringe, squeeze the syringe and place the tip in the salt water. Pull a small amount of the salt water into the syringe by relaxing your hand. · Sit down with your head tilted slightly back. Do not lie down.  Put the tip of the bulb syringe or the squeeze bottle a little way into one of your nostrils. Gently drip or squirt a few drops into the nostril. Repeat with the other nostril. Some sneezing and gagging are normal at first. 
· Gently blow your nose. · Wipe the syringe or bottle tip clean after each use. · Repeat this 2 or 3 times a day. · Use nasal washes gently if you have nosebleeds often. When should you call for help? Watch closely for changes in your health, and be sure to contact your doctor if: 
? · You often get nosebleeds. ? · You have problems doing the nasal washes. Where can you learn more? Go to http://romulo-daron.info/. Enter [id number] in the search box to learn more about \"Saline Nasal Washes: Care Instructions. \" Current as of: May 12, 2017 Content Version: 11.4 © 6114-3487 Brille24. Care instructions adapted under license by [employer] (which disclaims liability or warranty for this information). If you have questions about a medical condition or this instruction, always ask your healthcare professional. [place] [address] any warranty or liability for your use of this information. Upper Respiratory Infection: After Your Visit to the Emergency Room Your Care Instructions You were seen in the emergency room for an upper respiratory infection (URI). This is an infection of the nose, sinuses, or throat. Viruses or bacteria can cause URIs. Colds, flu, and sinusitis are examples of URIs. These infections are spread by coughs, sneezes, and close contact with people who have a URI. Your doctor may have given you antibiotics to treat the infection if it was caused by bacteria. But antibiotics will not help a viral infection. You can treat most infections with home care. This may include drinking lots of fluids and taking over-the-counter medicine for your symptoms.  
Even though you have been released from the emergency room, you still need to watch for any problems. The doctor carefully checked you. But sometimes problems can develop later. If you have new symptoms, or if your symptoms do not get better, return to the emergency room or call your doctor right away. A visit to the emergency room is only one step in your treatment. Even if you feel better, you still need to do what your doctor recommends, such as going to all suggested follow-up appointments and taking medicines exactly as directed. This will help you recover and help prevent future problems. How can you care for yourself at home? · To prevent dehydration, drink plenty of fluids, enough so that your urine is light yellow or clear like water. Choose water and other caffeine-free clear liquids until you feel better. If you have kidney, heart, or liver disease and have to limit fluids, talk with your doctor before you increase the amount of fluids you drink. · Take acetaminophen (Tylenol) or ibuprofen (Advil, Motrin) for fever or pain. Read and follow all instructions on the label. · If your doctor prescribed antibiotics, take them as directed. Do not stop taking them just because you feel better. You need to take the full course of antibiotics. · Take cough medicine and a decongestant if your doctor suggests it. · Get plenty of rest. 
· Use saline (saltwater) nasal washes to help keep your nasal passages open and wash out mucus and bacteria. You can buy saline nose drops at a grocery store or drugstore. Or you can make your own at home by adding 1 teaspoon of salt and 1 teaspoon of baking soda to 2 cups of distilled water. If you make your own, fill a bulb syringe with the solution, insert the tip into your nostril, and squeeze gently. Tae Louiseaac your nose. · Use a vaporizer or humidifier to add moisture to the air in your bedroom. Follow the instructions for cleaning it. · Do not smoke or allow others to smoke around you.  If you need help quitting, talk to your doctor about stop-smoking programs and medicines. These can increase your chances of quitting for good. When should you call for help? Call 911 if: 
· You have severe trouble breathing. Return to the emergency room now if: 
· You have a fever with stiff neck or a severe headache. · You have signs of needing more fluids. You have sunken eyes, a dry mouth, and pass only a little dark urine. · You cannot keep down fluids or medicine. Call your doctor today if: 
· You have a deep cough and a lot of mucus. · You are too tired to eat or drink. · You have a new symptom, such as a sore throat, an earache, or a rash. Where can you learn more? Go to Gient.be Enter H840 in the search box to learn more about \"Upper Respiratory Infection: After Your Visit to the Emergency Room. \"  
© 3732-8719 Healthwise, DevHD. Care instructions adapted under license by Ghazal Smith (which disclaims liability or warranty for this information). This care instruction is for use with your licensed healthcare professional. If you have questions about a medical condition or this instruction, always ask your healthcare professional. [place] [address] any warranty or liability for your use of this information. Content Version: 9.3.78953; Last Revised: February 13, 2012 Introducing [facility] SERVICES! Ghazal Smith introduces Stem[employer] patient portal. Now you can access parts of your medical record, email your doctor's office, and request medication refills online. 1. In your internet browser, go to [email or web address] 2. Click on the First Time User? Click Here link in the Sign In box. You will see the New Member Sign Up page. 3. Enter your StemCyte Access Code exactly as it appears below. You will not need to use this code after youve completed the sign-up process.  If you do not sign up before the expiration date, you must request a new code. · Microarrays Access Code: [email or web address] Expires: 8/13/2018  9:32 AM 
 
4. Enter the last four digits of your Social Security Number (xxxx) and Date of Birth (mm/dd/yyyy) as indicated and click Submit. You will be taken to the next sign-up page. 5. Create a Microarrays ID. This will be your Microarrays login ID and cannot be changed, so think of one that is secure and easy to remember. 6. Create a Microarrays password. You can change your password at any time. 7. Enter your Password Reset Question and Answer. This can be used at a later time if you forget your password. 8. Enter your e-mail address. You will receive e-mail notification when new information is available in [address] 9. Click Sign Up. You can now view and download portions of your medical record. 10. Click the Download Summary menu link to download a portable copy of your medical information. If you have questions, please visit the Frequently Asked Questions section of the Microarrays website. Remember, Microarrays is NOT to be used for urgent needs. For medical emergencies, dial 911. Now available from your iPhone and Android! Please provide this summary of care documentation to your next provider. Your primary care clinician is listed as Thera Goodell. If you have any questions after today's visit, please call [phone].

## 2018-06-15 NOTE — PROGRESS NOTES
Gia Ross is a 58 y.o. female    Chief Complaint   Patient presents with    Hypertension    Weight Management     1. Have you been to the ER, urgent care clinic since your last visit? Hospitalized since your last visit? No     2. Have you seen or consulted any other health care providers outside of the [address] [place] [address] since your last visit? Include any pap smears or colon screening.   No       Visit Vitals    /74    Pulse (!) 54    Temp 97.8 °F (36.6 °C) (Oral)    Resp 14    Ht 5' 4\" (1.626 m)    Wt 189 lb 8 oz (86 kg)    SpO2 99%    BMI 32.53 kg/m2     Body fat- 41.7  BMI- 32.4  Waist - 38 inches

## 2018-07-12 ENCOUNTER — OFFICE VISIT (OUTPATIENT)
Dept: FAMILY MEDICINE CLINIC | Age: 63
End: 2018-07-12

## 2018-07-12 VITALS
WEIGHT: 185.8 LBS | RESPIRATION RATE: 18 BRPM | SYSTOLIC BLOOD PRESSURE: 105 MMHG | BODY MASS INDEX: 31.72 KG/M2 | DIASTOLIC BLOOD PRESSURE: 65 MMHG | TEMPERATURE: 97.8 F | HEART RATE: 55 BPM | OXYGEN SATURATION: 99 % | HEIGHT: 64 IN

## 2018-07-12 DIAGNOSIS — M79.674 PAIN IN TOE OF RIGHT FOOT: Primary | ICD-10-CM

## 2018-07-12 DIAGNOSIS — R00.1 BRADYCARDIA: ICD-10-CM

## 2018-07-12 DIAGNOSIS — E78.00 PURE HYPERCHOLESTEROLEMIA: ICD-10-CM

## 2018-07-12 DIAGNOSIS — R63.4 WEIGHT LOSS: ICD-10-CM

## 2018-07-12 DIAGNOSIS — E55.9 VITAMIN D DEFICIENCY: ICD-10-CM

## 2018-07-12 DIAGNOSIS — E06.3 HASHIMOTO'S THYROIDITIS: ICD-10-CM

## 2018-07-12 DIAGNOSIS — F51.04 CHRONIC INSOMNIA: ICD-10-CM

## 2018-07-12 DIAGNOSIS — E66.9 OBESITY, CLASS I, BMI 30-34.9: ICD-10-CM

## 2018-07-12 RX ORDER — ESZOPICLONE 2 MG/1
2 TABLET, FILM COATED ORAL
Qty: 30 TAB | Refills: 5 | Status: CANCELLED | OUTPATIENT
Start: 2018-07-12

## 2018-07-12 RX ORDER — PHENTERMINE HYDROCHLORIDE 37.5 MG/1
37.5 TABLET ORAL
Qty: 30 TAB | Refills: 0 | Status: SHIPPED | OUTPATIENT
Start: 2018-07-12 | End: 2018-09-10 | Stop reason: SDUPTHER

## 2018-07-12 NOTE — PROGRESS NOTES
HISTORY OF PRESENT ILLNESS  Na Nguyen is a 61 y.o. female presents with Weight Management; Toe Pain (R 4th toe); and Medication Refill      Agree with nurse note. Pt with hypercholesterolemia, obesity, bradycardia, hashimoto's thyroiditis, and vit d deficiency presents to the office with a BP of 105/65. She is tolerating Phentermine 37.5 mg well, month 1 of 3. Last prescribed on 6/15/2018. She has noticed an increase of energy and decreased appetite since starting the medication. minutes a day. She lost 4 pounds since the last visit. Percent body fat has changed from 41.7% to 41%, waist circumference measurement is 32\". Overall, patient is pleased and requests a refill of the medication today. She has been walking 1 mile on the track everyday. She has been making meals from the [employer] recipes. Pt with chronic insomnia uses Lunesta 2 mg with relief. Pt complains of pain in her 4th toe on R foot after hitting it on the wall molding 8 days ago. The pain has improved with ice but it is still very tender. Patient denies fatigue, chest pain, heart palpitations, nausea, dry mouth, constipation, signs of depression. Written by hyun Vasquez, as dictated by Dr. Darling Granda DO.    ROS    Review of Systems negative except as noted above in HPI. ALLERGIES:    Allergies   Allergen Reactions    Advil Pm [Ibuprofen-Diphenhydramine Hcl] Other (comments)     Slightly elevated Creaitnine 1.02    Aleve [Naproxen Sodium] Other (comments)     Due to kidneys    Bactrim [Sulfamethoxazole-Trimethoprim] Nausea and Vomiting    Sulfa (Sulfonamide Antibiotics) Nausea and Vomiting    Sulfasalazine Nausea and Vomiting       CURRENT MEDICATIONS:    Outpatient Prescriptions Marked as Taking for the 7/12/18 encounter (Office Visit) with Yung Alamo DO   Medication Sig Dispense Refill    phentermine (ADIPEX-P) 37.5 mg tablet Take 1 Tab by mouth every morning.  Max Daily Amount: 37.5 mg. 30 Tab 0    montelukast (SINGULAIR) 10 mg tablet Take 1 Tab by mouth daily. Indications: Allergic Rhinitis 90 Tab 3    diclofenac EC (VOLTAREN) 75 mg EC tablet TAKE 1 TABLET BY MOUTH TWICE DAILY  Indications: OSTEOARTHRITIS 60 Tab 2    sertraline (ZOLOFT) 50 mg tablet Take 1 Tab by mouth daily. Indications: Generalized Anxiety Disorder 90 Tab 3    PREMARIN 0.625 mg/gram vaginal cream   4    MYRBETRIQ 50 mg ER tablet       furosemide (LASIX) 40 mg tablet TAKE 1 TABLET BY MOUTH DAILY FOR SWELLING 90 Tab 1    Insulin Needles, Disposable, (NAVID PEN NEEDLE) 32 gauge x 5/32\" ndle Use daily as directed 50 Pen Needle 5    aspirin delayed-release 81 mg tablet Take 1 Tab by mouth daily. Indications: prevention of transient ischemic attack 90 Tab 3    MINIVELLE 0.1 mg/24 hr 1 Patch by TransDERmal route two (2) times a week. Changes Sunday and Wednesday  0    cetirizine (ZYRTEC) 10 mg tablet Take 1 Tab by mouth daily. (Patient taking differently: Take 10 mg by mouth daily as needed.) 30 Tab 3    fluticasone (FLONASE) 50 mcg/actuation nasal spray 2 Sprays by Both Nostrils route daily. Indications: ALLERGIC RHINITIS 1 Bottle 5    albuterol (PROVENTIL HFA, VENTOLIN HFA) 90 mcg/actuation inhaler Take 2 Puffs by inhalation every four (4) hours as needed for Wheezing (cough). 1 Inhaler 1    cholecalciferol, vitamin D3, (VITAMIN D3) 2,000 unit Tab Take 2,000 Units by mouth daily. PAST MEDICAL HISTORY:    Past Medical History:   Diagnosis Date    Achilles tendonitis 12/2004    Right. Dr. Mitchell Lights Arthritis 2010    hips, knees. Dr. Tyesha Scott    Chest pain 08/2013    Dr. Sundar Self.  Chickenpox childhood    Endometriosis 1990    Dr. Lee Heart Environmental allergies     MARGI (generalized anxiety disorder)     Hip pain, bilateral 2010    due to severe OA. Iliopsoas bursitis.   Dr. Yara Phelan    History of breast lump/mass excision 1998    Left breast.  Dr. Magdaleno Alejo Hyperlipidemia     Iron deficiency anemia     iron deficiency    Knee pain, bilateral 2017    Dr. Angela Mckeon    Menopausal and postmenopausal disorder 2007    Dr. Chandra Senior Uterine fibroid     Dr. Aminta Lau.  Vitamin D deficiency 03/2008       PAST SURGICAL HISTORY:    Past Surgical History:   Procedure Laterality Date    BIOPSY BREAST  Rt 1993;Lt 1998    Dr. Leatha Chun Right 2002    Right achiles tendon. Dr. Nikita Valenzuela.  HX BREAST BIOPSY  2001    Left. benign. Dr. Conte Brothers HX COLONOSCOPY  09/27/2016    Dr. Jessica Stewart.  due q 5 yrs due to fam hx   [facility] [place]    Dr. Anthony Bellamy. due to endometriosis    HX PELVIC LAPAROSCOPY  1995    due to endometriosis Dr. Jimmy Siddiqi  04/2011    due to fibroids. Dr. Aminta Lau.        FAMILY HISTORY:    Family History   Problem Relation Age of Onset    Hypertension Mother     Other Mother      hearing loss/vision loss    Heart Disease Father     Lung Disease Father     Diabetes Sister     Heart Attack Sister 54     March 2014    Stroke Sister      after MI    Thyroid Disease Sister     Colon Polyps Sister     Heart Disease Brother      Defibrillator placed    Heart Attack Brother 48    Diabetes Maternal Grandmother        SOCIAL HISTORY:    Social History     Social History    Marital status: SINGLE     Spouse name: N/A    Number of children: 2    Years of education: N/A     Occupational History    [employer]      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      Social History Main Topics    Smoking status: Never Smoker    Smokeless tobacco: Never Used      Comment: lived with smoker mom x 18 yrs    Alcohol use No    Drug use: No    Sexual activity: Not Currently     Partners: Male     Birth control/ protection: Abstinence, Surgical     Other Topics Concern    None     Social History Narrative       IMMUNIZATIONS:    Immunization History   Administered Date(s) Administered   [facility] Influenza Vaccine 12/14/2012, 10/22/2013, 09/18/2014    Influenza Vaccine (Quad) 10/29/2015    Influenza Vaccine (Quad) PF 09/29/2016, 10/19/2017    Influenza Vaccine Split 11/12/2010, 11/07/2011    TD Vaccine 08/18/2000    Tdap 06/30/2016       PHYSICAL EXAMINATION    Vital Signs    Visit Vitals    /65 (BP 1 Location: Left arm, BP Patient Position: Sitting)    Pulse (!) 55    Temp 97.8 °F (36.6 °C) (Temporal)    Resp 18    Ht 5' 4\" (1.626 m)    Wt 185 lb 12.8 oz (84.3 kg)    SpO2 99%    BMI 31.89 kg/m2       Weight Metrics 7/12/2018 7/12/2018 6/15/2018 6/15/2018 5/15/2018 5/15/2018 4/16/2018   Weight - 185 lb 12.8 oz - 189 lb 8 oz - 195 lb 14.4 oz -   Neck Circ (inches) 12 - - - - - -   Waist Measure Inches 32 - 38 - 30.75 - 33.5   Exercise Mins/week 125 - - - - - -   Body Fat % 41 - 41.7 - 42.1 - 44.2   BMI - 31.89 kg/m2 - 32.53 kg/m2 - 33.63 kg/m2 -         General appearance - Well nourished. Well appearing. Well developed. No acute distress. Obese. Head - Normocephalic. Atraumatic. Eyes - pupils equal and reactive, extraocular eye movements intact, sclera anicteric  Ears - Hearing is grossly normal bilaterally. Nose - normal and patent, no erythema, discharge or polyps   Mouth - mucous membranes moist, pharynx normal with cobblestone appearance. No erythema, white exudate or obstruction. Neck - supple. Midline trachea. No carotid bruits are noted. No thyromegaly noted. Chest - clear to auscultation bilaterally anterriorly and posteriorly. No wheezes, rales or rhonchi. Breath sounds are symmetrical and unlabored bilaterally. Heart - normal rate. Regular rhythm, normal S1, S2. No murmur. No rubs, clicks or gallops noted. Abdomen - soft and distended. No masses or organomegaly. No rebound, rigidity or guarding. Bowel sounds normal x 4 quadrants. No tenderness noted. Neurological - awake, alert and oriented to person, place, and time and event.   Cranial nerves II through XII intact. Muscle strength is +5/5 x 4 extremities. Sensation is intact to light touch bilaterally. Steady gait. Heme/Lymph - peripheral pulses normal x 4 extremities. No cervical adenopathy noted. Musculoskeletal - Intact x 4 extremities. Full ROM x 4 extremities. Pain on palpation at PIP joint and DIP joint of 4th digit, PIP>DIP, mild edema but not erythematous  Skin - no rashes, erythema, ecchymosis, lacerations, abrasions, suspicious moles noted. No skin tags. No acanthosis nigricans noted in the axilla or neck. Psychological -   normal behavior, speech, dress and thought processes. Good insight. Good eye contact. Normal affect. Appropriate mood. DATA REVIEWED  Lab Results   Component Value Date/Time    WBC 5.2 03/21/2018 09:02 AM    HGB 12.0 03/21/2018 09:02 AM    HCT 38.5 03/21/2018 09:02 AM    PLATELET 667 82/86/4819 09:02 AM    MCV 91 03/21/2018 09:02 AM     Lab Results   Component Value Date/Time    Sodium 140 03/21/2018 09:02 AM    Potassium 4.7 03/21/2018 09:02 AM    Chloride 100 03/21/2018 09:02 AM    CO2 28 03/21/2018 09:02 AM    Anion gap 5 04/23/2011 05:20 AM    Glucose 86 03/21/2018 09:02 AM    BUN 13 03/21/2018 09:02 AM    Creatinine 0.90 03/21/2018 09:02 AM    BUN/Creatinine ratio 14 03/21/2018 09:02 AM    GFR est AA 79 03/21/2018 09:02 AM    GFR est non-AA 69 03/21/2018 09:02 AM    Calcium 9.1 03/21/2018 09:02 AM    Bilirubin, total 0.6 03/21/2018 09:02 AM    AST (SGOT) 29 03/21/2018 09:02 AM    Alk.  phosphatase 95 03/21/2018 09:02 AM    Protein, total 6.9 03/21/2018 09:02 AM    Albumin 3.9 03/21/2018 09:02 AM    A-G Ratio 1.3 03/21/2018 09:02 AM    ALT (SGPT) 15 03/21/2018 09:02 AM     Lab Results   Component Value Date/Time    Cholesterol, total 144 03/21/2018 09:02 AM    HDL Cholesterol 49 03/21/2018 09:02 AM    LDL, calculated 85 03/21/2018 09:02 AM    VLDL, calculated 10 03/21/2018 09:02 AM    Triglyceride 50 03/21/2018 09:02 AM     Lab Results   Component Value Date/Time    Vitamin D 25-Hydroxy 26.3 (L) 07/18/2011 10:22 AM    VITAMIN D, 25-HYDROXY 45.8 03/21/2018 09:02 AM       Lab Results   Component Value Date/Time    Hemoglobin A1c 5.4 03/21/2018 09:02 AM     Lab Results   Component Value Date/Time    TSH 1.740 03/21/2018 09:02 AM    TSH 2.36 08/21/2014 07:50 AM       ASSESSMENT and PLAN    ICD-10-CM ICD-9-CM    1. Pain in toe of right foot M79.674 729.5 XR 4TH TOE RT MIN 2 V    due to trauma with wall molding at home x 8 days, improving   2. Hashimoto's thyroiditis E06.3 245.2     stable   3. Chronic insomnia F51.04 780.52    4. Vitamin D deficiency E55.9 268.9     stable   5. Obesity, Class I, BMI 30-34.9 E66.9 278.00 phentermine (ADIPEX-P) 37.5 mg tablet   6. Weight loss R63.4 783.21     4# due to efforts   7. Bradycardia R00.1 427.89     Asymptomatic   8. Pure hypercholesterolemia E78.00 272.0        Continue current medications and care. X-Ray of RIGHT 4th toe ordered. Prescriptions written and given to patient Phentermine 37.5 mg (month 1 of 3.)  Medication side effects discussed. [facility]  reviewed and pt is compliant. Discussed the patient's BMI with her. The BMI follow up plan is as follows: I have counseled this patient on diet and exercise regimens. Diet recommendations:  Decrease carbohydrates (white foods including flour, white bread, white rice, white pasta, white potatoes; corn, sweet foods, sweet drinks and alcohol), increase green leafy vegetables and protein with each meal.  Avoid fried foods. Eat 3-5 small meals daily. Do not skip meals. Ok to use meal replacements up to twice daily with protein goal of 60 mg per meal.   Recommend OTC Premiere Protein bars or shakes. Increase water intake. Increase physical activity to 30 minutes daily for health benefit or 60 minutes daily to prevent weight regain, as tolerated.     Physical Activity prescription:  A goal of 30 minutes physical activity daily is recommended for health benefit and at least 60 minutes daily to prevent weight regain. For weight loss, no less than 75% needs to be aerobic (i.e. Walking) and no more than 25% resistance exercising (i.e. Weight lifting). For weight maintenance phase, 50% aerobic and 50% resistance exercises. Sleep prescription:    A goal of 7-8 hours of uninterrupted sleep is recommended to turn off the Grehlin hormone to be released from the stomach and triggers appetite while promoting weight gain. Proper rest turns on Leptin hormone to be released from white adipose tissue and promotes weight loss. Counseled patient on: weight loss goals, emphasizing a 5-10% weight loss in 6-12 months and strategies, toe pain, thyroid, cholesterol, and insomnia. Immunizations noted. Praised pt for weight loss efforts and progress. ACP handout given. Patient was offered a choice/choices in the treatment plan today. Patient expresses understanding of the plan and agrees with recommendations. Follow-up Disposition:  Return in about 1 month (around 8/12/2018) for weight, blood pressure. Written by hyun Noe, as dictated by Dr. Debbi Merlos DO. Documentation True and Accepted by Liza Bradley.  Cherry Ewing.

## 2018-07-12 NOTE — PROGRESS NOTES
Tim Neves  Identified pt with two pt identifiers(name and ). Chief Complaint   Patient presents with    Weight Management     Rm 13:       1. Have you been to the ER, urgent care clinic since your last visit? Hospitalized since your last visit? no    2. Have you seen or consulted any other health care providers outside of the [facility] since your last visit? Include any pap smears or colon screening. no    [unfilled]    [unfilled]    Weight Metrics 2018 2018 6/15/2018 6/15/2018 5/15/2018 5/15/2018 2018   Weight - 185 lb 12.8 oz - 189 lb 8 oz - 195 lb 14.4 oz -   Neck Circ (inches) 12 - - - - - -   Waist Measure Inches 32 - 38 - 30.75 - 33.5   Exercise Mins/week 125 - - - - - -   Body Fat % 41 - 41.7 - 42.1 - 44.2   BMI - 31.89 kg/m2 - 32.53 kg/m2 - 33.63 kg/m2 -       Medication reconciliation up to date and corrected with patient at this time. Today's provider has been notified of reason for visit, vitals and flowsheets obtained on patients. Reviewed record in preparation for visit, huddled with provider and have obtained necessary documentation. There are no preventive care reminders to display for this patient.     Wt Readings from Last 3 Encounters:   18 185 lb 12.8 oz (84.3 kg)   06/15/18 189 lb 8 oz (86 kg)   05/15/18 195 lb 14.4 oz (88.9 kg)     Temp Readings from Last 3 Encounters:   18 97.8 °F (36.6 °C) (Temporal)   06/15/18 97.8 °F (36.6 °C) (Oral)   05/15/18 98 °F (36.7 °C) (Oral)     BP Readings from Last 3 Encounters:   18 105/65   06/15/18 108/74   05/15/18 111/75     Pulse Readings from Last 3 Encounters:   18 (!) 55   06/15/18 (!) 54   05/15/18 63     Vitals:    18 0843   BP: 105/65   Pulse: (!) 55   Resp: 18   Temp: 97.8 °F (36.6 °C)   TempSrc: Temporal   SpO2: 99%   Weight: 185 lb 12.8 oz (84.3 kg)   Height: 5' 4\" (1.626 m)   PainSc:   0 - No pain         Learning Assessment:  :     Learning Assessment 3/15/2018 4/22/2014   PRIMARY LEARNER Patient Patient   HIGHEST LEVEL OF EDUCATION - PRIMARY LEARNER  4 YEARS OF COLLEGE 4 YEARS OF COLLEGE   BARRIERS PRIMARY LEARNER NONE NONE   CO-LEARNER CAREGIVER No -   PRIMARY LANGUAGE ENGLISH ENGLISH   LEARNER PREFERENCE PRIMARY READING LISTENING   ANSWERED BY self patient   RELATIONSHIP SELF SELF       Depression Screening:  :     PHQ over the last two weeks 7/12/2018   Little interest or pleasure in doing things Not at all   Feeling down, depressed or hopeless Not at all   Total Score PHQ 2 0       Fall Risk Assessment:  :     Fall Risk Assessment, last 12 mths 6/15/2018   Able to walk? Yes   Fall in past 12 months? No       Abuse Screening:  :     Abuse Screening Questionnaire 6/15/2018 4/16/2018 6/30/2016   Do you ever feel afraid of your partner? N N N   Are you in a relationship with someone who physically or mentally threatens you? N N N   Is it safe for you to go home?  Y Y Y       ADL Screening:  :     ADL Assessment 6/15/2018   Feeding yourself No Help Needed   Getting from bed to chair No Help Needed   Getting dressed No Help Needed   Bathing or showering No Help Needed   Walk across the room (includes cane/walker) No Help Needed   Using the telphone No Help Needed   Taking your medications No Help Needed   Preparing meals No Help Needed   Managing money (expenses/bills) No Help Needed   Moderately strenuous housework (laundry) No Help Needed   Shopping for personal items (toiletries/medicines) No Help Needed   Shopping for groceries No Help Needed   Driving No Help Needed   Climbing a flight of stairs No Help Needed   Getting to places beyond walking distances No Help Needed

## 2018-07-12 NOTE — MR AVS SNAPSHOT
[facility] 
 
 
 [address] 
[address] [facility] [address] 
[phone] Patient: Luis Carlos Vickers MRN: [id number] ZMP:[id number]/[id number] Visit Information Date & Time Provider Department Dept. Phone Encounter #  
 7/12/2018  8:30 AM DO [facility] [phone] Follow-up Instructions Return in about 1 month (around 8/12/2018) for weight, blood pressure. Your Appointments 8/16/2018  8:30 AM  
Office Visit with Vaughn Dyson DO [employer] ([place]) Appt Note: weight check [address] [facility] [address]  
[phone]  
  
   
 [address] 9/13/2018  8:45 AM  
Office Visit with Vaughn Dyson DO [employer] ([place]) Appt Note: weight check [address] [facility] [address]  
[phone]  
  
    
 10/18/2018  8:30 AM  
Office Visit with Vaughn Dyson DO [facility] ([place]) Appt Note: weight check [address] [facility] [address]  
[phone]  
  
    
 11/15/2018  8:30 AM  
Office Visit with Vaughn Dyson DO [facility] ([place]) Appt Note: weight check [address] [facility] [address]  
[phone]  
  
    
 12/13/2018  8:30 AM  
Office Visit with Vaughn Dyson DO [facility] ([place]) Appt Note: weight check 3979 [facility] [address]  
[phone] Upcoming Health Maintenance Date Due Influenza Age 5 to Adult 8/1/2018 PAP AKA CERVICAL CYTOLOGY 12/12/2018 BREAST CANCER SCRN MAMMOGRAM 12/15/2019 COLONOSCOPY 9/27/2021 DTaP/Tdap/Td series (2 - Td) 6/30/2026 Allergies as of 7/12/2018  Review Complete On: 7/12/2018 By: Murrel Dancer Rossy Kinney DO Severity Noted Reaction Type Reactions Advil Pm [Ibuprofen-diphenhydramine Hcl]  07/18/2011    Other (comments) Slightly elevated Creaitnine 1.02 Aleve [Naproxen Sodium]  12/28/2011    Other (comments) Due to kidneys Bactrim [Sulfamethoxazole-trimethoprim]  09/11/2009    Nausea and Vomiting  
 Sulfa (Sulfonamide Antibiotics)  09/11/2009    Nausea and Vomiting  
 Sulfasalazine  09/11/2009    Nausea and Vomiting Current Immunizations  Reviewed on 7/12/2018 Name Date Influenza Vaccine 9/18/2014, 10/22/2013, 12/14/2012 Influenza Vaccine [place] Royer) 10/29/2015 Influenza Vaccine (Quad) PF 10/19/2017, 9/29/2016 Influenza Vaccine Split 11/7/2011, 11/12/2010 TD Vaccine 8/18/2000 Tdap 6/30/2016  9:35 AM  
  
 Reviewed by Gray Burt DO on 7/12/2018 at  9:33 AM  
You Were Diagnosed With   
  
 Codes Comments Pain in toe of right foot    -  Primary ICD-10-CM: T67.182 ICD-9-CM: 729.5 due to trauma with wall molding at home x 8 days, improving Hashimoto's thyroiditis     ICD-10-CM: E06.3 ICD-9-CM: 245.2 stable Chronic insomnia     ICD-10-CM: F51.04 
ICD-9-CM: 780.52 Vitamin D deficiency     ICD-10-CM: E55.9 ICD-9-CM: 268.9 stable Obesity, Class I, BMI 30-34.9     ICD-10-CM: E66.9 ICD-9-CM: 278.00 Weight loss     ICD-10-CM: R63.4 ICD-9-CM: 783.21 4# due to efforts Bradycardia     ICD-10-CM: R00.1 ICD-9-CM: 427.89 Asymptomatic Pure hypercholesterolemia     ICD-10-CM: E78.00 ICD-9-CM: 272.0 Vitals BP Pulse Temp Resp Height(growth percentile) 105/65 (BP 1 Location: Left arm, BP Patient Position: Sitting) (!) 55 97.8 °F (36.6 °C) (Temporal) 18 5' 4\" (1.626 m) Weight(growth percentile) SpO2 BMI OB Status Smoking Status 185 lb 12.8 oz (84.3 kg) 99% 31.89 kg/m2 Hysterectomy Never Smoker BMI and BSA Data Body Mass Index Body Surface Area  
 31.89 kg/m 2 1.95 m 2 Preferred Pharmacy Pharmacy Name Phone [facility], [address] [facility] [address] [phone] Your Updated Medication List  
  
   
This list is accurate as of 7/12/18  9:38 AM.  Always use your most recent med list.  
  
  
  
  
 albuterol 90 mcg/actuation inhaler Commonly known as:  PROVENTIL HFA, VENTOLIN HFA, PROAIR HFA Take 2 Puffs by inhalation every four (4) hours as needed for Wheezing (cough). aspirin delayed-release 81 mg tablet Take 1 Tab by mouth daily. Indications: prevention of transient ischemic attack  
  
 cetirizine 10 mg tablet Commonly known as:  ZYRTEC Take 1 Tab by mouth daily. diclofenac EC 75 mg EC tablet Commonly known as:  VOLTAREN  
TAKE 1 TABLET BY MOUTH TWICE DAILY  Indications: OSTEOARTHRITIS  
  
 eszopiclone 2 mg tablet Commonly known as:  Magdaleno Josefa Take 1 Tab by mouth nightly. Max Daily Amount: 2 mg. fluticasone 50 mcg/actuation nasal spray Commonly known as:  Lindalee Farnsworth 2 Sprays by Both Nostrils route daily. Indications: ALLERGIC RHINITIS  
  
 furosemide 40 mg tablet Commonly known as:  LASIX TAKE 1 TABLET BY MOUTH DAILY FOR SWELLING Insulin Needles (Disposable) 32 gauge x 5/32\" Ndle Commonly known as:  Catina Pen Needle Use daily as directed MINIVELLE 0.1 mg/24 hr  
Generic drug:  estradiol 1 Patch by TransDERmal route two (2) times a week. Changes Sunday and Wednesday  
  
 montelukast 10 mg tablet Commonly known as:  SINGULAIR Take 1 Tab by mouth daily. Indications: Allergic Rhinitis MYRBETRIQ 50 mg ER tablet Generic drug:  mirabegron ER  
  
 phentermine 37.5 mg tablet Commonly known as:  ADIPEX-P Take 1 Tab by mouth every morning. Max Daily Amount: 37.5 mg. PREMARIN 0.625 mg/gram vaginal cream  
Generic drug:  conjugated estrogens  
  
 sertraline 50 mg tablet Commonly known as:  ZOLOFT  
 Take 1 Tab by mouth daily. Indications: Generalized Anxiety Disorder VITAMIN D3 2,000 unit Tab Generic drug:  cholecalciferol (vitamin D3) Take 2,000 Units by mouth daily. Prescriptions Printed Refills  
 phentermine (ADIPEX-P) 37.5 mg tablet 0 Sig: Take 1 Tab by mouth every morning. Max Daily Amount: 37.5 mg.  
 Class: Print Route: Oral  
  
Follow-up Instructions Return in about 1 month (around 8/12/2018) for weight, blood pressure. To-Do List   
 07/12/2018 Imaging:  XR 4TH TOE RT MIN 2 V Introducing [facility]! Chapin Thompson introduces [employer] patient portal. Now you can access parts of your medical record, email your doctor's office, and request medication refills online. 1. In your internet browser, go to [email or web address] 2. Click on the First Time User? Click Here link in the Sign In box. You will see the New Member Sign Up page. 3. Enter your [employer] Access Code exactly as it appears below. You will not need to use this code after youve completed the sign-up process. If you do not sign up before the expiration date, you must request a new code. · [employer] Access Code: [email or web address] Expires: 8/13/2018  9:32 AM 
 
4. Enter the last four digits of your Social Security Number (xxxx) and Date of Birth (mm/dd/yyyy) as indicated and click Submit. You will be taken to the next sign-up page. 5. Create a [employer] ID. This will be your [employer] login ID and cannot be changed, so think of one that is secure and easy to remember. 6. Create a [employer] password. You can change your password at any time. 7. Enter your Password Reset Question and Answer. This can be used at a later time if you forget your password. 8. Enter your e-mail address. You will receive e-mail notification when new information is available in [address] 9. Click Sign Up. You can now view and download portions of your medical record. 10. Click the Download Summary menu link to download a portable copy of your medical information. If you have questions, please visit the Frequently Asked Questions section of the [employer] website. Remember, [employer] is NOT to be used for urgent needs. For medical emergencies, dial 911. Now available from your iPhone and Android! Please provide this summary of care documentation to your next provider. Your primary care clinician is listed as Vero Herrera. If you have any questions after today's visit, please call [phone].

## 2018-07-16 ENCOUNTER — HOSPITAL ENCOUNTER (OUTPATIENT)
Dept: GENERAL RADIOLOGY | Age: 63
Discharge: HOME | End: 2018-07-16
Payer: COMMERCIAL

## 2018-07-16 DIAGNOSIS — M79.674 PAIN IN TOE OF RIGHT FOOT: ICD-10-CM

## 2018-07-16 DIAGNOSIS — R60.9 PERIPHERAL EDEMA: ICD-10-CM

## 2018-07-16 PROCEDURE — 73660 X-RAY EXAM OF TOE(S): CPT

## 2018-07-18 RX ORDER — FUROSEMIDE 40 MG/1
TABLET ORAL
Qty: 90 TAB | Refills: 0 | Status: SHIPPED | OUTPATIENT
Start: 2018-07-18 | End: 2018-10-14 | Stop reason: SDUPTHER

## 2018-07-18 NOTE — TELEPHONE ENCOUNTER
PCP: Ramona Schultz DO    Last appt: 7/12/2018  Future Appointments  Date Time Provider Amalia Garcia   8/16/2018 8:30 AM DO YOLANDA Meraz [facility]   9/13/2018 8:45 AM DO YOLANDA Meraz [facility]   10/18/2018 8:30 AM DO YOLANDA Meraz [facility]   11/15/2018 8:30 AM DO YOLANDA Meraz [facility]   12/13/2018 8:30 AM DO YOLANDA Meraz [facility]       Requested Prescriptions     Pending Prescriptions Disp Refills    furosemide (LASIX) 40 mg tablet [Pharmacy Med Name: FUROSEMIDE 40MG TABLETS] 90 Tab 0     Sig: TAKE 1 TABLET BY MOUTH DAILY FOR SWELLING       Prior labs and Blood pressures:  BP Readings from Last 3 Encounters:   07/12/18 105/65   06/15/18 108/74   05/15/18 111/75     Lab Results   Component Value Date/Time    Sodium 140 03/21/2018 09:02 AM    Potassium 4.7 03/21/2018 09:02 AM    Chloride 100 03/21/2018 09:02 AM    CO2 28 03/21/2018 09:02 AM    Anion gap 5 04/23/2011 05:20 AM    Glucose 86 03/21/2018 09:02 AM    BUN 13 03/21/2018 09:02 AM    Creatinine 0.90 03/21/2018 09:02 AM    BUN/Creatinine ratio 14 03/21/2018 09:02 AM    GFR est AA 79 03/21/2018 09:02 AM    GFR est non-AA 69 03/21/2018 09:02 AM    Calcium 9.1 03/21/2018 09:02 AM     Lab Results   Component Value Date/Time    Hemoglobin A1c 5.4 03/21/2018 09:02 AM     Lab Results   Component Value Date/Time    Cholesterol, total 144 03/21/2018 09:02 AM    HDL Cholesterol 49 03/21/2018 09:02 AM    LDL, calculated 85 03/21/2018 09:02 AM    VLDL, calculated 10 03/21/2018 09:02 AM    Triglyceride 50 03/21/2018 09:02 AM     Lab Results   Component Value Date/Time    Vitamin D 25-Hydroxy 26.3 (L) 07/18/2011 10:22 AM    VITAMIN D, 25-HYDROXY 45.8 03/21/2018 09:02 AM       Lab Results   Component Value Date/Time    TSH 1.740 03/21/2018 09:02 AM    TSH 2.36 08/21/2014 07:50 AM

## 2018-07-19 ENCOUNTER — TELEPHONE (OUTPATIENT)
Dept: FAMILY MEDICINE CLINIC | Age: 63
End: 2018-07-19

## 2018-07-19 NOTE — PROGRESS NOTES
Spoke to patient. Verified with two patient identifiers. Informed the pt that she had a nondisplaced right 4th toe fracture. Stevie tape for comfort till healed. Per Dr. Lubna Ocasio. Patient verbalized understanding of information, and has no further questions at this time.

## 2018-07-19 NOTE — TELEPHONE ENCOUNTER
Nurse, Kimmie Acevedo LPN, called and notified pt of toe XR results. Please result notes for more information.

## 2018-09-10 ENCOUNTER — OFFICE VISIT (OUTPATIENT)
Dept: FAMILY MEDICINE CLINIC | Age: 63
End: 2018-09-10

## 2018-09-10 VITALS
RESPIRATION RATE: 14 BRPM | HEART RATE: 55 BPM | BODY MASS INDEX: 31.84 KG/M2 | OXYGEN SATURATION: 100 % | TEMPERATURE: 98.4 F | WEIGHT: 186.5 LBS | DIASTOLIC BLOOD PRESSURE: 57 MMHG | SYSTOLIC BLOOD PRESSURE: 92 MMHG | HEIGHT: 64 IN

## 2018-09-10 DIAGNOSIS — R60.9 PERIPHERAL EDEMA: ICD-10-CM

## 2018-09-10 DIAGNOSIS — E55.9 VITAMIN D DEFICIENCY: ICD-10-CM

## 2018-09-10 DIAGNOSIS — K59.03 DRUG-INDUCED CONSTIPATION: ICD-10-CM

## 2018-09-10 DIAGNOSIS — S92.504D CLOSED NONDISPLACED FRACTURE OF PHALANX OF LESSER TOE OF RIGHT FOOT WITH ROUTINE HEALING, UNSPECIFIED PHALANX, SUBSEQUENT ENCOUNTER: ICD-10-CM

## 2018-09-10 DIAGNOSIS — E06.3 HASHIMOTO'S THYROIDITIS: ICD-10-CM

## 2018-09-10 DIAGNOSIS — E66.9 OBESITY, CLASS I, BMI 30-34.9: ICD-10-CM

## 2018-09-10 DIAGNOSIS — Z23 ENCOUNTER FOR IMMUNIZATION: ICD-10-CM

## 2018-09-10 DIAGNOSIS — F51.04 CHRONIC INSOMNIA: Primary | ICD-10-CM

## 2018-09-10 DIAGNOSIS — F41.1 GENERALIZED ANXIETY DISORDER: ICD-10-CM

## 2018-09-10 RX ORDER — PHENTERMINE HYDROCHLORIDE 37.5 MG/1
37.5 TABLET ORAL
Qty: 30 TAB | Refills: 0 | Status: SHIPPED | OUTPATIENT
Start: 2018-09-10 | End: 2018-10-18 | Stop reason: SDUPTHER

## 2018-09-10 NOTE — PROGRESS NOTES
HISTORY OF PRESENT ILLNESS  Lalo Ngo is a 61 y.o. female presents with Weight Management; Blood Pressure Check; and Medication Refill    Agree with nurse note. Pt with peripheral edema, hashimoto's thyroiditis, and vit d deficiency presents to the office with a BP of 92/57. For edema, she uses Lasix 40 mg daily, tolerating well. She was tolerating Phentermine 37.5 mg well, month 2 of 3. Last prescribed on 7/12/2018. She has gained 1 lb since the last visit. She noticed an increase of energy and decreased appetite while on the medication. Percent body fat has changed from 41% to 41.4%, waist circumference measurement has changed from 32\" to 30\". Overall, patient is pleased and requests a refill of the medication today. Pt complains of constipation and thinks she needs some type of laxative. Breakfast is protein shake. Lunch is fish, chicken, or tuna with leafy vegetables. Freeda Kamille is chicken breast or chicken salad with vegetables. She only drinks water. She did have a craving for 1 oatmeal raisin cookie. She exercises 3 days a week for 45 minutes. She averages 8 hours of sleep nightly. Pt with chronic insomnia. She is stable with increased exercise and occasional Lunesta use. Pt with MARGI. She uses Zoloft as needed with relief. Pt had fx'd her toe at the last visit, and she reports sxs have improved greatly with use of the buddy tape. Patient denies fatigue, chest pain, heart palpitations, nausea, dry mouth, signs of depression. Written by hyun Bowie, as dictated by Dr. Tijerina [place]. SANCHEZ    Review of Systems negative except as noted above in HPI.     ALLERGIES:    Allergies   Allergen Reactions    Advil Pm [Ibuprofen-Diphenhydramine Hcl] Other (comments)     Slightly elevated Creaitnine 1.02    Aleve [Naproxen Sodium] Other (comments)     Due to kidneys    Bactrim [Sulfamethoxazole-Trimethoprim] Nausea and Vomiting    Sulfa (Sulfonamide Antibiotics) Nausea and Vomiting    Sulfasalazine Nausea and Vomiting       CURRENT MEDICATIONS:    Outpatient Prescriptions Marked as Taking for the 9/10/18 encounter (Office Visit) with Julio Hidalgo, DO   Medication Sig Dispense Refill    phentermine (ADIPEX-P) 37.5 mg tablet Take 1 Tab by mouth every morning. Max Daily Amount: 37.5 mg. 30 Tab 0    diclofenac EC (VOLTAREN) 75 mg EC tablet TAKE 1 TABLET BY MOUTH TWICE DAILY 60 Tab 2    furosemide (LASIX) 40 mg tablet TAKE 1 TABLET BY MOUTH DAILY FOR SWELLING 90 Tab 0    montelukast (SINGULAIR) 10 mg tablet Take 1 Tab by mouth daily. Indications: Allergic Rhinitis 90 Tab 3    sertraline (ZOLOFT) 50 mg tablet Take 1 Tab by mouth daily. Indications: Generalized Anxiety Disorder 90 Tab 3    PREMARIN 0.625 mg/gram vaginal cream   4    MYRBETRIQ 50 mg ER tablet       aspirin delayed-release 81 mg tablet Take 1 Tab by mouth daily. Indications: prevention of transient ischemic attack 90 Tab 3    eszopiclone (LUNESTA) 2 mg tablet Take 1 Tab by mouth nightly. Max Daily Amount: 2 mg. 30 Tab 5    MINIVELLE 0.1 mg/24 hr 1 Patch by TransDERmal route two (2) times a week. Changes Sunday and Wednesday  0    cetirizine (ZYRTEC) 10 mg tablet Take 1 Tab by mouth daily. (Patient taking differently: Take 10 mg by mouth daily as needed.) 30 Tab 3    fluticasone (FLONASE) 50 mcg/actuation nasal spray 2 Sprays by Both Nostrils route daily. Indications: ALLERGIC RHINITIS 1 Bottle 5    albuterol (PROVENTIL HFA, VENTOLIN HFA) 90 mcg/actuation inhaler Take 2 Puffs by inhalation every four (4) hours as needed for Wheezing (cough). 1 Inhaler 1    cholecalciferol, vitamin D3, (VITAMIN D3) 2,000 unit Tab Take 2,000 Units by mouth daily. PAST MEDICAL HISTORY:    Past Medical History:   Diagnosis Date    Achilles tendonitis 12/2004    Right. Dr. Ashley Caballero Arthritis 2010    hips, knees. Dr. Marie Cavazos    Chest pain 08/2013    Dr. Araceli Johnson.      Chickenpox childhood    Endometriosis 1990    Dr. Elisabeth Sales Environmental allergies     MARGI (generalized anxiety disorder)     Hip pain, bilateral 2010    due to severe OA. Iliopsoas bursitis. Dr. Sugar Tran    History of breast lump/mass excision 1998    Left breast.  Dr. Leno Yenuce Hyperlipidemia     Iron deficiency anemia     iron deficiency    Knee pain, bilateral 2017    Dr. Regan Osorio    Menopausal and postmenopausal disorder 2007    Dr. Elisabeth Sales Uterine fibroid     Dr. iCnda Alvarez.  Vitamin D deficiency 03/2008       PAST SURGICAL HISTORY:    Past Surgical History:   Procedure Laterality Date    BIOPSY BREAST  Rt 1993;Lt 1998    Dr. Letitia Agustin Right 2002    Right achiles tendon. Dr. Ryley Villela.  HX BREAST BIOPSY  2001    Left. benign. Dr. Lee Gene HX COLONOSCOPY  09/27/2016    Dr. Mann [facility].  due q 5 yrs due to fam hx   [place]    Dr. Karin Jackson. due to endometriosis    HX PELVIC LAPAROSCOPY  1995    due to endometriosis Dr. Rain Gupta  04/2011    due to fibroids. Dr. Cinda Alvarez.        FAMILY HISTORY:    Family History   Problem Relation Age of Onset    Hypertension Mother     Other Mother      hearing loss/vision loss    Heart Disease Father     Lung Disease Father     Diabetes Sister     Heart Attack Sister 54     March 2014    Stroke Sister      after MI    Thyroid Disease Sister     Colon Polyps Sister     Heart Disease Brother      Defibrillator placed    Heart Attack Brother 48    Diabetes Maternal Grandmother        SOCIAL HISTORY:    Social History     Social History    Marital status: SINGLE     Spouse name: N/A    Number of children: 2    Years of education: N/A     Occupational History    [employer]      focus is [place]      Social History Main Topics    Smoking status: Never Smoker    Smokeless tobacco: Never Used      Comment: lived with smoker mom x 18 yrs   [facility] Alcohol use No    Drug use: No    Sexual activity: Not Currently     Partners: Male     Birth control/ protection: Abstinence, Surgical     Other Topics Concern    None     Social History Narrative       IMMUNIZATIONS:    Immunization History   Administered Date(s) Administered    Influenza Vaccine 12/14/2012, 10/22/2013, 09/18/2014    Influenza Vaccine (Quad) 10/29/2015    Influenza Vaccine (Quad) PF 09/29/2016, 10/19/2017    Influenza Vaccine Split 11/12/2010, 11/07/2011    TD Vaccine 08/18/2000    Tdap 06/30/2016         PHYSICAL EXAMINATION    Vital Signs    Visit Vitals    BP 92/57 (BP 1 Location: Right arm, BP Patient Position: Sitting)    Pulse (!) 55    Temp 98.4 °F (36.9 °C) (Temporal)    Resp 14    Ht 5' 4\" (1.626 m)    Wt 186 lb 8 oz (84.6 kg)    SpO2 100%    BMI 32.01 kg/m2       Weight Metrics 9/10/2018 9/10/2018 7/12/2018 7/12/2018 6/15/2018 6/15/2018 5/15/2018   Weight - 186 lb 8 oz - 185 lb 12.8 oz - 189 lb 8 oz -   Neck Circ (inches) - - 12 - - - -   Waist Measure Inches 30 - 32 - 38 - 30.75   Exercise Mins/week - - 125 - - - -   Body Fat % 41.4 - 41 - 41.7 - 42.1   BMI - 32.01 kg/m2 - 31.89 kg/m2 - 32.53 kg/m2 -       General appearance - Well nourished. Well appearing. Well developed. No acute distress. Obese. Head - Normocephalic. Atraumatic. Eyes - pupils equal and reactive. Extraocular eye movements intact. Sclera anicteric. Mildly injected sclera. Ears - Hearing is grossly normal bilaterally. Nose - normal and patent. No polyps noted. No erythema. No discharge. Mouth - mucous membranes with adequate moisture. Posterior pharynx normal with cobblestone appearance. No erythema, white exudate or obstruction. Neck - supple. Midline trachea. No carotid bruits noted bilaterally. No thyromegaly noted. Chest - clear to auscultation bilaterally anteriorly and posteriorly. No wheezes. No rales or rhonchi. Breath sounds are symmetrical bilaterally. Unlabored respirations. Heart - normal rate. Regular rhythm. Normal S1, S2. No murmur noted. No rubs, clicks or gallops noted. Abdomen - soft and distended. No masses or organomegaly. No rebound, rigidity or guarding. Bowel sounds normal x 4 quadrants. No tenderness noted. Neurological - awake, alert and oriented to person, place, and time and event. Cranial nerves II through XII intact. Clear speech. Muscle strength is +5/5 x 4 extremities. Sensation is intact to light touch bilaterally. Steady gait. Heme/Lymph - peripheral pulses normal x 4 extremities. No peripheral edema is noted. Musculoskeletal - Intact x 4 extremities. Full ROM x 4 extremities. No pain with movement. Back exam - normal range of motion. No pain on palpation of the spinous processes in the cervical, thoracic, lumbar, sacral regions. No CVA tenderness. Skin - no rashes, erythema, ecchymosis, lacerations, abrasions, suspicious moles noted  Psychological -   normal behavior, dress and thought processes. Good insight. Good eye contact. Normal affect. Appropriate mood. Normal speech. DATA REVIEWED    Lab Results   Component Value Date/Time    WBC 5.2 03/21/2018 09:02 AM    HGB 12.0 03/21/2018 09:02 AM    HCT 38.5 03/21/2018 09:02 AM    PLATELET 706 60/51/6639 09:02 AM    MCV 91 03/21/2018 09:02 AM     Lab Results   Component Value Date/Time    Sodium 140 03/21/2018 09:02 AM    Potassium 4.7 03/21/2018 09:02 AM    Chloride 100 03/21/2018 09:02 AM    CO2 28 03/21/2018 09:02 AM    Anion gap 5 04/23/2011 05:20 AM    Glucose 86 03/21/2018 09:02 AM    BUN 13 03/21/2018 09:02 AM    Creatinine 0.90 03/21/2018 09:02 AM    BUN/Creatinine ratio 14 03/21/2018 09:02 AM    GFR est AA 79 03/21/2018 09:02 AM    GFR est non-AA 69 03/21/2018 09:02 AM    Calcium 9.1 03/21/2018 09:02 AM    Bilirubin, total 0.6 03/21/2018 09:02 AM    AST (SGOT) 29 03/21/2018 09:02 AM    Alk.  phosphatase 95 03/21/2018 09:02 AM    Protein, total 6.9 03/21/2018 09:02 AM    Albumin 3.9 03/21/2018 09:02 AM    A-G Ratio 1.3 03/21/2018 09:02 AM    ALT (SGPT) 15 03/21/2018 09:02 AM     Lab Results   Component Value Date/Time    Cholesterol, total 144 03/21/2018 09:02 AM    HDL Cholesterol 49 03/21/2018 09:02 AM    LDL, calculated 85 03/21/2018 09:02 AM    VLDL, calculated 10 03/21/2018 09:02 AM    Triglyceride 50 03/21/2018 09:02 AM     Lab Results   Component Value Date/Time    Vitamin D 25-Hydroxy 26.3 (L) 07/18/2011 10:22 AM    VITAMIN D, 25-HYDROXY 45.8 03/21/2018 09:02 AM       Lab Results   Component Value Date/Time    Hemoglobin A1c 5.4 03/21/2018 09:02 AM     Lab Results   Component Value Date/Time    TSH 1.740 03/21/2018 09:02 AM    TSH 2.36 08/21/2014 07:50 AM         ASSESSMENT and PLAN      ICD-10-CM ICD-9-CM    1. Chronic insomnia F51.04 780.52     stable with exercise and occ Lunesta   2. Drug-induced constipation K59.03 564.09      E980.5     due to Adipex vs other   3. Hashimoto's thyroiditis E06.3 245.2    4. Vitamin D deficiency E55.9 268.9    5. Obesity, Class I, BMI 30-34.9 E66.9 278.00 phentermine (ADIPEX-P) 37.5 mg tablet   6. Encounter for immunization Z23 V03.89 SC IMMUNIZ ADMIN,1 SINGLE/COMB VAC/TOXOID      INFLUENZA VIRUS VAC QUAD,SPLIT,PRESV FREE SYRINGE IM   7. Generalized anxiety disorder F41.1 300.02     stable taking Zoloft prn   8. Peripheral edema R60.9 782.3     stable on Lasix daily   9. Closed nondisplaced fracture of phalanx of lesser toe of right foot with routine healing, unspecified phalanx, subsequent encounter S92.504D V54.19     improved       Discussed the patient's BMI with her. The BMI follow up plan is as follows: I have counseled this patient on diet and exercise regimens. Decrease carbohydrates (white foods, sweet foods, sweet drinks and alcohol), increase green leafy vegetables and protein (lean meats and beans) with each meal.  Avoid fried foods. Eat 3-5 small meals daily. Do not skip meals.   Increase water intake. Increase physical activity to 30 minutes daily for health benefit or 60 minutes daily to prevent weight regain, as tolerated. Get 7-8 hours uninterrupted sleep nightly. Chart reviewed and updated. Advised pt to use Smooth Move tea for constipation and then OTC stool softener's if needed. Continue current medications and care. Prescription given to patient during office visit today. Phentermine 37.5 mg, (month 3 of 3) Cautioned pt about addictive potential. Controlled Substance Agreement reviewed and signed. [place] and [place] is [place]. Counseled patient on health concerns:  Weight management, toe fx, constipation, toe fx, edema, and MARGI. Sleep hygiene. Relevant handouts given and discussed with patient. Immunizations noted; Flu vaccine administered today. Offered empathy, support, legitimation, prayers, partnership to patient. Praised patient for progress. ACP handout given today. Declines honoring choices referral.   Follow-up Disposition:  Return in about 1 month (around 10/10/2018) for weight, blood pressure. Patient was offered a choice/choices in the treatment plan today. Patient expresses understanding of the plan and agrees with recommendations. Written by hyun Arrington, as dictated by Dr. Zechariah Wray DO. Documentation True and Accepted by Alexa Parry. Jinx Check. Patient Instructions          Constipation: Care Instructions  Your Care Instructions    Constipation means that you have a hard time passing stools (bowel movements). People pass stools from 3 times a day to once every 3 days. What is normal for you may be different. Constipation may occur with pain in the rectum and cramping. The pain may get worse when you try to pass stools. Sometimes there are small amounts of bright red blood on toilet paper or the surface of stools. This is because of enlarged veins near the rectum (hemorrhoids).   A few changes in your diet and lifestyle may help you avoid ongoing constipation. Your doctor may also prescribe medicine to help loosen your stool. Some medicines can cause constipation. These include pain medicines and antidepressants. Tell your doctor about all the medicines you take. Your doctor may want to make a medicine change to ease your symptoms. Follow-up care is a key part of your treatment and safety. Be sure to make and go to all appointments, and call your doctor if you are having problems. It's also a good idea to know your test results and keep a list of the medicines you take. How can you care for yourself at home? · Drink plenty of fluids, enough so that your urine is light yellow or clear like water. If you have kidney, heart, or liver disease and have to limit fluids, talk with your doctor before you increase the amount of fluids you drink. · Include high-fiber foods in your diet each day. These include fruits, vegetables, beans, and whole grains. · Get at least 30 minutes of exercise on most days of the week. Walking is a good choice. You also may want to do other activities, such as running, swimming, cycling, or playing tennis or team sports. · Take a fiber supplement, such as Citrucel or Metamucil, every day. Read and follow all instructions on the label. · Schedule time each day for a bowel movement. A daily routine may help. Take your time having your bowel movement. · Support your feet with a small step stool when you sit on the toilet. This helps flex your hips and places your pelvis in a squatting position. · Your doctor may recommend an over-the-counter laxative to relieve your constipation. Examples are Milk of Magnesia and MiraLax. Read and follow all instructions on the label. Do not use laxatives on a long-term basis. When should you call for help?   Call your doctor now or seek immediate medical care if:    · You have new or worse belly pain.     · You have new or worse nausea or vomiting.     · You have blood in your stools.    Watch closely for changes in your health, and be sure to contact your doctor if:    · Your constipation is getting worse.     · You do not get better as expected. Where can you learn more? Go to http://romulo-daron.info/. Enter 21 [phone] in the search box to learn more about \"Constipation: Care Instructions. \"  Current as of: November 20, 2017  Content Version: 11.7  © 6879-4439 [employer]. Care instructions adapted under license by [employer] (which disclaims liability or warranty for this information). If you have questions about a medical condition or this instruction, always ask your healthcare professional. [place] [address] any warranty or liability for your use of this information. To relieve or prevent constipation, increase your water intake, fiber, and walking to prevent constipation. Use over the counter Smooth Move Tea ([employer] international tea section),  fiber or stool softener as needed. Consider OTC Miralax or Milk of Magnesia if no bowel movement in 3 days. Constipation: Care Instructions  Your Care Instructions    Constipation means that you have a hard time passing stools (bowel movements). People pass stools from 3 times a day to once every 3 days. What is normal for you may be different. Constipation may occur with pain in the rectum and cramping. The pain may get worse when you try to pass stools. Sometimes there are small amounts of bright red blood on toilet paper or the surface of stools. This is because of enlarged veins near the rectum (hemorrhoids). A few changes in your diet and lifestyle may help you avoid ongoing constipation. Your doctor may also prescribe medicine to help loosen your stool. Some medicines can cause constipation. These include pain medicines and antidepressants. Tell your doctor about all the medicines you take.  Your doctor may want to make a medicine change to ease your symptoms. Follow-up care is a key part of your treatment and safety. Be sure to make and go to all appointments, and call your doctor if you are having problems. It's also a good idea to know your test results and keep a list of the medicines you take. How can you care for yourself at home? · Drink plenty of fluids, enough so that your urine is light yellow or clear like water. If you have kidney, heart, or liver disease and have to limit fluids, talk with your doctor before you increase the amount of fluids you drink. · Include high-fiber foods in your diet each day. These include fruits, vegetables, beans, and whole grains. · Get at least 30 minutes of exercise on most days of the week. Walking is a good choice. You also may want to do other activities, such as running, swimming, cycling, or playing tennis or team sports. · Take a fiber supplement, such as Citrucel or Metamucil, every day. Read and follow all instructions on the label. · Schedule time each day for a bowel movement. A daily routine may help. Take your time having your bowel movement. · Support your feet with a small step stool when you sit on the toilet. This helps flex your hips and places your pelvis in a squatting position. · Your doctor may recommend an over-the-counter laxative to relieve your constipation. Examples are Milk of Magnesia and MiraLax. Read and follow all instructions on the label. Do not use laxatives on a long-term basis. When should you call for help? Call your doctor now or seek immediate medical care if:    · You have new or worse belly pain.     · You have new or worse nausea or vomiting.     · You have blood in your stools.    Watch closely for changes in your health, and be sure to contact your doctor if:    · Your constipation is getting worse.     · You do not get better as expected. Where can you learn more? Go to http://romulo-daron.info/.   Enter 21 [phone] in the search box to learn more about \"Constipation: Care Instructions. \"  Current as of: November 20, 2017  Content Version: 11.7  © 4925-3247 [employer], [employer]. Care instructions adapted under license by [employer] (which disclaims liability or warranty for this information). If you have questions about a medical condition or this instruction, always ask your healthcare professional. [address] any warranty or liability for your use of this information.

## 2018-09-10 NOTE — PATIENT INSTRUCTIONS
Constipation: Care Instructions  Your Care Instructions    Constipation means that you have a hard time passing stools (bowel movements). People pass stools from 3 times a day to once every 3 days. What is normal for you may be different. Constipation may occur with pain in the rectum and cramping. The pain may get worse when you try to pass stools. Sometimes there are small amounts of bright red blood on toilet paper or the surface of stools. This is because of enlarged veins near the rectum (hemorrhoids). A few changes in your diet and lifestyle may help you avoid ongoing constipation. Your doctor may also prescribe medicine to help loosen your stool. Some medicines can cause constipation. These include pain medicines and antidepressants. Tell your doctor about all the medicines you take. Your doctor may want to make a medicine change to ease your symptoms. Follow-up care is a key part of your treatment and safety. Be sure to make and go to all appointments, and call your doctor if you are having problems. It's also a good idea to know your test results and keep a list of the medicines you take. How can you care for yourself at home? · Drink plenty of fluids, enough so that your urine is light yellow or clear like water. If you have kidney, heart, or liver disease and have to limit fluids, talk with your doctor before you increase the amount of fluids you drink. · Include high-fiber foods in your diet each day. These include fruits, vegetables, beans, and whole grains. · Get at least 30 minutes of exercise on most days of the week. Walking is a good choice. You also may want to do other activities, such as running, swimming, cycling, or playing tennis or team sports. · Take a fiber supplement, such as Citrucel or Metamucil, every day. Read and follow all instructions on the label. · Schedule time each day for a bowel movement. A daily routine may help.  Take your time having your bowel movement. · Support your feet with a small step stool when you sit on the toilet. This helps flex your hips and places your pelvis in a squatting position. · Your doctor may recommend an over-the-counter laxative to relieve your constipation. Examples are Milk of Magnesia and MiraLax. Read and follow all instructions on the label. Do not use laxatives on a long-term basis. When should you call for help? Call your doctor now or seek immediate medical care if:    · You have new or worse belly pain.     · You have new or worse nausea or vomiting.     · You have blood in your stools.    Watch closely for changes in your health, and be sure to contact your doctor if:    · Your constipation is getting worse.     · You do not get better as expected. Where can you learn more? Go to http://romulo-daron.info/. Enter 21 [phone] in the search box to learn more about \"Constipation: Care Instructions. \"  Current as of: November 20, 2017  Content Version: 11.7  © 8496-3956 [employer]. Care instructions adapted under license by zulily (which disclaims liability or warranty for this information). If you have questions about a medical condition or this instruction, always ask your healthcare professional. [address] any warranty or liability for your use of this information. To relieve or prevent constipation, increase your water intake, fiber, and walking to prevent constipation. Use over the counter Smooth Move Tea ([employer] international tea section),  fiber or stool softener as needed. Consider OTC Miralax or Milk of Magnesia if no bowel movement in 3 days. Constipation: Care Instructions  Your Care Instructions    Constipation means that you have a hard time passing stools (bowel movements). People pass stools from 3 times a day to once every 3 days. What is normal for you may be different.  Constipation may occur with pain in the rectum and cramping. The pain may get worse when you try to pass stools. Sometimes there are small amounts of bright red blood on toilet paper or the surface of stools. This is because of enlarged veins near the rectum (hemorrhoids). A few changes in your diet and lifestyle may help you avoid ongoing constipation. Your doctor may also prescribe medicine to help loosen your stool. Some medicines can cause constipation. These include pain medicines and antidepressants. Tell your doctor about all the medicines you take. Your doctor may want to make a medicine change to ease your symptoms. Follow-up care is a key part of your treatment and safety. Be sure to make and go to all appointments, and call your doctor if you are having problems. It's also a good idea to know your test results and keep a list of the medicines you take. How can you care for yourself at home? · Drink plenty of fluids, enough so that your urine is light yellow or clear like water. If you have kidney, heart, or liver disease and have to limit fluids, talk with your doctor before you increase the amount of fluids you drink. · Include high-fiber foods in your diet each day. These include fruits, vegetables, beans, and whole grains. · Get at least 30 minutes of exercise on most days of the week. Walking is a good choice. You also may want to do other activities, such as running, swimming, cycling, or playing tennis or team sports. · Take a fiber supplement, such as Citrucel or Metamucil, every day. Read and follow all instructions on the label. · Schedule time each day for a bowel movement. A daily routine may help. Take your time having your bowel movement. · Support your feet with a small step stool when you sit on the toilet. This helps flex your hips and places your pelvis in a squatting position. · Your doctor may recommend an over-the-counter laxative to relieve your constipation. Examples are Milk of Magnesia and MiraLax.  Read and follow all instructions on the label. Do not use laxatives on a long-term basis. When should you call for help? Call your doctor now or seek immediate medical care if:    · You have new or worse belly pain.     · You have new or worse nausea or vomiting.     · You have blood in your stools.    Watch closely for changes in your health, and be sure to contact your doctor if:    · Your constipation is getting worse.     · You do not get better as expected. Where can you learn more? Go to http://romulo-daron.info/. Enter 21 [phone] in the search box to learn more about \"Constipation: Care Instructions. \"  Current as of: November 20, 2017  Content Version: 11.7  © 4990-9209 QuickProNotes, [employer]orporated. Care instructions adapted under license by [employer] (which disclaims liability or warranty for this information). If you have questions about a medical condition or this instruction, always ask your healthcare professional. [address] any warranty or liability for your use of this information.

## 2018-09-10 NOTE — PROGRESS NOTES
Anabel Lara  Identified pt with two pt identifiers(name and ). Chief Complaint   Patient presents with    Weight Management    Blood Pressure Check    Medication Refill       1. Have you been to the ER, urgent care clinic since your last visit? Hospitalized since your last visit? No    2. Have you seen or consulted any other health care providers outside of the [facility] since your last visit? Include any pap smears or colon screening. No    In the event something were to happen to you and you were unable to speak on your behalf, do you have an Advance Directive/ Living Will in place stating your wishes? NO    If yes, do we have a copy on file NO    If no, would you like information:            Would you like to sign up for [employer] today, if you have not already done so? Yes  If not, would you like information on MyChart, and how to sign up at a later time? Yes      Medication reconciliation up to date and corrected with patient at this time. Today's provider has been notified of reason for visit, vitals and flowsheets obtained on patients. Reviewed record in preparation for visit, huddled with provider and have obtained necessary documentation.       Health Maintenance Due   Topic    Influenza Age 5 to Adult     PAP AKA CERVICAL CYTOLOGY        Wt Readings from Last 3 Encounters:   09/10/18 186 lb 8 oz (84.6 kg)   18 185 lb 12.8 oz (84.3 kg)   06/15/18 189 lb 8 oz (86 kg)     Temp Readings from Last 3 Encounters:   18 97.8 °F (36.6 °C) (Temporal)   06/15/18 97.8 °F (36.6 °C) (Oral)   05/15/18 98 °F (36.7 °C) (Oral)     BP Readings from Last 3 Encounters:   18 105/65   06/15/18 108/74   05/15/18 111/75     Pulse Readings from Last 3 Encounters:   18 (!) 55   06/15/18 (!) 54   05/15/18 63     Vitals:    09/10/18 0831   BP: 92/57   Pulse: (!) 55   Resp: 14   Temp: 98.4 °F (36.9 °C)   TempSrc: Temporal   SpO2: 100%   Weight: 186 lb 8 oz (84.6 kg)   Height: 5' 4\" (1.626 m)   PainSc:   0 - No pain         Learning Assessment:  :     Learning Assessment 3/15/2018 4/22/2014   PRIMARY LEARNER Patient Patient   HIGHEST LEVEL OF EDUCATION - PRIMARY LEARNER  4 YEARS OF COLLEGE 4 YEARS OF COLLEGE   BARRIERS PRIMARY LEARNER NONE NONE   CO-LEARNER CAREGIVER No -   PRIMARY LANGUAGE ENGLISH ENGLISH   LEARNER PREFERENCE PRIMARY READING LISTENING   ANSWERED BY self patient   RELATIONSHIP SELF SELF       Depression Screening:  :     PHQ over the last two weeks 7/12/2018   Little interest or pleasure in doing things Not at all   Feeling down, depressed, irritable, or hopeless Not at all   Total Score PHQ 2 0       Fall Risk Assessment:  :     Fall Risk Assessment, last 12 mths 6/15/2018   Able to walk? Yes   Fall in past 12 months? No       Abuse Screening:  :     Abuse Screening Questionnaire 6/15/2018 4/16/2018 6/30/2016   Do you ever feel afraid of your partner? N N N   Are you in a relationship with someone who physically or mentally threatens you? N N N   Is it safe for you to go home?  Y Y Y       ADL Screening:  :     ADL Assessment 6/15/2018   Feeding yourself No Help Needed   Getting from bed to chair No Help Needed   Getting dressed No Help Needed   Bathing or showering No Help Needed   Walk across the room (includes cane/walker) No Help Needed   Using the telphone No Help Needed   Taking your medications No Help Needed   Preparing meals No Help Needed   Managing money (expenses/bills) No Help Needed   Moderately strenuous housework (laundry) No Help Needed   Shopping for personal items (toiletries/medicines) No Help Needed   Shopping for groceries No Help Needed   Driving No Help Needed   Climbing a flight of stairs No Help Needed   Getting to places beyond walking distances No Help Needed

## 2018-09-10 NOTE — MR AVS SNAPSHOT
[facility] 
 
 
 [address] [facility] 
[address] [facility] [address] 
[phone] Patient: Reyna Zamarripa MRN: [id number] BBQ:[id number] Visit Information Date & Time Provider Department Dept. Phone Encounter #  
 9/10/2018  8:30 AM DO Cecilia Troy-Chantell [phone]) [id number] Follow-up Instructions Return in about 1 month (around 10/10/2018) for weight, blood pressure. Your Appointments 10/18/2018  8:30 AM  
Office Visit with DO Diomedes [facility] ([facility]) Appt Note: weight check [address] [facility] [phone]  
  
   
 [address] [facility] [address]  
  
    
 11/15/2018  8:30 AM  
Office Visit with DO Diomedes [facility] ([facility]) Appt Note: weight check [address]  [facility] [id number] [phone]  
  
    
 12/13/2018  8:30 AM  
Office Visit with DO Diomedes [facility] ([facility]) Appt Note: weight check [address] [facility] [address]  
[phone] Upcoming Health Maintenance Date Due Influenza Age 5 to Adult 8/1/2018 PAP AKA CERVICAL CYTOLOGY 12/12/2018 BREAST CANCER SCRN MAMMOGRAM 12/15/2019 COLONOSCOPY 9/27/2021 DTaP/Tdap/Td series (2 - Td) 6/30/2026 Allergies as of 9/10/2018  Review Complete On: 9/10/2018 By: Zahra Jack DO Severity Noted Reaction Type Reactions Advil Pm [Ibuprofen-diphenhydramine Hcl]  07/18/2011    Other (comments) Slightly elevated Creaitnine 1.02 Aleve [Naproxen Sodium]  12/28/2011    Other (comments) Due to kidneys Bactrim [Sulfamethoxazole-trimethoprim]  09/11/2009    Nausea and Vomiting  
 Sulfa (Sulfonamide Antibiotics)  09/11/2009    Nausea and Vomiting Sulfasalazine  09/11/2009    Nausea and Vomiting Current Immunizations  Reviewed on 7/12/2018 Name Date Influenza Vaccine 9/18/2014, 10/22/2013, 12/14/2012 Influenza Vaccine Alcario Ravens) 10/29/2015 Influenza Vaccine (Quad) PF  Incomplete, 10/19/2017, 9/29/2016 Influenza Vaccine Split 11/7/2011, 11/12/2010 TD Vaccine 8/18/2000 Tdap 6/30/2016  9:35 AM  
  
 Not reviewed this visit You Were Diagnosed With   
  
 Codes Comments Chronic insomnia    -  Primary ICD-10-CM: F51.04 
ICD-9-CM: 780.52 stable with exercise and occ Lunesta Drug-induced constipation     ICD-10-CM: K59.03 
ICD-9-CM: 564.09, E980.5 due to Adipex vs other Hashimoto's thyroiditis     ICD-10-CM: E06.3 ICD-9-CM: 569. 2 Vitamin D deficiency     ICD-10-CM: E55.9 ICD-9-CM: 268.9 Obesity, Class I, BMI 30-34.9     ICD-10-CM: E66.9 ICD-9-CM: 278.00 Encounter for immunization     ICD-10-CM: J14 ICD-9-CM: V03.89 Generalized anxiety disorder     ICD-10-CM: F41.1 ICD-9-CM: 300.02 stable taking Zoloft prn Peripheral edema     ICD-10-CM: R60.9 ICD-9-CM: 782.3 stable on Lasix daily Closed nondisplaced fracture of phalanx of lesser toe of right foot with routine healing, unspecified phalanx, subsequent encounter     ICD-10-CM: R62.419S ICD-9-CM: V54.19 improved Vitals BP Pulse Temp Resp Height(growth percentile) 92/57 (BP 1 Location: Right arm, BP Patient Position: Sitting) (!) 55 98.4 °F (36.9 °C) (Temporal) 14 5' 4\" (1.626 m) Weight(growth percentile) SpO2 BMI OB Status Smoking Status 186 lb 8 oz (84.6 kg) 100% 32.01 kg/m2 Hysterectomy Never Smoker Vitals History BMI and BSA Data Body Mass Index Body Surface Area 32.01 kg/m 2 1.95 m 2 Preferred Pharmacy Pharmacy Name Phone [facility] [address] AT [address] [facility] [address] [phone] Your Updated Medication List  
  
   
 This list is accurate as of 9/10/18  9:09 AM.  Always use your most recent med list.  
  
  
  
  
 albuterol 90 mcg/actuation inhaler Commonly known as:  PROVENTIL HFA, VENTOLIN HFA, PROAIR HFA Take 2 Puffs by inhalation every four (4) hours as needed for Wheezing (cough). aspirin delayed-release 81 mg tablet Take 1 Tab by mouth daily. Indications: prevention of transient ischemic attack  
  
 cetirizine 10 mg tablet Commonly known as:  ZYRTEC Take 1 Tab by mouth daily. diclofenac EC 75 mg EC tablet Commonly known as:  VOLTAREN  
TAKE 1 TABLET BY MOUTH TWICE DAILY  
  
 eszopiclone 2 mg tablet Commonly known as:  Alcaraz Susu Take 1 Tab by mouth nightly. Max Daily Amount: 2 mg. fluticasone 50 mcg/actuation nasal spray Commonly known as:  [place] 2 Sprays by Both Nostrils route daily. Indications: ALLERGIC RHINITIS  
  
 furosemide 40 mg tablet Commonly known as:  LASIX TAKE 1 TABLET BY MOUTH DAILY FOR SWELLING Insulin Needles (Disposable) 32 gauge x 5/32\" Ndle Commonly known as:  Catina Pen Needle Use daily as directed MINIVELLE 0.1 mg/24 hr  
Generic drug:  estradiol 1 Patch by TransDERmal route two (2) times a week. Changes Sunday and Wednesday  
  
 montelukast 10 mg tablet Commonly known as:  SINGULAIR Take 1 Tab by mouth daily. Indications: Allergic Rhinitis MYRBETRIQ 50 mg ER tablet Generic drug:  mirabegron ER  
  
 phentermine 37.5 mg tablet Commonly known as:  ADIPEX-P Take 1 Tab by mouth every morning. Max Daily Amount: 37.5 mg. PREMARIN 0.625 mg/gram vaginal cream  
Generic drug:  conjugated estrogens  
  
 sertraline 50 mg tablet Commonly known as:  ZOLOFT Take 1 Tab by mouth daily. Indications: Generalized Anxiety Disorder VITAMIN D3 2,000 unit Tab Generic drug:  cholecalciferol (vitamin D3) Take 2,000 Units by mouth daily. Prescriptions Printed Refills phentermine (ADIPEX-P) 37.5 mg tablet 0 Sig: Take 1 Tab by mouth every morning. Max Daily Amount: 37.5 mg.  
 Class: Print Route: Oral  
  
We Performed the Following INFLUENZA VIRUS VAC QUAD,SPLIT,PRESV FREE SYRINGE IM [id number] CPT(R)] NH IMMUNIZ ADMIN,1 SINGLE/COMB VAC/TOXOID [id number] CPT(R)] Follow-up Instructions Return in about 1 month (around 10/10/2018) for weight, blood pressure. Patient Instructions Constipation: Care Instructions Your Care Instructions Constipation means that you have a hard time passing stools (bowel movements). People pass stools from 3 times a day to once every 3 days. What is normal for you may be different. Constipation may occur with pain in the rectum and cramping. The pain may get worse when you try to pass stools. Sometimes there are small amounts of bright red blood on toilet paper or the surface of stools. This is because of enlarged veins near the rectum (hemorrhoids). A few changes in your diet and lifestyle may help you avoid ongoing constipation. Your doctor may also prescribe medicine to help loosen your stool. Some medicines can cause constipation. These include pain medicines and antidepressants. Tell your doctor about all the medicines you take. Your doctor may want to make a medicine change to ease your symptoms. Follow-up care is a key part of your treatment and safety. Be sure to make and go to all appointments, and call your doctor if you are having problems. It's also a good idea to know your test results and keep a list of the medicines you take. How can you care for yourself at home? · Drink plenty of fluids, enough so that your urine is light yellow or clear like water. If you have kidney, heart, or liver disease and have to limit fluids, talk with your doctor before you increase the amount of fluids you drink. · Include high-fiber foods in your diet each day.  These include fruits, vegetables, beans, and whole grains. · Get at least 30 minutes of exercise on most days of the week. Walking is a good choice. You also may want to do other activities, such as running, swimming, cycling, or playing tennis or team sports. · Take a fiber supplement, such as Citrucel or Metamucil, every day. Read and follow all instructions on the label. · Schedule time each day for a bowel movement. A daily routine may help. Take your time having your bowel movement. · Support your feet with a small step stool when you sit on the toilet. This helps flex your hips and places your pelvis in a squatting position. · Your doctor may recommend an over-the-counter laxative to relieve your constipation. Examples are Milk of Magnesia and MiraLax. Read and follow all instructions on the label. Do not use laxatives on a long-term basis. When should you call for help? Call your doctor now or seek immediate medical care if: 
  · You have new or worse belly pain.  
  · You have new or worse nausea or vomiting.  
  · You have blood in your stools.  
 Watch closely for changes in your health, and be sure to contact your doctor if: 
  · Your constipation is getting worse.  
  · You do not get better as expected. Where can you learn more? Go to http://romulo-daron.info/. Enter 21 [phone] in the search box to learn more about \"Constipation: Care Instructions. \" Current as of: November 20, 2017 Content Version: 11.7 © 7260-4383 [employer]. Care instructions adapted under license by [employer] (which disclaims liability or warranty for this information). If you have questions about a medical condition or this instruction, always ask your healthcare professional. [place] [address] any warranty or liability for your use of this information.  
 
To relieve or prevent constipation, increase your water intake, fiber, and walking to prevent constipation. Use over the counter Smooth Move Tea ([employer] international tea section),  fiber or stool softener as needed. Consider OTC Miralax or Milk of Magnesia if no bowel movement in 3 days. Constipation: Care Instructions Your Care Instructions Constipation means that you have a hard time passing stools (bowel movements). People pass stools from 3 times a day to once every 3 days. What is normal for you may be different. Constipation may occur with pain in the rectum and cramping. The pain may get worse when you try to pass stools. Sometimes there are small amounts of bright red blood on toilet paper or the surface of stools. This is because of enlarged veins near the rectum (hemorrhoids). A few changes in your diet and lifestyle may help you avoid ongoing constipation. Your doctor may also prescribe medicine to help loosen your stool. Some medicines can cause constipation. These include pain medicines and antidepressants. Tell your doctor about all the medicines you take. Your doctor may want to make a medicine change to ease your symptoms. Follow-up care is a key part of your treatment and safety. Be sure to make and go to all appointments, and call your doctor if you are having problems. It's also a good idea to know your test results and keep a list of the medicines you take. How can you care for yourself at home? · Drink plenty of fluids, enough so that your urine is light yellow or clear like water. If you have kidney, heart, or liver disease and have to limit fluids, talk with your doctor before you increase the amount of fluids you drink. · Include high-fiber foods in your diet each day. These include fruits, vegetables, beans, and whole grains. · Get at least 30 minutes of exercise on most days of the week. Walking is a good choice. You also may want to do other activities, such as running, swimming, cycling, or playing tennis or team sports. · Take a fiber supplement, such as Citrucel or Metamucil, every day. Read and follow all instructions on the label. · Schedule time each day for a bowel movement. A daily routine may help. Take your time having your bowel movement. · Support your feet with a small step stool when you sit on the toilet. This helps flex your hips and places your pelvis in a squatting position. · Your doctor may recommend an over-the-counter laxative to relieve your constipation. Examples are Milk of Magnesia and MiraLax. Read and follow all instructions on the label. Do not use laxatives on a long-term basis. When should you call for help? Call your doctor now or seek immediate medical care if: 
  · You have new or worse belly pain.  
  · You have new or worse nausea or vomiting.  
  · You have blood in your stools.  
 Watch closely for changes in your health, and be sure to contact your doctor if: 
  · Your constipation is getting worse.  
  · You do not get better as expected. Where can you learn more? Go to http://romulo-daron.info/. Enter 21 [phone] in the search box to learn more about \"Constipation: Care Instructions. \" Current as of: November 20, 2017 Content Version: 11.7 © 0912-8417 [employer]. Care instructions adapted under license by [employer] Circle (which disclaims liability or warranty for this information). If you have questions about a medical condition or this instruction, always ask your healthcare professional. [place] [address] any warranty or liability for your use of this information. Introducing [facility]! Dear Param Pereira: 
Thank you for requesting a InSite [employer] account. Our records indicate that you already have an active In[employer] account. You can access your account anytime at [email or web address] Did you know that you can access your hospital and ER discharge instructions at any time in Invajo? You can also review all of your test results from your hospital stay or ER visit. Additional Information If you have questions, please visit the Frequently Asked Questions section of the Invajo website at [email or web address] Remember, Invajo is NOT to be used for urgent needs. For medical emergencies, dial 911. Now available from your iPhone and Android! Please provide this summary of care documentation to your next provider. Your primary care clinician is listed as Hussein Smith. If you have any questions after today's visit, please call [phone].

## 2018-10-18 ENCOUNTER — OFFICE VISIT (OUTPATIENT)
Dept: FAMILY MEDICINE CLINIC | Age: 63
End: 2018-10-18

## 2018-10-18 VITALS
DIASTOLIC BLOOD PRESSURE: 66 MMHG | HEIGHT: 64 IN | BODY MASS INDEX: 31.41 KG/M2 | OXYGEN SATURATION: 98 % | WEIGHT: 184 LBS | RESPIRATION RATE: 18 BRPM | HEART RATE: 50 BPM | SYSTOLIC BLOOD PRESSURE: 99 MMHG

## 2018-10-18 DIAGNOSIS — E66.9 OBESITY, CLASS I, BMI 30-34.9: ICD-10-CM

## 2018-10-18 DIAGNOSIS — F51.04 CHRONIC INSOMNIA: ICD-10-CM

## 2018-10-18 DIAGNOSIS — E78.00 PURE HYPERCHOLESTEROLEMIA: ICD-10-CM

## 2018-10-18 DIAGNOSIS — K59.09 OTHER CONSTIPATION: ICD-10-CM

## 2018-10-18 DIAGNOSIS — E06.3 HASHIMOTO'S THYROIDITIS: ICD-10-CM

## 2018-10-18 DIAGNOSIS — M25.561 RECURRENT PAIN OF RIGHT KNEE: ICD-10-CM

## 2018-10-18 DIAGNOSIS — Z23 ENCOUNTER FOR IMMUNIZATION: Primary | ICD-10-CM

## 2018-10-18 DIAGNOSIS — R63.4 WEIGHT LOSS: ICD-10-CM

## 2018-10-18 DIAGNOSIS — F43.23 ADJUSTMENT DISORDER WITH MIXED ANXIETY AND DEPRESSED MOOD: ICD-10-CM

## 2018-10-18 PROBLEM — M17.11 PRIMARY OSTEOARTHRITIS OF RIGHT KNEE: Status: ACTIVE | Noted: 2017-07-31

## 2018-10-18 RX ORDER — PHENTERMINE HYDROCHLORIDE 37.5 MG/1
37.5 TABLET ORAL
Qty: 30 TAB | Refills: 0 | Status: CANCELLED | OUTPATIENT
Start: 2018-10-18

## 2018-10-18 RX ORDER — PHENDIMETRAZINE TARTRATE 105 MG/1
105 CAPSULE, EXTENDED RELEASE ORAL DAILY
Qty: 30 CAP | Refills: 0 | Status: CANCELLED | OUTPATIENT
Start: 2018-10-18

## 2018-10-18 RX ORDER — PHENTERMINE HYDROCHLORIDE 37.5 MG/1
37.5 TABLET ORAL
Qty: 30 TAB | Refills: 0 | Status: SHIPPED | OUTPATIENT
Start: 2018-10-18 | End: 2018-12-13 | Stop reason: ALTCHOICE

## 2018-10-18 NOTE — PROGRESS NOTES
Jose Bhatia  Identified pt with two pt identifiers(name and ). Chief Complaint   Patient presents with    Weight Management     Rm 13          1. Have you been to the ER, urgent care clinic since your last visit? Hospitalized since your last visit? NO    2. Have you seen or consulted any other health care providers outside of the [address] [place] [address] since your last visit? Include any pap smears or colon screening. NO      Advance Care Planning    In the event something were to happen to you and you were unable to speak on your behalf, do you have an Advance Directive/ Living Will in place stating your wishes? NO    If yes, do we have a copy on file NO    If no, would you like information NO    My Chart     My chart gives you direct online access to portions of the electronic medical record (EMR) where your doctor stores your health information (ie, lab results, appointment information, medications, immunizations, and more. It is free. Would you like to set up your my chart? YES    [unfilled]    Weight Metrics 10/18/2018 9/10/2018 9/10/2018 2018 2018 6/15/2018 6/15/2018   Weight 184 lb - 186 lb 8 oz - 185 lb 12.8 oz - 189 lb 8 oz   Neck Circ (inches) - - - 12 - - -   Waist Measure Inches 34.5 30 - 32 - 38 -   Exercise Mins/week - - - 125 - - -   Body Fat % 42.2 41.4 - 41 - 41.7 -   BMI 31.58 kg/m2 - 32.01 kg/m2 - 31.89 kg/m2 - 32.53 kg/m2       Medication reconciliation up to date and corrected with patient at this time. Today's provider has been notified of reason for visit, vitals and flowsheets obtained on patients. Reviewed record in preparation for visit, huddled with provider and have obtained necessary documentation.       Health Maintenance Due   Topic    Shingrix Vaccine Age 50> (1 of 2)    PAP AKA CERVICAL CYTOLOGY        Wt Readings from Last 3 Encounters:   10/18/18 184 lb (83.5 kg)   09/10/18 186 lb 8 oz (84.6 kg)   18 185 lb 12.8 oz (84.3 kg)     Temp Readings from Last 3 Encounters:   09/10/18 98.4 °F (36.9 °C) (Temporal)   07/12/18 97.8 °F (36.6 °C) (Temporal)   06/15/18 97.8 °F (36.6 °C) (Oral)     BP Readings from Last 3 Encounters:   10/18/18 99/66   09/10/18 92/57   07/12/18 105/65     Pulse Readings from Last 3 Encounters:   10/18/18 (!) 50   09/10/18 (!) 55   07/12/18 (!) 55     Vitals:    10/18/18 0841   BP: 99/66   Pulse: (!) 50   Resp: 18   SpO2: 98%   Weight: 184 lb (83.5 kg)   Height: 5' 4\" (1.626 m)   PainSc:   0 - No pain         Learning Assessment:  :     Learning Assessment 3/15/2018 4/22/2014   PRIMARY LEARNER Patient Patient   HIGHEST LEVEL OF EDUCATION - PRIMARY LEARNER  4 YEARS OF COLLEGE 4 YEARS OF COLLEGE   BARRIERS PRIMARY LEARNER NONE NONE   CO-LEARNER CAREGIVER No -   PRIMARY LANGUAGE ENGLISH ENGLISH   LEARNER PREFERENCE PRIMARY READING LISTENING   ANSWERED BY self patient   RELATIONSHIP SELF SELF       Depression Screening:  :     PHQ over the last two weeks 7/12/2018   Little interest or pleasure in doing things Not at all   Feeling down, depressed, irritable, or hopeless Not at all   Total Score PHQ 2 0       Fall Risk Assessment:  :     Fall Risk Assessment, last 12 mths 6/15/2018   Able to walk? Yes   Fall in past 12 months? No       Abuse Screening:  :     Abuse Screening Questionnaire 6/15/2018 4/16/2018 6/30/2016   Do you ever feel afraid of your partner? N N N   Are you in a relationship with someone who physically or mentally threatens you? N N N   Is it safe for you to go home?  Y Y Y       ADL Screening:  :     ADL Assessment 6/15/2018   Feeding yourself No Help Needed   Getting from bed to chair No Help Needed   Getting dressed No Help Needed   Bathing or showering No Help Needed   Walk across the room (includes cane/walker) No Help Needed   Using the telphone No Help Needed   Taking your medications No Help Needed   Preparing meals No Help Needed   Managing money (expenses/bills) No Help Needed   Moderately strenuous housework (laundry) No Help Needed   Shopping for personal items (toiletries/medicines) No Help Needed   Shopping for groceries No Help Needed   Driving No Help Needed   Climbing a flight of stairs No Help Needed   Getting to places beyond walking distances No Help Needed

## 2018-10-18 NOTE — PROGRESS NOTES
HISTORY OF PRESENT ILLNESS  Paul Barraza is a 61 y.o. female presents with Weight Management (Rm 13 ); Medication Refill; and Stress    Agree with nurse note. Pt with obesity, hypercholesterolemia, and hashimoto's thyroiditis presents to the office with a BP of 99/66 for weight management. She is tolerating Phentermine 37.5 mg well, month 2 of 3. Last prescribed on 9/10/2018. She has noticed an increase of energy and decreased appetite since starting the medication. She lost 2 pounds since the last visit. Percent body fat has changed from 41.4% to 42.2%, waist circumference measurement has changed from 30\" to 32.5\". Overall, patient is pleased and requests a refill of the medication today. Pt with chronic insomnia. She uses Lunesta 3 mg with relief. Pt with adjustment disorder with anxiety and depressed mood. She feels she would lose more weight if she wasn't so stressed. She uses Zoloft 50 mg daily, but feels like she needs more relief. No SI/HI. Stressors: taking care of her elderly sister who has lived with her for 5+ years. Pt with chronic constipation. She uses smooth move tea with relief. Pt with recurrent pain of R knee. She sees orthopedist, Dr. Kelle Howard. No complaints today. Patient denies fatigue, chest pain, heart palpitations, nausea, dry mouth. Written by hyun Cunningham, as dictated by Dr. Hong Owusu DO.       DANIEL    Review of Systems negative except as noted above in HPI.     ALLERGIES:    Allergies   Allergen Reactions    Advil Pm [Ibuprofen-Diphenhydramine Hcl] Other (comments)     Slightly elevated Creaitnine 1.02    Aleve [Naproxen Sodium] Other (comments)     Due to kidneys    Bactrim [Sulfamethoxazole-Trimethoprim] Nausea and Vomiting    Sulfa (Sulfonamide Antibiotics) Nausea and Vomiting    Sulfasalazine Nausea and Vomiting       CURRENT MEDICATIONS:    Outpatient Medications Marked as Taking for the 10/18/18 encounter (Office Visit) [facility]   Medication Sig Dispense Refill    furosemide (LASIX) 40 mg tablet TAKE 1 TABLET BY MOUTH DAILY FOR SWELLING 90 Tab 3    phentermine (ADIPEX-P) 37.5 mg tablet Take 1 Tab by mouth every morning. Max Daily Amount: 37.5 mg. 30 Tab 0    diclofenac EC (VOLTAREN) 75 mg EC tablet TAKE 1 TABLET BY MOUTH TWICE DAILY 60 Tab 2    sertraline (ZOLOFT) 50 mg tablet Take 1 Tab by mouth daily. Indications: Generalized Anxiety Disorder 90 Tab 3    MYRBETRIQ 50 mg ER tablet       aspirin delayed-release 81 mg tablet Take 1 Tab by mouth daily. Indications: prevention of transient ischemic attack 90 Tab 3    eszopiclone (LUNESTA) 2 mg tablet Take 1 Tab by mouth nightly. Max Daily Amount: 2 mg. 30 Tab 5    MINIVELLE 0.1 mg/24 hr 1 Patch by TransDERmal route two (2) times a week. Changes Sunday and Wednesday  0    cetirizine (ZYRTEC) 10 mg tablet Take 1 Tab by mouth daily. (Patient taking differently: Take 10 mg by mouth daily as needed.) 30 Tab 3    cholecalciferol, vitamin D3, (VITAMIN D3) 2,000 unit Tab Take 2,000 Units by mouth daily. PAST MEDICAL HISTORY:    Past Medical History:   Diagnosis Date    Achilles tendonitis 12/2004    Right. Dr. Cralos A Gallardo Arthritis 2010    hips, knees. Dr. Julienne Ramon    Chest pain 08/2013    Dr. Amanda Peter.  Chickenpox childhood    Endometriosis 1990    Dr. Shanna Snowden Environmental allergies     MARGI (generalized anxiety disorder)     Hip pain, bilateral 2010    due to severe OA. Iliopsoas bursitis. Dr. Leona Holly    History of breast lump/mass excision 1998    Left breast.  Dr. Heron Borja Hyperlipidemia     Iron deficiency anemia     iron deficiency    Knee pain, bilateral 2017    Dr. Db Arce    Menopausal and postmenopausal disorder 2007    Dr. Shanna Snowden Uterine fibroid     Dr. Ashish Chu.     Vitamin D deficiency 03/2008       PAST SURGICAL HISTORY:    Past Surgical History:   Procedure Laterality Date    Jordon Nurse    Dr. Susanna Martinez FOOT/TOES SURGERY [facility] Right 2002    Right achiles tendon. Dr. Marii Jeong.  HX BREAST BIOPSY  2001    Left. benign. Dr. Pruett Ser HX COLONOSCOPY  09/27/2016    Dr. Edwin Rasmussen.  due q 5 yrs due to fam hx   Brandt Leatha    Dr. Kevin Calderón. due to endometriosis    HX PELVIC LAPAROSCOPY  1995    due to endometriosis Dr. Leticia Coyle  04/2011    due to fibroids. Dr. Emma Benton.        FAMILY HISTORY:    Family History   Problem Relation Age of Onset    Hypertension Mother     Other Mother         hearing loss/vision loss    Heart Disease Father     Lung Disease Father     Diabetes Sister     Heart Attack Sister 54        March 2014    Stroke Sister         after MI    Thyroid Disease Sister     Colon Polyps Sister     Heart Disease Brother         Defibrillator placed    Heart Attack Brother 48    Diabetes Maternal Grandmother        SOCIAL HISTORY:    Social History     Socioeconomic History    Marital status: SINGLE     Spouse name: Not on file    Number of children: 2    Years of education: Not on file    Highest education level: Not on file   Social Needs    Financial resource strain: Not on file    Food insecurity - worry: Not on file    Food insecurity - inability: Not on file   [employer] needs - medical: Not on file   [employer] needs - non-medical: Not on file   Occupational History    Occupation: [employer]     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    Tobacco Use    Smoking status: Never Smoker    Smokeless tobacco: Never Used    Tobacco comment: lived with smoker mom x 18 yrs   Substance and Sexual Activity    Alcohol use: No    Drug use: No    Sexual activity: Not Currently     Partners: Male     Birth control/protection: Abstinence, Surgical   Other Topics Concern    Not on file   Social History Narrative    Not on file       IMMUNIZATIONS:    Immunization History Administered Date(s) Administered    Influenza Vaccine 12/14/2012, 10/22/2013, 09/18/2014    Influenza Vaccine (Quad) 10/29/2015    Influenza Vaccine (Quad) PF 09/29/2016, 10/19/2017, 09/10/2018    Influenza Vaccine Split 11/12/2010, 11/07/2011    TD Vaccine 08/18/2000    Tdap 06/30/2016       PHYSICAL EXAMINATION    Vital Signs  There were no vitals taken for this visit. Weight Metrics 9/10/2018 9/10/2018 7/12/2018 7/12/2018 6/15/2018 6/15/2018 5/15/2018   Weight - 186 lb 8 oz - 185 lb 12.8 oz - 189 lb 8 oz -   Neck Circ (inches) - - 12 - - - -   Waist Measure Inches 30 - 32 - 38 - 30.75   Exercise Mins/week - - 125 - - - -   Body Fat % 41.4 - 41 - 41.7 - 42.1   BMI - 32.01 kg/m2 - 31.89 kg/m2 - 32.53 kg/m2 -         General appearance - Well nourished. Well appearing. Well developed. No acute distress. Obese. Head - Normocephalic. Atraumatic. Eyes - pupils equal and reactive, extraocular eye movements intact, sclera anicteric  Ears - Hearing is grossly normal bilaterally. Nose - normal and patent, no erythema, discharge or polyps   Mouth - mucous membranes moist, pharynx normal with cobblestone appearance. No erythema, white exudate or obstruction. Neck - supple. Midline trachea. No carotid bruits are noted. No thyromegaly noted. Chest - clear to auscultation bilaterally anterriorly and posteriorly. No wheezes, rales or rhonchi. Breath sounds are symmetrical and unlabored bilaterally. Heart - HR 50 bpm.  Regular rhythm, normal S1, S2. No murmur. No rubs, clicks or gallops noted. Abdomen - soft and distended. No masses or organomegaly. No rebound, rigidity or guarding. Bowel sounds normal x 4 quadrants. No tenderness noted. Neurological - awake, alert and oriented to person, place, and time and event. Cranial nerves II through XII intact. Muscle strength is +5/5 x 4 extremities. Sensation is intact to light touch bilaterally. Steady gait.    Heme/Lymph - peripheral pulses normal x 4 extremities. No peripheral edema is noted. No cervical adenopathy noted. Skin - no rashes, erythema, ecchymosis, lacerations, abrasions, suspicious moles noted. No skin tags. No acanthosis nigricans noted in the axilla or neck. Psychological -   normal behavior, speech, dress and thought processes. Good insight. Good eye contact. Normal affect. Appropriate mood. DATA REVIEWED  Lab Results   Component Value Date/Time    WBC 5.2 03/21/2018 09:02 AM    HGB 12.0 03/21/2018 09:02 AM    HCT 38.5 03/21/2018 09:02 AM    PLATELET 495 35/41/2595 09:02 AM    MCV 91 03/21/2018 09:02 AM     Lab Results   Component Value Date/Time    Sodium 140 03/21/2018 09:02 AM    Potassium 4.7 03/21/2018 09:02 AM    Chloride 100 03/21/2018 09:02 AM    CO2 28 03/21/2018 09:02 AM    Anion gap 5 04/23/2011 05:20 AM    Glucose 86 03/21/2018 09:02 AM    BUN 13 03/21/2018 09:02 AM    Creatinine 0.90 03/21/2018 09:02 AM    BUN/Creatinine ratio 14 03/21/2018 09:02 AM    GFR est AA 79 03/21/2018 09:02 AM    GFR est non-AA 69 03/21/2018 09:02 AM    Calcium 9.1 03/21/2018 09:02 AM    Bilirubin, total 0.6 03/21/2018 09:02 AM    AST (SGOT) 29 03/21/2018 09:02 AM    Alk.  phosphatase 95 03/21/2018 09:02 AM    Protein, total 6.9 03/21/2018 09:02 AM    Albumin 3.9 03/21/2018 09:02 AM    A-G Ratio 1.3 03/21/2018 09:02 AM    ALT (SGPT) 15 03/21/2018 09:02 AM     Lab Results   Component Value Date/Time    Cholesterol, total 144 03/21/2018 09:02 AM    HDL Cholesterol 49 03/21/2018 09:02 AM    LDL, calculated 85 03/21/2018 09:02 AM    VLDL, calculated 10 03/21/2018 09:02 AM    Triglyceride 50 03/21/2018 09:02 AM     Lab Results   Component Value Date/Time    Vitamin D 25-Hydroxy 26.3 (L) 07/18/2011 10:22 AM    VITAMIN D, 25-HYDROXY 45.8 03/21/2018 09:02 AM       Lab Results   Component Value Date/Time    Hemoglobin A1c 5.4 03/21/2018 09:02 AM     Lab Results   Component Value Date/Time    TSH 1.740 03/21/2018 09:02 AM    TSH 2.36 08/21/2014 07:50 AM           ASSESSMENT and PLAN    ICD-10-CM ICD-9-CM    1. Encounter for immunization Z23 V03.89    2. Hashimoto's thyroiditis E06.3 245.2    3. Adjustment disorder with mixed anxiety and depressed mood F43.23 309.28     worse with increased family stress/sister's care vs other, stable   4. Pure hypercholesterolemia E78.00 272.0     stable   5. Chronic insomnia F51.04 780.52    6. Obesity, Class I, BMI 30-34.9 E66.9 278.00 phentermine (ADIPEX-P) 37.5 mg tablet   7. Weight loss R63.4 783.21     2# since 9/2018 and 32# since 11/2017   8. Other constipation K59.09 564.09     stable with Smooth Move Tea   9. Recurrent pain of right knee M25.561 719.46        Continue current medications and care. Increase Zoloft 50 mg from 1 tab to 1.5 tabs daily. Prescriptions written and given to patient Phentermine 37.5 mg (month 3 of 3.)  Medication side effects discussed. [facility]  reviewed and pt is compliant. Discussed the patient's BMI with her. The BMI follow up plan is as follows: I have counseled this patient on diet and exercise regimens. Diet recommendations:  Decrease carbohydrates (white foods including flour, white bread, white rice, white pasta, white potatoes; corn, sweet foods, sweet drinks and alcohol), increase green leafy vegetables and protein with each meal.  Avoid fried foods. Eat 3-5 small meals daily. Do not skip meals. Ok to use meal replacements up to twice daily with protein goal of 60 mg per meal.   Recommend OTC Premiere Protein bars or shakes. Increase water intake. Increase physical activity to 30 minutes daily for health benefit or 60 minutes daily to prevent weight regain, as tolerated. Physical Activity prescription:  A goal of 30 minutes physical activity daily is recommended for health benefit and at least 60 minutes daily to prevent weight regain.   For weight loss, no less than 75% needs to be aerobic (i.e. Walking) and no more than 25% resistance exercising (i.e. Weight lifting). For weight maintenance phase, 50% aerobic and 50% resistance exercises. Sleep prescription:    A goal of 7-8 hours of uninterrupted sleep is recommended to turn off the Grehlin hormone to be released from the stomach and triggers appetite while promoting weight gain. Proper rest turns on Leptin hormone to be released from white adipose tissue and promotes weight loss. Counseled patient on: weight loss goals, emphasizing a 5-10% weight loss in 6-12 months and strategies, thyroid, knee care, stress, cholesterol, sleep hygiene, and constipation. Immunizations noted. Advise flu vaccine between the months September to December. Praised pt for weight loss efforts and progress. Patient was offered a choice/choices in the treatment plan today. Patient expresses understanding of the plan and agrees with recommendations. Patient declines any additional handouts. Patient is satisfied with previous handouts received from our office    Follow-up Disposition:  Return in about 1 month (around 11/18/2018) for weight, blood pressure, stress. Written by hyun Doherty, as dictated by Dr. Kei Perry DO. Documentation True and Accepted by Heron Plascencia.  Zach Rowe.

## 2018-12-13 ENCOUNTER — OFFICE VISIT (OUTPATIENT)
Dept: FAMILY MEDICINE CLINIC | Age: 63
End: 2018-12-13

## 2018-12-13 VITALS
SYSTOLIC BLOOD PRESSURE: 102 MMHG | HEIGHT: 64 IN | OXYGEN SATURATION: 98 % | DIASTOLIC BLOOD PRESSURE: 60 MMHG | RESPIRATION RATE: 18 BRPM | HEART RATE: 71 BPM | BODY MASS INDEX: 31.41 KG/M2 | WEIGHT: 184 LBS | TEMPERATURE: 98.1 F

## 2018-12-13 DIAGNOSIS — M25.561 RECURRENT PAIN OF RIGHT KNEE: ICD-10-CM

## 2018-12-13 DIAGNOSIS — F51.04 CHRONIC INSOMNIA: Primary | ICD-10-CM

## 2018-12-13 DIAGNOSIS — E55.9 VITAMIN D DEFICIENCY: ICD-10-CM

## 2018-12-13 DIAGNOSIS — F41.1 GENERALIZED ANXIETY DISORDER: ICD-10-CM

## 2018-12-13 DIAGNOSIS — E06.3 HASHIMOTO'S THYROIDITIS: ICD-10-CM

## 2018-12-13 DIAGNOSIS — E66.9 OBESITY, CLASS I, BMI 30-34.9: ICD-10-CM

## 2018-12-13 RX ORDER — PHENDIMETRAZINE TARTRATE 105 MG/1
1 CAPSULE, EXTENDED RELEASE ORAL DAILY
Qty: 30 CAP | Refills: 0 | Status: SHIPPED | OUTPATIENT
Start: 2018-12-13 | End: 2019-02-11 | Stop reason: SDUPTHER

## 2018-12-13 NOTE — PROGRESS NOTES
HISTORY OF PRESENT ILLNESS  Jil Alcantar is a 61 y.o. female presents with Weight Management (Rm 14) and Medication Refill    Agree with nurse note. Pt with vit d deficiency, hashimoto's thyroiditis, and obesity presents to the office with a BP of 102/60. She is tolerating Phentermine 37.5 mg well, month 3 of 3. Last prescribed on 10/18/2018. She has noticed an increase of energy and decreased appetite since starting the medication. She lost 0 pounds since the last visit. Percent body fat has changed from 42.2% to 40.9%, waist circumference measurement has changed from 32.5 to 36. She drinks at least 50 oz of water daily. She goes to the gym about 3 times weekly. Overall, patient is pleased and is agreeable with switching to Phendimetrazine today. Pt has a pap smear and mammogram scheduled in 1/2019. Pt reports she received and synvisc injection in her R knee by orthopedist, Dr. Cal Allen. Last time she received an injection, she ad relief for over 1 year. Pt with MARGI. She no longer feels depression but is stressed a lot of the time taking care of her elderly sister. She isn't currently using Zoloft 50 mg. Pt with chronic insomnia. She uses Lunesta prn with relief. She averages 8 hours of sleep nightly. Insomnia is not worsened while using Phentermine. Patient denies fatigue, sleep disturbance, chest pain, heart palpitations, nausea, dry mouth, constipation. Written by hyun Reveles, as dictated by DO. DANIEL Ramos    Review of Systems negative except as noted above in HPI.     ALLERGIES:    Allergies   Allergen Reactions    Advil Pm [Ibuprofen-Diphenhydramine Hcl] Other (comments)     Slightly elevated Creaitnine 1.02    Aleve [Naproxen Sodium] Other (comments)     Due to kidneys    Bactrim [Sulfamethoxazole-Trimethoprim] Nausea and Vomiting    Sulfa (Sulfonamide Antibiotics) Nausea and Vomiting    Sulfasalazine Nausea and Vomiting CURRENT MEDICATIONS:    Outpatient Medications Marked as Taking for the 12/13/18 encounter (Office Visit) with Camila DO Jessika   Medication Sig Dispense Refill    phendimetrazine tartrate 105 mg cpER Take 1 Cap by mouth daily. Max Daily Amount: 1 Cap. 30 Cap 0    diclofenac EC (VOLTAREN) 75 mg EC tablet TAKE 1 TABLET BY MOUTH TWICE DAILY 60 Tab 5    furosemide (LASIX) 40 mg tablet TAKE 1 TABLET BY MOUTH DAILY FOR SWELLING 90 Tab 3    montelukast (SINGULAIR) 10 mg tablet Take 1 Tab by mouth daily. Indications: Allergic Rhinitis 90 Tab 3    PREMARIN 0.625 mg/gram vaginal cream   4    MYRBETRIQ 50 mg ER tablet       aspirin delayed-release 81 mg tablet Take 1 Tab by mouth daily. Indications: prevention of transient ischemic attack 90 Tab 3    eszopiclone (LUNESTA) 2 mg tablet Take 1 Tab by mouth nightly. Max Daily Amount: 2 mg. 30 Tab 5    MINIVELLE 0.1 mg/24 hr 1 Patch by TransDERmal route two (2) times a week. Changes Sunday and Wednesday  0    cetirizine (ZYRTEC) 10 mg tablet Take 1 Tab by mouth daily. (Patient taking differently: Take 10 mg by mouth daily as needed.) 30 Tab 3    fluticasone (FLONASE) 50 mcg/actuation nasal spray 2 Sprays by Both Nostrils route daily. Indications: ALLERGIC RHINITIS 1 Bottle 5    albuterol (PROVENTIL HFA, VENTOLIN HFA) 90 mcg/actuation inhaler Take 2 Puffs by inhalation every four (4) hours as needed for Wheezing (cough). 1 Inhaler 1    cholecalciferol, vitamin D3, (VITAMIN D3) 2,000 unit Tab Take 2,000 Units by mouth daily. PAST MEDICAL HISTORY:    Past Medical History:   Diagnosis Date    Achilles tendonitis 12/2004    Right. Dr. Cinda Ramos Arthritis 2010    hips, knees. Dr. Reed Maier    Chest pain 08/2013    Dr. Dion Rodriguez.  Chickenpox childhood    Endometriosis 1990    Dr. Eileen Pierson Environmental allergies     MARGI (generalized anxiety disorder)     Hip pain, bilateral 2010    due to severe OA. Iliopsoas bursitis. Dr. Humera Amador    History of breast lump/mass excision 1998    Left breast.  Dr. Shreya Chin Hyperlipidemia     Iron deficiency anemia     iron deficiency    Knee pain, bilateral 2017    Dr. Tylor Perez    Menopausal and postmenopausal disorder 2007    Dr. Jenelle Holstein Uterine fibroid     Dr. June Hill.  Vitamin D deficiency 03/2008       PAST SURGICAL HISTORY:    Past Surgical History:   Procedure Laterality Date    BIOPSY BREAST  Rt 1993;Lt 1998    Dr. Ivan Orellana Right 2002    Right achiles tendon. Dr. Noemi Varela.  HX BREAST BIOPSY  2001    Left. benign. Dr. Cheryl Chapman HX COLONOSCOPY  09/27/2016    Dr. Kerri River.  due q 5 yrs due to fam hx   Destinee Demarco    Dr. Benji Lopez. due to endometriosis    HX PELVIC LAPAROSCOPY  1995    due to endometriosis Dr. Jonah Arias  04/2011    due to fibroids. Dr. June Hill.        FAMILY HISTORY:    Family History   Problem Relation Age of Onset    Hypertension Mother     Other Mother         hearing loss/vision loss    Heart Disease Father     Lung Disease Father     Diabetes Sister     Heart Attack Sister 54        March 2014    Stroke Sister         after MI    Thyroid Disease Sister     Colon Polyps Sister     Heart Disease Brother         Defibrillator placed    Heart Attack Brother 48    Diabetes Maternal Grandmother        SOCIAL HISTORY:    Social History     Socioeconomic History    Marital status: SINGLE     Spouse name: Not on file    Number of children: 2    Years of education: Not on file    Highest education level: Not on file   Occupational History    Occupation: [employer]     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    Tobacco Use    Smoking status: Never Smoker    Smokeless tobacco: Never Used    Tobacco comment: lived with smoker mom x 18 yrs   Substance and Sexual Activity    Alcohol use: No    Drug use: No    Sexual activity: Not Currently Partners: Male     Birth control/protection: Abstinence, Surgical       IMMUNIZATIONS:    Immunization History   Administered Date(s) Administered    Influenza Vaccine 12/14/2012, 10/22/2013, 09/18/2014    Influenza Vaccine (Quad) 10/29/2015    Influenza Vaccine (Quad) PF 09/29/2016, 10/19/2017, 09/10/2018    Influenza Vaccine Split 11/12/2010, 11/07/2011    TD Vaccine 08/18/2000    Tdap 06/30/2016       PHYSICAL EXAMINATION    Vital Signs    Visit Vitals  /60 (BP 1 Location: Left arm, BP Patient Position: Sitting)   Pulse 71   Temp 98.1 °F (36.7 °C) (Temporal)   Resp 18   Ht 5' 4\" (1.626 m)   Wt 184 lb (83.5 kg)   SpO2 98%   BMI 31.58 kg/m²       Weight Metrics 12/13/2018 12/13/2018 10/18/2018 10/18/2018 9/10/2018 9/10/2018 7/12/2018   Weight - 184 lb - 184 lb - 186 lb 8 oz -   Neck Circ (inches) - - - - - - 12   Waist Measure Inches 36 - 32.5 - 30 - 32   Exercise Mins/week - - - - - - 125   Body Fat % 40.9 - 42.2 - 41.4 - 41   BMI - 31.58 kg/m2 - 31.58 kg/m2 - 32.01 kg/m2 -         General appearance - Well nourished. Well appearing. Well developed. No acute distress. Obese. Head - Normocephalic. Atraumatic. Eyes - pupils equal and reactive, extraocular eye movements intact, sclera anicteric  Ears - Hearing is grossly normal bilaterally. Nose - normal and patent, no erythema, discharge or polyps   Mouth - mucous membranes moist, pharynx normal with cobblestone appearance. No erythema, white exudate or obstruction. Neck - supple. Midline trachea. No carotid bruits are noted. No thyromegaly noted. Chest - clear to auscultation bilaterally anterriorly and posteriorly. No wheezes, rales or rhonchi. Breath sounds are symmetrical and unlabored bilaterally. Heart - normal rate. Regular rhythm, normal S1, S2. No murmur. No rubs, clicks or gallops noted. Abdomen - soft and distended. No masses or organomegaly. No rebound, rigidity or guarding. Bowel sounds normal x 4 quadrants.   No tenderness noted. Neurological - awake, alert and oriented to person, place, and time and event. Cranial nerves II through XII intact. Muscle strength is +5/5 x 4 extremities. Sensation is intact to light touch bilaterally. Steady gait. Heme/Lymph - peripheral pulses normal x 4 extremities. No peripheral edema is noted. No cervical adenopathy noted. Skin - no rashes, erythema, ecchymosis, lacerations, abrasions, suspicious moles noted. No skin tags. No acanthosis nigricans noted in the axilla or neck. Psychological -   normal behavior, speech, dress and thought processes. Good insight. Good eye contact. Normal affect. Appropriate mood. DATA REVIEWED  Lab Results   Component Value Date/Time    WBC 5.2 03/21/2018 09:02 AM    HGB 12.0 03/21/2018 09:02 AM    HCT 38.5 03/21/2018 09:02 AM    PLATELET 879 91/70/0352 09:02 AM    MCV 91 03/21/2018 09:02 AM     Lab Results   Component Value Date/Time    Sodium 140 03/21/2018 09:02 AM    Potassium 4.7 03/21/2018 09:02 AM    Chloride 100 03/21/2018 09:02 AM    CO2 28 03/21/2018 09:02 AM    Anion gap 5 04/23/2011 05:20 AM    Glucose 86 03/21/2018 09:02 AM    BUN 13 03/21/2018 09:02 AM    Creatinine 0.90 03/21/2018 09:02 AM    BUN/Creatinine ratio 14 03/21/2018 09:02 AM    GFR est AA 79 03/21/2018 09:02 AM    GFR est non-AA 69 03/21/2018 09:02 AM    Calcium 9.1 03/21/2018 09:02 AM    Bilirubin, total 0.6 03/21/2018 09:02 AM    AST (SGOT) 29 03/21/2018 09:02 AM    Alk.  phosphatase 95 03/21/2018 09:02 AM    Protein, total 6.9 03/21/2018 09:02 AM    Albumin 3.9 03/21/2018 09:02 AM    A-G Ratio 1.3 03/21/2018 09:02 AM    ALT (SGPT) 15 03/21/2018 09:02 AM     Lab Results   Component Value Date/Time    Cholesterol, total 144 03/21/2018 09:02 AM    HDL Cholesterol 49 03/21/2018 09:02 AM    LDL, calculated 85 03/21/2018 09:02 AM    VLDL, calculated 10 03/21/2018 09:02 AM    Triglyceride 50 03/21/2018 09:02 AM     Lab Results   Component Value Date/Time    Vitamin D 25-Hydroxy 26.3 (L) 07/18/2011 10:22 AM    VITAMIN D, 25-HYDROXY 45.8 03/21/2018 09:02 AM       Lab Results   Component Value Date/Time    Hemoglobin A1c 5.4 03/21/2018 09:02 AM     Lab Results   Component Value Date/Time    TSH 1.740 03/21/2018 09:02 AM    TSH 2.36 08/21/2014 07:50 AM       ASSESSMENT and PLAN    ICD-10-CM ICD-9-CM    1. Chronic insomnia F51.04 780.52    2. Generalized anxiety disorder F41.1 300.02    3. Vitamin D deficiency E55.9 268.9    4. Hashimoto's thyroiditis E06.3 245.2    5. Recurrent pain of right knee M25.561 719.46    6. Obesity, Class I, BMI 30-34.9 E66.9 278.00 phendimetrazine tartrate 105 mg cpER       Continue current medications and care. Switch from Phentermine to Phendimetrazine daily. Prescriptions written and given to patient Phendimetrazine 105 mg (month 1 of 3.)  Medication side effects discussed. [facility]  reviewed and pt is compliant. Discussed the patient's BMI with her. The BMI follow up plan is as follows: I have counseled this patient on diet and exercise regimens. Diet recommendations:  Decrease carbohydrates (white foods including flour, white bread, white rice, white pasta, white potatoes; corn, sweet foods, sweet drinks and alcohol), increase green leafy vegetables and protein with each meal.  Avoid fried foods. Eat 3-5 small meals daily. Do not skip meals. Ok to use meal replacements up to twice daily with protein goal of 60 mg per meal.   Recommend OTC Premiere Protein bars or shakes. Increase water intake. Increase physical activity to 30 minutes daily for health benefit or 60 minutes daily to prevent weight regain, as tolerated. Physical Activity prescription:  A goal of 30 minutes physical activity daily is recommended for health benefit and at least 60 minutes daily to prevent weight regain. For weight loss, no less than 75% needs to be aerobic (i.e. Walking) and no more than 25% resistance exercising (i.e. Weight lifting).   For weight maintenance phase, 50% aerobic and 50% resistance exercises. Sleep prescription:    A goal of 7-8 hours of uninterrupted sleep is recommended to turn off the Grehlin hormone to be released from the stomach and triggers appetite while promoting weight gain. Proper rest turns on Leptin hormone to be released from white adipose tissue and promotes weight loss. Counseled patient on: weight loss goals, emphasizing a 5-10% weight loss in 6-12 months and strategies , anxiety, sleep hygiene, vit d deficiency, and hashimoto's. Immunizations noted. Advised pt to discuss Shingrix vaccine with insurance company and local pharmacy. Praised pt for weight loss efforts and progress. Pt declines additional ACP handout. Patient was offered a choice/choices in the treatment plan today. Patient expresses understanding of the plan and agrees with recommendations. Patient declines any additional handouts. Patient is satisfied with previous handouts received from our office    Follow-up Disposition:  Return in about 1 month (around 1/13/2019) for weight, stress. Written by hyun Curran, as dictated by Dr. Martha Kaufman DO. Documentation True and Accepted by David Cordova.  Nils Garcia.

## 2018-12-13 NOTE — PROGRESS NOTES
Az Dunn  Identified pt with two pt identifiers(name and ). Chief Complaint   Patient presents with    Weight Management     Rm 14       1. Have you been to the ER, urgent care clinic since your last visit? Hospitalized since your last visit? n    2. Have you seen or consulted any other health care providers outside of the [address] [place] [address] since your last visit? Include any pap smears or colon screening. Advance Care Planning    In the event something were to happen to you and you were unable to speak on your behalf, do you have an Advance Directive/ Living Will in place stating your wishes? NO    If yes, do we have a copy on file NO    If no, would you like information NO    Medication reconciliation up to date and corrected with patient at this time. Today's provider has been notified of reason for visit, vitals and flowsheets obtained on patients. Reviewed record in preparation for visit, huddled with provider and have obtained necessary documentation.       Health Maintenance Due   Topic    Shingrix Vaccine Age 50> (1 of 2)    PAP AKA CERVICAL CYTOLOGY        Wt Readings from Last 3 Encounters:   18 184 lb (83.5 kg)   10/18/18 184 lb (83.5 kg)   09/10/18 186 lb 8 oz (84.6 kg)     Temp Readings from Last 3 Encounters:   18 98.1 °F (36.7 °C) (Temporal)   09/10/18 98.4 °F (36.9 °C) (Temporal)   18 97.8 °F (36.6 °C) (Temporal)     BP Readings from Last 3 Encounters:   18 102/60   10/18/18 99/66   09/10/18 92/57     Pulse Readings from Last 3 Encounters:   18 71   10/18/18 (!) 50   09/10/18 (!) 55     Vitals:    18 0840   BP: 102/60   Pulse: 71   Resp: 18   Temp: 98.1 °F (36.7 °C)   TempSrc: Temporal   SpO2: 98%   Weight: 184 lb (83.5 kg)   Height: 5' 4\" (1.626 m)   PainSc:   0 - No pain         Learning Assessment:  :     Learning Assessment 3/15/2018 2014   PRIMARY LEARNER Patient Patient   HIGHEST LEVEL OF EDUCATION - PRIMARY LEARNER 4 YEARS OF COLLEGE 4 YEARS OF COLLEGE   BARRIERS PRIMARY LEARNER NONE NONE   CO-LEARNER CAREGIVER No -   PRIMARY LANGUAGE ENGLISH ENGLISH   LEARNER PREFERENCE PRIMARY READING LISTENING   ANSWERED BY self patient   RELATIONSHIP SELF SELF       Depression Screening:  :     PHQ over the last two weeks 12/13/2018   Little interest or pleasure in doing things Not at all   Feeling down, depressed, irritable, or hopeless Not at all   Total Score PHQ 2 0       No flowsheet data found. Fall Risk Assessment:  :     Fall Risk Assessment, last 12 mths 6/15/2018   Able to walk? Yes   Fall in past 12 months? No       Abuse Screening:  :     Abuse Screening Questionnaire 6/15/2018 4/16/2018 6/30/2016   Do you ever feel afraid of your partner? N N N   Are you in a relationship with someone who physically or mentally threatens you? N N N   Is it safe for you to go home?  Y Y Y       ADL Screening:  :     ADL Assessment 6/15/2018   Feeding yourself No Help Needed   Getting from bed to chair No Help Needed   Getting dressed No Help Needed   Bathing or showering No Help Needed   Walk across the room (includes cane/walker) No Help Needed   Using the telphone No Help Needed   Taking your medications No Help Needed   Preparing meals No Help Needed   Managing money (expenses/bills) No Help Needed   Moderately strenuous housework (laundry) No Help Needed   Shopping for personal items (toiletries/medicines) No Help Needed   Shopping for groceries No Help Needed   Driving No Help Needed   Climbing a flight of stairs No Help Needed   Getting to places beyond walking distances No Help Needed           BMI:  Weight Metrics 12/13/2018 10/18/2018 10/18/2018 9/10/2018 9/10/2018 7/12/2018 7/12/2018   Weight 184 lb - 184 lb - 186 lb 8 oz - 185 lb 12.8 oz   Neck Circ (inches) - - - - - 12 -   Waist Measure Inches - 32.5 - 30 - 32 -   Exercise Mins/week - - - - - 125 -   Body Fat % - 42.2 - 41.4 - 41 -   BMI 31.58 kg/m2 - 31.58 kg/m2 - 32.01 kg/m2 - 31.89 kg/m2           Medication reconciliation up to date and corrected with patient at this time.

## 2019-02-11 ENCOUNTER — OFFICE VISIT (OUTPATIENT)
Dept: FAMILY MEDICINE CLINIC | Age: 64
End: 2019-02-11

## 2019-02-11 VITALS
SYSTOLIC BLOOD PRESSURE: 119 MMHG | DIASTOLIC BLOOD PRESSURE: 73 MMHG | RESPIRATION RATE: 18 BRPM | OXYGEN SATURATION: 98 % | TEMPERATURE: 97.5 F | BODY MASS INDEX: 31.74 KG/M2 | HEART RATE: 51 BPM | HEIGHT: 64 IN | WEIGHT: 185.9 LBS

## 2019-02-11 DIAGNOSIS — F51.04 CHRONIC INSOMNIA: ICD-10-CM

## 2019-02-11 DIAGNOSIS — M05.751 RHEUMATOID ARTHRITIS INVOLVING BOTH HIPS WITH POSITIVE RHEUMATOID FACTOR (HCC): ICD-10-CM

## 2019-02-11 DIAGNOSIS — E66.9 OBESITY, CLASS I, BMI 30-34.9: ICD-10-CM

## 2019-02-11 DIAGNOSIS — F41.1 GENERALIZED ANXIETY DISORDER: Primary | ICD-10-CM

## 2019-02-11 DIAGNOSIS — E06.3 HASHIMOTO'S THYROIDITIS: ICD-10-CM

## 2019-02-11 DIAGNOSIS — M05.752 RHEUMATOID ARTHRITIS INVOLVING BOTH HIPS WITH POSITIVE RHEUMATOID FACTOR (HCC): ICD-10-CM

## 2019-02-11 DIAGNOSIS — E78.00 PURE HYPERCHOLESTEROLEMIA: ICD-10-CM

## 2019-02-11 DIAGNOSIS — E55.9 VITAMIN D DEFICIENCY: ICD-10-CM

## 2019-02-11 RX ORDER — SERTRALINE HYDROCHLORIDE 50 MG/1
75 TABLET, FILM COATED ORAL DAILY
Qty: 135 TAB | Refills: 3 | Status: SHIPPED | OUTPATIENT
Start: 2019-02-11 | End: 2019-04-11 | Stop reason: DRUGHIGH

## 2019-02-11 RX ORDER — PHENDIMETRAZINE TARTRATE 105 MG/1
1 CAPSULE, EXTENDED RELEASE ORAL DAILY
Qty: 30 CAP | Refills: 0 | Status: SHIPPED | OUTPATIENT
Start: 2019-02-11 | End: 2019-03-11 | Stop reason: ALTCHOICE

## 2019-02-11 NOTE — PROGRESS NOTES
Aroldo Stubbs  Identified pt with two pt identifiers(name and ). Chief Complaint   Patient presents with    Weight Management         1. Have you been to the ER, urgent care clinic since your last visit? Hospitalized since your last visit? NO    2. Have you seen or consulted any other health care providers outside of the [address] [place] [address] since your last visit? Include any pap smears or colon screening. NO      Advance Care Planning    In the event something were to happen to you and you were unable to speak on your behalf, do you have an Advance Directive/ Living Will in place stating your wishes? NO    If yes, do we have a copy on file NO    If no, would you like information YES    My Chart     My chart gives you direct online access to portions of the electronic medical record (EMR) where your doctor stores your health information (ie, lab results, appointment information, medications, immunizations, and more. It is free. Would you like to set up your my chart? YES    [unfilled]    Weight Metrics 2019 2019 2018 2018 10/18/2018 10/18/2018 9/10/2018   Weight - 185 lb 14.4 oz - 184 lb - 184 lb -   Neck Circ (inches) - - - - - - -   Waist Measure Inches 31.25 - 36 - 32.5 - 30   Exercise Mins/week - - - - - - -   Body Fat % 39.7 - 40.9 - 42.2 - 41.4   BMI - 31.91 kg/m2 - 31.58 kg/m2 - 31.58 kg/m2 -           Medication reconciliation up to date and corrected with patient at this time. Today's provider has been notified of reason for visit, vitals and flowsheets obtained on patients. Reviewed record in preparation for visit, huddled with provider and have obtained necessary documentation. There are no preventive care reminders to display for this patient.     Wt Readings from Last 3 Encounters:   19 185 lb 14.4 oz (84.3 kg)   18 184 lb (83.5 kg)   10/18/18 184 lb (83.5 kg)     Temp Readings from Last 3 Encounters:   19 97.5 °F (36.4 °C)   18 98.1 °F (36.7 °C) (Temporal)   09/10/18 98.4 °F (36.9 °C) (Temporal)     BP Readings from Last 3 Encounters:   02/11/19 119/73   12/13/18 102/60   10/18/18 99/66     Pulse Readings from Last 3 Encounters:   02/11/19 (!) 51   12/13/18 71   10/18/18 (!) 50     Vitals:    02/11/19 0839   BP: 119/73   Pulse: (!) 51   Resp: 18   Temp: 97.5 °F (36.4 °C)   SpO2: 98%   Weight: 185 lb 14.4 oz (84.3 kg)   Height: 5' 4\" (1.626 m)   PainSc:   2   PainLoc: Knee         Learning Assessment:  :     Learning Assessment 3/15/2018 4/22/2014   PRIMARY LEARNER Patient Patient   HIGHEST LEVEL OF EDUCATION - PRIMARY LEARNER  4 YEARS OF COLLEGE 4 YEARS OF COLLEGE   BARRIERS PRIMARY LEARNER NONE NONE   CO-LEARNER CAREGIVER No -   PRIMARY LANGUAGE ENGLISH ENGLISH   LEARNER PREFERENCE PRIMARY READING LISTENING   ANSWERED BY self patient   RELATIONSHIP SELF SELF       Depression Screening:  :     PHQ over the last two weeks 12/13/2018   Little interest or pleasure in doing things Not at all   Feeling down, depressed, irritable, or hopeless Not at all   Total Score PHQ 2 0       Fall Risk Assessment:  :     Fall Risk Assessment, last 12 mths 6/15/2018   Able to walk? Yes   Fall in past 12 months? No       Abuse Screening:  :     Abuse Screening Questionnaire 6/15/2018 4/16/2018 6/30/2016   Do you ever feel afraid of your partner? N N N   Are you in a relationship with someone who physically or mentally threatens you? N N N   Is it safe for you to go home?  Y Y Y       ADL Screening:  :     ADL Assessment 6/15/2018   Feeding yourself No Help Needed   Getting from bed to chair No Help Needed   Getting dressed No Help Needed   Bathing or showering No Help Needed   Walk across the room (includes cane/walker) No Help Needed   Using the telphone No Help Needed   Taking your medications No Help Needed   Preparing meals No Help Needed   Managing money (expenses/bills) No Help Needed   Moderately strenuous housework (laundry) No Help Needed   Shopping for personal items (toiletries/medicines) No Help Needed   Shopping for groceries No Help Needed   Driving No Help Needed   Climbing a flight of stairs No Help Needed   Getting to places beyond walking distances No Help Needed

## 2019-02-11 NOTE — PROGRESS NOTES
HISTORY OF PRESENT ILLNESS  Benjy Mclean is a 61 y.o. female presents with Weight Management; Stress; Medication Refill; Blood Pressure Check; and Labs    Agree with nurse note. Pt with hypercholesterolemia, vit d deficiency, hashimoto's thyroiditis, RA, and obesity presents to the office with a BP of 119/73. She is tolerating Phendimetrazine 105 mg well, month 1 of 3. Last prescribed on 12/13/2018. She has noticed an increase of energy and decreased appetite since starting the medication. She gained 1 pound since the last visit. She has lost 18 lbs since 2/2018. Percent body fat has changed from 40.9 to 39.7, waist circumference measurement has changed from 36 to 31.25. She drinks drinks 66 oz of water daily. She doesn't drink any sugar sweetened beverages. She walks everyday. Overall, patient is pleased and requests a refill of the medication today. Pt with MARGI. She uses Zoloft 50 mg daily with some relief and requests a refill today. She thinks she may need a higher dosage with her increased stress. No SI/SA. Stressors: caring for her sister who has lived with her for 5+ years      Pt with chronic insomnia. She averages 7 hours of sleep nightly. She uses Lunesta 3 mg prn with relief. Patient denies fatigue, chest pain, heart palpitations, nausea, dry mouth, constipation. Written by hyun Lowe, as dictated by Dr. Miladys Yusuf DO.       ROS    Review of Systems negative except as noted above in HPI.     ALLERGIES:    Allergies   Allergen Reactions    Advil Pm [Ibuprofen-Diphenhydramine Hcl] Other (comments)     Slightly elevated Creaitnine 1.02    Aleve [Naproxen Sodium] Other (comments)     Due to kidneys    Bactrim [Sulfamethoxazole-Trimethoprim] Nausea and Vomiting    Sulfa (Sulfonamide Antibiotics) Nausea and Vomiting    Sulfasalazine Nausea and Vomiting       CURRENT MEDICATIONS:    Outpatient Medications Marked as Taking for the 2/11/19 encounter (Office Visit) with Enrique Corcoran, DO   Medication Sig Dispense Refill    sertraline (ZOLOFT) 50 mg tablet Take 1.5 Tabs by mouth daily. 135 Tab 3    phendimetrazine tartrate 105 mg cpER Take 1 Cap by mouth daily. Max Daily Amount: 1 Cap. 30 Cap 0    diclofenac EC (VOLTAREN) 75 mg EC tablet TAKE 1 TABLET BY MOUTH TWICE DAILY 60 Tab 5    furosemide (LASIX) 40 mg tablet TAKE 1 TABLET BY MOUTH DAILY FOR SWELLING 90 Tab 3    PREMARIN 0.625 mg/gram vaginal cream   4    MYRBETRIQ 50 mg ER tablet       aspirin delayed-release 81 mg tablet Take 1 Tab by mouth daily. Indications: prevention of transient ischemic attack 90 Tab 3    eszopiclone (LUNESTA) 2 mg tablet Take 1 Tab by mouth nightly. Max Daily Amount: 2 mg. 30 Tab 5    MINIVELLE 0.1 mg/24 hr 1 Patch by TransDERmal route two (2) times a week. Changes Sunday and Wednesday  0    cholecalciferol, vitamin D3, (VITAMIN D3) 2,000 unit Tab Take 2,000 Units by mouth daily. PAST MEDICAL HISTORY:    Past Medical History:   Diagnosis Date    Achilles tendonitis 12/2004    Right. Dr. Christi Johnson Arthritis 2010    hips, knees. Dr. Mott Solar    Chest pain 08/2013    Dr. Yaniv Arriola.  Chickenpox childhood    Endometriosis 1990    Dr. Bella Jones Environmental allergies     MARGI (generalized anxiety disorder)     Hip pain, bilateral 2010    due to severe OA. Iliopsoas bursitis. Dr. Black [place]    History of breast lump/mass excision 1998    Left breast.  Dr. Dominic Brink Hyperlipidemia     Iron deficiency anemia     iron deficiency    Knee pain, bilateral 2017    Dr. Enrique Bolanos    Menopausal and postmenopausal disorder 2007    Dr. Bella Jones Uterine fibroid     Dr. Ankita Thompson.  Vitamin D deficiency 03/2008       PAST SURGICAL HISTORY:    Past Surgical History:   Procedure Laterality Date    BIOPSY BREAST  Rt 1993;Lt 1998    Dr. Ally Hassan Right 2002    Right achiles tendon. Dr. Lemuel Fatima.     HX BREAST BIOPSY  2001    Left. benign. Dr. Gideon Shaikh HX COLONOSCOPY  09/27/2016    Dr. Radha Neal.  due q 5 yrs due to fam hx   Emanuel Shark    Dr. Mitchell Antoine. due to endometriosis    HX PELVIC LAPAROSCOPY  1995    due to endometriosis Dr. Catarino Doe  04/2011    due to fibroids. Dr. Elizabeth Low.        FAMILY HISTORY:    Family History   Problem Relation Age of Onset    Hypertension Mother     Other Mother         hearing loss/vision loss    Heart Disease Father     Lung Disease Father     Diabetes Sister     Heart Attack Sister 54        March 2014    Stroke Sister         after MI    Thyroid Disease Sister     Colon Polyps Sister     Heart Disease Brother         Defibrillator placed    Heart Attack Brother 48    Diabetes Maternal Grandmother        SOCIAL HISTORY:    Social History     Socioeconomic History    Marital status: SINGLE     Spouse name: Not on file    Number of children: 2    Years of education: Not on file    Highest education level: Not on file   Occupational History    Occupation: [employer]     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    Tobacco Use    Smoking status: Never Smoker    Smokeless tobacco: Never Used    Tobacco comment: lived with smoker mom x 18 yrs   Substance and Sexual Activity    Alcohol use: No    Drug use: No    Sexual activity: Not Currently     Partners: Male     Birth control/protection: Abstinence, Surgical       IMMUNIZATIONS:    Immunization History   Administered Date(s) Administered    Influenza Vaccine 12/14/2012, 10/22/2013, 09/18/2014    Influenza Vaccine (Quad) 10/29/2015    Influenza Vaccine (Quad) PF 09/29/2016, 10/19/2017, 09/10/2018    Influenza Vaccine Split 11/12/2010, 11/07/2011    TD Vaccine 08/18/2000    Tdap 06/30/2016       PHYSICAL EXAMINATION    Vital Signs    Visit Vitals  /73   Pulse (!) 51   Temp 97.5 °F (36.4 °C)   Resp 18   Ht 5' 4\" (1.626 m)   Wt 185 lb 14.4 oz (84.3 kg)   SpO2 98%   BMI 31.91 kg/m²       Weight Metrics 2/11/2019 2/11/2019 12/13/2018 12/13/2018 10/18/2018 10/18/2018 9/10/2018   Weight - 185 lb 14.4 oz - 184 lb - 184 lb -   Neck Circ (inches) - - - - - - -   Waist Measure Inches 31.25 - 36 - 32.5 - 30   Exercise Mins/week - - - - - - -   Body Fat % 39.7 - 40.9 - 42.2 - 41.4   BMI - 31.91 kg/m2 - 31.58 kg/m2 - 31.58 kg/m2 -         General appearance - Well nourished. Well appearing. Well developed. No acute distress. Obese. Head - Normocephalic. Atraumatic. Eyes - pupils equal and reactive, extraocular eye movements intact, sclera anicteric  Ears - Hearing is grossly normal bilaterally. Nose - normal and patent, no erythema, discharge or polyps   Mouth - mucous membranes moist, pharynx normal with cobblestone appearance. No erythema, white exudate or obstruction. Neck - supple. Midline trachea. No carotid bruits are noted. No thyromegaly noted. Chest - clear to auscultation bilaterally anterriorly and posteriorly. No wheezes, rales or rhonchi. Breath sounds are symmetrical and unlabored bilaterally. Heart - low rate. Regular rhythm, normal S1, S2. No murmur. No rubs, clicks or gallops noted. Abdomen - soft and distended. No masses or organomegaly. No rebound, rigidity or guarding. Bowel sounds normal x 4 quadrants. No tenderness noted. Neurological - awake, alert and oriented to person, place, and time and event. Cranial nerves II through XII intact. Muscle strength is +5/5 x 4 extremities. Sensation is intact to light touch bilaterally. Steady gait. Heme/Lymph - peripheral pulses normal x 4 extremities. No peripheral edema is noted. No cervical adenopathy noted. Skin - no rashes, erythema, ecchymosis, lacerations, abrasions, suspicious moles noted. No skin tags. No acanthosis nigricans noted in the axilla or neck. Psychological -   normal behavior, speech, dress and thought processes. Good insight. Good eye contact.   Normal affect. Appropriate mood. DATA REVIEWED  Lab Results   Component Value Date/Time    WBC 5.2 03/21/2018 09:02 AM    HGB 12.0 03/21/2018 09:02 AM    HCT 38.5 03/21/2018 09:02 AM    PLATELET 691 81/55/2705 09:02 AM    MCV 91 03/21/2018 09:02 AM     Lab Results   Component Value Date/Time    Sodium 140 03/21/2018 09:02 AM    Potassium 4.7 03/21/2018 09:02 AM    Chloride 100 03/21/2018 09:02 AM    CO2 28 03/21/2018 09:02 AM    Anion gap 5 04/23/2011 05:20 AM    Glucose 86 03/21/2018 09:02 AM    BUN 13 03/21/2018 09:02 AM    Creatinine 0.90 03/21/2018 09:02 AM    BUN/Creatinine ratio 14 03/21/2018 09:02 AM    GFR est AA 79 03/21/2018 09:02 AM    GFR est non-AA 69 03/21/2018 09:02 AM    Calcium 9.1 03/21/2018 09:02 AM    Bilirubin, total 0.6 03/21/2018 09:02 AM    AST (SGOT) 29 03/21/2018 09:02 AM    Alk. phosphatase 95 03/21/2018 09:02 AM    Protein, total 6.9 03/21/2018 09:02 AM    Albumin 3.9 03/21/2018 09:02 AM    A-G Ratio 1.3 03/21/2018 09:02 AM    ALT (SGPT) 15 03/21/2018 09:02 AM     Lab Results   Component Value Date/Time    Cholesterol, total 144 03/21/2018 09:02 AM    HDL Cholesterol 49 03/21/2018 09:02 AM    LDL, calculated 85 03/21/2018 09:02 AM    VLDL, calculated 10 03/21/2018 09:02 AM    Triglyceride 50 03/21/2018 09:02 AM     Lab Results   Component Value Date/Time    Vitamin D 25-Hydroxy 26.3 (L) 07/18/2011 10:22 AM    VITAMIN D, 25-HYDROXY 45.8 03/21/2018 09:02 AM       Lab Results   Component Value Date/Time    Hemoglobin A1c 5.4 03/21/2018 09:02 AM     Lab Results   Component Value Date/Time    TSH 1.740 03/21/2018 09:02 AM    TSH 2.36 08/21/2014 07:50 AM         ASSESSMENT and PLAN    ICD-10-CM ICD-9-CM    1. Generalized anxiety disorder F41.1 300.02 sertraline (ZOLOFT) 50 mg tablet    stable on Zoloft   2. Chronic insomnia F51.04 780.52     improving   3.  Vitamin D deficiency E55.9 268.9 VITAMIN D, 25 HYDROXY   4. Pure hypercholesterolemia E78.00 272.0 LIPID PANEL      METABOLIC PANEL, COMPREHENSIVE   5. Hashimoto's thyroiditis E06.3 245.2 TSH 3RD GENERATION      T4, FREE   6. Obesity, Class I, BMI 30-34.9 E66.9 278.00 phendimetrazine tartrate 105 mg cpER   7. Rheumatoid arthritis involving both hips with positive rheumatoid factor (HCC) M05.751 714.0     M05.752         Continue current medications and care. Increase Zoloft to 1.5 tabs daily up to 2 tabs daily if needed. Presecretions sent to pharmacy. Zoloft 50 mg.   Prescriptions written and given to patient Phendimetrazine 105 mg (month 2 of 3.)  Medication side effects discussed. [facility]  reviewed and pt is compliant. Discussed the patient's BMI with her. The BMI follow up plan is as follows: I have counseled this patient on diet and exercise regimens. Diet recommendations:  Decrease carbohydrates (white foods including flour, white bread, white rice, white pasta, white potatoes; corn, sweet foods, sweet drinks and alcohol), increase green leafy vegetables and protein with each meal.  Avoid fried foods. Eat 3-5 small meals daily. Do not skip meals. Ok to use meal replacements up to twice daily with protein goal of 60 mg per meal.   Recommend OTC Premiere Protein bars or shakes. Increase water intake. Increase physical activity to 30 minutes daily for health benefit or 60 minutes daily to prevent weight regain, as tolerated. Physical Activity prescription:  A goal of 30 minutes physical activity daily is recommended for health benefit and at least 60 minutes daily to prevent weight regain. For weight loss, no less than 75% needs to be aerobic (i.e. Walking) and no more than 25% resistance exercising (i.e. Weight lifting). For weight maintenance phase, 50% aerobic and 50% resistance exercises. Sleep prescription:    A goal of 7-8 hours of uninterrupted sleep is recommended to turn off the Grehlin hormone to be released from the stomach and triggers appetite while promoting weight gain.   Proper rest turns on Leptin hormone to be released from white adipose tissue and promotes weight loss. Counseled patient on: weight loss goals, emphasizing a 5-10% weight loss in 6-12 months and strategies, stress, cholesterol, thyroid, sleep hygiene, RA, vit d deficiency. Recheck fasting labs 1 week prior to next visit. Immunizations noted. Praised pt for weight loss efforts and progress. Patient was offered a choice/choices in the treatment plan today. Patient expresses understanding of the plan and agrees with recommendations. Patient declines any additional handouts. Patient is satisfied with previous handouts received from our office    Follow-up Disposition:  Return in about 1 month (around 3/11/2019) for blood pressure, weight. Written by hyun Sherman, as dictated by Dr. Nyle Lefort, DO. Documentation True and Accepted by Sharita Larry.  Mónica Dale

## 2019-03-05 LAB
25(OH)D3+25(OH)D2 SERPL-MCNC: 32.4 NG/ML (ref 30–100)
ALBUMIN SERPL-MCNC: 4.2 G/DL (ref 3.6–4.8)
ALBUMIN/GLOB SERPL: 1.6 {RATIO} (ref 1.2–2.2)
ALP SERPL-CCNC: 101 IU/L (ref 39–117)
ALT SERPL-CCNC: 14 IU/L (ref 0–32)
AST SERPL-CCNC: 17 IU/L (ref 0–40)
BILIRUB SERPL-MCNC: 0.5 MG/DL (ref 0–1.2)
BUN SERPL-MCNC: 14 MG/DL (ref 8–27)
BUN/CREAT SERPL: 15 (ref 12–28)
CALCIUM SERPL-MCNC: 9.3 MG/DL (ref 8.7–10.3)
CHLORIDE SERPL-SCNC: 103 MMOL/L (ref 96–106)
CHOLEST SERPL-MCNC: 171 MG/DL (ref 100–199)
CO2 SERPL-SCNC: 27 MMOL/L (ref 20–29)
CREAT SERPL-MCNC: 0.93 MG/DL (ref 0.57–1)
GLOBULIN SER CALC-MCNC: 2.7 G/DL (ref 1.5–4.5)
GLUCOSE SERPL-MCNC: 88 MG/DL (ref 65–99)
HDLC SERPL-MCNC: 73 MG/DL
INTERPRETATION, 910389: NORMAL
LDLC SERPL CALC-MCNC: 91 MG/DL (ref 0–99)
POTASSIUM SERPL-SCNC: 3.9 MMOL/L (ref 3.5–5.2)
PROT SERPL-MCNC: 6.9 G/DL (ref 6–8.5)
SODIUM SERPL-SCNC: 143 MMOL/L (ref 134–144)
T4 FREE SERPL-MCNC: 1.31 NG/DL (ref 0.82–1.77)
TRIGL SERPL-MCNC: 36 MG/DL (ref 0–149)
TSH SERPL DL<=0.005 MIU/L-ACNC: 3.63 UIU/ML (ref 0.45–4.5)
VLDLC SERPL CALC-MCNC: 7 MG/DL (ref 5–40)

## 2019-03-11 ENCOUNTER — OFFICE VISIT (OUTPATIENT)
Dept: FAMILY MEDICINE CLINIC | Age: 64
End: 2019-03-11

## 2019-03-11 VITALS
SYSTOLIC BLOOD PRESSURE: 115 MMHG | RESPIRATION RATE: 18 BRPM | BODY MASS INDEX: 30.95 KG/M2 | WEIGHT: 181.3 LBS | DIASTOLIC BLOOD PRESSURE: 73 MMHG | HEIGHT: 64 IN | HEART RATE: 73 BPM | TEMPERATURE: 97.8 F | OXYGEN SATURATION: 99 %

## 2019-03-11 DIAGNOSIS — J00 ACUTE NASOPHARYNGITIS: ICD-10-CM

## 2019-03-11 DIAGNOSIS — E78.00 PURE HYPERCHOLESTEROLEMIA: ICD-10-CM

## 2019-03-11 DIAGNOSIS — E55.9 VITAMIN D DEFICIENCY: ICD-10-CM

## 2019-03-11 DIAGNOSIS — R63.4 WEIGHT LOSS: ICD-10-CM

## 2019-03-11 DIAGNOSIS — J02.8 ACUTE PHARYNGITIS DUE TO OTHER SPECIFIED ORGANISMS: ICD-10-CM

## 2019-03-11 DIAGNOSIS — E06.3 HASHIMOTO'S THYROIDITIS: ICD-10-CM

## 2019-03-11 DIAGNOSIS — J02.0 STREPTOCOCCAL PHARYNGITIS: Primary | ICD-10-CM

## 2019-03-11 DIAGNOSIS — E66.9 OBESITY, CLASS II, BMI 35-39.9: ICD-10-CM

## 2019-03-11 DIAGNOSIS — F51.04 CHRONIC INSOMNIA: ICD-10-CM

## 2019-03-11 LAB
S PYO AG THROAT QL: POSITIVE
VALID INTERNAL CONTROL?: YES

## 2019-03-11 RX ORDER — AMOXICILLIN 875 MG/1
875 TABLET, FILM COATED ORAL 2 TIMES DAILY
Qty: 20 TAB | Refills: 0 | Status: SHIPPED | OUTPATIENT
Start: 2019-03-11 | End: 2019-03-21

## 2019-03-11 RX ORDER — PHENTERMINE HYDROCHLORIDE 37.5 MG/1
37.5 TABLET ORAL
Qty: 30 TAB | Refills: 0 | Status: SHIPPED | OUTPATIENT
Start: 2019-03-11 | End: 2019-04-11 | Stop reason: SDUPTHER

## 2019-03-11 NOTE — PROGRESS NOTES
HISTORY OF PRESENT ILLNESS  Tuyet Leger is a 61 y.o. female presents with Weight Management (Rm 14 results); Sore Throat; Blood Pressure Check; and Medication Refill    Agree with nurse note. Pt with hashimoto's thyroiditis, hypercholesterolemia, vit d deficiency, and obesity presents to the office with a BP of 115/73. She is tolerating Phendimetrazine 105 mg well, month 2 of 3 for weight management. Last prescribed on 2/11/2019. She has noticed an increase of energy and decreased appetite since starting the medication. She lost 4 pounds since the last visit. Percent body fat has changed from 39.7 to 41, waist circumference measurement has changed from 31.25 to 32.5. Overall, patient is pleased and requests to switch to Phentermine today. Pt complains of sore throat, hoarseness, PND and cough with with yellow sputum x 4 days. Denies itchy/watery eyes, itchy nose, body ache, chills, fatigue. She rinsed her throat with salt water and peroxide with some relief. Pt with MARGI. She is stable on Zoloft 50 mg. No SI/SA. Stressors: caring for her sister who has lived with her for 5+ years    Pt with chronic insomnia. She averages 8 hours of sleep nightly. She uses Lunesta 3 mg prn with relief. Patient denies fatigue, sleep disturbance, chest pain, heart palpitations, nausea, dry mouth, constipation, signs of depression. Written by hyun Cuevas, as dictated by Dr. Brent Myers DO.       DANIEL    Review of Systems negative except as noted above in HPI.     ALLERGIES:    Allergies   Allergen Reactions    Advil Pm [Ibuprofen-Diphenhydramine Hcl] Other (comments)     Slightly elevated Creaitnine 1.02    Aleve [Naproxen Sodium] Other (comments)     Due to kidneys    Bactrim [Sulfamethoxazole-Trimethoprim] Nausea and Vomiting    Sulfa (Sulfonamide Antibiotics) Nausea and Vomiting    Sulfasalazine Nausea and Vomiting       CURRENT MEDICATIONS:    Outpatient Medications Marked as Taking for the 3/11/19 encounter (Office Visit) with Lyla Watkins, DO   Medication Sig Dispense Refill    phentermine (ADIPEX-P) 37.5 mg tablet Take 1 Tab by mouth every morning. 30 Tab 0    amoxicillin (AMOXIL) 875 mg tablet Take 1 Tab by mouth two (2) times a day for 10 days. 20 Tab 0    furosemide (LASIX) 40 mg tablet TAKE 1 TABLET BY MOUTH DAILY FOR SWELLING 90 Tab 3    montelukast (SINGULAIR) 10 mg tablet Take 1 Tab by mouth daily. Indications: Allergic Rhinitis 90 Tab 3    PREMARIN 0.625 mg/gram vaginal cream   4    MYRBETRIQ 50 mg ER tablet       aspirin delayed-release 81 mg tablet Take 1 Tab by mouth daily. Indications: prevention of transient ischemic attack 90 Tab 3    MINIVELLE 0.1 mg/24 hr 1 Patch by TransDERmal route two (2) times a week. Changes Sunday and Wednesday  0    cholecalciferol, vitamin D3, (VITAMIN D3) 2,000 unit Tab Take 2,000 Units by mouth daily. PAST MEDICAL HISTORY:    Past Medical History:   Diagnosis Date    Achilles tendonitis 12/2004    Right. Dr. Yoko Santiago Arthritis 2010    hips, knees. Dr. Bebeto Simpson    Chest pain 08/2013    Dr. Tracey Oscar.  Chickenpox childhood    Endometriosis 1990    Dr. Chen Angulo Environmental allergies     MARGI (generalized anxiety disorder)     Hip pain, bilateral 2010    due to severe OA. Iliopsoas bursitis. Dr. Skinner Gavevidya    History of breast lump/mass excision 1998    Left breast.  Dr. Paola Bernabe Hyperlipidemia     Iron deficiency anemia     iron deficiency    Knee pain, bilateral 2017    Dr. Daniella Downs    Menopausal and postmenopausal disorder 2007    Dr. Chen Angulo Uterine fibroid     Dr. Wali Jon.  Vitamin D deficiency 03/2008       PAST SURGICAL HISTORY:    Past Surgical History:   Procedure Laterality Date    BIOPSY BREAST  Rt 1993;Lt 1998    Dr. Melina Martínez Right 2002    Right achiles tendon. Dr. Nahed Strauss.  HX BREAST BIOPSY  2001    Left. benign. Dr. Osman Estrada HX COLONOSCOPY  09/27/2016    Dr. Alberto Stevens.  due q 5 yrs due to fam hx   George Choudhary. due to endometriosis    HX PELVIC LAPAROSCOPY  1995    due to endometriosis Dr. Blake Ba  04/2011    due to fibroids. Dr. Smith Suh.        FAMILY HISTORY:    Family History   Problem Relation Age of Onset    Hypertension Mother     Other Mother         hearing loss/vision loss    Heart Disease Father     Lung Disease Father     Diabetes Sister     Heart Attack Sister 54        March 2014    Stroke Sister         after MI    Thyroid Disease Sister     Colon Polyps Sister     Heart Disease Brother         Defibrillator placed    Heart Attack Brother 48    Diabetes Maternal Grandmother        SOCIAL HISTORY:    Social History     Socioeconomic History    Marital status: SINGLE     Spouse name: Not on file    Number of children: 2    Years of education: Not on file    Highest education level: Not on file   Occupational History    Occupation: [employer]     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    Tobacco Use    Smoking status: Never Smoker    Smokeless tobacco: Never Used    Tobacco comment: lived with smoker mom x 18 yrs   Substance and Sexual Activity    Alcohol use: No    Drug use: No    Sexual activity: Not Currently     Partners: Male     Birth control/protection: Abstinence, Surgical       IMMUNIZATIONS:    Immunization History   Administered Date(s) Administered    Influenza Vaccine 12/14/2012, 10/22/2013, 09/18/2014    Influenza Vaccine (Quad) 10/29/2015    Influenza Vaccine (Quad) PF 09/29/2016, 10/19/2017, 09/10/2018    Influenza Vaccine Split 11/12/2010, 11/07/2011    TD Vaccine 08/18/2000    Tdap 06/30/2016       PHYSICAL EXAMINATION    Vital Signs    Visit Vitals  /73   Pulse 73   Temp 97.8 °F (36.6 °C) (Oral)   Resp 18   Ht 5' 4\" (1.626 m)   Wt 181 lb 4.8 oz (82.2 kg)   SpO2 99%   BMI 31.12 kg/m² Weight Metrics 3/11/2019 3/11/2019 2/11/2019 2/11/2019 12/13/2018 12/13/2018 10/18/2018   Weight - 181 lb 4.8 oz - 185 lb 14.4 oz - 184 lb -   Neck Circ (inches) - - - - - - -   Waist Measure Inches 32.5 - 31.25 - 36 - 32.5   Exercise Mins/week - - - - - - -   Body Fat % 41 - 39.7 - 40.9 - 42.2   BMI - 31.12 kg/m2 - 31.91 kg/m2 - 31.58 kg/m2 -         General appearance - Well nourished. Well appearing. Well developed. No acute distress. Obese. Head - Normocephalic. Atraumatic. Eyes - pupils equal and reactive, extraocular eye movements intact, sclera anicteric  Ears - Hearing is grossly normal bilaterally. Nose - normal and patent, no erythema, discharge or polyps   Mouth - mucous membranes moist, pharynx normal with cobblestone appearance. No white exudate or obstruction. Mild erythema. Neck - supple. Midline trachea. No carotid bruits are noted. No thyromegaly noted. Chest - clear to auscultation bilaterally anterriorly and posteriorly. No wheezes, rales or rhonchi. Breath sounds are symmetrical and unlabored bilaterally. Heart - normal rate. Regular rhythm, normal S1, S2. No murmur. No rubs, clicks or gallops noted. Abdomen - soft and distended. No masses or organomegaly. No rebound, rigidity or guarding. Bowel sounds normal x 4 quadrants. No tenderness noted. Neurological - awake, alert and oriented to person, place, and time and event. Cranial nerves II through XII intact. Muscle strength is +5/5 x 4 extremities. Sensation is intact to light touch bilaterally. Steady gait. Heme/Lymph - peripheral pulses normal x 4 extremities. No peripheral edema is noted. No cervical adenopathy noted. Skin - no rashes, erythema, ecchymosis, lacerations, abrasions, suspicious moles noted. No skin tags. No acanthosis nigricans noted in the axilla or neck. Psychological -   normal behavior, speech, dress and thought processes. Good insight. Good eye contact. Normal affect. Appropriate mood. DATA REVIEWED  Lab Results   Component Value Date/Time    WBC 5.2 03/21/2018 09:02 AM    HGB 12.0 03/21/2018 09:02 AM    HCT 38.5 03/21/2018 09:02 AM    PLATELET 153 59/30/8566 09:02 AM    MCV 91 03/21/2018 09:02 AM     Lab Results   Component Value Date/Time    Sodium 143 03/04/2019 08:05 AM    Potassium 3.9 03/04/2019 08:05 AM    Chloride 103 03/04/2019 08:05 AM    CO2 27 03/04/2019 08:05 AM    Anion gap 5 04/23/2011 05:20 AM    Glucose 88 03/04/2019 08:05 AM    BUN 14 03/04/2019 08:05 AM    Creatinine 0.93 03/04/2019 08:05 AM    BUN/Creatinine ratio 15 03/04/2019 08:05 AM    GFR est AA 76 03/04/2019 08:05 AM    GFR est non-AA 66 03/04/2019 08:05 AM    Calcium 9.3 03/04/2019 08:05 AM    Bilirubin, total 0.5 03/04/2019 08:05 AM    AST (SGOT) 17 03/04/2019 08:05 AM    Alk. phosphatase 101 03/04/2019 08:05 AM    Protein, total 6.9 03/04/2019 08:05 AM    Albumin 4.2 03/04/2019 08:05 AM    A-G Ratio 1.6 03/04/2019 08:05 AM    ALT (SGPT) 14 03/04/2019 08:05 AM     Lab Results   Component Value Date/Time    Cholesterol, total 171 03/04/2019 08:05 AM    HDL Cholesterol 73 03/04/2019 08:05 AM    LDL, calculated 91 03/04/2019 08:05 AM    VLDL, calculated 7 03/04/2019 08:05 AM    Triglyceride 36 03/04/2019 08:05 AM     Lab Results   Component Value Date/Time    Vitamin D 25-Hydroxy 26.3 (L) 07/18/2011 10:22 AM    VITAMIN D, 25-HYDROXY 32.4 03/04/2019 08:05 AM       Lab Results   Component Value Date/Time    Hemoglobin A1c 5.4 03/21/2018 09:02 AM     Lab Results   Component Value Date/Time    TSH 3.630 03/04/2019 08:05 AM    TSH 2.36 08/21/2014 07:50 AM     POC Strep A positive. ASSESSMENT and PLAN    ICD-10-CM ICD-9-CM    1. Streptococcal pharyngitis J02.0 034.0    2. Acute nasopharyngitis J00 460    3. Hashimoto's thyroiditis E06.3 245.2    4. Pure hypercholesterolemia E78.00 272.0    5.  Acute pharyngitis due to other specified organisms J02.8 462 AMB POC RAPID STREP A      amoxicillin (AMOXIL) 875 mg tablet   6. Vitamin D deficiency E55.9 268.9    7. Chronic insomnia F51.04 780.52    8. Weight loss R63.4 783.21     4# since 2/2019   9. Obesity, Class II, BMI 35-39.9 E66.9 278.00 phentermine (ADIPEX-P) 37.5 mg tablet       Continue current medications and care. Start Amoxicillin 875 mg BIDx 10 days for strep. Change from Phendimetrazine 105 mg to Phentermine 37.5 mg.   Prescriptions sent to pharmacy. Amoxicillin 875 mg. Prescriptions written and given to patient Phentermine 37.5 mg (month 1 of 3.)  Medication side effects discussed. [facility]  reviewed and pt is compliant. Discussed the patient's BMI with her. The BMI follow up plan is as follows: I have counseled this patient on diet and exercise regimens. Diet recommendations:  Decrease carbohydrates (white foods including flour, white bread, white rice, white pasta, white potatoes; corn, sweet foods, sweet drinks and alcohol), increase green leafy vegetables and protein with each meal.  Avoid fried foods. Eat 3-5 small meals daily. Do not skip meals. Ok to use meal replacements up to twice daily with protein goal of 60 mg per meal.   Recommend OTC Premiere Protein bars or shakes. Increase water intake. Increase physical activity to 30 minutes daily for health benefit or 60 minutes daily to prevent weight regain, as tolerated. Physical Activity prescription:  A goal of 30 minutes physical activity daily is recommended for health benefit and at least 60 minutes daily to prevent weight regain. For weight loss, no less than 75% needs to be aerobic (i.e. Walking) and no more than 25% resistance exercising (i.e. Weight lifting). For weight maintenance phase, 50% aerobic and 50% resistance exercises. Sleep prescription:    A goal of 7-8 hours of uninterrupted sleep is recommended to turn off the Grehlin hormone to be released from the stomach and triggers appetite while promoting weight gain.   Proper rest turns on Leptin hormone to be released from white adipose tissue and promotes weight loss. Counseled patient on: weight loss goals, emphasizing a 5-10% weight loss in 6-12 months and strategies, vit d deficiency, sleep hygiene, stressors, cough, cholesterol, and thyroid. Strep transmission and prevention. Relevant handouts given and discussed with patient. Immunizations noted. Praised pt for weight loss efforts and progress. Patient was offered a choice/choices in the treatment plan today. Patient expresses understanding of the plan and agrees with recommendations. Follow-up Disposition:  Return in about 1 month (around 4/11/2019) for weight, blood pressure. Written by hyun Ness, as dictated by Dr. Megan Trent DO. Documentation True and Accepted by Yg Membreno. Liv Ahn. Patient Instructions          Laryngitis: Care Instructions  Your Care Instructions    Laryngitis is an inflammation of the voice box (larynx) that causes your voice to become raspy or hoarse. It can be short-lived or long-lasting. Most of the time, laryngitis comes on quickly and lasts as long as 2 weeks. It is caused by overuse, irritation, or infection of the vocal cords inside the larynx. Some of the most common causes are a cold, the flu, or allergies. Loud talking, shouting, cheering, or singing also can cause laryngitis. Stomach acid that backs up into the throat also can make you lose your voice. Resting your voice and taking other steps at home can help you get your voice back. Follow-up care is a key part of your treatment and safety. Be sure to make and go to all appointments, and call your doctor if you are having problems. It's also a good idea to know your test results and keep a list of the medicines you take. How can you care for yourself at home? · Follow your doctor's directions for treating the condition that caused you to lose your voice. If your doctor prescribed antibiotics, take them as directed.  Do not stop taking them just because you feel better. You need to take the full course of antibiotics. · Before you use cough and cold medicines, check the label. They may not be safe for young children or for people with certain health problems. · Try to keep stomach acid from backing up into your throat. Do not eat just before bedtime. Reduce the amount of coffee and alcohol you drink, and eat healthy foods. Taking over-the-counter acid reducers can help when these steps are not enough. In some cases, you may need prescription medicine. · Rest your voice. You do not have to stop speaking, but use your voice as little as possible. Speak softly but do not whisper; whispering can bother your larynx more than speaking softly. Avoid talking on the telephone or trying to speak loudly. · Try not to clear your throat. This can cause more irritation of your larynx. Take an over-the-counter cough suppressant (if your doctor recommends it) if you have a dry cough that does not produce mucus. · Do not smoke or allow others to smoke around you. If you need help quitting, talk to your doctor about stop-smoking programs and medicines. These can increase your chances of quitting for good. · Use a humidifier or vaporizer to add moisture to your bedroom. Humidity helps to thin the mucus in the nasal membranes that causes stuffiness or postnasal drip. Follow the directions for cleaning the machine. · Drink plenty of fluids, enough so that your urine is light yellow or clear like water. If you have kidney, heart, or liver disease and have to limit fluids, talk with your doctor before you increase the amount of fluids you drink. · Use saline (saltwater) nasal washes to help keep your nasal passages open and wash out mucus and bacteria. You can buy saline nose drops at a grocery store or drugstore. Or, you can make your own at home by mixing ½ teaspoon salt, 1 cup water (at room temperature), and ½ teaspoon baking soda.  If you make your own, fill a bulb syringe with the solution, insert the tip into your nostril, and squeeze gently. Ericka Wenian your nose. When should you call for help? Call 911 anytime you think you may need emergency care. For example, call if:    · You have trouble breathing.    Call your doctor now or seek immediate medical care if:    · You have new or worse pain.     · You have trouble swallowing.    Watch closely for changes in your health, and be sure to contact your doctor if:    · You do not get better as expected. Where can you learn more? Go to http://romulo-daron.info/. Enter G220 in the search box to learn more about \"Laryngitis: Care Instructions. \"  Current as of: March 27, 2018  Content Version: 11.9  © 8633-5693 [employer]. Care instructions adapted under license by [employer] (which disclaims liability or warranty for this information). If you have questions about a medical condition or this instruction, always ask your healthcare professional. [place] [address] any warranty or liability for your use of this information. Advised protocol for clearing congestion:  Increase fluid intake, especially water to thin mucous and boost the immune system. Avoid sugar and dairy while congested since they thicken mucous. Get plenty of rest!  Gargle 3 times daily and as needed in Listerine or warm salt water vinegar solutions (1 tsp salt, 1 tsp vinegar in 1 cup lukewarm water.)  Use OTC nasal saline spray up each nostril twice daily. Use humidifier at bedtime. Use OTC Mucinex 600 mg twice daily to loosen mucous. Use OTC Tylenol Arthritis or Ibuprofen up to 800 mg up to 3 times daily as needed for pain, fever or headaches. Avoid decongestants and Ibuprofen if you have high blood pressure! If mucous is consistently discolored yellow or green throughout the day for more than a week, call the doctor for an evaluation.

## 2019-03-11 NOTE — PROGRESS NOTES
Carrol Schmitz  Identified pt with two pt identifiers(name and ). Chief Complaint   Patient presents with    Weight Management     Rm 14 results         1. Have you been to the ER, urgent care clinic since your last visit? Hospitalized since your last visit? NO    2. Have you seen or consulted any other health care providers outside of the [address] [place] [address] since your last visit? Include any pap smears or colon screening. NO      Advance Care Planning    In the event something were to happen to you and you were unable to speak on your behalf, do you have an Advance Directive/ Living Will in place stating your wishes? NO    If yes, do we have a copy on file NO    If no, would you like information NO    My Chart     My chart gives you direct online access to portions of the electronic medical record (EMR) where your doctor stores your health information (ie, lab results, appointment information, medications, immunizations, and more. It is free. Would you like to set up your my chart? YES    [unfilled]    Weight Metrics 3/11/2019 3/11/2019 2019 2019 2018 2018 10/18/2018   Weight - 181 lb 4.8 oz - 185 lb 14.4 oz - 184 lb -   Neck Circ (inches) - - - - - - -   Waist Measure Inches 32.5 - 31.25 - 36 - 32.5   Exercise Mins/week - - - - - - -   Body Fat % 41 - 39.7 - 40.9 - 42.2   BMI - 31.12 kg/m2 - 31.91 kg/m2 - 31.58 kg/m2 -         Medication reconciliation up to date and corrected with patient at this time. Today's provider has been notified of reason for visit, vitals and flowsheets obtained on patients. Reviewed record in preparation for visit, huddled with provider and have obtained necessary documentation. There are no preventive care reminders to display for this patient.     Wt Readings from Last 3 Encounters:   19 181 lb 4.8 oz (82.2 kg)   19 185 lb 14.4 oz (84.3 kg)   18 184 lb (83.5 kg)     Temp Readings from Last 3 Encounters:   19 97.8 °F (36.6 °C) (Oral)   02/11/19 97.5 °F (36.4 °C)   12/13/18 98.1 °F (36.7 °C) (Temporal)     BP Readings from Last 3 Encounters:   03/11/19 115/73   02/11/19 119/73   12/13/18 102/60     Pulse Readings from Last 3 Encounters:   03/11/19 73   02/11/19 (!) 51   12/13/18 71     Vitals:    03/11/19 0834   BP: 115/73   Pulse: 73   Resp: 18   Temp: 97.8 °F (36.6 °C)   TempSrc: Oral   SpO2: 99%   Weight: 181 lb 4.8 oz (82.2 kg)   Height: 5' 4\" (1.626 m)   PainSc:   0 - No pain         Learning Assessment:  :     Learning Assessment 3/15/2018 4/22/2014   PRIMARY LEARNER Patient Patient   HIGHEST LEVEL OF EDUCATION - PRIMARY LEARNER  4 YEARS OF COLLEGE 4 YEARS OF COLLEGE   BARRIERS PRIMARY LEARNER NONE NONE   CO-LEARNER CAREGIVER No -   PRIMARY LANGUAGE ENGLISH ENGLISH   LEARNER PREFERENCE PRIMARY READING LISTENING   ANSWERED BY self patient   RELATIONSHIP SELF SELF       Depression Screening:  :     3 most recent PHQ Screens 12/13/2018   Little interest or pleasure in doing things Not at all   Feeling down, depressed, irritable, or hopeless Not at all   Total Score PHQ 2 0       Fall Risk Assessment:  :     Fall Risk Assessment, last 12 mths 6/15/2018   Able to walk? Yes   Fall in past 12 months? No       Abuse Screening:  :     Abuse Screening Questionnaire 6/15/2018 4/16/2018 6/30/2016   Do you ever feel afraid of your partner? N N N   Are you in a relationship with someone who physically or mentally threatens you? N N N   Is it safe for you to go home?  Y Y Y       ADL Screening:  :     ADL Assessment 6/15/2018   Feeding yourself No Help Needed   Getting from bed to chair No Help Needed   Getting dressed No Help Needed   Bathing or showering No Help Needed   Walk across the room (includes cane/walker) No Help Needed   Using the telphone No Help Needed   Taking your medications No Help Needed   Preparing meals No Help Needed   Managing money (expenses/bills) No Help Needed   Moderately strenuous housework (laundry) No Help Needed   Shopping for personal items (toiletries/medicines) No Help Needed   Shopping for groceries No Help Needed   Driving No Help Needed   Climbing a flight of stairs No Help Needed   Getting to places beyond walking distances No Help Needed

## 2019-03-11 NOTE — PATIENT INSTRUCTIONS
Laryngitis: Care Instructions  Your Care Instructions    Laryngitis is an inflammation of the voice box (larynx) that causes your voice to become raspy or hoarse. It can be short-lived or long-lasting. Most of the time, laryngitis comes on quickly and lasts as long as 2 weeks. It is caused by overuse, irritation, or infection of the vocal cords inside the larynx. Some of the most common causes are a cold, the flu, or allergies. Loud talking, shouting, cheering, or singing also can cause laryngitis. Stomach acid that backs up into the throat also can make you lose your voice. Resting your voice and taking other steps at home can help you get your voice back. Follow-up care is a key part of your treatment and safety. Be sure to make and go to all appointments, and call your doctor if you are having problems. It's also a good idea to know your test results and keep a list of the medicines you take. How can you care for yourself at home? · Follow your doctor's directions for treating the condition that caused you to lose your voice. If your doctor prescribed antibiotics, take them as directed. Do not stop taking them just because you feel better. You need to take the full course of antibiotics. · Before you use cough and cold medicines, check the label. They may not be safe for young children or for people with certain health problems. · Try to keep stomach acid from backing up into your throat. Do not eat just before bedtime. Reduce the amount of coffee and alcohol you drink, and eat healthy foods. Taking over-the-counter acid reducers can help when these steps are not enough. In some cases, you may need prescription medicine. · Rest your voice. You do not have to stop speaking, but use your voice as little as possible. Speak softly but do not whisper; whispering can bother your larynx more than speaking softly. Avoid talking on the telephone or trying to speak loudly. · Try not to clear your throat.  This can cause more irritation of your larynx. Take an over-the-counter cough suppressant (if your doctor recommends it) if you have a dry cough that does not produce mucus. · Do not smoke or allow others to smoke around you. If you need help quitting, talk to your doctor about stop-smoking programs and medicines. These can increase your chances of quitting for good. · Use a humidifier or vaporizer to add moisture to your bedroom. Humidity helps to thin the mucus in the nasal membranes that causes stuffiness or postnasal drip. Follow the directions for cleaning the machine. · Drink plenty of fluids, enough so that your urine is light yellow or clear like water. If you have kidney, heart, or liver disease and have to limit fluids, talk with your doctor before you increase the amount of fluids you drink. · Use saline (saltwater) nasal washes to help keep your nasal passages open and wash out mucus and bacteria. You can buy saline nose drops at a grocery store or drugstore. Or, you can make your own at home by mixing ½ teaspoon salt, 1 cup water (at room temperature), and ½ teaspoon baking soda. If you make your own, fill a bulb syringe with the solution, insert the tip into your nostril, and squeeze gently. Mickiel Sickle your nose. When should you call for help? Call 911 anytime you think you may need emergency care. For example, call if:    · You have trouble breathing.    Call your doctor now or seek immediate medical care if:    · You have new or worse pain.     · You have trouble swallowing.    Watch closely for changes in your health, and be sure to contact your doctor if:    · You do not get better as expected. Where can you learn more? Go to http://romulo-daron.info/. Enter E892 in the search box to learn more about \"Laryngitis: Care Instructions. \"  Current as of: March 27, 2018  Content Version: 11.9  © 8213-0484 [employer].  Care instructions adapted under license by [employer] (which disclaims liability or warranty for this information). If you have questions about a medical condition or this instruction, always ask your healthcare professional. [address] any warranty or liability for your use of this information. Advised protocol for clearing congestion:  Increase fluid intake, especially water to thin mucous and boost the immune system. Avoid sugar and dairy while congested since they thicken mucous. Get plenty of rest!  Gargle 3 times daily and as needed in Listerine or warm salt water vinegar solutions (1 tsp salt, 1 tsp vinegar in 1 cup lukewarm water.)  Use OTC nasal saline spray up each nostril twice daily. Use humidifier at bedtime. Use OTC Mucinex 600 mg twice daily to loosen mucous. Use OTC Tylenol Arthritis or Ibuprofen up to 800 mg up to 3 times daily as needed for pain, fever or headaches. Avoid decongestants and Ibuprofen if you have high blood pressure! If mucous is consistently discolored yellow or green throughout the day for more than a week, call the doctor for an evaluation.

## 2019-03-18 ENCOUNTER — TELEPHONE (OUTPATIENT)
Dept: FAMILY MEDICINE CLINIC | Age: 64
End: 2019-03-18

## 2019-03-18 NOTE — TELEPHONE ENCOUNTER
Phoned in Tamaflu 75mg  #10 1tab POq day to [facility] @ [address] store pharmacy per Dr. Ruba Vieyra  orders for prevention of flu.

## 2019-04-11 ENCOUNTER — OFFICE VISIT (OUTPATIENT)
Dept: FAMILY MEDICINE CLINIC | Age: 64
End: 2019-04-11

## 2019-04-11 VITALS
WEIGHT: 180 LBS | BODY MASS INDEX: 30.73 KG/M2 | TEMPERATURE: 97.8 F | RESPIRATION RATE: 18 BRPM | HEART RATE: 57 BPM | DIASTOLIC BLOOD PRESSURE: 68 MMHG | HEIGHT: 64 IN | OXYGEN SATURATION: 98 % | SYSTOLIC BLOOD PRESSURE: 103 MMHG

## 2019-04-11 DIAGNOSIS — R63.4 WEIGHT LOSS: Primary | ICD-10-CM

## 2019-04-11 DIAGNOSIS — F51.04 CHRONIC INSOMNIA: ICD-10-CM

## 2019-04-11 DIAGNOSIS — F41.1 GENERALIZED ANXIETY DISORDER: ICD-10-CM

## 2019-04-11 DIAGNOSIS — E06.3 HASHIMOTO'S THYROIDITIS: ICD-10-CM

## 2019-04-11 DIAGNOSIS — E66.9 OBESITY, CLASS II, BMI 35-39.9: ICD-10-CM

## 2019-04-11 DIAGNOSIS — E55.9 VITAMIN D DEFICIENCY: ICD-10-CM

## 2019-04-11 RX ORDER — PHENTERMINE HYDROCHLORIDE 37.5 MG/1
37.5 TABLET ORAL
Qty: 30 TAB | Refills: 0 | Status: SHIPPED | OUTPATIENT
Start: 2019-04-11 | End: 2019-06-11 | Stop reason: SDUPTHER

## 2019-04-11 RX ORDER — SERTRALINE HYDROCHLORIDE 100 MG/1
100 TABLET, FILM COATED ORAL DAILY
Qty: 90 TAB | Refills: 3 | Status: SHIPPED | OUTPATIENT
Start: 2019-04-11 | End: 2020-05-06

## 2019-04-11 NOTE — PROGRESS NOTES
Destinee Jensen  Identified pt with two pt identifiers(name and ). Chief Complaint   Patient presents with    Weight Management     Rm 14/fasting       1. Have you been to the ER, urgent care clinic since your last visit? Hospitalized since your last visit? no    2. Have you seen or consulted any other health care providers outside of the [address] [place] [address] since your last visit? Include any pap smears or colon screening. no      Would you like to sign up for MyChart today, if you have not already done so? no  If not, would you like information on MyChart, and how to sign up at a later time? no      Medication reconciliation up to date and corrected with patient at this time. Today's provider has been notified of reason for visit, vitals and flowsheets obtained on patients. Reviewed record in preparation for visit, huddled with provider and have obtained necessary documentation.       Health Maintenance Due   Topic    PAP AKA CERVICAL CYTOLOGY        Wt Readings from Last 3 Encounters:   19 180 lb (81.6 kg)   19 181 lb 4.8 oz (82.2 kg)   19 185 lb 14.4 oz (84.3 kg)     Temp Readings from Last 3 Encounters:   19 97.8 °F (36.6 °C) (Oral)   19 97.8 °F (36.6 °C) (Oral)   19 97.5 °F (36.4 °C)     BP Readings from Last 3 Encounters:   19 103/68   19 115/73   19 119/73     Pulse Readings from Last 3 Encounters:   19 (!) 57   19 73   19 (!) 51     Vitals:    19 0831   BP: 103/68   Pulse: (!) 57   Resp: 18   Temp: 97.8 °F (36.6 °C)   TempSrc: Oral   SpO2: 98%   Weight: 180 lb (81.6 kg)   Height: 5' 4\" (1.626 m)   PainSc:   0 - No pain         Learning Assessment:  :     Learning Assessment 3/15/2018 2014   PRIMARY LEARNER Patient Patient   HIGHEST LEVEL OF EDUCATION - PRIMARY LEARNER  4 YEARS OF COLLEGE 4 YEARS OF COLLEGE   BARRIERS PRIMARY LEARNER NONE NONE   CO-LEARNER CAREGIVER No -   PRIMARY LANGUAGE ENGLISH ENGLISH LEARNER PREFERENCE PRIMARY READING LISTENING   ANSWERED BY self patient   RELATIONSHIP SELF SELF       Depression Screening:  :     3 most recent PHQ Screens 4/11/2019   Little interest or pleasure in doing things Several days   Feeling down, depressed, irritable, or hopeless Several days   Total Score PHQ 2 2       Fall Risk Assessment:  :     Fall Risk Assessment, last 12 mths 4/11/2019   Able to walk? Yes   Fall in past 12 months? No       Abuse Screening:  :     Abuse Screening Questionnaire 4/11/2019 6/15/2018 4/16/2018 6/30/2016   Do you ever feel afraid of your partner? N N N N   Are you in a relationship with someone who physically or mentally threatens you? N N N N   Is it safe for you to go home?  Y Y Y Y       ADL Screening:  :     ADL Assessment 6/15/2018   Feeding yourself No Help Needed   Getting from bed to chair No Help Needed   Getting dressed No Help Needed   Bathing or showering No Help Needed   Walk across the room (includes cane/walker) No Help Needed   Using the telphone No Help Needed   Taking your medications No Help Needed   Preparing meals No Help Needed   Managing money (expenses/bills) No Help Needed   Moderately strenuous housework (laundry) No Help Needed   Shopping for personal items (toiletries/medicines) No Help Needed   Shopping for groceries No Help Needed   Driving No Help Needed   Climbing a flight of stairs No Help Needed   Getting to places beyond walking distances No Help Needed

## 2019-04-11 NOTE — PROGRESS NOTES
HISTORY OF PRESENT ILLNESS  Cammie Steele is a 61 y.o. female presents with Weight Management (Rm 14/fasting); Blood Pressure Check; and Medication Refill    Agree with nurse note. Pt with hashimoto's thyroiditis, vit d efficiency, and obesity presents to the office with a BP of 103/68. She is tolerating Phentermine 37.5 mg well, month 1 of 3 for weight management. Last prescribed on 3/11/2019. She has noticed an increase of energy and decreased appetite since starting the medication. She lost 1 pounds since the last visit. Percent body fat has changed from 41 to 37.7, waist circumference measurement has changed from 32.5 to 32. Breakfast is oatmeal. She walks every day. Overall, patient is pleased and requests a refill of the medication today. Patient denies fatigue, chest pain, heart palpitations, nausea, dry mouth, constipation, signs of depression. Pt with MARGI. She is stable on Zoloft 50 mg. 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 No SI/SA.     Pt with chronic insomnia. She uses Lunesta 2 mg prn. She has had more trouble sleeping the past few nights due to thinking about things. She drinks 1 cup of coffee in the morning but does not have caffeine later in the day. Stressors: caring for her sister who has lived with her for 5+ years    Pt reports she had a pap smear two weeks ago with TARAS Cardozo. Written by hyun Goodwin, as dictated by Dr. Renetta Newsome DO.     ROS    Review of Systems negative except as noted above in HPI.     ALLERGIES:    Allergies   Allergen Reactions    Advil Pm [Ibuprofen-Diphenhydramine Hcl] Other (comments)     Slightly elevated Creaitnine 1.02    Aleve [Naproxen Sodium] Other (comments)     Due to kidneys    Bactrim [Sulfamethoxazole-Trimethoprim] Nausea and Vomiting    Sulfa (Sulfonamide Antibiotics) Nausea and Vomiting    Sulfasalazine Nausea and Vomiting       CURRENT MEDICATIONS:    Outpatient Medications Marked as Taking for the 4/11/19 encounter (Office Visit) with Maximilian Ramirez, DO   Medication Sig Dispense Refill    phentermine (ADIPEX-P) 37.5 mg tablet Take 1 Tab by mouth every morning. Indications: Adjunct Treatment of Obesity in a Comprehensive Weight Reduction Regimen 30 Tab 0    sertraline (ZOLOFT) 100 mg tablet Take 1 Tab by mouth daily. Indications: stress 90 Tab 3    diclofenac EC (VOLTAREN) 75 mg EC tablet TAKE 1 TABLET BY MOUTH TWICE DAILY 60 Tab 5    furosemide (LASIX) 40 mg tablet TAKE 1 TABLET BY MOUTH DAILY FOR SWELLING 90 Tab 3    montelukast (SINGULAIR) 10 mg tablet Take 1 Tab by mouth daily. Indications: Allergic Rhinitis 90 Tab 3    PREMARIN 0.625 mg/gram vaginal cream   4    MYRBETRIQ 50 mg ER tablet       Insulin Needles, Disposable, (NAVID PEN NEEDLE) 32 gauge x 5/32\" ndle Use daily as directed 50 Pen Needle 5    aspirin delayed-release 81 mg tablet Take 1 Tab by mouth daily. Indications: prevention of transient ischemic attack 90 Tab 3    eszopiclone (LUNESTA) 2 mg tablet Take 1 Tab by mouth nightly. Max Daily Amount: 2 mg. 30 Tab 5    MINIVELLE 0.1 mg/24 hr 1 Patch by TransDERmal route two (2) times a week. Changes Sunday and Wednesday  0    cetirizine (ZYRTEC) 10 mg tablet Take 1 Tab by mouth daily. (Patient taking differently: Take 10 mg by mouth daily as needed.) 30 Tab 3    fluticasone (FLONASE) 50 mcg/actuation nasal spray 2 Sprays by Both Nostrils route daily. Indications: ALLERGIC RHINITIS 1 Bottle 5    albuterol (PROVENTIL HFA, VENTOLIN HFA) 90 mcg/actuation inhaler Take 2 Puffs by inhalation every four (4) hours as needed for Wheezing (cough). 1 Inhaler 1    cholecalciferol, vitamin D3, (VITAMIN D3) 2,000 unit Tab Take 2,000 Units by mouth daily. PAST MEDICAL HISTORY:    Past Medical History:   Diagnosis Date    Achilles tendonitis 12/2004    Right. Dr. Jarrell Price Arthritis 2010    hips, knees.   Dr. Shaikh Ser Chest pain 08/2013 Dr. Reema Streeter.  Chickenpox childhood    Endometriosis 1990    Dr. Stanley Whelan Environmental allergies     MARGI (generalized anxiety disorder)     Hip pain, bilateral 2010    due to severe OA. Iliopsoas bursitis. Dr. Juan Ramon Horne    History of breast lump/mass excision 1998    Left breast.  Dr. Primitivo Grissom Hyperlipidemia     Iron deficiency anemia     iron deficiency    Knee pain, bilateral 2017    Dr. Jose Ward    Menopausal and postmenopausal disorder 2007    Dr. Stanley Whelan Uterine fibroid     Dr. Berkley Stewart.  Vitamin D deficiency 03/2008       PAST SURGICAL HISTORY:    Past Surgical History:   Procedure Laterality Date    BIOPSY BREAST  Rt 1993;Lt 1998    Dr. Waleska Vanegas Right 2002    Right achiles tendon. Dr. Rogelio Emery.  HX BREAST BIOPSY  2001    Left. benign. Dr. Michael Boykin HX COLONOSCOPY  09/27/2016    Dr. Carrie Manuel.  due q 5 yrs due to fam hx   Durel Chimera    Dr. Clarita Krueger. due to endometriosis    HX PELVIC LAPAROSCOPY  1995    due to endometriosis Dr. Jacquelyn Ho  04/2011    due to fibroids. Dr. Berkley Stewart.        FAMILY HISTORY:    Family History   Problem Relation Age of Onset    Hypertension Mother     Other Mother         hearing loss/vision loss    Heart Disease Father     Lung Disease Father     Diabetes Sister     Heart Attack Sister 54        March 2014    Stroke Sister         after MI    Thyroid Disease Sister     Colon Polyps Sister     Heart Disease Brother         Defibrillator placed    Heart Attack Brother 48    Diabetes Maternal Grandmother        SOCIAL HISTORY:    Social History     Socioeconomic History    Marital status: SINGLE     Spouse name: Not on file    Number of children: 2    Years of education: Not on file    Highest education level: Not on file   Occupational History    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    Tobacco Use    Smoking status: Never Smoker    Smokeless tobacco: Never Used    Tobacco comment: lived with smoker mom x 18 yrs   Substance and Sexual Activity    Alcohol use: No    Drug use: No    Sexual activity: Not Currently     Partners: Male     Birth control/protection: Abstinence, Surgical       IMMUNIZATIONS:    Immunization History   Administered Date(s) Administered    Influenza Vaccine 12/14/2012, 10/22/2013, 09/18/2014    Influenza Vaccine (Quad) 10/29/2015    Influenza Vaccine (Quad) PF 09/29/2016, 10/19/2017, 09/10/2018    Influenza Vaccine Split 11/12/2010, 11/07/2011    TD Vaccine 08/18/2000    Tdap 06/30/2016       PHYSICAL EXAMINATION    Vital Signs    Visit Vitals  /68 (BP 1 Location: Left arm, BP Patient Position: Sitting)   Pulse (!) 57   Temp 97.8 °F (36.6 °C) (Oral)   Resp 18   Ht 5' 4\" (1.626 m)   Wt 180 lb (81.6 kg)   SpO2 98%   BMI 30.90 kg/m²       Weight Metrics 4/11/2019 4/11/2019 3/11/2019 3/11/2019 2/11/2019 2/11/2019 12/13/2018   Weight - 180 lb - 181 lb 4.8 oz - 185 lb 14.4 oz -   Neck Circ (inches) - - - - - - -   Waist Measure Inches 32 - 32.5 - 31.25 - 36   Exercise Mins/week - - - - - - -   Body Fat % 37.7 - 41 - 39.7 - 40.9   BMI - 30.9 kg/m2 - 31.12 kg/m2 - 31.91 kg/m2 -         General appearance - Well nourished. Well appearing. Well developed. No acute distress. Obese. Head - Normocephalic. Atraumatic. Eyes - pupils equal and reactive, extraocular eye movements intact, sclera anicteric  Ears - Hearing is grossly normal bilaterally. Nose - normal and patent, no erythema, discharge or polyps   Mouth - mucous membranes moist, pharynx normal with cobblestone appearance. No erythema, white exudate or obstruction. Neck - supple. Midline trachea. No carotid bruits are noted. No thyromegaly noted. Chest - clear to auscultation bilaterally anterriorly and posteriorly. No wheezes, rales or rhonchi.   Breath sounds are symmetrical and unlabored bilaterally. Heart - HR 57 BPM, rechecked at 60. Regular rhythm, normal S1, S2. No murmur. No rubs, clicks or gallops noted. Abdomen - soft and distended. No masses or organomegaly. No rebound, rigidity or guarding. Bowel sounds normal x 4 quadrants. No tenderness noted. Neurological - awake, alert and oriented to person, place, and time and event. Cranial nerves II through XII intact. Muscle strength is +5/5 x 4 extremities. Sensation is intact to light touch bilaterally. Steady gait. Heme/Lymph - peripheral pulses normal x 4 extremities. No peripheral edema is noted. No cervical adenopathy noted. Skin - no rashes, erythema, ecchymosis, lacerations, abrasions, suspicious moles noted. No skin tags. No acanthosis nigricans noted in the axilla or neck. Psychological -   normal behavior, speech, dress and thought processes. Good insight. Good eye contact. Normal affect. Appropriate mood. DATA REVIEWED  Lab Results   Component Value Date/Time    WBC 5.2 03/21/2018 09:02 AM    HGB 12.0 03/21/2018 09:02 AM    HCT 38.5 03/21/2018 09:02 AM    PLATELET 670 15/38/0085 09:02 AM    MCV 91 03/21/2018 09:02 AM     Lab Results   Component Value Date/Time    Sodium 143 03/04/2019 08:05 AM    Potassium 3.9 03/04/2019 08:05 AM    Chloride 103 03/04/2019 08:05 AM    CO2 27 03/04/2019 08:05 AM    Anion gap 5 04/23/2011 05:20 AM    Glucose 88 03/04/2019 08:05 AM    BUN 14 03/04/2019 08:05 AM    Creatinine 0.93 03/04/2019 08:05 AM    BUN/Creatinine ratio 15 03/04/2019 08:05 AM    GFR est AA 76 03/04/2019 08:05 AM    GFR est non-AA 66 03/04/2019 08:05 AM    Calcium 9.3 03/04/2019 08:05 AM    Bilirubin, total 0.5 03/04/2019 08:05 AM    AST (SGOT) 17 03/04/2019 08:05 AM    Alk.  phosphatase 101 03/04/2019 08:05 AM    Protein, total 6.9 03/04/2019 08:05 AM    Albumin 4.2 03/04/2019 08:05 AM    A-G Ratio 1.6 03/04/2019 08:05 AM    ALT (SGPT) 14 03/04/2019 08:05 AM     Lab Results   Component Value Date/Time Cholesterol, total 171 03/04/2019 08:05 AM    HDL Cholesterol 73 03/04/2019 08:05 AM    LDL, calculated 91 03/04/2019 08:05 AM    VLDL, calculated 7 03/04/2019 08:05 AM    Triglyceride 36 03/04/2019 08:05 AM     Lab Results   Component Value Date/Time    Vitamin D 25-Hydroxy 26.3 (L) 07/18/2011 10:22 AM    VITAMIN D, 25-HYDROXY 32.4 03/04/2019 08:05 AM       Lab Results   Component Value Date/Time    Hemoglobin A1c 5.4 03/21/2018 09:02 AM     Lab Results   Component Value Date/Time    TSH 3.630 03/04/2019 08:05 AM    TSH 2.36 08/21/2014 07:50 AM           ASSESSMENT and PLAN    ICD-10-CM ICD-9-CM    1. Weight loss R63.4 783.21     1# since 3/2019 and 20# since 3/2018 due to efforts    2. Obesity, Class II, BMI 35-39.9 E66.9 278.00 phentermine (ADIPEX-P) 37.5 mg tablet   3. Hashimoto's thyroiditis E06.3 245.2    4. Chronic insomnia F51.04 780.52    5. Vitamin D deficiency E55.9 268.9    6. Generalized anxiety disorder F41.1 300.02 sertraline (ZOLOFT) 100 mg tablet       Continue current medications and care. Increase Zoloft form 75 mg to 100 mg daily. Prescriptions written and given to patient Phentermine 37.5 mg (month 2 of 3.)  Medication side effects discussed. [facility]  reviewed and pt is compliant. Discussed the patient's BMI with her. The BMI follow up plan is as follows: I have counseled this patient on diet and exercise regimens. Diet recommendations:  Decrease carbohydrates (white foods including flour, white bread, white rice, white pasta, white potatoes; corn, sweet foods, sweet drinks and alcohol), increase green leafy vegetables and protein with each meal.  Avoid fried foods. Eat 3-5 small meals daily. Do not skip meals. Ok to use meal replacements up to twice daily with protein goal of 60 mg per meal.   Recommend OTC Premiere Protein bars or shakes. Increase water intake.   Increase physical activity to 30 minutes daily for health benefit or 60 minutes daily to prevent weight regain, as tolerated. Physical Activity prescription:  A goal of 30 minutes physical activity daily is recommended for health benefit and at least 60 minutes daily to prevent weight regain. For weight loss, no less than 75% needs to be aerobic (i.e. Walking) and no more than 25% resistance exercising (i.e. Weight lifting). For weight maintenance phase, 50% aerobic and 50% resistance exercises. Sleep prescription:    A goal of 7-8 hours of uninterrupted sleep is recommended to turn off the Grehlin hormone to be released from the stomach and triggers appetite while promoting weight gain. Proper rest turns on Leptin hormone to be released from white adipose tissue and promotes weight loss. Counseled patient on: weight loss goals, emphasizing a 5-10% weight loss in 6-12 months and strategies, stress, hashimoto's thryroiditis, vit d deficiency, and sleep hygiene. Advised pt to journal at night to help sleep. Request ov notes from NP [facility]. Advised pt to sign release. Relevant handouts given and discussed with patient. Immunizations noted. Praised pt for weight loss efforts and progress. Patient was offered a choice/choices in the treatment plan today. Patient expresses understanding of the plan and agrees with recommendations. Patient declines any additional handouts. Patient is satisfied with previous handouts received from our office. Follow-up and Dispositions    · Return in about 1 month (around 5/11/2019) for weight, blood pressure. Written by hyun Montejo, as dictated by Dr. Dell Mcmanus DO. Documentation True and Accepted by Mamadou Bills.  Adriana Moreno.

## 2019-06-11 ENCOUNTER — OFFICE VISIT (OUTPATIENT)
Dept: FAMILY MEDICINE CLINIC | Age: 64
End: 2019-06-11

## 2019-06-11 VITALS
HEART RATE: 61 BPM | HEIGHT: 64 IN | DIASTOLIC BLOOD PRESSURE: 60 MMHG | RESPIRATION RATE: 18 BRPM | SYSTOLIC BLOOD PRESSURE: 99 MMHG | OXYGEN SATURATION: 97 % | TEMPERATURE: 97.2 F | BODY MASS INDEX: 30.56 KG/M2 | WEIGHT: 179 LBS

## 2019-06-11 DIAGNOSIS — E66.9 OBESITY, CLASS I, BMI 30-34.9: ICD-10-CM

## 2019-06-11 DIAGNOSIS — E66.9 OBESITY, CLASS II, BMI 35-39.9: ICD-10-CM

## 2019-06-11 DIAGNOSIS — K59.09 OTHER CONSTIPATION: ICD-10-CM

## 2019-06-11 DIAGNOSIS — F51.04 CHRONIC INSOMNIA: ICD-10-CM

## 2019-06-11 DIAGNOSIS — F41.1 GENERALIZED ANXIETY DISORDER: Primary | ICD-10-CM

## 2019-06-11 DIAGNOSIS — E55.9 VITAMIN D DEFICIENCY: ICD-10-CM

## 2019-06-11 DIAGNOSIS — E78.00 PURE HYPERCHOLESTEROLEMIA: ICD-10-CM

## 2019-06-11 DIAGNOSIS — E06.3 HASHIMOTO'S THYROIDITIS: ICD-10-CM

## 2019-06-11 RX ORDER — PHENTERMINE HYDROCHLORIDE 37.5 MG/1
37.5 TABLET ORAL
Qty: 30 TAB | Refills: 0 | Status: SHIPPED | OUTPATIENT
Start: 2019-06-11 | End: 2019-08-16 | Stop reason: SDUPTHER

## 2019-06-11 NOTE — PROGRESS NOTES
Wolfgang Lemon  Identified pt with two pt identifiers(name and ). Chief Complaint   Patient presents with    Weight Management     Rm 14         1. Have you been to the ER, urgent care clinic since your last visit? Hospitalized since your last visit? NO    2. Have you seen or consulted any other health care providers outside of the [address] [place] [address] since your last visit? Include any pap smears or colon screening. NO      My Chart     My chart gives you direct online access to portions of the electronic medical record (EMR) where your doctor stores your health information (ie, lab results, appointment information, medications, immunizations, and more. It is free. Would you like to set up your my chart? YES    [unfilled]    Weight Metrics 2019 2019 2019 2019 3/11/2019 3/11/2019 2019   Weight - 179 lb - 180 lb - 181 lb 4.8 oz -   Neck Circ (inches) - - - - - - -   Waist Measure Inches 33 - 32 - 32.5 - 31.25   Exercise Mins/week - - - - - - -   Body Fat % 39.9 - 37.7 - 41 - 39.7   BMI - 30.73 kg/m2 - 30.9 kg/m2 - 31.12 kg/m2 -         Medication reconciliation up to date and corrected with patient at this time. Advance Care Planning    In the event something were to happen to you and you were unable to speak on your behalf, do you have an Advance Directive/ Living Will in place stating your wishes? NO    If yes, do we have a copy on file NO    If no, would you like information YES      ====Jaspreet DWNLD Invitation====    Patient was invited to [facility] on this date and given the information folder for review. Recommended appointment with Jaspreet DWNLD facilitator for ACP conversation regarding advance directives. [] Yes  [x] No  Referral sent to [facility] team member or Coordinator for follow-up    [] Yes  [x] No  Patient scheduled an appointment.        Site of Referral:       Today's provider has been notified of reason for visit, vitals and flowsheets obtained on patients. Reviewed record in preparation for visit, huddled with provider and have obtained necessary documentation. Health Maintenance Due   Topic    PAP AKA CERVICAL CYTOLOGY        Wt Readings from Last 3 Encounters:   06/11/19 179 lb (81.2 kg)   04/11/19 180 lb (81.6 kg)   03/11/19 181 lb 4.8 oz (82.2 kg)     Temp Readings from Last 3 Encounters:   06/11/19 97.2 °F (36.2 °C) (Oral)   04/11/19 97.8 °F (36.6 °C) (Oral)   03/11/19 97.8 °F (36.6 °C) (Oral)     BP Readings from Last 3 Encounters:   06/11/19 99/60   04/11/19 103/68   03/11/19 115/73     Pulse Readings from Last 3 Encounters:   06/11/19 61   04/11/19 (!) 57   03/11/19 73     Vitals:    06/11/19 0846   BP: 99/60   Pulse: 61   Resp: 18   Temp: 97.2 °F (36.2 °C)   TempSrc: Oral   SpO2: 97%   Weight: 179 lb (81.2 kg)   Height: 5' 4\" (1.626 m)   PainSc:   0 - No pain         Learning Assessment:  :     Learning Assessment 3/15/2018 4/22/2014   PRIMARY LEARNER Patient Patient   HIGHEST LEVEL OF EDUCATION - PRIMARY LEARNER  4 YEARS OF COLLEGE 4 YEARS OF COLLEGE   BARRIERS PRIMARY LEARNER NONE NONE   CO-LEARNER CAREGIVER No -   PRIMARY LANGUAGE ENGLISH ENGLISH   LEARNER PREFERENCE PRIMARY READING LISTENING   ANSWERED BY self patient   RELATIONSHIP SELF SELF       Depression Screening:  :     3 most recent PHQ Screens 4/11/2019   Little interest or pleasure in doing things Several days   Feeling down, depressed, irritable, or hopeless Several days   Total Score PHQ 2 2       Fall Risk Assessment:  :     Fall Risk Assessment, last 12 mths 4/11/2019   Able to walk? Yes   Fall in past 12 months? No       Abuse Screening:  :     Abuse Screening Questionnaire 4/11/2019 6/15/2018 4/16/2018 6/30/2016   Do you ever feel afraid of your partner? N N N N   Are you in a relationship with someone who physically or mentally threatens you? N N N N   Is it safe for you to go home?  Aied DUQUE       ADL Screening:  :     ADL Assessment 6/15/2018 Feeding yourself No Help Needed   Getting from bed to chair No Help Needed   Getting dressed No Help Needed   Bathing or showering No Help Needed   Walk across the room (includes cane/walker) No Help Needed   Using the telphone No Help Needed   Taking your medications No Help Needed   Preparing meals No Help Needed   Managing money (expenses/bills) No Help Needed   Moderately strenuous housework (laundry) No Help Needed   Shopping for personal items (toiletries/medicines) No Help Needed   Shopping for groceries No Help Needed   Driving No Help Needed   Climbing a flight of stairs No Help Needed   Getting to places beyond walking distances No Help Needed

## 2019-06-11 NOTE — PROGRESS NOTES
HISTORY OF PRESENT ILLNESS  Wolfgang Lemon is a 61 y.o. female presents with Weight Management (Rm 14)      Agree with nurse note. Pt with Hashimoto's, MARGI, hypercholesterolemia, constipation, and vit d deficiency presents to the office with a BP of 99/60. She is tolerating Phentermine 37.5 mg well, month 2 of 3 for weight management. Last prescribed on 4/11/2019. She has noticed an increase of energy and decreased appetite since starting the medication. She lost 1 pounds since the last visit. Percent body fat has changed from 37.7% to 39.9%, waist circumference measurement has changed from 32 to 33. Overall, patient is pleased and requests a refill of the medication today. Patient denies fatigue, sleep disturbance, chest pain, heart palpitations, nausea, dry mouth signs of depression. Pt sleeps 8 hours nightly and drinks 8 oz of water daily. Written by hyun Constantino, as dictated by Dr. Luis Eduardo Mclain DO.     ROS    Review of Systems negative except as noted above in HPI. ALLERGIES:    Allergies   Allergen Reactions    Advil Pm [Ibuprofen-Diphenhydramine Hcl] Other (comments)     Slightly elevated Creaitnine 1.02    Aleve [Naproxen Sodium] Other (comments)     Due to kidneys    Bactrim [Sulfamethoxazole-Trimethoprim] Nausea and Vomiting    Sulfa (Sulfonamide Antibiotics) Nausea and Vomiting    Sulfasalazine Nausea and Vomiting       CURRENT MEDICATIONS:    Outpatient Medications Marked as Taking for the 6/11/19 encounter (Office Visit) with Hunter Carl DO   Medication Sig Dispense Refill    CHOLECALCIFEROL, VITAMIN D3, PO Take 2,000 Units by mouth.  phentermine (ADIPEX-P) 37.5 mg tablet Take 1 Tab by mouth every morning. Indications: Adjunct Treatment of Obesity in a Comprehensive Weight Reduction Regimen 30 Tab 0    sertraline (ZOLOFT) 100 mg tablet Take 1 Tab by mouth daily.  Indications: stress 90 Tab 3    diclofenac EC (VOLTAREN) 75 mg EC tablet TAKE 1 TABLET BY MOUTH TWICE DAILY 60 Tab 5    furosemide (LASIX) 40 mg tablet TAKE 1 TABLET BY MOUTH DAILY FOR SWELLING 90 Tab 3    montelukast (SINGULAIR) 10 mg tablet Take 1 Tab by mouth daily. Indications: Allergic Rhinitis 90 Tab 3    PREMARIN 0.625 mg/gram vaginal cream   4    MYRBETRIQ 50 mg ER tablet       aspirin delayed-release 81 mg tablet Take 1 Tab by mouth daily. Indications: prevention of transient ischemic attack 90 Tab 3    eszopiclone (LUNESTA) 2 mg tablet Take 1 Tab by mouth nightly. Max Daily Amount: 2 mg. 30 Tab 5    MINIVELLE 0.1 mg/24 hr 1 Patch by TransDERmal route two (2) times a week. Changes Sunday and Wednesday  0    cholecalciferol, vitamin D3, (VITAMIN D3) 2,000 unit Tab Take 2,000 Units by mouth daily. PAST MEDICAL HISTORY:    Past Medical History:   Diagnosis Date    Achilles tendonitis 12/2004    Right. Dr. Jeovany Merlos Arthritis 2010    hips, knees. Dr. Halima Au    Chest pain 08/2013    Dr. Elan Pierce.  Chickenpox childhood    Endometriosis 1990    Dr. Shraddha Iqbal Environmental allergies     MARGI (generalized anxiety disorder)     Hip pain, bilateral 2010    due to severe OA. Iliopsoas bursitis. Dr. Callum Meredith    History of breast lump/mass excision 1998    Left breast.  Dr. Gia Rowan Hyperlipidemia     Iron deficiency anemia     iron deficiency    Knee pain, bilateral 2017    Dr. Mehdi German    Menopausal and postmenopausal disorder 2007    Dr. Shraddha Iqbal Uterine fibroid     Dr. Camelia Rader.  Vitamin D deficiency 03/2008       PAST SURGICAL HISTORY:    Past Surgical History:   Procedure Laterality Date    BIOPSY BREAST  Rt 1993;Lt 1998    Dr. Mccullough Do Right 2002    Right achiles tendon. Dr. Lindy Huber.  HX BREAST BIOPSY  2001    Left. benign. Dr. Cydney Nichole HX COLONOSCOPY  09/27/2016    Dr. Leslie Marques.  due q 5 yrs due to fam hx   Ricci Mark Kat. due to endometriosis    HX PELVIC LAPAROSCOPY  1995    due to endometriosis Dr. Jimmy Siddiqi  04/2011    due to fibroids. Dr. Aminta Justin        FAMILY HISTORY:    Family History   Problem Relation Age of Onset    Hypertension Mother     Other Mother         hearing loss/vision loss    Heart Disease Father     Lung Disease Father     Diabetes Sister     Heart Attack Sister 54        March 2014    Stroke Sister         after MI    Thyroid Disease Sister     Colon Polyps Sister     Heart Disease Brother         Defibrillator placed    Heart Attack Brother 48    Diabetes Maternal Grandmother        SOCIAL HISTORY:    Social History     Socioeconomic History    Marital status: SINGLE     Spouse name: Not on file    Number of children: 2    Years of education: Not on file    Highest education level: Not on file   Occupational History    Occupation: [employer]     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    Tobacco Use    Smoking status: Never Smoker    Smokeless tobacco: Never Used    Tobacco comment: lived with smoker mom x 18 yrs   Substance and Sexual Activity    Alcohol use: No    Drug use: No    Sexual activity: Not Currently     Partners: Male     Birth control/protection: Abstinence, Surgical       IMMUNIZATIONS:    Immunization History   Administered Date(s) Administered    Influenza Vaccine 12/14/2012, 10/22/2013, 09/18/2014    Influenza Vaccine (Quad) 10/29/2015    Influenza Vaccine (Quad) PF 09/29/2016, 10/19/2017, 09/10/2018    Influenza Vaccine Split 11/12/2010, 11/07/2011    TD Vaccine 08/18/2000    Tdap 06/30/2016       PHYSICAL EXAMINATION    Vital Signs    Visit Vitals  BP 99/60   Pulse 61   Temp 97.2 °F (36.2 °C) (Oral)   Resp 18   Ht 5' 4\" (1.626 m)   Wt 179 lb (81.2 kg)   SpO2 97%   BMI 30.73 kg/m²       Weight Metrics 6/11/2019 6/11/2019 4/11/2019 4/11/2019 3/11/2019 3/11/2019 2/11/2019   Weight - 179 lb - 180 lb - 181 lb 4.8 oz -   Neck Circ (inches) - - - - - - - Waist Measure Inches 33 - 32 - 32.5 - 31.25   Exercise Mins/week - - - - - - -   Body Fat % 39.9 - 37.7 - 41 - 39.7   BMI - 30.73 kg/m2 - 30.9 kg/m2 - 31.12 kg/m2 -         General appearance - Well nourished. Well appearing. Well developed. No acute distress. Obese. Head - Normocephalic. Atraumatic. Eyes - pupils equal and reactive, extraocular eye movements intact, sclera anicteric  Ears - Hearing is grossly normal bilaterally. Nose - normal and patent, no erythema, discharge or polyps   Mouth - mucous membranes moist, pharynx normal with cobblestone appearance. No erythema, white exudate or obstruction. Neck - supple. Midline trachea. No carotid bruits are noted. No thyromegaly noted. Chest - clear to auscultation bilaterally anterriorly and posteriorly. No wheezes, rales or rhonchi. Breath sounds are symmetrical and unlabored bilaterally. Heart - normal rate. Regular rhythm, normal S1, S2. No murmur. No rubs, clicks or gallops noted. Abdomen - soft and distended. No masses or organomegaly. No rebound, rigidity or guarding. Bowel sounds normal x 4 quadrants. No tenderness noted. Neurological - awake, alert and oriented to person, place, and time and event. Cranial nerves II through XII intact. Muscle strength is +5/5 x 4 extremities. Sensation is intact to light touch bilaterally. Steady gait. Heme/Lymph - peripheral pulses normal x 4 extremities. No peripheral edema is noted. No cervical adenopathy noted. Skin - no rashes, erythema, ecchymosis, lacerations, abrasions, suspicious moles noted. No skin tags. No acanthosis nigricans noted in the axilla or neck. Psychological -   normal behavior, speech, dress and thought processes. Good insight. Good eye contact. Normal affect. Appropriate mood.       DATA REVIEWED  Lab Results   Component Value Date/Time    WBC 5.2 03/21/2018 09:02 AM    HGB 12.0 03/21/2018 09:02 AM    HCT 38.5 03/21/2018 09:02 AM    PLATELET 899 03/21/2018 09:02 AM    MCV 91 03/21/2018 09:02 AM     Lab Results   Component Value Date/Time    Sodium 143 03/04/2019 08:05 AM    Potassium 3.9 03/04/2019 08:05 AM    Chloride 103 03/04/2019 08:05 AM    CO2 27 03/04/2019 08:05 AM    Anion gap 5 04/23/2011 05:20 AM    Glucose 88 03/04/2019 08:05 AM    BUN 14 03/04/2019 08:05 AM    Creatinine 0.93 03/04/2019 08:05 AM    BUN/Creatinine ratio 15 03/04/2019 08:05 AM    GFR est AA 76 03/04/2019 08:05 AM    GFR est non-AA 66 03/04/2019 08:05 AM    Calcium 9.3 03/04/2019 08:05 AM    Bilirubin, total 0.5 03/04/2019 08:05 AM    AST (SGOT) 17 03/04/2019 08:05 AM    Alk. phosphatase 101 03/04/2019 08:05 AM    Protein, total 6.9 03/04/2019 08:05 AM    Albumin 4.2 03/04/2019 08:05 AM    A-G Ratio 1.6 03/04/2019 08:05 AM    ALT (SGPT) 14 03/04/2019 08:05 AM     Lab Results   Component Value Date/Time    Cholesterol, total 171 03/04/2019 08:05 AM    HDL Cholesterol 73 03/04/2019 08:05 AM    LDL, calculated 91 03/04/2019 08:05 AM    VLDL, calculated 7 03/04/2019 08:05 AM    Triglyceride 36 03/04/2019 08:05 AM     Lab Results   Component Value Date/Time    Vitamin D 25-Hydroxy 26.3 (L) 07/18/2011 10:22 AM    VITAMIN D, 25-HYDROXY 32.4 03/04/2019 08:05 AM       Lab Results   Component Value Date/Time    Hemoglobin A1c 5.4 03/21/2018 09:02 AM     Lab Results   Component Value Date/Time    TSH 3.630 03/04/2019 08:05 AM    TSH 2.36 08/21/2014 07:50 AM     No results found for: MCACR, MCA1, MCA2, MCA3, MCAU, MCAU2, MCALPOCT      ASSESSMENT and PLAN    ICD-10-CM ICD-9-CM    1. Generalized anxiety disorder F41.1 300.02     stable on Zoloft and Adipex   2. Pure hypercholesterolemia E78.00 272.0    3. Other constipation K59.09 564.09     stable on Adipex   4. Chronic insomnia F51.04 780.52    5. Obesity, Class II, BMI 35-39.9 E66.9 278.00 phentermine (ADIPEX-P) 37.5 mg tablet   6. Hashimoto's thyroiditis E06.3 245.2    7. Vitamin D deficiency E55.9 268.9    8.  Obesity, Class I, BMI 30-34.9 E66.9 278.00        Continue current medications and care. Prescriptions written and given to patient: Phentermine 37.5 mg (month 3 of 3.)  Medication side effects discussed. [facility]  reviewed and pt is compliant. Discussed the patient's BMI with her. The BMI follow up plan is as follows: I have counseled this patient on diet and exercise regimens. Diet recommendations:  Decrease carbohydrates (white foods including flour, white bread, white rice, white pasta, white potatoes; corn, sweet foods, sweet drinks and alcohol), increase green leafy vegetables and protein with each meal.  Avoid fried foods. Eat 3-5 small meals daily. Do not skip meals. Ok to use meal replacements up to twice daily with protein goal of 20 g per meal.   Recommend OTC Premiere Protein bars or shakes. Increase water intake. Increase physical activity to 30 minutes daily for health benefit or 60 minutes daily to prevent weight regain, as tolerated. Physical Activity prescription:  A goal of 30 minutes physical activity daily is recommended for health benefit and at least 60 minutes daily to prevent weight regain. For weight loss, no less than 75% needs to be aerobic (i.e. Walking) and no more than 25% resistance exercising (i.e. Weight lifting). For weight maintenance phase, 50% aerobic and 50% resistance exercises. Sleep prescription:    A goal of 7-8 hours of uninterrupted sleep is recommended to turn off the Grehlin hormone to be released from the stomach and triggers appetite while promoting weight gain. Proper rest turns on Leptin hormone to be released from white adipose tissue and promotes weight loss. Counseled patient on: weight loss goals, emphasizing a 5-10% weight loss in 6-12 months and strategies, sleep, stress. Patient declines any additional handouts. Patient is satisfied with previous handouts received from our office  Immunizations noted. Praised pt for weight loss efforts and progress.     Patient was offered a choice/choices in the treatment plan today. Patient expresses understanding of the plan and agrees with recommendations. Follow-up and Dispositions    · Return in about 1 month (around 2019) for weight, blood pressure. Written by hyun Almeida, as dictated by Dr. Ashish Tomlinson DO. Documentation True and Accepted by Alvira Proffer. Ivan Apgar. There are no Patient Instructions on file for this visit.

## 2019-08-16 ENCOUNTER — OFFICE VISIT (OUTPATIENT)
Dept: FAMILY MEDICINE CLINIC | Age: 64
End: 2019-08-16

## 2019-08-16 VITALS
TEMPERATURE: 98.5 F | WEIGHT: 175.5 LBS | HEIGHT: 64 IN | RESPIRATION RATE: 18 BRPM | HEART RATE: 51 BPM | BODY MASS INDEX: 29.96 KG/M2 | DIASTOLIC BLOOD PRESSURE: 65 MMHG | SYSTOLIC BLOOD PRESSURE: 104 MMHG | OXYGEN SATURATION: 98 %

## 2019-08-16 DIAGNOSIS — M25.561 RECURRENT PAIN OF RIGHT KNEE: ICD-10-CM

## 2019-08-16 DIAGNOSIS — M25.552 HIP PAIN, BILATERAL: ICD-10-CM

## 2019-08-16 DIAGNOSIS — M25.551 HIP PAIN, BILATERAL: ICD-10-CM

## 2019-08-16 DIAGNOSIS — E06.3 HASHIMOTO'S THYROIDITIS: Primary | ICD-10-CM

## 2019-08-16 DIAGNOSIS — M05.752 RHEUMATOID ARTHRITIS INVOLVING BOTH HIPS WITH POSITIVE RHEUMATOID FACTOR (HCC): ICD-10-CM

## 2019-08-16 DIAGNOSIS — M05.751 RHEUMATOID ARTHRITIS INVOLVING BOTH HIPS WITH POSITIVE RHEUMATOID FACTOR (HCC): ICD-10-CM

## 2019-08-16 DIAGNOSIS — E55.9 VITAMIN D DEFICIENCY: ICD-10-CM

## 2019-08-16 DIAGNOSIS — E66.9 OBESITY, CLASS I, BMI 30-34.9: ICD-10-CM

## 2019-08-16 RX ORDER — PHENDIMETRAZINE TARTRATE 105 MG/1
1 CAPSULE, EXTENDED RELEASE ORAL DAILY
Qty: 30 CAP | Refills: 0 | Status: SHIPPED | OUTPATIENT
Start: 2019-08-16 | End: 2019-11-08

## 2019-08-16 RX ORDER — PHENTERMINE HYDROCHLORIDE 37.5 MG/1
37.5 TABLET ORAL
Qty: 30 TAB | Refills: 0 | Status: SHIPPED | OUTPATIENT
Start: 2019-08-16 | End: 2019-08-16 | Stop reason: ALTCHOICE

## 2019-08-16 NOTE — PROGRESS NOTES
Kevin Aldana  Identified pt with two pt identifiers(name and ). Chief Complaint   Patient presents with    Weight Management     Rm 13    Results         1. Have you been to the ER, urgent care clinic since your last visit? Hospitalized since your last visit? NO    2. Have you seen or consulted any other health care providers outside of the [address] [place] [address] since your last visit? Include any pap smears or colon screening. NO            Weight Metrics 2019 2019 2019 2019 2019 2019 3/11/2019   Weight - 175 lb 8 oz - 179 lb - 180 lb -   Neck Circ (inches) - - - - - - -   Waist Measure Inches 32 - 33 - 32 - 32.5   Exercise Mins/week - - - - - - -   Body Fat % 40.9 - 39.9 - 37.7 - 41   BMI - 30.12 kg/m2 - 30.73 kg/m2 - 30.9 kg/m2 -         Medication reconciliation up to date and correct with patient at this time. My Chart     My chart gives you direct online access to portions of the electronic medical record (EMR) where your doctor stores your health information (ie, lab results, appointment information, medications, immunizations, and more. It is free. Would you like to set up your my chart? YES      Advance Care Planning    In the event something were to happen to you and you were unable to speak on your behalf, do you have an Advance Directive/ Living Will in place stating your wishes? NO    If yes, do we have a copy on file NO    If no, would you like information YES      ====Jaspreet Colbert Invitation====    Patient was invited to [facility] on this date and given the information folder for review. Recommended appointment with Jaspreet Colbert facilitator for ACP conversation regarding advance directives. [x] Yes  [] No  Referral sent to [facility] Choices team member or Coordinator for follow-up    [] Yes  [x] No  Patient scheduled an appointment.        Site of Referral: [facility]      Today's provider has been notified of reason for visit, vitals and flowsheets obtained on patients. Reviewed record in preparation for visit, huddled with provider and have obtained necessary documentation. Health Maintenance Due   Topic    PAP AKA CERVICAL CYTOLOGY     Influenza Age 5 to Adult        Wt Readings from Last 3 Encounters:   08/16/19 175 lb 8 oz (79.6 kg)   06/11/19 179 lb (81.2 kg)   04/11/19 180 lb (81.6 kg)     Temp Readings from Last 3 Encounters:   08/16/19 98.5 °F (36.9 °C) (Oral)   06/11/19 97.2 °F (36.2 °C) (Oral)   04/11/19 97.8 °F (36.6 °C) (Oral)     BP Readings from Last 3 Encounters:   08/16/19 104/65   06/11/19 99/60   04/11/19 103/68     Pulse Readings from Last 3 Encounters:   08/16/19 (!) 51   06/11/19 61   04/11/19 (!) 57     Vitals:    08/16/19 0903   BP: 104/65   Pulse: (!) 51   Resp: 18   Temp: 98.5 °F (36.9 °C)   TempSrc: Oral   SpO2: 98%   Weight: 175 lb 8 oz (79.6 kg)   Height: 5' 4\" (1.626 m)   PainSc:   0 - No pain         Learning Assessment:  :     Learning Assessment 3/15/2018 4/22/2014   PRIMARY LEARNER Patient Patient   HIGHEST LEVEL OF EDUCATION - PRIMARY LEARNER  4 YEARS OF COLLEGE 4 YEARS OF COLLEGE   BARRIERS PRIMARY LEARNER NONE NONE   CO-LEARNER CAREGIVER No -   PRIMARY LANGUAGE ENGLISH ENGLISH   LEARNER PREFERENCE PRIMARY READING LISTENING   ANSWERED BY self patient   RELATIONSHIP SELF SELF       Depression Screening:  :     3 most recent PHQ Screens 4/11/2019   Little interest or pleasure in doing things Several days   Feeling down, depressed, irritable, or hopeless Several days   Total Score PHQ 2 2       Fall Risk Assessment:  :     Fall Risk Assessment, last 12 mths 4/11/2019   Able to walk? Yes   Fall in past 12 months? No       Abuse Screening:  :     Abuse Screening Questionnaire 4/11/2019 6/15/2018 4/16/2018 6/30/2016   Do you ever feel afraid of your partner? N N N N   Are you in a relationship with someone who physically or mentally threatens you? N N N N   Is it safe for you to go home?  [place] ADL Screening:  :     ADL Assessment 6/15/2018   Feeding yourself No Help Needed   Getting from bed to chair No Help Needed   Getting dressed No Help Needed   Bathing or showering No Help Needed   Walk across the room (includes cane/walker) No Help Needed   Using the telphone No Help Needed   Taking your medications No Help Needed   Preparing meals No Help Needed   Managing money (expenses/bills) No Help Needed   Moderately strenuous housework (laundry) No Help Needed   Shopping for personal items (toiletries/medicines) No Help Needed   Shopping for groceries No Help Needed   Driving No Help Needed   Climbing a flight of stairs No Help Needed   Getting to places beyond walking distances No Help Needed

## 2019-08-16 NOTE — PROGRESS NOTES
HISTORY OF PRESENT ILLNESS  Ferny Mcmahan is a 59 y.o. female presents with Weight Management (Rm 13) and Medication Refill      Agree with nurse note. Pt with Hashimoto's thyroiditis, RA, obesity, recurrent pain of R knee, BL hip pain, and vit d deficiency presents to the office with a BP of 104/65. She is tolerating Phentermine 37.5 mg well, month 3 of 3 for weight management. Last prescribed on 6/11/2019. She has noticed an increase of energy and decreased appetite since starting the medication. She lost 4 pounds since the last visit. Percent body fat has changed from 39.9 to 40.9, waist circumference measurement has changed from 33 to 32. Pt is down 64 lbs since 9/2014. Overall, patient is pleased and requests a refill of the medication today. She has started alternating jacquelyn classes with [employer] and walking around a track 5 days a week. For breakfast, she will eat a Premier protein shake. For lunch, she will eat a salad with chicken. Patient denies fatigue, sleep disturbance, chest pain, heart palpitations, nausea, dry mouth, constipation, signs of depression. Written by hyun Camargo, as dictated by Dr. Mark Pereyra DO.     ROS    Review of Systems negative except as noted above in HPI. ALLERGIES:    Allergies   Allergen Reactions    Advil Pm [Ibuprofen-Diphenhydramine Hcl] Other (comments)     Slightly elevated Creaitnine 1.02    Aleve [Naproxen Sodium] Other (comments)     Due to kidneys    Bactrim [Sulfamethoxazole-Trimethoprim] Nausea and Vomiting    Sulfa (Sulfonamide Antibiotics) Nausea and Vomiting    Sulfasalazine Nausea and Vomiting       CURRENT MEDICATIONS:    Outpatient Medications Marked as Taking for the 8/16/19 encounter (Office Visit) with Tamir Vila DO   Medication Sig Dispense Refill    phendimetrazine tartrate 105 mg cpER Take 1 Cap by mouth daily. Max Daily Amount: 1 Cap.  Indications: Weight Loss Management for an Obese Person 30 Cap 0  sertraline (ZOLOFT) 100 mg tablet Take 1 Tab by mouth daily. Indications: stress 90 Tab 3    diclofenac EC (VOLTAREN) 75 mg EC tablet TAKE 1 TABLET BY MOUTH TWICE DAILY 60 Tab 5    furosemide (LASIX) 40 mg tablet TAKE 1 TABLET BY MOUTH DAILY FOR SWELLING 90 Tab 3    montelukast (SINGULAIR) 10 mg tablet Take 1 Tab by mouth daily. Indications: Allergic Rhinitis 90 Tab 3    PREMARIN 0.625 mg/gram vaginal cream   4    MYRBETRIQ 50 mg ER tablet       aspirin delayed-release 81 mg tablet Take 1 Tab by mouth daily. Indications: prevention of transient ischemic attack 90 Tab 3    eszopiclone (LUNESTA) 2 mg tablet Take 1 Tab by mouth nightly. Max Daily Amount: 2 mg. 30 Tab 5    MINIVELLE 0.1 mg/24 hr 1 Patch by TransDERmal route two (2) times a week. Changes Sunday and Wednesday  0    cetirizine (ZYRTEC) 10 mg tablet Take 1 Tab by mouth daily. (Patient taking differently: Take 10 mg by mouth daily as needed.) 30 Tab 3    fluticasone (FLONASE) 50 mcg/actuation nasal spray 2 Sprays by Both Nostrils route daily. Indications: ALLERGIC RHINITIS 1 Bottle 5    albuterol (PROVENTIL HFA, VENTOLIN HFA) 90 mcg/actuation inhaler Take 2 Puffs by inhalation every four (4) hours as needed for Wheezing (cough). 1 Inhaler 1    cholecalciferol, vitamin D3, (VITAMIN D3) 2,000 unit Tab Take 2,000 Units by mouth daily. PAST MEDICAL HISTORY:    Past Medical History:   Diagnosis Date    Achilles tendonitis 12/2004    Right. Dr. Sundar Oden Arthritis 2010    hips, knees. Dr. Robert Tse    Chest pain 08/2013    Dr. Peggy Carlos.  Chickenpox childhood    Endometriosis 1990    Dr. Rosi Rose Environmental allergies     MARGI (generalized anxiety disorder)     Hip pain, bilateral 2010    due to severe OA. Iliopsoas bursitis.   Dr. Meghna Vargas    History of breast lump/mass excision 1998    Left breast.  Dr. Yahaira Fernandez Hyperlipidemia     Iron deficiency anemia     iron deficiency    Knee pain, bilateral 2017    Dr. Misa Larson    Menopausal and postmenopausal disorder 2007    Dr. Jaycee Jha Uterine fibroid     Dr. Roge Dejesus.  Vitamin D deficiency 03/2008       PAST SURGICAL HISTORY:    Past Surgical History:   Procedure Laterality Date    BIOPSY BREAST  Rt 1993;Lt 1998    Dr. Joseluis Mcdaniel Right 2002    Right achiles tendon. Dr. Shantanu Talamantes.  HX BREAST BIOPSY  2001    Left. benign. Dr. Taylor Davila HX COLONOSCOPY  09/27/2016    Dr. Nicola Elaine.  due q 5 yrs due to fam hx   Jt Boolvier Lucas. due to endometriosis    HX PELVIC LAPAROSCOPY  1995    due to endometriosis Dr. Sanon Conception  04/2011    due to fibroids. Dr. Roge Dejesus.        FAMILY HISTORY:    Family History   Problem Relation Age of Onset    Hypertension Mother     Other Mother         hearing loss/vision loss    Heart Disease Father     Lung Disease Father     Diabetes Sister     Heart Attack Sister 54        March 2014    Stroke Sister         after MI    Thyroid Disease Sister     Colon Polyps Sister     Heart Disease Brother         Defibrillator placed    Heart Attack Brother 48    Diabetes Maternal Grandmother        SOCIAL HISTORY:    Social History     Socioeconomic History    Marital status: SINGLE     Spouse name: Not on file    Number of children: 2    Years of education: Not on file    Highest education level: Not on file   Occupational History    Occupation: [employer]     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    Tobacco Use    Smoking status: Never Smoker    Smokeless tobacco: Never Used    Tobacco comment: lived with smoker mom x 18 yrs   Substance and Sexual Activity    Alcohol use: No    Drug use: No    Sexual activity: Not Currently     Partners: Male     Birth control/protection: Abstinence, Surgical       IMMUNIZATIONS:    Immunization History   Administered Date(s) Administered    Influenza Vaccine 12/14/2012, 10/22/2013, 09/18/2014    Influenza Vaccine (Quad) 10/29/2015    Influenza Vaccine (Quad) PF 09/29/2016, 10/19/2017, 09/10/2018    Influenza Vaccine Split 11/12/2010, 11/07/2011    TD Vaccine 08/18/2000    Tdap 06/30/2016       PHYSICAL EXAMINATION    Vital Signs    Visit Vitals  /65   Pulse (!) 51   Temp 98.5 °F (36.9 °C) (Oral)   Resp 18   Ht 5' 4\" (1.626 m)   Wt 175 lb 8 oz (79.6 kg)   SpO2 98%   BMI 30.12 kg/m²       Weight Metrics 8/16/2019 8/16/2019 6/11/2019 6/11/2019 4/11/2019 4/11/2019 3/11/2019   Weight - 175 lb 8 oz - 179 lb - 180 lb -   Neck Circ (inches) - - - - - - -   Waist Measure Inches 32 - 33 - 32 - 32.5   Exercise Mins/week - - - - - - -   Body Fat % 40.9 - 39.9 - 37.7 - 41   BMI - 30.12 kg/m2 - 30.73 kg/m2 - 30.9 kg/m2 -         General appearance - Well nourished. Well appearing. Well developed. No acute distress. Obese. Head - Normocephalic. Atraumatic. Eyes - pupils equal and reactive, extraocular eye movements intact, sclera anicteric. Ears - Hearing is grossly normal bilaterally. Nose - normal and patent, no erythema, discharge or polyps   Mouth - mucous membranes moist, pharynx normal with cobblestone appearance. No erythema, white exudate or obstruction. Neck - supple. Midline trachea. No carotid bruits are noted. No thyromegaly noted. Chest - clear to auscultation bilaterally anterriorly and posteriorly. No wheezes, rales or rhonchi. Breath sounds are symmetrical and unlabored bilaterally. Heart - normal rate. Regular rhythm, normal S1, S2. No murmur. No rubs, clicks or gallops noted. Abdomen - soft and distended. No masses or organomegaly. No rebound, rigidity or guarding. Bowel sounds normal x 4 quadrants. No tenderness noted. Neurological - awake, alert and oriented to person, place, and time and event. Cranial nerves II through XII intact. Muscle strength is +5/5 x 4 extremities. Sensation is intact to light touch bilaterally.   Steady gait.   Heme/Lymph - peripheral pulses normal x 4 extremities. No peripheral edema is noted. No cervical adenopathy noted. Skin - no rashes, erythema, ecchymosis, lacerations, abrasions, suspicious moles noted. No skin tags. No acanthosis nigricans noted in the axilla or neck. Psychological -   normal behavior, speech, dress and thought processes. Good insight. Good eye contact. Normal affect. Appropriate mood. DATA REVIEWED  Lab Results   Component Value Date/Time    WBC 5.2 03/21/2018 09:02 AM    HGB 12.0 03/21/2018 09:02 AM    HCT 38.5 03/21/2018 09:02 AM    PLATELET 184 66/60/7657 09:02 AM    MCV 91 03/21/2018 09:02 AM     Lab Results   Component Value Date/Time    Sodium 143 03/04/2019 08:05 AM    Potassium 3.9 03/04/2019 08:05 AM    Chloride 103 03/04/2019 08:05 AM    CO2 27 03/04/2019 08:05 AM    Anion gap 5 04/23/2011 05:20 AM    Glucose 88 03/04/2019 08:05 AM    BUN 14 03/04/2019 08:05 AM    Creatinine 0.93 03/04/2019 08:05 AM    BUN/Creatinine ratio 15 03/04/2019 08:05 AM    GFR est AA 76 03/04/2019 08:05 AM    GFR est non-AA 66 03/04/2019 08:05 AM    Calcium 9.3 03/04/2019 08:05 AM    Bilirubin, total 0.5 03/04/2019 08:05 AM    AST (SGOT) 17 03/04/2019 08:05 AM    Alk.  phosphatase 101 03/04/2019 08:05 AM    Protein, total 6.9 03/04/2019 08:05 AM    Albumin 4.2 03/04/2019 08:05 AM    A-G Ratio 1.6 03/04/2019 08:05 AM    ALT (SGPT) 14 03/04/2019 08:05 AM     Lab Results   Component Value Date/Time    Cholesterol, total 171 03/04/2019 08:05 AM    HDL Cholesterol 73 03/04/2019 08:05 AM    LDL, calculated 91 03/04/2019 08:05 AM    VLDL, calculated 7 03/04/2019 08:05 AM    Triglyceride 36 03/04/2019 08:05 AM     Lab Results   Component Value Date/Time    Vitamin D 25-Hydroxy 26.3 (L) 07/18/2011 10:22 AM    VITAMIN D, 25-HYDROXY 32.4 03/04/2019 08:05 AM       Lab Results   Component Value Date/Time    Hemoglobin A1c 5.4 03/21/2018 09:02 AM     Lab Results   Component Value Date/Time    TSH 3.630 03/04/2019 08:05 AM    TSH 2.36 08/21/2014 07:50 AM       ASSESSMENT and PLAN    ICD-10-CM ICD-9-CM    1. Hashimoto's thyroiditis E06.3 245.2     stable   2. Vitamin D deficiency E55.9 268.9     stable   3. Rheumatoid arthritis involving both hips with positive rheumatoid factor (HCC) M05.751 714.0     M05.752      stable   4. Hip pain, bilateral M25.551 719.45     M25.552     5. Recurrent pain of right knee M25.561 719.46     stable   6. Obesity, Class I, BMI 30-34.9 E66.9 278.00 phendimetrazine tartrate 105 mg cpER      DISCONTINUED: phentermine (ADIPEX-P) 37.5 mg tablet       Continue current medications and care. Prescriptions written and given to patient: Phendimetrazine 105 mg (month 1 of 3.)  Medication side effects discussed. [facility]  reviewed and pt is compliant. Discussed the patient's BMI with her. The BMI follow up plan is as follows: I have counseled this patient on diet and exercise regimens. Diet recommendations:  Decrease carbohydrates (white foods including flour, white bread, white rice, white pasta, white potatoes; corn, sweet foods, sweet drinks and alcohol), increase green leafy vegetables and protein with each meal.  Avoid fried foods. Eat 3-5 small meals daily. Do not skip meals. Ok to use meal replacements up to twice daily with protein goal of 20 g per meal.   Recommend OTC Premiere Protein bars or shakes. Increase water intake. Increase physical activity to 30 minutes daily for health benefit or 60 minutes daily to prevent weight regain, as tolerated. Physical Activity prescription:  A goal of 30 minutes physical activity daily is recommended for health benefit and at least 60 minutes daily to prevent weight regain. For weight loss, no less than 75% needs to be aerobic (i.e. Walking) and no more than 25% resistance exercising (i.e. Weight lifting). For weight maintenance phase, 50% aerobic and 50% resistance exercises.   Sleep prescription:    A goal of 7-8 hours of uninterrupted sleep is recommended to turn off the [place] hormone to be released from the stomach and triggers appetite while promoting weight gain. Proper rest turns on Leptin hormone to be released from white adipose tissue and promotes weight loss. Counseled patient on: weight loss goals, emphasizing a 5-10% weight loss in 6-12 months and strategies, vit d, thyroid, RA, hip and knee pain. Relevant handouts given and discussed with patient. Immunizations noted. Praised pt for weight loss efforts and progress. Patient was offered a choice/choices in the treatment plan today. Patient expresses understanding of the plan and agrees with recommendations. More than 20 mins spent face to face with patient and more than 50% of this time spent in counseling and coordinating care. Follow-up and Dispositions    · Return in about 1 month (around 9/16/2019) for blood pressure, referral follow up. ok for QC. .         Written by hyun Sinclair, as dictated by Dr. Cassius Rider DO. Documentation True and Accepted by Tobias Mohan. Mariam Luke. There are no Patient Instructions on file for this visit.

## 2019-11-08 ENCOUNTER — OFFICE VISIT (OUTPATIENT)
Dept: FAMILY MEDICINE CLINIC | Age: 64
End: 2019-11-08

## 2019-11-08 VITALS
OXYGEN SATURATION: 100 % | WEIGHT: 178.5 LBS | HEIGHT: 64 IN | HEART RATE: 57 BPM | SYSTOLIC BLOOD PRESSURE: 110 MMHG | TEMPERATURE: 97.7 F | DIASTOLIC BLOOD PRESSURE: 72 MMHG | BODY MASS INDEX: 30.48 KG/M2 | RESPIRATION RATE: 16 BRPM

## 2019-11-08 DIAGNOSIS — Z23 ENCOUNTER FOR IMMUNIZATION: ICD-10-CM

## 2019-11-08 DIAGNOSIS — E66.9 OBESITY, CLASS I, BMI 30-34.9: ICD-10-CM

## 2019-11-08 DIAGNOSIS — F41.1 GENERALIZED ANXIETY DISORDER: ICD-10-CM

## 2019-11-08 DIAGNOSIS — M05.752 RHEUMATOID ARTHRITIS INVOLVING BOTH HIPS WITH POSITIVE RHEUMATOID FACTOR (HCC): ICD-10-CM

## 2019-11-08 DIAGNOSIS — K59.09 OTHER CONSTIPATION: ICD-10-CM

## 2019-11-08 DIAGNOSIS — E06.3 HASHIMOTO'S THYROIDITIS: Primary | ICD-10-CM

## 2019-11-08 DIAGNOSIS — E78.00 PURE HYPERCHOLESTEROLEMIA: ICD-10-CM

## 2019-11-08 DIAGNOSIS — M05.751 RHEUMATOID ARTHRITIS INVOLVING BOTH HIPS WITH POSITIVE RHEUMATOID FACTOR (HCC): ICD-10-CM

## 2019-11-08 DIAGNOSIS — E55.9 VITAMIN D DEFICIENCY: ICD-10-CM

## 2019-11-08 DIAGNOSIS — F51.04 CHRONIC INSOMNIA: ICD-10-CM

## 2019-11-08 RX ORDER — PHENTERMINE HYDROCHLORIDE 37.5 MG/1
37.5 TABLET ORAL
Qty: 30 TAB | Refills: 0 | Status: SHIPPED | OUTPATIENT
Start: 2019-11-08 | End: 2020-01-27 | Stop reason: SDUPTHER

## 2019-11-08 NOTE — PROGRESS NOTES
HISTORY OF PRESENT ILLNESS  Sameera Phillips is a 59 y.o. female presents with Weight Management and Medication Refill      Agree with nurse note. Pt with Hashimoto's thyroiditis, obesity, vit d deficiency, RA, generalized anxiety disorder, hypercholesterolemia, chronic insomnia, and constipation presents to the office with a BP of 110/72. She is tolerating Phendimetrazine 105 mg well, month 1 of 3 for weight management. Last prescribed on 8/16/2019. She did not fill this script because it cost $67. She gained 3 pounds since the last visit. Percent body fat has changed from 40.9 to 40, waist circumference measurement has changed from 32 to 33. She goes to [facility] to walk and take jacquelyn classes. She drinks 16 oz of water daily. She drinks 2 cups of green tea daily. She will drink one Premier Protein shake through the work day because she does not have time to eat. Overall, patient requests a refill of Phentermine today. She has found that she is able to fall asleep without Lunesta because she is exhausted from the stress at work. Stressor: She switched to a new job in 5/2019 that she does not like. 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 She is going back to her old job in January. Patient denies fatigue, sleep disturbance, chest pain, heart palpitations, nausea, dry mouth, signs of depression. Written by Daryle Abelson, scribe, as dictated by Dr. Vince Johnson DO.     ROS    Review of Systems negative except as noted above in HPI.     ALLERGIES:    Allergies   Allergen Reactions    Advil Pm [Ibuprofen-Diphenhydramine Hcl] Other (comments)     Slightly elevated Creaitnine 1.02    Aleve [Naproxen Sodium] Other (comments)     Due to kidneys    Bactrim [Sulfamethoxazole-Trimethoprim] Nausea and Vomiting    Sulfa (Sulfonamide Antibiotics) Nausea and Vomiting    Sulfasalazine Nausea and Vomiting       CURRENT MEDICATIONS:    Outpatient Medications Marked as Taking for the 11/8/19 encounter (Office Visit) with Omar Sandra, DO   Medication Sig Dispense Refill    phentermine (ADIPEX-P) 37.5 mg tablet Take 1 Tab by mouth every morning. Indications: Adjunct Treatment of Obesity in a Comprehensive Weight Reduction Regimen 30 Tab 0    furosemide (LASIX) 40 mg tablet TAKE 1 TABLET BY MOUTH DAILY FOR SWELLING 90 Tab 1    sertraline (ZOLOFT) 100 mg tablet Take 1 Tab by mouth daily. Indications: stress 90 Tab 3    diclofenac EC (VOLTAREN) 75 mg EC tablet TAKE 1 TABLET BY MOUTH TWICE DAILY 60 Tab 5    montelukast (SINGULAIR) 10 mg tablet Take 1 Tab by mouth daily. Indications: Allergic Rhinitis 90 Tab 3    PREMARIN 0.625 mg/gram vaginal cream   4    MYRBETRIQ 50 mg ER tablet       aspirin delayed-release 81 mg tablet Take 1 Tab by mouth daily. Indications: prevention of transient ischemic attack 90 Tab 3    eszopiclone (LUNESTA) 2 mg tablet Take 1 Tab by mouth nightly. Max Daily Amount: 2 mg. 30 Tab 5    MINIVELLE 0.1 mg/24 hr 1 Patch by TransDERmal route two (2) times a week. Changes Sunday and Wednesday  0    fluticasone (FLONASE) 50 mcg/actuation nasal spray 2 Sprays by Both Nostrils route daily. Indications: ALLERGIC RHINITIS 1 Bottle 5    albuterol (PROVENTIL HFA, VENTOLIN HFA) 90 mcg/actuation inhaler Take 2 Puffs by inhalation every four (4) hours as needed for Wheezing (cough). 1 Inhaler 1    cholecalciferol, vitamin D3, (VITAMIN D3) 2,000 unit Tab Take 2,000 Units by mouth daily. PAST MEDICAL HISTORY:    Past Medical History:   Diagnosis Date    Achilles tendonitis 12/2004    Right. Dr. Prabhu Avila Arthritis 2010    hips, knees. Dr. Valerio Romero    Chest pain 08/2013    Dr. Bearden Edson.  Chickenpox childhood    Endometriosis 1990    Dr. Bridger Phipps Environmental allergies     MARGI (generalized anxiety disorder)     Hip pain, bilateral 2010    due to severe OA. Iliopsoas bursitis.   Dr. Mariajose Rios    History of breast lump/mass excision 1998    Left breast.  Dr. Ospina Army    Hyperlipidemia     Iron deficiency anemia     iron deficiency    Knee pain, bilateral 2017    Dr. Kim Love    Menopausal and postmenopausal disorder 2007    Dr. Power Both Uterine fibroid     Dr. Lady Lambert.  Vitamin D deficiency 03/2008       PAST SURGICAL HISTORY:    Past Surgical History:   Procedure Laterality Date    BIOPSY BREAST  Rt 1993;Lt 1998    Dr. Nela Herman Right 2002    Right achiles tendon. Dr. Ihsan June.  HX BREAST BIOPSY  2001    Left. benign. Dr. Modesta Child HX COLONOSCOPY  09/27/2016    Dr. Tony Davila.  due q 5 yrs due to fam hx   Merilee Huguenin    Dr. Thelma Steel. due to endometriosis    HX PELVIC LAPAROSCOPY  1995    due to endometriosis Dr. Dick Gil  04/2011    due to fibroids. Dr. Lady Lambert.        FAMILY HISTORY:    Family History   Problem Relation Age of Onset    Hypertension Mother     Other Mother         hearing loss/vision loss    Heart Disease Father     Lung Disease Father     Diabetes Sister     Heart Attack Sister 54        March 2014    Stroke Sister         after MI    Thyroid Disease Sister     Colon Polyps Sister     Heart Disease Brother         Defibrillator placed    Heart Attack Brother 48    Diabetes Maternal Grandmother        SOCIAL HISTORY:    Social History     Socioeconomic History    Marital status: SINGLE     Spouse name: Not on file    Number of children: 2    Years of education: Not on file    Highest education level: Not on file   Occupational History    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    Tobacco Use    Smoking status: Never Smoker    Smokeless tobacco: Never Used    Tobacco comment: lived with smoker mom x 18 yrs   Substance and Sexual Activity    Alcohol use: No    Drug use: No    Sexual activity: Not Currently     Partners: Male     Birth control/protection: Abstinence, Surgical       IMMUNIZATIONS:    Immunization History   Administered Date(s) Administered    Influenza Vaccine 12/14/2012, 10/22/2013, 09/18/2014    Influenza Vaccine (Quad) 10/29/2015    Influenza Vaccine (Quad) PF 09/29/2016, 10/19/2017, 09/10/2018, 11/08/2019    Influenza Vaccine Split 11/12/2010, 11/07/2011    TD Vaccine 08/18/2000    Tdap 06/30/2016       PHYSICAL EXAMINATION    Vital Signs    Visit Vitals  /72 (BP 1 Location: Left arm, BP Patient Position: Sitting)   Pulse (!) 57   Temp 97.7 °F (36.5 °C) (Oral)   Resp 16   Ht 5' 4\" (1.626 m)   Wt 178 lb 8 oz (81 kg)   SpO2 100%   BMI 30.64 kg/m²       Weight Metrics 11/8/2019 11/8/2019 8/16/2019 8/16/2019 6/11/2019 6/11/2019 4/11/2019   Weight - 178 lb 8 oz - 175 lb 8 oz - 179 lb -   Neck Circ (inches) - - - - - - -   Waist Measure Inches 33 - 32 - 33 - 32   Exercise Mins/week - - - - - - -   Body Fat % 40 - 40.9 - 39.9 - 37.7   BMI - 30.64 kg/m2 - 30.12 kg/m2 - 30.73 kg/m2 -         General appearance - Well nourished. Well appearing. Well developed. No acute distress. Obese. Head - Normocephalic. Atraumatic. Eyes - pupils equal and reactive, extraocular eye movements intact, sclera anicteric  Ears - Hearing is grossly normal bilaterally. Nose - normal and patent, no erythema, discharge or polyps   Mouth - mucous membranes moist, pharynx normal with cobblestone appearance. No erythema, white exudate or obstruction. Neck - supple. Midline trachea. No carotid bruits are noted. No thyromegaly noted. Chest - clear to auscultation bilaterally anterriorly and posteriorly. No wheezes, rales or rhonchi. Breath sounds are symmetrical and unlabored bilaterally. Heart - bradycardia. Regular rhythm, normal S1, S2. No murmur. No rubs, clicks or gallops noted. Abdomen - soft and distended. No masses or organomegaly. No rebound, rigidity or guarding. Bowel sounds normal x 4 quadrants.   No tenderness noted.  Neurological - awake, alert and oriented to person, place, and time and event. Cranial nerves II through XII intact. Muscle strength is +5/5 x 4 extremities. Sensation is intact to light touch bilaterally. Steady gait. Musculoskeletal - Intact x 4 extremities. Full ROM x 4 extremities. No pain with movement. Back exam - normal range of motion. No pain on palpation of the spinous processes in the cervical, thoracic, lumbar, sacral regions. No CVA tenderness. Heme/Lymph - peripheral pulses normal x 4 extremities. No peripheral edema is noted. No cervical adenopathy noted. Skin - no rashes, erythema, ecchymosis, lacerations, abrasions, suspicious moles noted. No skin tags. No acanthosis nigricans noted in the axilla or neck. Psychological -   normal behavior, speech, dress and thought processes. Good insight. Good eye contact. Normal affect. Appropriate mood. DATA REVIEWED  Lab Results   Component Value Date/Time    WBC 5.2 03/21/2018 09:02 AM    HGB 12.0 03/21/2018 09:02 AM    HCT 38.5 03/21/2018 09:02 AM    PLATELET 763 76/67/8696 09:02 AM    MCV 91 03/21/2018 09:02 AM     Lab Results   Component Value Date/Time    Sodium 143 03/04/2019 08:05 AM    Potassium 3.9 03/04/2019 08:05 AM    Chloride 103 03/04/2019 08:05 AM    CO2 27 03/04/2019 08:05 AM    Anion gap 5 04/23/2011 05:20 AM    Glucose 88 03/04/2019 08:05 AM    BUN 14 03/04/2019 08:05 AM    Creatinine 0.93 03/04/2019 08:05 AM    BUN/Creatinine ratio 15 03/04/2019 08:05 AM    GFR est AA 76 03/04/2019 08:05 AM    GFR est non-AA 66 03/04/2019 08:05 AM    Calcium 9.3 03/04/2019 08:05 AM    Bilirubin, total 0.5 03/04/2019 08:05 AM    AST (SGOT) 17 03/04/2019 08:05 AM    Alk.  phosphatase 101 03/04/2019 08:05 AM    Protein, total 6.9 03/04/2019 08:05 AM    Albumin 4.2 03/04/2019 08:05 AM    A-G Ratio 1.6 03/04/2019 08:05 AM    ALT (SGPT) 14 03/04/2019 08:05 AM     Lab Results   Component Value Date/Time    Cholesterol, total 171 03/04/2019 08:05 AM    HDL Cholesterol 73 03/04/2019 08:05 AM    LDL, calculated 91 03/04/2019 08:05 AM    VLDL, calculated 7 03/04/2019 08:05 AM    Triglyceride 36 03/04/2019 08:05 AM     Lab Results   Component Value Date/Time    Vitamin D 25-Hydroxy 26.3 (L) 07/18/2011 10:22 AM    VITAMIN D, 25-HYDROXY 32.4 03/04/2019 08:05 AM       Lab Results   Component Value Date/Time    Hemoglobin A1c 5.4 03/21/2018 09:02 AM     Lab Results   Component Value Date/Time    TSH 3.630 03/04/2019 08:05 AM    TSH 2.36 08/21/2014 07:50 AM       ASSESSMENT and PLAN    ICD-10-CM ICD-9-CM    1. Hashimoto's thyroiditis E06.3 245.2    2. Obesity, Class I, BMI 30-34.9 E66.9 278.00 phentermine (ADIPEX-P) 37.5 mg tablet   3. Vitamin D deficiency E55.9 268.9    4. Rheumatoid arthritis involving both hips with positive rheumatoid factor (HCC) M05.751 714.0     M05.752     5. Generalized anxiety disorder F41.1 300.02    6. Pure hypercholesterolemia E78.00 272.0    7. Other constipation K59.09 564.09    8. Chronic insomnia F51.04 780.52    9. Encounter for immunization Z23 V03.89 INFLUENZA VIRUS VAC QUAD,SPLIT,PRESV FREE SYRINGE IM      AL IMMUNIZ ADMIN,1 SINGLE/COMB VAC/TOXOID       Continue current medications and care. Encouraged her to continue without Lunesta if not needed. Recommend Magnesium 400 mg daily for constipation. Prescriptions written and given to patient: Phentermine 37.5 mg (month 1 of 3.)  Medication side effects discussed. [facility]  reviewed and pt is compliant. Discussed the patient's BMI with her. The BMI follow up plan is as follows: I have counseled this patient on diet and exercise regimens. Diet recommendations:  Decrease carbohydrates (white foods including flour, white bread, white rice, white pasta, white potatoes; corn, sweet foods, sweet drinks and alcohol), increase green leafy vegetables and protein with each meal.  Avoid fried foods. Eat 3-5 small meals daily. Do not skip meals.   Ok to use meal replacements up to twice daily with protein goal of 20 g per meal.   Recommend OTC Premiere Protein bars or shakes. Increase water intake. Increase physical activity to 30 minutes daily for health benefit or 60 minutes daily to prevent weight regain, as tolerated. Physical Activity prescription:  A goal of 30 minutes physical activity daily is recommended for health benefit and at least 60 minutes daily to prevent weight regain. For weight loss, no less than 75% needs to be aerobic (i.e. Walking) and no more than 25% resistance exercising (i.e. Weight lifting). For weight maintenance phase, 50% aerobic and 50% resistance exercises. Sleep prescription:    A goal of 7-8 hours of uninterrupted sleep is recommended to turn off the Grehlin hormone to be released from the stomach and triggers appetite while promoting weight gain. Proper rest turns on Leptin hormone to be released from white adipose tissue and promotes weight loss. Counseled patient on: weight loss goals, emphasizing a 5-10% weight loss in 6-12 months and strategies, stress. Relevant handouts given and discussed with patient. Immunizations noted. Flu shot administered today. Praised pt for weight loss efforts and progress. Patient was offered a choice/choices in the treatment plan today. Patient expresses understanding of the plan and agrees with recommendations. Follow-up and Dispositions    · Return in about 1 month (around 12/8/2019) for weight, med refills. Written by hyun Bolanos, as dictated by Dr. Ruth Coleman DO. Documentation True and Accepted by Gabriella Oneill. Andreia Perez. Patient Instructions          Constipation: Care Instructions  Your Care Instructions    Constipation means that you have a hard time passing stools (bowel movements). People pass stools from 3 times a day to once every 3 days. What is normal for you may be different.  Constipation may occur with pain in the rectum and cramping. The pain may get worse when you try to pass stools. Sometimes there are small amounts of bright red blood on toilet paper or the surface of stools. This is because of enlarged veins near the rectum (hemorrhoids). A few changes in your diet and lifestyle may help you avoid ongoing constipation. Your doctor may also prescribe medicine to help loosen your stool. Some medicines can cause constipation. These include pain medicines and antidepressants. Tell your doctor about all the medicines you take. Your doctor may want to make a medicine change to ease your symptoms. Follow-up care is a key part of your treatment and safety. Be sure to make and go to all appointments, and call your doctor if you are having problems. It's also a good idea to know your test results and keep a list of the medicines you take. How can you care for yourself at home? · Drink plenty of fluids, enough so that your urine is light yellow or clear like water. If you have kidney, heart, or liver disease and have to limit fluids, talk with your doctor before you increase the amount of fluids you drink. · Include high-fiber foods in your diet each day. These include fruits, vegetables, beans, and whole grains. · Get at least 30 minutes of exercise on most days of the week. Walking is a good choice. You also may want to do other activities, such as running, swimming, cycling, or playing tennis or team sports. · Take a fiber supplement, such as Citrucel or Metamucil, every day. Read and follow all instructions on the label. · Schedule time each day for a bowel movement. A daily routine may help. Take your time having your bowel movement. · Support your feet with a small step stool when you sit on the toilet. This helps flex your hips and places your pelvis in a squatting position. · Your doctor may recommend an over-the-counter laxative to relieve your constipation. Examples are Milk of Magnesia and MiraLax.  Read and follow all instructions on the label. Do not use laxatives on a long-term basis. When should you call for help? Call your doctor now or seek immediate medical care if:    · You have new or worse belly pain.     · You have new or worse nausea or vomiting.     · You have blood in your stools.    Watch closely for changes in your health, and be sure to contact your doctor if:    · Your constipation is getting worse.     · You do not get better as expected. Where can you learn more? Go to http://romulo-daron.info/. Enter 21 [phone] in the search box to learn more about \"Constipation: Care Instructions. \"  Current as of: June 26, 2019  Content Version: 12.2  © 2076-7509 [employer]. Care instructions adapted under license by [employer] (which disclaims liability or warranty for this information). If you have questions about a medical condition or this instruction, always ask your healthcare professional. [place] [address] any warranty or liability for your use of this information. To relieve or prevent constipation, increase your water intake, fiber, and walking to prevent constipation. Use over the counter Smooth Move Tea ([employer] international tea section),  fiber or stool softener as needed. Consider OTC Miralax or Milk of Magnesia if no bowel movement in 3 days.

## 2019-11-08 NOTE — PATIENT INSTRUCTIONS
Constipation: Care Instructions  Your Care Instructions    Constipation means that you have a hard time passing stools (bowel movements). People pass stools from 3 times a day to once every 3 days. What is normal for you may be different. Constipation may occur with pain in the rectum and cramping. The pain may get worse when you try to pass stools. Sometimes there are small amounts of bright red blood on toilet paper or the surface of stools. This is because of enlarged veins near the rectum (hemorrhoids). A few changes in your diet and lifestyle may help you avoid ongoing constipation. Your doctor may also prescribe medicine to help loosen your stool. Some medicines can cause constipation. These include pain medicines and antidepressants. Tell your doctor about all the medicines you take. Your doctor may want to make a medicine change to ease your symptoms. Follow-up care is a key part of your treatment and safety. Be sure to make and go to all appointments, and call your doctor if you are having problems. It's also a good idea to know your test results and keep a list of the medicines you take. How can you care for yourself at home? · Drink plenty of fluids, enough so that your urine is light yellow or clear like water. If you have kidney, heart, or liver disease and have to limit fluids, talk with your doctor before you increase the amount of fluids you drink. · Include high-fiber foods in your diet each day. These include fruits, vegetables, beans, and whole grains. · Get at least 30 minutes of exercise on most days of the week. Walking is a good choice. You also may want to do other activities, such as running, swimming, cycling, or playing tennis or team sports. · Take a fiber supplement, such as Citrucel or Metamucil, every day. Read and follow all instructions on the label. · Schedule time each day for a bowel movement. A daily routine may help.  Take your time having your bowel movement. · Support your feet with a small step stool when you sit on the toilet. This helps flex your hips and places your pelvis in a squatting position. · Your doctor may recommend an over-the-counter laxative to relieve your constipation. Examples are Milk of Magnesia and MiraLax. Read and follow all instructions on the label. Do not use laxatives on a long-term basis. When should you call for help? Call your doctor now or seek immediate medical care if:    · You have new or worse belly pain.     · You have new or worse nausea or vomiting.     · You have blood in your stools.    Watch closely for changes in your health, and be sure to contact your doctor if:    · Your constipation is getting worse.     · You do not get better as expected. Where can you learn more? Go to http://romulo-daron.info/. Enter 21 [phone] in the search box to learn more about \"Constipation: Care Instructions. \"  Current as of: June 26, 2019  Content Version: 12.2  © 4702-5178 [employer]. Care instructions adapted under license by [employer] (which disclaims liability or warranty for this information). If you have questions about a medical condition or this instruction, always ask your healthcare professional. [place] [address] any warranty or liability for your use of this information. To relieve or prevent constipation, increase your water intake, fiber, and walking to prevent constipation. Use over the counter Smooth Move Tea ([employer] international tea section),  fiber or stool softener as needed. Consider OTC Miralax or Milk of Magnesia if no bowel movement in 3 days.

## 2019-11-08 NOTE — PROGRESS NOTES
Identified pt with two pt identifiers(name and ). Reviewed record in preparation for visit and have obtained necessary documentation. Chief Complaint   Patient presents with    Weight Management        Health Maintenance Due   Topic    Influenza Age 5 to Adult         Visit Vitals  /72 (BP 1 Location: Left arm, BP Patient Position: Sitting)   Pulse (!) 57   Temp 97.7 °F (36.5 °C) (Oral)   Resp 16   Ht 5' 4\" (1.626 m)   Wt 178 lb 8 oz (81 kg)   SpO2 100%   BMI 30.64 kg/m²     Pain Scale: 0 - No pain/10    Coordination of Care Questionnaire:  :   1. Have you been to the ER, urgent care clinic since your last visit? Hospitalized since your last visit? No    2. Have you seen or consulted any other health care providers outside of the [address] [place] [address] since your last visit? Include any pap smears or colon screening.  No

## 2020-01-27 ENCOUNTER — OFFICE VISIT (OUTPATIENT)
Dept: FAMILY MEDICINE CLINIC | Age: 65
End: 2020-01-27

## 2020-01-27 VITALS
SYSTOLIC BLOOD PRESSURE: 103 MMHG | HEIGHT: 64 IN | BODY MASS INDEX: 30.73 KG/M2 | WEIGHT: 180 LBS | TEMPERATURE: 97.9 F | OXYGEN SATURATION: 99 % | RESPIRATION RATE: 16 BRPM | DIASTOLIC BLOOD PRESSURE: 69 MMHG | HEART RATE: 58 BPM

## 2020-01-27 DIAGNOSIS — M05.751 RHEUMATOID ARTHRITIS INVOLVING BOTH HIPS WITH POSITIVE RHEUMATOID FACTOR (HCC): ICD-10-CM

## 2020-01-27 DIAGNOSIS — G89.29 CHRONIC PAIN OF RIGHT KNEE: ICD-10-CM

## 2020-01-27 DIAGNOSIS — E55.9 VITAMIN D DEFICIENCY: ICD-10-CM

## 2020-01-27 DIAGNOSIS — E06.3 HASHIMOTO'S THYROIDITIS: ICD-10-CM

## 2020-01-27 DIAGNOSIS — M25.561 CHRONIC PAIN OF RIGHT KNEE: ICD-10-CM

## 2020-01-27 DIAGNOSIS — E66.9 OBESITY, CLASS I, BMI 30-34.9: ICD-10-CM

## 2020-01-27 DIAGNOSIS — Z11.1 SCREENING-PULMONARY TB: Primary | ICD-10-CM

## 2020-01-27 DIAGNOSIS — F51.04 CHRONIC INSOMNIA: ICD-10-CM

## 2020-01-27 DIAGNOSIS — M05.752 RHEUMATOID ARTHRITIS INVOLVING BOTH HIPS WITH POSITIVE RHEUMATOID FACTOR (HCC): ICD-10-CM

## 2020-01-27 DIAGNOSIS — F41.1 GENERALIZED ANXIETY DISORDER: ICD-10-CM

## 2020-01-27 RX ORDER — PHENDIMETRAZINE TARTRATE 105 MG/1
105 CAPSULE, EXTENDED RELEASE ORAL
COMMUNITY
Start: 2017-04-09 | End: 2020-01-27 | Stop reason: ALTCHOICE

## 2020-01-27 RX ORDER — PHENTERMINE HYDROCHLORIDE 37.5 MG/1
37.5 TABLET ORAL
Qty: 30 TAB | Refills: 0 | Status: SHIPPED | OUTPATIENT
Start: 2020-01-27 | End: 2021-05-04 | Stop reason: SDUPTHER

## 2020-01-27 NOTE — PROGRESS NOTES
HISTORY OF PRESENT ILLNESS  Anabel Lara is a 59 y.o. female presents with Weight Management and Medication Refill      Agree with nurse note. Pt with Hashimoto's thyroiditis, vit d deficiency, RA, MARGI, and chronic insomnia presents to the office with a BP of 103/69. She takes Lasix 40 mg qPM for swelling; tolerating well. She is tolerating Phentermine 37.5 mg well, month 1 of 3 for weight management. Last prescribed on 11/8/2019. She has noticed an increase of energy and decreased appetite since starting the medication. She gained 2 pounds since the last visit. Percent body fat has changed from 40 to 39.1, waist circumference measurement has changed from 33 to 30. She gets 5-6 hours of sleep nightly. Overall, patient is pleased and requests a refill of the medication today. Patient denies fatigue, sleep disturbance, chest pain, heart palpitations, nausea, dry mouth, constipation, signs of depression. Pt had a Synvisc injection in the R knee with DEMETRIA Collado on 1/15/2020. Pt requests a skin TB screening for her new job. Written by hyun Johnson, as dictated by Dr. Sven Mark DO.     ROS    Review of Systems negative except as noted above in HPI. ALLERGIES:    Allergies   Allergen Reactions    Advil Pm [Ibuprofen-Diphenhydramine Hcl] Other (comments)     Slightly elevated Creaitnine 1.02    Aleve [Naproxen Sodium] Other (comments)     Due to kidneys    Bactrim [Sulfamethoxazole-Trimethoprim] Nausea and Vomiting    Sulfa (Sulfonamide Antibiotics) Nausea and Vomiting    Sulfasalazine Nausea and Vomiting       CURRENT MEDICATIONS:    Outpatient Medications Marked as Taking for the 1/27/20 encounter (Office Visit) with Diamond Borges DO   Medication Sig Dispense Refill    phentermine (ADIPEX-P) 37.5 mg tablet Take 1 Tab by mouth every morning.  Indications: Adjunct Treatment of Obesity in a Comprehensive Weight Reduction Regimen 30 Tab 0    furosemide (LASIX) 40 mg tablet TAKE 1 TABLET BY MOUTH DAILY FOR SWELLING 90 Tab 1    sertraline (ZOLOFT) 100 mg tablet Take 1 Tab by mouth daily. Indications: stress 90 Tab 3    diclofenac EC (VOLTAREN) 75 mg EC tablet TAKE 1 TABLET BY MOUTH TWICE DAILY 60 Tab 5    montelukast (SINGULAIR) 10 mg tablet Take 1 Tab by mouth daily. Indications: Allergic Rhinitis 90 Tab 3    PREMARIN 0.625 mg/gram vaginal cream   4    MYRBETRIQ 50 mg ER tablet Take 50 mg by mouth two (2) times a week.  aspirin delayed-release 81 mg tablet Take 1 Tab by mouth daily. Indications: prevention of transient ischemic attack 90 Tab 3    MINIVELLE 0.1 mg/24 hr 1 Patch by TransDERmal route two (2) times a week. Changes Sunday and Wednesday  0    cetirizine (ZYRTEC) 10 mg tablet Take 1 Tab by mouth daily. (Patient taking differently: Take 10 mg by mouth daily as needed.) 30 Tab 3    fluticasone (FLONASE) 50 mcg/actuation nasal spray 2 Sprays by Both Nostrils route daily. Indications: ALLERGIC RHINITIS 1 Bottle 5    cholecalciferol, vitamin D3, (VITAMIN D3) 2,000 unit Tab Take 2,000 Units by mouth daily. PAST MEDICAL HISTORY:    Past Medical History:   Diagnosis Date    Achilles tendonitis 12/2004    Right. Dr. Wyatt Roach Arthritis 2010    hips, knees. Dr. Lynette Phipps    Chest pain 08/2013    Dr. Isabel Mendez.  Chickenpox childhood    Endometriosis 1990    Dr. Yung Cruz Environmental allergies     MARGI (generalized anxiety disorder)     Hip pain, bilateral 2010    due to severe OA. Iliopsoas bursitis. Dr. Ish Villareal    History of breast lump/mass excision 1998    Left breast.  Dr. Jessenia Gordon Hyperlipidemia     Iron deficiency anemia     iron deficiency    Knee pain, bilateral 2017    Dr. Opal Delgado    Menopausal and postmenopausal disorder 2007    Dr. Yung Cruz Uterine fibroid     Dr. Garcia Schmitt.     Vitamin D deficiency 03/2008       PAST SURGICAL HISTORY:    Past Surgical History:   Procedure Laterality Date    BIOPSY BREAST  Rt 1993;Lt 1998    Dr. Michi Delarosa Right 2002    Right achiles tendon. Dr. Rocco Boykin.  HX BREAST BIOPSY  2001    Left. benign. Dr. Lawrence Doss HX COLONOSCOPY  09/27/2016    Dr. Paulette Sequeira.  due q 5 yrs due to fam hx   Sara Lashell    Dr. Cristian Lyn. due to endometriosis    HX PELVIC LAPAROSCOPY  1995    due to endometriosis Dr. Samantha Ellis  04/2011    due to fibroids. Dr. Blanche Streeter.        FAMILY HISTORY:    Family History   Problem Relation Age of Onset    Hypertension Mother     Other Mother         hearing loss/vision loss    Heart Disease Father     Lung Disease Father     Diabetes Sister     Heart Attack Sister 54        March 2014    Stroke Sister         after MI    Thyroid Disease Sister     Colon Polyps Sister     Heart Disease Brother         Defibrillator placed    Heart Attack Brother 48    Diabetes Maternal Grandmother        SOCIAL HISTORY:    Social History     Socioeconomic History    Marital status: SINGLE     Spouse name: Not on file    Number of children: 2    Years of education: Not on file    Highest education level: Not on file   Occupational History    Occupation: [employer]     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    Tobacco Use    Smoking status: Never Smoker    Smokeless tobacco: Never Used    Tobacco comment: lived with smoker mom x 18 yrs   Substance and Sexual Activity    Alcohol use: No    Drug use: No    Sexual activity: Not Currently     Partners: Male     Birth control/protection: Abstinence, Surgical       IMMUNIZATIONS:    Immunization History   Administered Date(s) Administered    Influenza Vaccine 12/14/2012, 10/22/2013, 09/18/2014    Influenza Vaccine (Quad) 10/29/2015    Influenza Vaccine (Quad) PF 09/29/2016, 10/19/2017, 09/10/2018, 11/08/2019    Influenza Vaccine Split 11/12/2010, 11/07/2011    TB Skin Test (PPD) Intradermal 01/27/2020  TD Vaccine 08/18/2000    Tdap 06/30/2016       PHYSICAL EXAMINATION    Vital Signs    Visit Vitals  /69 (BP 1 Location: Left arm, BP Patient Position: Sitting)   Pulse (!) 58   Temp 97.9 °F (36.6 °C) (Oral)   Resp 16   Ht 5' 4\" (1.626 m)   Wt 180 lb (81.6 kg)   SpO2 99%   BMI 30.90 kg/m²       Weight Metrics 1/27/2020 1/27/2020 11/8/2019 11/8/2019 8/16/2019 8/16/2019 6/11/2019   Weight - 180 lb - 178 lb 8 oz - 175 lb 8 oz -   Neck Circ (inches) - - - - - - -   Waist Measure Inches - - 33 - 32 - 33   Exercise Mins/week - - - - - - -   Body Fat % 39.1 - 40 - 40.9 - 39.9   BMI - 30.9 kg/m2 - 30.64 kg/m2 - 30.12 kg/m2 -         General appearance - Well nourished. Well appearing. Well developed. No acute distress. Obese. Head - Normocephalic. Atraumatic. Eyes - pupils equal and reactive, extraocular eye movements intact, sclera anicteric  Ears - Hearing is grossly normal bilaterally. Nose - normal and patent, no erythema, discharge or polyps   Mouth - mucous membranes moist, pharynx normal with cobblestone appearance. No erythema, white exudate or obstruction. Neck - supple. Midline trachea. No carotid bruits are noted. No thyromegaly noted. Chest - clear to auscultation bilaterally anterriorly and posteriorly. No wheezes, rales or rhonchi. Breath sounds are symmetrical and unlabored bilaterally. Heart - bradycardia. Regular rhythm, normal S1, S2. No murmur. No rubs, clicks or gallops noted. Abdomen - soft and distended. No masses or organomegaly. No rebound, rigidity or guarding. Bowel sounds normal x 4 quadrants. No tenderness noted. Neurological - awake, alert and oriented to person, place, and time and event. Cranial nerves II through XII intact. Muscle strength is +5/5 x 4 extremities. Sensation is intact to light touch bilaterally. Steady gait. Musculoskeletal - Intact x 4 extremities. Full ROM x 4 extremities. No pain with movement. Knee crepitance noted.   Back exam - normal range of motion. No pain on palpation of the spinous processes in the cervical, thoracic, lumbar, sacral regions. No CVA tenderness. Heme/Lymph - peripheral pulses normal x 4 extremities. No peripheral edema is noted. No cervical adenopathy noted. Skin - no rashes, erythema, ecchymosis, lacerations, abrasions, suspicious moles noted. No skin tags. No acanthosis nigricans noted in the axilla or neck. Psychological -   normal behavior, speech, dress and thought processes. Good insight. Good eye contact. Normal affect. Appropriate mood. DATA REVIEWED  Lab Results   Component Value Date/Time    WBC 5.2 03/21/2018 09:02 AM    HGB 12.0 03/21/2018 09:02 AM    HCT 38.5 03/21/2018 09:02 AM    PLATELET 824 73/51/3235 09:02 AM    MCV 91 03/21/2018 09:02 AM     Lab Results   Component Value Date/Time    Sodium 143 03/04/2019 08:05 AM    Potassium 3.9 03/04/2019 08:05 AM    Chloride 103 03/04/2019 08:05 AM    CO2 27 03/04/2019 08:05 AM    Anion gap 5 04/23/2011 05:20 AM    Glucose 88 03/04/2019 08:05 AM    BUN 14 03/04/2019 08:05 AM    Creatinine 0.93 03/04/2019 08:05 AM    BUN/Creatinine ratio 15 03/04/2019 08:05 AM    GFR est AA 76 03/04/2019 08:05 AM    GFR est non-AA 66 03/04/2019 08:05 AM    Calcium 9.3 03/04/2019 08:05 AM    Bilirubin, total 0.5 03/04/2019 08:05 AM    AST (SGOT) 17 03/04/2019 08:05 AM    Alk.  phosphatase 101 03/04/2019 08:05 AM    Protein, total 6.9 03/04/2019 08:05 AM    Albumin 4.2 03/04/2019 08:05 AM    A-G Ratio 1.6 03/04/2019 08:05 AM    ALT (SGPT) 14 03/04/2019 08:05 AM     Lab Results   Component Value Date/Time    Cholesterol, total 171 03/04/2019 08:05 AM    HDL Cholesterol 73 03/04/2019 08:05 AM    LDL, calculated 91 03/04/2019 08:05 AM    VLDL, calculated 7 03/04/2019 08:05 AM    Triglyceride 36 03/04/2019 08:05 AM     Lab Results   Component Value Date/Time    Vitamin D 25-Hydroxy 26.3 (L) 07/18/2011 10:22 AM    VITAMIN D, 25-HYDROXY 32.4 03/04/2019 08:05 AM Lab Results   Component Value Date/Time    Hemoglobin A1c 5.4 03/21/2018 09:02 AM     Lab Results   Component Value Date/Time    TSH 3.630 03/04/2019 08:05 AM    TSH 2.36 08/21/2014 07:50 AM     ASSESSMENT and PLAN    ICD-10-CM ICD-9-CM    1. Screening-pulmonary TB Z11.1 V74.1 AMB POC TUBERCULOSIS, INTRADERMAL (SKIN TEST)   2. Obesity, Class I, BMI 30-34.9 E66.9 278.00 phentermine (ADIPEX-P) 37.5 mg tablet   3. Hashimoto's thyroiditis E06.3 245.2    4. Vitamin D deficiency E55.9 268.9    5. Rheumatoid arthritis involving both hips with positive rheumatoid factor (HCC) M05.751 714.0     M05.752     6. Generalized anxiety disorder F41.1 300.02    7. Chronic insomnia F51.04 780.52    8. Chronic pain of right knee M25.561 719.46     G89.29 338.29     improving after Synvisc injection,        Continue current medications and care. Prescriptions written and given to patient: Phentermine 37.5 mg (month 2 of 3.)  Medication side effects discussed. [facility]  reviewed and pt is compliant. Discussed the patient's BMI with her. The BMI follow up plan is as follows: I have counseled this patient on diet and exercise regimens. Diet recommendations:  Decrease carbohydrates (white foods including flour, white bread, white rice, white pasta, white potatoes; corn, sweet foods, sweet drinks and alcohol), increase green leafy vegetables and protein with each meal.  Avoid fried foods. Eat 3-5 small meals daily. Do not skip meals. Ok to use meal replacements up to twice daily with protein goal of 20 g per meal.   Recommend OTC Premiere Protein bars or shakes. Increase water intake. Increase physical activity to 30 minutes daily for health benefit or 60 minutes daily to prevent weight regain, as tolerated. Physical Activity prescription:  A goal of 30 minutes physical activity daily is recommended for health benefit and at least 60 minutes daily to prevent weight regain.   For weight loss, no less than 75% needs to be aerobic (i.e. Walking) and no more than 25% resistance exercising (i.e. Weight lifting). For weight maintenance phase, 50% aerobic and 50% resistance exercises. Sleep prescription:    A goal of 7-8 hours of uninterrupted sleep is recommended to turn off the Grehlin hormone to be released from the stomach and triggers appetite while promoting weight gain. Proper rest turns on Leptin hormone to be released from white adipose tissue and promotes weight loss. Counseled patient on: weight loss goals, emphasizing a 5-10% weight loss in 6-12 months and strategies, knee pain. Relevant handouts given and discussed with patient. Immunizations noted. Praised pt for weight loss efforts and progress. Patient was offered a choice/choices in the treatment plan today. Patient expresses understanding of the plan and agrees with recommendations. Follow-up and Dispositions    · Return in about 1 month (around 2/27/2020) for weight. Written by hyun Luis, as dictated by Dr. Wil Pineda DO. Documentation True and Accepted by Allyson Lindo. Cheyenne Cody. There are no Patient Instructions on file for this visit.

## 2020-01-27 NOTE — PROGRESS NOTES
Chief Complaint   Patient presents with    Weight Management     1. Have you been to the ER, urgent care clinic since your last visit? Hospitalized since your last visit? No    2. Have you seen or consulted any other health care providers outside of the [address] [place] [address] since your last visit? Include any pap smears or colon screening. No     39.1 Fat  30.7 mass  44.9 water  1343 expenditure    Pulse low doctor notified.

## 2020-01-29 LAB
MM INDURATION POC: 0 MM (ref 0–5)
PPD POC: NEGATIVE NEGATIVE

## 2020-05-06 DIAGNOSIS — R60.9 PERIPHERAL EDEMA: ICD-10-CM

## 2020-05-06 DIAGNOSIS — F41.1 GENERALIZED ANXIETY DISORDER: ICD-10-CM

## 2020-05-06 RX ORDER — FUROSEMIDE 40 MG/1
TABLET ORAL
Qty: 90 TAB | Refills: 1 | Status: SHIPPED | OUTPATIENT
Start: 2020-05-06 | End: 2020-11-06

## 2020-05-06 RX ORDER — SERTRALINE HYDROCHLORIDE 100 MG/1
TABLET, FILM COATED ORAL
Qty: 90 TAB | Refills: 3 | Status: SHIPPED | OUTPATIENT
Start: 2020-05-06 | End: 2021-05-04 | Stop reason: SDUPTHER

## 2020-11-12 ENCOUNTER — TELEPHONE (OUTPATIENT)
Dept: FAMILY MEDICINE CLINIC | Age: 65
End: 2020-11-12

## 2020-11-12 NOTE — TELEPHONE ENCOUNTER
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 The patient will like to know when that paperwork will be complete because the [employer] was waiting on the paperwork.  Please call as soon as possible with an update [phone]

## 2020-11-27 NOTE — TELEPHONE ENCOUNTER
The patient called to follow up on the paperwork for her accommodations for being out of work til February. The patient would like to get the paperwork completed as soon as possible.

## 2021-01-11 ENCOUNTER — OFFICE VISIT (OUTPATIENT)
Dept: FAMILY MEDICINE CLINIC | Age: 66
End: 2021-01-11
Payer: COMMERCIAL

## 2021-01-11 VITALS
DIASTOLIC BLOOD PRESSURE: 60 MMHG | OXYGEN SATURATION: 100 % | HEART RATE: 58 BPM | RESPIRATION RATE: 16 BRPM | SYSTOLIC BLOOD PRESSURE: 110 MMHG | HEIGHT: 64 IN | TEMPERATURE: 97.3 F | BODY MASS INDEX: 35.51 KG/M2 | WEIGHT: 208 LBS

## 2021-01-11 DIAGNOSIS — Z23 ENCOUNTER FOR IMMUNIZATION: ICD-10-CM

## 2021-01-11 DIAGNOSIS — R60.9 PERIPHERAL EDEMA: Primary | ICD-10-CM

## 2021-01-11 DIAGNOSIS — E66.9 OBESITY, CLASS I, BMI 30-34.9: ICD-10-CM

## 2021-01-11 DIAGNOSIS — Z23 NEED FOR SHINGLES VACCINE: ICD-10-CM

## 2021-01-11 DIAGNOSIS — E78.00 PURE HYPERCHOLESTEROLEMIA: ICD-10-CM

## 2021-01-11 PROCEDURE — 99213 OFFICE O/P EST LOW 20 MIN: CPT | Performed by: PHYSICIAN ASSISTANT

## 2021-01-11 PROCEDURE — 90732 PPSV23 VACC 2 YRS+ SUBQ/IM: CPT | Performed by: PHYSICIAN ASSISTANT

## 2021-01-11 RX ORDER — ZOSTER VACCINE RECOMBINANT, ADJUVANTED 50 MCG/0.5
0.5 KIT INTRAMUSCULAR ONCE
Qty: 0.5 ML | Refills: 1 | Status: SHIPPED | OUTPATIENT
Start: 2021-01-11 | End: 2021-01-11

## 2021-01-11 RX ORDER — HYLAN G-F 20 16MG/2ML
SYRINGE (ML) INTRAARTICULAR
COMMUNITY
Start: 2020-10-29

## 2021-01-11 RX ORDER — FUROSEMIDE 40 MG/1
TABLET ORAL
Qty: 30 TAB | Refills: 1 | Status: SHIPPED | OUTPATIENT
Start: 2021-01-11 | End: 2021-05-04 | Stop reason: SDUPTHER

## 2021-01-11 RX ORDER — PHENTERMINE HYDROCHLORIDE 37.5 MG/1
37.5 TABLET ORAL
Qty: 30 TAB | Refills: 0 | Status: CANCELLED | OUTPATIENT
Start: 2021-01-11

## 2021-01-11 NOTE — PROGRESS NOTES
HPI:  72 y.o.  presents for follow up appointment. No hospital, ER or specialist visits since last primary care visit except as noted below. PCP is Dr Carmine Hough who is out on leave. Edema - takes furosemide 40 mg daily, not on potassium suppl, last labs 2019 CMP normal, she is out of the furosemide today    Request PSV-23 vaccine today  Shingrix rx printed    Also requests phentermine refill. I advised I do not prescribe weight loss medications. She has appt in Feb with Dr Carmine Hough, and will review with her then    Patient Active Problem List    Diagnosis    Obesity, Class I, BMI 30-34.9    Recurrent pain of right knee    Primary osteoarthritis of right knee    Leg edema, right     due to chronic achilles tendonitis vs other, stable      Hashimoto's thyroiditis    Advance care planning    Elevated alkaline phosphatase level    Vitamin D deficiency    Chronic insomnia    Generalized anxiety disorder    Peripheral edema     hands and legs, off and on      Family history of ischemic heart disease    Family history of stroke    Pure hypercholesterolemia    RA (rheumatoid arthritis) ([facility] [address]    Female pelvic pain    Hip pain, bilateral     Dr. China Patel      Arthritis     hips, knees      Menopausal and postmenopausal disorder    Achilles tendonitis     Right. Dr. Kaity Reese           Past Medical History:   Diagnosis Date    Achilles tendonitis 12/2004    Right. Dr. Irena Garcia Arthritis 2010    hips, knees. Dr. Bowden Inch    Chest pain 08/2013    Dr. Lucio Gruber.  Chickenpox childhood    Endometriosis 1990    Dr. Terrance Heath Environmental allergies     MARGI (generalized anxiety disorder)     Hip pain, bilateral 2010    due to severe OA. Iliopsoas bursitis.   Dr. China Patel    History of breast lump/mass excision 1998    Left breast.  Dr. Jovan Caro Hyperlipidemia     Iron deficiency anemia     iron deficiency    Knee pain, bilateral 2017    Dr. Deon Fuller Menopausal and postmenopausal disorder 2007    Dr. Joseluis Kay Uterine fibroid     Dr. Mono De La Torre.  Vitamin D deficiency 03/2008       Social History     Tobacco Use    Smoking status: Never Smoker    Smokeless tobacco: Never Used    Tobacco comment: lived with smoker mom x 18 yrs   Substance Use Topics    Alcohol use: No     Frequency: Never     Binge frequency: Never    Drug use: No       Outpatient Medications Marked as Taking for the 1/11/21 encounter (Office Visit) with Roxane Antunez PA-C   Medication Sig Dispense Refill    hyaluronate (Synvisc-One) 48 mg/6 mL syrg injection Inject one prefilled syringe into the right knee, for a quantity of 1 injection      furosemide (LASIX) 40 mg tablet TAKE 1 TABLET BY MOUTH DAILY FOR SWELLING 30 Tab 0    sertraline (ZOLOFT) 100 mg tablet TAKE 1 TABLET BY MOUTH DAILY FOR STRESS 90 Tab 3    phentermine (ADIPEX-P) 37.5 mg tablet Take 1 Tab by mouth every morning. Indications: Adjunct Treatment of Obesity in a Comprehensive Weight Reduction Regimen 30 Tab 0    diclofenac EC (VOLTAREN) 75 mg EC tablet TAKE 1 TABLET BY MOUTH TWICE DAILY 60 Tab 5    montelukast (SINGULAIR) 10 mg tablet Take 1 Tab by mouth daily. Indications: Allergic Rhinitis (Patient taking differently: Take 10 mg by mouth as needed. Indications: inflammation of the nose due to an allergy) 90 Tab 3    PREMARIN 0.625 mg/gram vaginal cream   4    MYRBETRIQ 50 mg ER tablet Take 50 mg by mouth two (2) times a week.  aspirin delayed-release 81 mg tablet Take 1 Tab by mouth daily. Indications: prevention of transient ischemic attack 90 Tab 3    MINIVELLE 0.1 mg/24 hr 1 Patch by TransDERmal route two (2) times a week. Changes Sunday and Wednesday  0    cetirizine (ZYRTEC) 10 mg tablet Take 1 Tab by mouth daily. (Patient taking differently: Take 10 mg by mouth daily as needed.) 30 Tab 3    fluticasone (FLONASE) 50 mcg/actuation nasal spray 2 Sprays by Both Nostrils route daily.  Indications: ALLERGIC RHINITIS 1 Bottle 5    albuterol (PROVENTIL HFA, VENTOLIN HFA) 90 mcg/actuation inhaler Take 2 Puffs by inhalation every four (4) hours as needed for Wheezing (cough). 1 Inhaler 1    cholecalciferol, vitamin D3, (VITAMIN D3) 2,000 unit Tab Take 2,000 Units by mouth daily. Allergies   Allergen Reactions    Advil Pm [Ibuprofen-Diphenhydramine Hcl] Other (comments)     Slightly elevated Creaitnine 1.02    Aleve [Naproxen Sodium] Other (comments)     Due to kidneys    Bactrim [Sulfamethoxazole-Trimethoprim] Nausea and Vomiting    Sulfa (Sulfonamide Antibiotics) Nausea and Vomiting    Sulfasalazine Nausea and Vomiting       ROS:  ROS negative except as per HPI. PE:  Visit Vitals  /60 (BP 1 Location: Left arm, BP Patient Position: Sitting)   Pulse (!) 58   Temp 97.3 °F (36.3 °C) (Oral)   Resp 16   Ht 5' 4\" (1.626 m)   Wt 208 lb (94.3 kg)   SpO2 100%   BMI 35.70 kg/m²     Gen: alert, oriented, no acute distress  Head: normocephalic, atraumatic  Eyes: pupils equal round reactive to light, sclera clear, conjunctiva clear  Oral: moist mucus membranes, no oral lesions, no pharyngeal inflammation or exudate  Neck: supple  Resp: no increase work of breathing, lungs clear to ausculation bilaterally, no wheezing, rales or rhonchi  CV: S1, S2 normal.  No murmurs, rubs, or gallops. Skin: no lesion or rash  Extremities: 1+ bilateral non-pitting ankle edema    No results found for this visit on 01/11/21.     Lab Results   Component Value Date/Time    WBC 5.2 03/21/2018 09:02 AM    HGB 12.0 03/21/2018 09:02 AM    HCT 38.5 03/21/2018 09:02 AM    PLATELET 208 99/75/5894 09:02 AM    MCV 91 03/21/2018 09:02 AM     Lab Results   Component Value Date/Time    Sodium 143 03/04/2019 08:05 AM    Potassium 3.9 03/04/2019 08:05 AM    Chloride 103 03/04/2019 08:05 AM    CO2 27 03/04/2019 08:05 AM    Anion gap 5 04/23/2011 05:20 AM    Glucose 88 03/04/2019 08:05 AM    BUN 14 03/04/2019 08:05 AM    Creatinine 0.93 03/04/2019 08:05 AM    BUN/Creatinine ratio 15 03/04/2019 08:05 AM    GFR est AA 76 03/04/2019 08:05 AM    GFR est non-AA 66 03/04/2019 08:05 AM    Calcium 9.3 03/04/2019 08:05 AM    Bilirubin, total 0.5 03/04/2019 08:05 AM    Alk. phosphatase 101 03/04/2019 08:05 AM    Protein, total 6.9 03/04/2019 08:05 AM    Albumin 4.2 03/04/2019 08:05 AM    A-G Ratio 1.6 03/04/2019 08:05 AM    ALT (SGPT) 14 03/04/2019 08:05 AM    AST (SGOT) 17 03/04/2019 08:05 AM     Lab Results   Component Value Date/Time    TSH 3.630 03/04/2019 08:05 AM    TSH 2.36 08/21/2014 07:50 AM     Lab Results   Component Value Date/Time    Cholesterol, total 171 03/04/2019 08:05 AM    HDL Cholesterol 73 03/04/2019 08:05 AM    LDL, calculated 91 03/04/2019 08:05 AM    VLDL, calculated 7 03/04/2019 08:05 AM    Triglyceride 36 03/04/2019 08:05 AM         Assessment/Plan:    1. Peripheral edema  Mild non-pitting edema, ran out of her furosemide  -no labs since 2019, advised need CMP q6-12 mos to monitor the medication  -she will schedule fasting lab visit  - furosemide (LASIX) 40 mg tablet; TAKE 1 TABLET BY MOUTH DAILY FOR SWELLING  Dispense: 30 Tab; Refill: 1  - CBC WITH AUTOMATED DIFF; Future  - METABOLIC PANEL, COMPREHENSIVE; Future  - TSH 3RD GENERATION; Future    2. Obesity, Class I, BMI 30-34.9  To follow up with Dr Barbara Francis for phentermine  - CBC WITH AUTOMATED DIFF; Future  - METABOLIC PANEL, COMPREHENSIVE; Future  - TSH 3RD GENERATION; Future    3. Encounter for immunization    - PNEUMOCOCCAL POLYSACCHARIDE VACCINE, 23-VALENT, ADULT OR IMMUNOSUPPRESSED PT DOSE,  - [place] IMMUNIZ ADMIN,1 SINGLE/COMB VAC/TOXOID    4. Pure hypercholesterolemia  Not on medication  - LIPID PANEL; Future    5. Need for shingles vaccine    - varicella-zoster recombinant, PF, (Shingrix, PF,) 50 mcg/0.5 mL susr injection; 0.5 mL by IntraMUSCular route once for 1 dose.  Administer second dose in 2-6 months  Dispense: 0.5 mL; Refill: 1      Health Maintenance reviewed - updated. Orders Placed This Encounter    Pneumococcal polysaccharide vaccine, 23-valent, adult or immunosuppressed pt dose    CBC WITH AUTOMATED DIFF     Standing Status:   Future     Standing Expiration Date:       METABOLIC PANEL, COMPREHENSIVE     Standing Status:   Future     Standing Expiration Date:   2022    TSH 3RD GENERATION     Standing Status:   Future     Standing Expiration Date:   2022    LIPID PANEL     Standing Status:   Future     Standing Expiration Date:   2022    CT IMMUNIZ ADMIN,1 SINGLE/COMB VAC/TOXOID    furosemide (LASIX) 40 mg tablet     Sig: TAKE 1 TABLET BY MOUTH DAILY FOR SWELLING     Dispense:  30 Tab     Refill:  1    varicella-zoster recombinant, PF, (Shingrix, PF,) 50 mcg/0.5 mL susr injection     Si.5 mL by IntraMUSCular route once for 1 dose. Administer second dose in 2-6 months     Dispense:  0.5 mL     Refill:  1       Medications Discontinued During This Encounter   Medication Reason    furosemide (LASIX) 40 mg tablet REORDER       Current Outpatient Medications   Medication Sig Dispense Refill    hyaluronate (Synvisc-One) 48 mg/6 mL syrg injection Inject one prefilled syringe into the right knee, for a quantity of 1 injection      furosemide (LASIX) 40 mg tablet TAKE 1 TABLET BY MOUTH DAILY FOR SWELLING 30 Tab 1    varicella-zoster recombinant, PF, (Shingrix, PF,) 50 mcg/0.5 mL susr injection 0.5 mL by IntraMUSCular route once for 1 dose. Administer second dose in 2-6 months 0.5 mL 1    sertraline (ZOLOFT) 100 mg tablet TAKE 1 TABLET BY MOUTH DAILY FOR STRESS 90 Tab 3    phentermine (ADIPEX-P) 37.5 mg tablet Take 1 Tab by mouth every morning. Indications: Adjunct Treatment of Obesity in a Comprehensive Weight Reduction Regimen 30 Tab 0    diclofenac EC (VOLTAREN) 75 mg EC tablet TAKE 1 TABLET BY MOUTH TWICE DAILY 60 Tab 5    montelukast (SINGULAIR) 10 mg tablet Take 1 Tab by mouth daily. Indications:  Allergic Rhinitis (Patient taking differently: Take 10 mg by mouth as needed. Indications: inflammation of the nose due to an allergy) 90 Tab 3    PREMARIN 0.625 mg/gram vaginal cream   4    MYRBETRIQ 50 mg ER tablet Take 50 mg by mouth two (2) times a week.  aspirin delayed-release 81 mg tablet Take 1 Tab by mouth daily. Indications: prevention of transient ischemic attack 90 Tab 3    MINIVELLE 0.1 mg/24 hr 1 Patch by TransDERmal route two (2) times a week. Changes Sunday and Wednesday  0    cetirizine (ZYRTEC) 10 mg tablet Take 1 Tab by mouth daily. (Patient taking differently: Take 10 mg by mouth daily as needed.) 30 Tab 3    fluticasone (FLONASE) 50 mcg/actuation nasal spray 2 Sprays by Both Nostrils route daily. Indications: ALLERGIC RHINITIS 1 Bottle 5    albuterol (PROVENTIL HFA, VENTOLIN HFA) 90 mcg/actuation inhaler Take 2 Puffs by inhalation every four (4) hours as needed for Wheezing (cough). 1 Inhaler 1    cholecalciferol, vitamin D3, (VITAMIN D3) 2,000 unit Tab Take 2,000 Units by mouth daily. Recommended healthy diet low in carbohydrates, fats, sodium and cholesterol. Recommended regular cardiovascular exercise 3-6 times per week for 30-60 minutes daily. Verbal and written instructions (see AVS) provided. Patient expresses understanding of diagnosis and treatment plan. Follow-up and Dispositions    · Return in about 2 weeks (around 1/25/2021) for fasting lab-only appointment.        Future Appointments   Date Time Provider Amalia Garcia   2/19/2021  8:30 AM Harlie Sandifer R, DO [facility]

## 2021-01-11 NOTE — PATIENT INSTRUCTIONS
Vaccine Information Statement    Pneumococcal Conjugate Vaccine (PCV13): What You Need to Know    Many Vaccine Information Statements are available in Serbian and other languages. See www.immunize.org/vis  Hojas de información sobre vacunas están disponibles en español y en muchos otros idiomas. Visite www.immunize.org/vis    1. Why get vaccinated? Pneumococcal conjugate vaccine (PCV13) can prevent pneumococcal disease. Pneumococcal disease refers to any illness caused by pneumococcal bacteria. These bacteria can cause many types of illnesses, including pneumonia, which is an infection of the lungs. Pneumococcal bacteria are one of the most common causes of pneumonia. Besides pneumonia, pneumococcal bacteria can also cause:   Ear infections   Sinus infections   Meningitis (infection of the tissue covering the brain and spinal cord)   Bacteremia (bloodstream infection)    Anyone can get pneumococcal disease, but children under 3years of age, people with certain medical conditions, adults 72 years or older, and cigarette smokers are at the highest risk. Most pneumococcal infections are mild. However, some can result in long-term problems, such as brain damage or hearing loss. Meningitis, bacteremia, and pneumonia caused by pneumococcal disease can be fatal.     2. PCV13     PCV13 protects against 13 types of bacteria that cause pneumococcal disease. Infants and young children usually need 4 doses of pneumococcal conjugate vaccine, at 2, 4, 6, and 1515 months of age. In some cases, a child might need fewer than 4 doses to complete PCV13 vaccination. A dose of PCV13 is also recommended for anyone 2 years or older with certain medical conditions if they did not already receive PCV13. This vaccine may be given to adults 72 years or older based on discussions between the patient and health care provider.     3. Talk with your health care provider    Tell your vaccine provider if the person getting the vaccine:   Has had an allergic reaction after a previous dose of PCV13, to an earlier pneumococcal conjugate vaccine known as PCV7, or to any vaccine containing diphtheria toxoid (for example, DTaP), or has any severe, life-threatening allergies. In some cases, your health care provider may decide to postpone PCV13 vaccination to a future visit. People with minor illnesses, such as a cold, may be vaccinated. People who are moderately or severely ill should usually wait until they recover before getting PCV13. Your health care provider can give you more information. 4. Risks of a vaccine reaction     Redness, swelling, pain, or tenderness where the shot is given, and fever, loss of appetite, fussiness (irritability), feeling tired, headache, and chills can happen after PCV13. Charlene Fletcher children may be at increased risk for seizures caused by fever after PCV13 if it is administered at the same time as inactivated influenza vaccine. Ask your health care provider for more information. People sometimes faint after medical procedures, including vaccination. Tell your provider if you feel dizzy or have vision changes or ringing in the ears. As with any medicine, there is a very remote chance of a vaccine causing a severe allergic reaction, other serious injury, or death. 5. What if there is a serious problem? An allergic reaction could occur after the vaccinated person leaves the clinic. If you see signs of a severe allergic reaction (hives, swelling of the face and throat, difficulty breathing, a fast heartbeat, dizziness, or weakness), call 9-1-1 and get the person to the nearest hospital.    For other signs that concern you, call your health care provider. Adverse reactions should be reported to the Vaccine Adverse Event Reporting System (VAERS). Your health care provider will usually file this report, or you can do it yourself. Visit the VAERS website at www.vaers. hhs.gov or call 2-707.284.8631. VAERS is only for reporting reactions, and [facility] staff do not give medical advice. 6. The National Vaccine Injury Compensation Program    The [facility] Vaccine Injury Compensation Program (VICP) is a federal program that was created to compensate people who may have been injured by certain vaccines. Visit the VICP website at www.[facility].gov/vaccinecompensation or call 6-375.768.4037 to learn about the program and about filing a claim. There is a time limit to file a claim for compensation. 7. How can I learn more?  Ask your health care provider.  Call your local or state health department.  Contact the Centers for Disease Control and Prevention (CDC):  - Call 8-497.980.1101 (1-598-UAI-INFO) or  - Visit CDCs website at www.cdc.gov/vaccines    Vaccine Information Statement (Interim)  PCV13   10/30/2019  [address] [id number]-[id number]   Department of Health and Human Services  Centers for Disease Control and Prevention    Office Use Only

## 2021-01-11 NOTE — PROGRESS NOTES
Room: 10    Identified pt with two pt identifiers(name and ). Reviewed record in preparation for visit and have obtained necessary documentation. All patient medications has been reviewed. Chief Complaint   Patient presents with    Medication Refill     Lasix, Phentermine     Immunization/Injection     PPSV23     3 most recent PHQ Screens 2021   Little interest or pleasure in doing things Not at all   Feeling down, depressed, irritable, or hopeless Not at all   Total Score PHQ 2 0     . Fall Risk Assessment, last 12 mths 2021   Able to walk? Yes   Fall in past 12 months? 0   Do you feel unsteady? 0   Are you worried about falling 0       Health Maintenance Due   Topic    Shingrix Vaccine Age 50> (1 of 2)    GLAUCOMA SCREENING Q2Y     Bone Densitometry (Dexa) Screening     Pneumococcal 65+ years (1 of 1 - PPSV23)     Dexa: overdue     Pneumonia: overdue     Last eye exam: 2020     Shingrix: overdue       Vitals:    21 0827   BP: 110/60   Pulse: (!) 58   Resp: 16   Temp: 97.3 °F (36.3 °C)   TempSrc: Oral   SpO2: 100%   Weight: 208 lb (94.3 kg)   Height: 5' 4\" (1.626 m)   PainSc:   0 - No pain       4. Have you been to the ER, urgent care clinic since your last visit? Hospitalized since your last visit? No    5. Have you seen or consulted any other health care providers outside of the [address] [place] [address] since your last visit? Include any pap smears or colon screening. Yes, Willie Ruby. [facility]) 1 year for inj into R knee     6. Would you like to receive your flu shot today? NO      Patient is accompanied by self I have received verbal consent from Edmund Allen to discuss any/all medical information while they are present in the room.

## 2021-01-26 LAB
ALBUMIN SERPL-MCNC: 4 G/DL (ref 3.8–4.8)
ALBUMIN/GLOB SERPL: 1.3 {RATIO} (ref 1.2–2.2)
ALP SERPL-CCNC: 123 IU/L (ref 39–117)
ALT SERPL-CCNC: 17 IU/L (ref 0–32)
AST SERPL-CCNC: 22 IU/L (ref 0–40)
BASOPHILS # BLD AUTO: 0.1 X10E3/UL (ref 0–0.2)
BASOPHILS NFR BLD AUTO: 1 %
BILIRUB SERPL-MCNC: 0.2 MG/DL (ref 0–1.2)
BUN SERPL-MCNC: 17 MG/DL (ref 8–27)
BUN/CREAT SERPL: 18 (ref 12–28)
CALCIUM SERPL-MCNC: 9.3 MG/DL (ref 8.7–10.3)
CHLORIDE SERPL-SCNC: 101 MMOL/L (ref 96–106)
CHOLEST SERPL-MCNC: 177 MG/DL (ref 100–199)
CO2 SERPL-SCNC: 26 MMOL/L (ref 20–29)
CREAT SERPL-MCNC: 0.95 MG/DL (ref 0.57–1)
EOSINOPHIL # BLD AUTO: 0.2 X10E3/UL (ref 0–0.4)
EOSINOPHIL NFR BLD AUTO: 4 %
ERYTHROCYTE [DISTWIDTH] IN BLOOD BY AUTOMATED COUNT: 12.7 % (ref 11.7–15.4)
GLOBULIN SER CALC-MCNC: 3.1 G/DL (ref 1.5–4.5)
GLUCOSE SERPL-MCNC: 85 MG/DL (ref 65–99)
HCT VFR BLD AUTO: 41.1 % (ref 34–46.6)
HDLC SERPL-MCNC: 76 MG/DL
HGB BLD-MCNC: 13.4 G/DL (ref 11.1–15.9)
IMM GRANULOCYTES # BLD AUTO: 0 X10E3/UL (ref 0–0.1)
IMM GRANULOCYTES NFR BLD AUTO: 0 %
INTERPRETATION, 910389: NORMAL
LDLC SERPL CALC-MCNC: 89 MG/DL (ref 0–99)
LYMPHOCYTES # BLD AUTO: 2 X10E3/UL (ref 0.7–3.1)
LYMPHOCYTES NFR BLD AUTO: 47 %
MCH RBC QN AUTO: 30 PG (ref 26.6–33)
MCHC RBC AUTO-ENTMCNC: 32.6 G/DL (ref 31.5–35.7)
MCV RBC AUTO: 92 FL (ref 79–97)
MONOCYTES # BLD AUTO: 0.5 X10E3/UL (ref 0.1–0.9)
MONOCYTES NFR BLD AUTO: 12 %
NEUTROPHILS # BLD AUTO: 1.5 X10E3/UL (ref 1.4–7)
NEUTROPHILS NFR BLD AUTO: 36 %
PLATELET # BLD AUTO: 220 X10E3/UL (ref 150–450)
POTASSIUM SERPL-SCNC: 4.3 MMOL/L (ref 3.5–5.2)
PROT SERPL-MCNC: 7.1 G/DL (ref 6–8.5)
RBC # BLD AUTO: 4.46 X10E6/UL (ref 3.77–5.28)
SODIUM SERPL-SCNC: 142 MMOL/L (ref 134–144)
TRIGL SERPL-MCNC: 60 MG/DL (ref 0–149)
TSH SERPL DL<=0.005 MIU/L-ACNC: 3.27 UIU/ML (ref 0.45–4.5)
VLDLC SERPL CALC-MCNC: 12 MG/DL (ref 5–40)
WBC # BLD AUTO: 4.3 X10E3/UL (ref 3.4–10.8)

## 2021-02-08 NOTE — PROGRESS NOTES
Results reviewed and [employer]. Your cholesterol looks great and stable from prior checks. One enzyme is just a few points elevated (alk phos 123), that can come from the liver or bone. This number has fluctuated, and was 121 when checked 5 years ago, but then came down to normal (normal is 117 or less), so I am not alarmed. Try to limit tylenol and alcohol. This can be rechecked with your next labs. The remainder of the labs look good.

## 2021-05-04 ENCOUNTER — OFFICE VISIT (OUTPATIENT)
Dept: INTERNAL MEDICINE CLINIC | Age: 66
End: 2021-05-04
Payer: COMMERCIAL

## 2021-05-04 VITALS
TEMPERATURE: 97.9 F | HEART RATE: 67 BPM | SYSTOLIC BLOOD PRESSURE: 108 MMHG | OXYGEN SATURATION: 97 % | WEIGHT: 210 LBS | RESPIRATION RATE: 16 BRPM | DIASTOLIC BLOOD PRESSURE: 69 MMHG | HEIGHT: 64 IN | BODY MASS INDEX: 35.85 KG/M2

## 2021-05-04 DIAGNOSIS — M25.551 HIP PAIN, BILATERAL: ICD-10-CM

## 2021-05-04 DIAGNOSIS — E66.9 OBESITY, CLASS I, BMI 30-34.9: ICD-10-CM

## 2021-05-04 DIAGNOSIS — M25.552 HIP PAIN, BILATERAL: ICD-10-CM

## 2021-05-04 DIAGNOSIS — J45.20 MILD INTERMITTENT ASTHMA WITHOUT COMPLICATION: ICD-10-CM

## 2021-05-04 DIAGNOSIS — E55.9 VITAMIN D DEFICIENCY: ICD-10-CM

## 2021-05-04 DIAGNOSIS — M05.751 RHEUMATOID ARTHRITIS INVOLVING BOTH HIPS WITH POSITIVE RHEUMATOID FACTOR (HCC): ICD-10-CM

## 2021-05-04 DIAGNOSIS — R60.9 PERIPHERAL EDEMA: ICD-10-CM

## 2021-05-04 DIAGNOSIS — M17.11 PRIMARY OSTEOARTHRITIS OF RIGHT KNEE: ICD-10-CM

## 2021-05-04 DIAGNOSIS — E06.3 HASHIMOTO'S THYROIDITIS: Primary | ICD-10-CM

## 2021-05-04 DIAGNOSIS — F41.1 GENERALIZED ANXIETY DISORDER: ICD-10-CM

## 2021-05-04 DIAGNOSIS — M05.752 RHEUMATOID ARTHRITIS INVOLVING BOTH HIPS WITH POSITIVE RHEUMATOID FACTOR (HCC): ICD-10-CM

## 2021-05-04 DIAGNOSIS — M25.561 RECURRENT PAIN OF RIGHT KNEE: ICD-10-CM

## 2021-05-04 DIAGNOSIS — F51.04 CHRONIC INSOMNIA: ICD-10-CM

## 2021-05-04 DIAGNOSIS — R06.02 SOB (SHORTNESS OF BREATH) ON EXERTION: ICD-10-CM

## 2021-05-04 DIAGNOSIS — J45.909 ASTHMA DUE TO SEASONAL ALLERGIES: ICD-10-CM

## 2021-05-04 DIAGNOSIS — Z13.820 SCREENING FOR OSTEOPOROSIS: ICD-10-CM

## 2021-05-04 DIAGNOSIS — E78.00 PURE HYPERCHOLESTEROLEMIA: ICD-10-CM

## 2021-05-04 DIAGNOSIS — R74.8 ELEVATED ALKALINE PHOSPHATASE LEVEL: ICD-10-CM

## 2021-05-04 PROCEDURE — 99214 OFFICE O/P EST MOD 30 MIN: CPT | Performed by: PHYSICIAN ASSISTANT

## 2021-05-04 RX ORDER — SERTRALINE HYDROCHLORIDE 100 MG/1
TABLET, FILM COATED ORAL
Qty: 90 TAB | Refills: 3 | Status: SHIPPED | OUTPATIENT
Start: 2021-05-04 | End: 2021-11-03 | Stop reason: DRUGHIGH

## 2021-05-04 RX ORDER — PHENTERMINE HYDROCHLORIDE 37.5 MG/1
37.5 TABLET ORAL
Qty: 30 TAB | Refills: 2 | Status: SHIPPED
Start: 2021-05-04 | End: 2021-07-08 | Stop reason: ALTCHOICE

## 2021-05-04 RX ORDER — MONTELUKAST SODIUM 10 MG/1
10 TABLET ORAL DAILY
Qty: 90 TAB | Refills: 3 | Status: SHIPPED | OUTPATIENT
Start: 2021-05-04 | End: 2022-08-12

## 2021-05-04 RX ORDER — FUROSEMIDE 40 MG/1
TABLET ORAL
Qty: 90 TAB | Refills: 3 | Status: SHIPPED | OUTPATIENT
Start: 2021-05-04 | End: 2022-05-27 | Stop reason: SDUPTHER

## 2021-05-04 RX ORDER — DICLOFENAC SODIUM 75 MG/1
TABLET, DELAYED RELEASE ORAL
Qty: 60 TAB | Refills: 5 | Status: SHIPPED | OUTPATIENT
Start: 2021-05-04 | End: 2021-08-25

## 2021-05-04 RX ORDER — ALBUTEROL SULFATE 90 UG/1
2 AEROSOL, METERED RESPIRATORY (INHALATION)
Qty: 1 INHALER | Refills: 1 | Status: SHIPPED | OUTPATIENT
Start: 2021-05-04 | End: 2021-06-04

## 2021-05-04 NOTE — PROGRESS NOTES
Identified pt with two pt identifiers. Reviewed record in preparation for visit and have obtained necessary documentation. All patient medications has been reviewed. Chief Complaint   Patient presents with    New Patient     Additional information about chief complaint:    Visit Vitals  /69 (BP 1 Location: Left upper arm, BP Patient Position: Sitting, BP Cuff Size: Large adult)   Pulse 67   Temp 97.9 °F (36.6 °C) (Oral)   Resp 16   Ht 5' 4\" (1.626 m)   Wt 210 lb (95.3 kg)   SpO2 97%   BMI 36.05 kg/m²       Health Maintenance Due   Topic    Shingrix Vaccine Age 50> (1 of 2)    Bone Densitometry (Dexa) Screening        1. Have you been to the ER, urgent care clinic since your last visit? Hospitalized since your last visit? No   2. Have you seen or consulted any other health care providers outside of the [address] [place] [address] since your last visit? Include any pap smears or colon screening.  No     bdg

## 2021-05-04 NOTE — PROGRESS NOTES
Libia Gomes is a 72y.o. year old female seen in clinic today for   Chief Complaint   Patient presents with    New Patient     Former patient of Dr. Verl Siemens. she is here today to follow up for Anxiety/Depression, Obesity    Diet: Chicken/Turkey/Fish, lots of vegetables. When she has high anxiety, eats bad. Exercise: Walking    Hx of Anxiety. Using Zoloft. Has stress related to taking care of family. Believes she previously was using Lorazepam? No longer using because she could not find original bottle. Sleep has been good recently. Hx of rheumatoid arthritis. See's Dr. Sarika Moses for treatment. R knee arthritis, getting yearly injections of hyaluronic acid. Knows she will likely need knee replacement. Hx of obesity. Uses Phentermine 3 months, then off, then 3 months and it helps her lose weight as she keeps walking. Hx of total hysterectomy. Followed by GYN for urinary incontinence and estrogen/premarin replacement. Hx of seasonal allergies and asthma. Using albuterol as needed. Uses Anti-histamine, Flonase and Montelukast as needed. Uses Lasix for LE edema and blood pressure control. Never had true dx of HTN. she specifically denies any CP, SOB, HA. Dizziness, fevers, chills, N/V/D, urinary symptoms or other bowel changes. Current Outpatient Medications on File Prior to Visit   Medication Sig Dispense Refill    hyaluronate (Synvisc-One) 48 mg/6 mL syrg injection Inject one prefilled syringe into the right knee, for a quantity of 1 injection      PREMARIN 0.625 mg/gram vaginal cream   4    MYRBETRIQ 50 mg ER tablet Take 50 mg by mouth two (2) times a week.  aspirin delayed-release 81 mg tablet Take 1 Tab by mouth daily. Indications: prevention of transient ischemic attack 90 Tab 3    MINIVELLE 0.1 mg/24 hr 1 Patch by TransDERmal route two (2) times a week. Changes Sunday and Wednesday  0    cetirizine (ZYRTEC) 10 mg tablet Take 1 Tab by mouth daily.  (Patient taking differently: Take 10 mg by mouth daily as needed.) 30 Tab 3    fluticasone (FLONASE) 50 mcg/actuation nasal spray 2 Sprays by Both Nostrils route daily. Indications: ALLERGIC RHINITIS 1 Bottle 5    cholecalciferol, vitamin D3, (VITAMIN D3) 2,000 unit Tab Take 2,000 Units by mouth daily.  [DISCONTINUED] furosemide (LASIX) 40 mg tablet TAKE 1 TABLET BY MOUTH DAILY FOR SWELLING 30 Tab 1    [DISCONTINUED] sertraline (ZOLOFT) 100 mg tablet TAKE 1 TABLET BY MOUTH DAILY FOR STRESS 90 Tab 3    [DISCONTINUED] phentermine (ADIPEX-P) 37.5 mg tablet Take 1 Tab by mouth every morning. Indications: Adjunct Treatment of Obesity in a Comprehensive Weight Reduction Regimen 30 Tab 0    [DISCONTINUED] diclofenac EC (VOLTAREN) 75 mg EC tablet TAKE 1 TABLET BY MOUTH TWICE DAILY 60 Tab 5    [DISCONTINUED] montelukast (SINGULAIR) 10 mg tablet Take 1 Tab by mouth daily. Indications: Allergic Rhinitis (Patient taking differently: Take 10 mg by mouth as needed. Indications: inflammation of the nose due to an allergy) 90 Tab 3    [DISCONTINUED] albuterol (PROVENTIL HFA, VENTOLIN HFA) 90 mcg/actuation inhaler Take 2 Puffs by inhalation every four (4) hours as needed for Wheezing (cough). 1 Inhaler 1     No current facility-administered medications on file prior to visit. Allergies   Allergen Reactions    Advil Pm [Ibuprofen-Diphenhydramine Hcl] Other (comments)     Slightly elevated Creaitnine 1.02    Aleve [Naproxen Sodium] Other (comments)     Due to kidneys    Bactrim [Sulfamethoxazole-Trimethoprim] Nausea and Vomiting    Sulfa (Sulfonamide Antibiotics) Nausea and Vomiting    Sulfasalazine Nausea and Vomiting     Past Medical History:   Diagnosis Date    Achilles tendonitis 12/2004    Right. Dr. Michael Chavez Arthritis 2010    hips, knees. Dr. Jewel Renteria    Chest pain 08/2013    Dr. Paloma Nix.      Chickenpox childhood    Endometriosis 1990    Dr. Steven Rashid Environmental allergies     MARGI (generalized anxiety disorder)     Hip pain, bilateral 2010    due to severe OA. Iliopsoas bursitis. Dr. Armen Adams    History of breast lump/mass excision 1998    Left breast.  Dr. Mitchell Luke Hyperlipidemia     Iron deficiency anemia     iron deficiency    Knee pain, bilateral 2017    Dr. Shree Salcido    Menopausal and postmenopausal disorder 2007    Dr. Rush Niño Uterine fibroid     Dr. Merle Silver.  Vitamin D deficiency 03/2008      Past Surgical History:   Procedure Laterality Date    BIOPSY BREAST  Rt 1993;Lt 1998    Dr. Tru Caballero Right 2002    Right achiles tendon. Dr. Mony Sierra.  HX BREAST BIOPSY  2001    Left. benign. Dr. Roxy Marroquin HX COLONOSCOPY  09/27/2016    Dr. Ирина Willoughby.  due q 5 yrs due to fam hx   Jesus Olya    Dr. Leigh Bridegroom. due to endometriosis    HX PELVIC LAPAROSCOPY  1995    due to endometriosis Dr. Kelvin Hess  04/2011    due to fibroids. Dr. Merle Silver.         Family History   Problem Relation Age of Onset    Hypertension Mother     Other Mother         hearing loss/vision loss    Heart Disease Father     Lung Disease Father     Diabetes Sister     Heart Attack Sister 54        March 2014    Stroke Sister         after MI    Thyroid Disease Sister     Colon Polyps Sister     Heart Disease Brother         Defibrillator placed    Heart Attack Brother 48    Diabetes Maternal Grandmother         Social History     Socioeconomic History    Marital status: SINGLE     Spouse name: Not on file    Number of children: 2    Years of education: Not on file    Highest education level: Not on file   Occupational History    Occupation: [email or web address]     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    Social Needs    Financial resource strain: Not on file    Food insecurity     Worry: Not on file     Inability: Not on file   [employer] needs     Medical: Not on file     Non-medical: Not on file Tobacco Use    Smoking status: Never Smoker    Smokeless tobacco: Never Used    Tobacco comment: lived with smoker mom x 18 yrs   Substance and Sexual Activity    Alcohol use: No     Frequency: Never     Binge frequency: Never    Drug use: No    Sexual activity: Not Currently     Partners: Male     Birth control/protection: Abstinence, Surgical   Lifestyle    Physical activity     Days per week: Not on file     Minutes per session: Not on file    Stress: Not on file   Relationships    Social connections     Talks on phone: Not on file     Gets together: Not on file     Attends Hoahaoism service: Not on file     Active member of club or organization: Not on file     Attends meetings of clubs or organizations: Not on file     Relationship status: Not on file    Intimate partner violence     Fear of current or ex partner: Not on file     Emotionally abused: Not on file     Physically abused: Not on file     Forced sexual activity: Not on file   Other Topics Concern    Not on file   Social History Narrative    Not on file           Visit Vitals  /69 (BP 1 Location: Left upper arm, BP Patient Position: Sitting, BP Cuff Size: Large adult)   Pulse 67   Temp 97.9 °F (36.6 °C) (Oral)   Resp 16   Ht 5' 4\" (1.626 m)   Wt 210 lb (95.3 kg)   SpO2 97%   BMI 36.05 kg/m²       Review of Systems   Constitutional: Negative for chills, fever, malaise/fatigue and weight loss. Respiratory: Negative for cough, shortness of breath and wheezing. Cardiovascular: Negative for chest pain, palpitations and leg swelling. Gastrointestinal: Negative for abdominal pain, blood in stool, constipation, diarrhea, heartburn, melena, nausea and vomiting. Genitourinary: Negative for dysuria and frequency. Musculoskeletal: Negative for myalgias. Skin: Negative for rash. Neurological: Negative for dizziness, weakness and headaches. Endo/Heme/Allergies: Does not bruise/bleed easily.    Psychiatric/Behavioral: Negative for depression. All other systems reviewed and are negative. Physical Exam  Vitals signs and nursing note reviewed. Constitutional:       Appearance: Normal appearance. She is obese. HENT:      Head: Normocephalic and atraumatic. Right Ear: Tympanic membrane, ear canal and external ear normal.      Left Ear: Tympanic membrane, ear canal and external ear normal.      Nose: Nose normal.      Mouth/Throat:      Mouth: Mucous membranes are moist.      Pharynx: Oropharynx is clear. Eyes:      Conjunctiva/sclera: Conjunctivae normal.      Pupils: Pupils are equal, round, and reactive to light. Neck:      Musculoskeletal: Normal range of motion and neck supple. No neck rigidity. Vascular: No carotid bruit. Cardiovascular:      Rate and Rhythm: Normal rate and regular rhythm. Pulses: Normal pulses. Heart sounds: Normal heart sounds. Pulmonary:      Effort: Pulmonary effort is normal.      Breath sounds: Normal breath sounds. No wheezing, rhonchi or rales. Abdominal:      General: Abdomen is flat. Bowel sounds are normal. There is no distension. Palpations: There is no mass. Tenderness: There is no abdominal tenderness. There is no guarding or rebound. Musculoskeletal: Normal range of motion. Right lower leg: Edema (trace non-pitting) present. Left lower leg: Edema (trace non-pitting) present. Comments: R knee chronic swelling   Skin:     General: Skin is warm and dry. Capillary Refill: Capillary refill takes less than 2 seconds. Neurological:      General: No focal deficit present. Mental Status: She is alert and oriented to person, place, and time. Sensory: No sensory deficit. Motor: No weakness. Coordination: Coordination normal.      Gait: Gait normal.   Psychiatric:         Mood and Affect: Mood normal.         Behavior: Behavior normal.         Thought Content:  Thought content normal.          No results found for this or any previous visit (from the past 24 hour(s)). ASSESSMENT AND PLAN  Diagnoses and all orders for this visit:    1. Hashimoto's thyroiditis  Thyroid normal last check. Lab Results   Component Value Date/Time    TSH 3.270 01/25/2021 12:00 AM    TSH 2.36 08/21/2014 07:50 AM       2. Rheumatoid arthritis involving both hips with positive rheumatoid factor (HCC)  -     diclofenac EC (VOLTAREN) 75 mg EC tablet; TAKE 1 TABLET BY MOUTH TWICE DAILY  Indications: rheumatoid arthritis  -     DEXA BONE DENSITY STUDY AXIAL; Future    3. Primary osteoarthritis of right knee  -     DEXA BONE DENSITY STUDY AXIAL; Future    4. Pure hypercholesterolemia  Labs normal last check  Lab Results   Component Value Date/Time    LDL, calculated 89 01/25/2021 12:00 AM    LDL, calculated 91 03/04/2019 08:05 AM       5. Vitamin D deficiency  -     DEXA BONE DENSITY STUDY AXIAL; Future  Continue Vitamin D supplement  6. Chronic insomnia  Improved, no meds needed at this time  7. Generalized anxiety disorder  -     sertraline (ZOLOFT) 100 mg tablet; TAKE 1 TABLET BY MOUTH DAILY FOR STRESS  Indications: anxiousness associated with depression  Continue Zoloft 100 mg  8. Elevated alkaline phosphatase level  Monitor next lab draw  9. Mild intermittent asthma without complication    10. Obesity, Class I, BMI 30-34.9  -     phentermine (ADIPEX-P) 37.5 mg tablet; Take 1 Tab by mouth every morning. Indications: Adjunct Treatment of Obesity in a Comprehensive Weight Reduction Regimen  3 months. Will call with other medication she had been using for 3 months   11. Peripheral edema  Comments:  hands, feet, ankles  Orders:  -     furosemide (LASIX) 40 mg tablet; TAKE 1 TABLET BY MOUTH DAILY FOR SWELLING  Continue every other day Lasix  12. Recurrent pain of right knee  -     diclofenac EC (VOLTAREN) 75 mg EC tablet; TAKE 1 TABLET BY MOUTH TWICE DAILY  Indications: rheumatoid arthritis  Gets orthovisc injections  13.  Rheumatoid arthritis involving both hips with positive rheumatoid factor (HCC)  Comments:  stable  Orders:  -     diclofenac EC (VOLTAREN) 75 mg EC tablet; TAKE 1 TABLET BY MOUTH TWICE DAILY  Indications: rheumatoid arthritis  -     DEXA BONE DENSITY STUDY AXIAL; Future  Followed by Orthopedics  14. Hip pain, bilateral  Comments:  L>R, due to RA and OA vs other  Orders:  -     diclofenac EC (VOLTAREN) 75 mg EC tablet; TAKE 1 TABLET BY MOUTH TWICE DAILY  Indications: rheumatoid arthritis  Continue follow up with Orthopedics  15. SOB (shortness of breath) on exertion  Comments:  due to RAD vs cardio vs iron vs other  Orders:  -     albuterol (PROVENTIL HFA, VENTOLIN HFA, PROAIR HFA) 90 mcg/actuation inhaler; Take 2 Puffs by inhalation every four (4) hours as needed for Wheezing (cough). Hx of asthma/allergies  16. Screening for osteoporosis  -     DEXA BONE DENSITY STUDY AXIAL; Future  Unsure if has been completed  17. Asthma due to seasonal allergies  Continue PRN albuterol, Zyrtec, singulair  Other orders  -     montelukast (Singulair) 10 mg tablet; Take 1 Tab by mouth daily. Indications: inflammation of the nose due to an allergy           I have discussed the diagnosis with the patient and the intended plan as seen in the above orders. Patient is in agreement. The patient has received an after-visit summary and questions were answered concerning future plans. I have discussed medication side effects and warnings with the patient as well.     Zenon Richardson PA-C

## 2021-05-07 ENCOUNTER — HOSPITAL ENCOUNTER (OUTPATIENT)
Dept: MAMMOGRAPHY | Age: 66
Discharge: HOME | End: 2021-05-07
Attending: PHYSICIAN ASSISTANT
Payer: COMMERCIAL

## 2021-05-07 DIAGNOSIS — M05.751 RHEUMATOID ARTHRITIS INVOLVING BOTH HIPS WITH POSITIVE RHEUMATOID FACTOR (HCC): ICD-10-CM

## 2021-05-07 DIAGNOSIS — M05.752 RHEUMATOID ARTHRITIS INVOLVING BOTH HIPS WITH POSITIVE RHEUMATOID FACTOR (HCC): ICD-10-CM

## 2021-05-07 DIAGNOSIS — E55.9 VITAMIN D DEFICIENCY: ICD-10-CM

## 2021-05-07 DIAGNOSIS — M17.11 PRIMARY OSTEOARTHRITIS OF RIGHT KNEE: ICD-10-CM

## 2021-05-07 DIAGNOSIS — Z13.820 SCREENING FOR OSTEOPOROSIS: ICD-10-CM

## 2021-05-07 PROBLEM — M85.89 OSTEOPENIA OF MULTIPLE SITES: Status: ACTIVE | Noted: 2021-05-07

## 2021-05-07 PROCEDURE — 77080 DXA BONE DENSITY AXIAL: CPT

## 2021-05-07 NOTE — PROGRESS NOTES
Lyndon, your DEXA scan shows osteopenia. Not at the point any medications are needed. I would begin taking a calcium supplement daily along with your vitamin D supplement to ensure your bones have an adequate supply for healthy bone building.      Recheck Dexa in 2-3 years

## 2021-07-08 ENCOUNTER — OFFICE VISIT (OUTPATIENT)
Dept: SURGERY | Age: 66
End: 2021-07-08
Payer: COMMERCIAL

## 2021-07-08 VITALS
WEIGHT: 212.4 LBS | OXYGEN SATURATION: 100 % | BODY MASS INDEX: 35.39 KG/M2 | RESPIRATION RATE: 18 BRPM | HEART RATE: 66 BPM | TEMPERATURE: 97.2 F | HEIGHT: 65 IN | DIASTOLIC BLOOD PRESSURE: 73 MMHG | SYSTOLIC BLOOD PRESSURE: 115 MMHG

## 2021-07-08 DIAGNOSIS — Z90.710 S/P TAH-BSO (TOTAL ABDOMINAL HYSTERECTOMY AND BILATERAL SALPINGO-OOPHORECTOMY): ICD-10-CM

## 2021-07-08 DIAGNOSIS — Z83.49 FAMILY HISTORY OF THYROID DISEASE: ICD-10-CM

## 2021-07-08 DIAGNOSIS — Z90.79 S/P TAH-BSO (TOTAL ABDOMINAL HYSTERECTOMY AND BILATERAL SALPINGO-OOPHORECTOMY): ICD-10-CM

## 2021-07-08 DIAGNOSIS — R63.5 EXCESSIVE WEIGHT GAIN: ICD-10-CM

## 2021-07-08 DIAGNOSIS — M25.561 RECURRENT PAIN OF RIGHT KNEE: ICD-10-CM

## 2021-07-08 DIAGNOSIS — Z82.0 FAMILY HISTORY OF SLEEP APNEA: ICD-10-CM

## 2021-07-08 DIAGNOSIS — Z83.49 FAMILY HISTORY OF OBESITY: ICD-10-CM

## 2021-07-08 DIAGNOSIS — Z83.3 FAMILY HISTORY OF DIABETES MELLITUS (DM): ICD-10-CM

## 2021-07-08 DIAGNOSIS — E66.01 CLASS 2 SEVERE OBESITY DUE TO EXCESS CALORIES WITH SERIOUS COMORBIDITY AND BODY MASS INDEX (BMI) OF 35.0 TO 35.9 IN ADULT (HCC): Primary | ICD-10-CM

## 2021-07-08 DIAGNOSIS — R74.8 ELEVATED ALKALINE PHOSPHATASE LEVEL: ICD-10-CM

## 2021-07-08 DIAGNOSIS — E55.9 VITAMIN D DEFICIENCY: ICD-10-CM

## 2021-07-08 DIAGNOSIS — Z82.49 FAMILY HISTORY OF HEART ATTACK: ICD-10-CM

## 2021-07-08 DIAGNOSIS — F51.04 CHRONIC INSOMNIA: ICD-10-CM

## 2021-07-08 DIAGNOSIS — E06.3 HASHIMOTO'S THYROIDITIS: ICD-10-CM

## 2021-07-08 DIAGNOSIS — Z82.3 FAMILY HISTORY OF STROKE: ICD-10-CM

## 2021-07-08 DIAGNOSIS — R60.0 LEG EDEMA, RIGHT: ICD-10-CM

## 2021-07-08 DIAGNOSIS — R53.82 CHRONIC FATIGUE: ICD-10-CM

## 2021-07-08 DIAGNOSIS — E78.00 PURE HYPERCHOLESTEROLEMIA: ICD-10-CM

## 2021-07-08 DIAGNOSIS — Z90.722 S/P TAH-BSO (TOTAL ABDOMINAL HYSTERECTOMY AND BILATERAL SALPINGO-OOPHORECTOMY): ICD-10-CM

## 2021-07-08 DIAGNOSIS — N95.9 MENOPAUSAL AND POSTMENOPAUSAL DISORDER: ICD-10-CM

## 2021-07-08 DIAGNOSIS — M85.89 OSTEOPENIA OF MULTIPLE SITES: ICD-10-CM

## 2021-07-08 DIAGNOSIS — Z83.71 FAMILY HISTORY OF COLONIC POLYPS: ICD-10-CM

## 2021-07-08 PROCEDURE — 99215 OFFICE O/P EST HI 40 MIN: CPT | Performed by: FAMILY MEDICINE

## 2021-07-08 NOTE — PROGRESS NOTES
[address] [facility], [place] [address], David Leigh Ráelviraczi  22.  [phone] o  [phone] f    PCP:   Shelly De La Fuente PA-C  Referred by:  Self. [place] about program from me as pcp at [facility] which closed 05/01/21. Patient Status: New  Patient's weight management approach preference today:  VLCD    HISTORY OF PRESENT ILLNESS  Agree with nurse notes. Wilber Carroll is a 77 y.o. female with Obesity Class 1 Body mass index is 35.35 kg/m². presents for evaluation and treatment. How did you hear about this weight management program: From me as pcp at [facility] which closed 05/01/21  Are you ready to start participating in this weight loss journey and reason(s) why: Yes. I aim to get back to a consistent program that focuses attention on weight loss. I want to lose the 42 lbs I gained over the past 1.5 yrs during Covid when I worked virtually, sat all day working at [employer], had sweet cravings. She was last seen by me face to face pre-Covid on 01/27/20 for Phentermine 37.5 mg refill. Her weight was 180 lbs, BMI 30.9 kg/m2, Body Fat 39.1%. .  Any potential unsupportive people in your life: no    Goals for participation in weight management program:   150 lbs    Previous Weight Loss Surgery:  no Date of Surgery:  na    Wt Readings from Last 3 Encounters:   07/08/21 212 lb 6.4 oz (96.3 kg)   05/04/21 210 lb (95.3 kg)   01/11/21 208 lb (94.3 kg)       Contributing factors to weight gain:  Pt gained 42 lbs over the past 1.5 years during the Covid pandemic due to stress eating and stress.     Associated Medical Conditions  Patient denies any contraindications to participation in LCD or VLCD including: history of MI in the last 3 months, Type 1 DM, Type 2 DM, Liver or Kidney disease requiring protein restriction, Recent treatment for Cancer, History of Uric-acid Kidney Stone, Gout, Gall stones, Recent onset of Inflammatory Bowel Disease, or severe Food Allergies. Past Medical History:   Diagnosis Date    Achilles tendonitis 12/2004    Right. Dr. Mil Ramirez Arthritis 2010    hips, knees. Dr. Michelle Petersen    Chest pain 08/2013    Dr. Mary Banegas.  Chickenpox childhood    Endometriosis 1990    Dr. Laura Kent Environmental allergies     MARGI (generalized anxiety disorder)     Hip pain, bilateral 2010    due to severe OA. Iliopsoas bursitis. Dr. Marissa Fan    History of breast lump/mass excision 1998    Left breast.  Dr. Canseco Early Hyperlipidemia     Iron deficiency anemia     iron deficiency    Knee pain, bilateral 2017    Dr. Paloma Wilkes    Menopausal and postmenopausal disorder 2007    Dr. Laura Kent Uterine fibroid     Dr. Nabil Rdz.  Vitamin D deficiency 03/2008       Past Surgical History:   Procedure Laterality Date    BIOPSY BREAST  Rt 1993;Lt 1998    Dr. Sharad Acevedo Right 2002    Right achiles tendon. Dr. Elsa Smith.  HX BREAST BIOPSY  2001    Left. benign. Dr. Mine Purcell HX COLONOSCOPY  09/27/2016    Dr. Clifton Nichole.  due q 5 yrs due to fam hx   Scot Shoulder    Dr. Colt Palacio. due to endometriosis    HX PELVIC LAPAROSCOPY  1995    due to endometriosis Dr. Oscar Lew  04/2011    due to fibroids. Dr. Nabil Rdz.        Family History  Do you have a family history of Obesity- yes, Kidney stones- no, Gallstones- no, Diabetes- yes, Heart Disease- yes, Sleep Apnea-  yes, Gout- no  Has anyone in your family had weight loss surgery: yes  Family History   Problem Relation Age of Onset    Hypertension Mother     Other Mother         hearing loss/vision loss    Heart Disease Father     Lung Disease Father     Diabetes Sister     Heart Attack Sister 54        March 2014    Stroke Sister         after MI    Thyroid Disease Sister     Colon Polyps Sister     Obesity Sister     Sleep Apnea Sister     Heart Disease Brother         Defibrillator placed    Heart Attack Brother 48    Diabetes Maternal Grandmother        Weight Loss History  Lowest weight in lifetime/date: 170 lbs  Highest weight in lifetime/date:   212 lbs  Number of previous weight loss attempts: several  Previous weight loss programs:?  Which were most successful: ?  Barriers to your success:  Covid pandemic, job stress  Mobile Shannon use: 0      Weight Loss Medication History  Previous prescription, OTC weight loss medications or herbal remedies/supplements: Adipex 37.5 mg since 02/04/15 per chart review and alternating with, Phendimetrazine 105 mg  Negative side effects: 0  Pounds lost with use:  42 lbs    Current Outpatient Medications   Medication Sig    albuterol (PROVENTIL HFA, VENTOLIN HFA, PROAIR HFA) 90 mcg/actuation inhaler INHALE 2 PUFFS BY MOUTH EVERY 4 HOURS AS NEEDED FOR WHEEZING OR COUGH    montelukast (Singulair) 10 mg tablet Take 1 Tab by mouth daily. Indications: inflammation of the nose due to an allergy    furosemide (LASIX) 40 mg tablet TAKE 1 TABLET BY MOUTH DAILY FOR SWELLING    diclofenac EC (VOLTAREN) 75 mg EC tablet TAKE 1 TABLET BY MOUTH TWICE DAILY  Indications: rheumatoid arthritis    PREMARIN 0.625 mg/gram vaginal cream     MYRBETRIQ 50 mg ER tablet Take 50 mg by mouth two (2) times a week.  aspirin delayed-release 81 mg tablet Take 1 Tab by mouth daily. Indications: prevention of transient ischemic attack    MINIVELLE 0.1 mg/24 hr 1 Patch by TransDERmal route two (2) times a week. Changes Sunday and Wednesday    cetirizine (ZYRTEC) 10 mg tablet Take 1 Tab by mouth daily. (Patient taking differently: Take 10 mg by mouth daily as needed.)    fluticasone (FLONASE) 50 mcg/actuation nasal spray 2 Sprays by Both Nostrils route daily. Indications: ALLERGIC RHINITIS    cholecalciferol, vitamin D3, (VITAMIN D3) 2,000 unit Tab Take 2,000 Units by mouth daily.     sertraline (ZOLOFT) 100 mg tablet TAKE 1 TABLET BY MOUTH DAILY FOR STRESS  Indications: anxiousness associated with depression (Patient not taking: Reported on 7/8/2021)    hyaluronate (Synvisc-One) 48 mg/6 mL syrg injection Inject one prefilled syringe into the right knee, for a quantity of 1 injection (Patient not taking: Reported on 7/8/2021)     No current facility-administered medications for this visit.        Medications that cause weight gain:  Voltaren, Zyrtec  Medications that cause weight loss:  0    Allergies   Allergen Reactions    Advil Pm [Ibuprofen-Diphenhydramine Hcl] Other (comments)     Slightly elevated Creaitnine 1.02    Aleve [Naproxen Sodium] Other (comments)     Due to kidneys    Bactrim [Sulfamethoxazole-Trimethoprim] Nausea and Vomiting    Sulfa (Sulfonamide Antibiotics) Nausea and Vomiting    Sulfasalazine Nausea and Vomiting       Eating Habits  Total calories consumed per day:  500-600 kcal  Number of times per week fast food or meals at/from restaurants is consumed: 0  Number of meals consumed per day on average: 1    Typical Meals:       Breakfast:  Skip or  OTC Premiere Shake      Lunch:  skip      Dinner: turkey and cheese sandwhich      Snacks: kel snaps, green tostitos, apples  Barriers to eating meals:  Staying busy  Mobile Shannon Use:  0    Drinking Habits  How much water do you consume daily: 62 oz CORE water/day    How much caffeine do you drink daily: 12 oz coffee/day   Alcohol use:  0   Any other sugar-sweetened beverages daily (sodas, teas, juices, etc.): 0  Mobile Shannon Use:  0     Social History     Tobacco Use    Smoking status: Passive Smoke Exposure - Never Smoker    Smokeless tobacco: Never Used    Tobacco comment: lived with smoker mom x 18 yrs   Vaping Use    Vaping Use: Never used   Substance Use Topics    Alcohol use: No    Drug use: No         Sleep Habits  How many hours of sleep nightly: 8 plus  Occupation:   Teacher x 42 yrs Do you work night shift:  no  Work hours:  School day  Do you snore: no  Daytime naps: no  Have you ever been diagnosed by a physician with Sleep Apnea:   no  Do you use a CPAP machine? no  Do you have a family history of sleep apnea: na  Barriers to getting proper rest:  ? Mobile Shannon Use: 0     Exercise History  How many days a week do you currently exercise: 2  Type of physical activity:  Biking, Walking and Other [place] [facility] on Mondays x 1 hour, walking w daughter once a month, gardening  Frequency of activity per day:  1 times daily.   Duration of activity each time:  1 minutes  Enjoyment with physical activity:  yes  Pets owned:  0  [employer] owned:  yes Actively utilizing the gym:  yes    Do you own a pedometer or fitness tracker: yes   Average number steps per day:  1000  Mobile Shannon use:  0    Have you ever been told by a physician or anyone else not to exercise: no  Barriers limiting physical activity:  BL hip pain, R knee pain    Behavioral Health History  DEPRESSION SCREENING:    3 most recent PHQ Screens 7/8/2021   PHQ Not Done -   Little interest or pleasure in doing things Nearly every day   Feeling down, depressed, irritable, or hopeless More than half the days   Total Score PHQ 2 5   Trouble falling or staying asleep, or sleeping too much Not at all   Feeling tired or having little energy Nearly every day   Poor appetite, weight loss, or overeating Several days   Feeling bad about yourself - or that you are a failure or have let yourself or your family down Several days   Trouble concentrating on things such as school, work, reading, or watching TV Not at all   Moving or speaking so slowly that other people could have noticed; or the opposite being so fidgety that others notice Not at all   Thoughts of being better off dead, or hurting yourself in some way Not at all   PHQ 9 Score 10   How difficult have these problems made it for you to do your work, take care of your home and get along with others Somewhat difficult       Any history of mental health conditions (including Depression, Anxiety, Anorexia, Bulimia or Binge Eating disorder): MARGI  Treating provider and medication(s):Zoloft 100 mg daily  Is it controlled: yes  History of drug abuse or dependence:  no    Do you have any current major lifestyle changes or stressors:   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 She believes it is because of her weight gain and stress. No other associated symptoms. She has a hx of BL hip paina dn R knee pain. She sees Dr. Chi Jorge. Rxd Stephane prn and she had a steroid injection 2020 with relief. She has a family history of colon polyps and plans to have another colonoscopy soon.       Do you have upcoming travel in the next 6 weeks:  no    Immunization History   Administered Date(s) Administered    COVID-19, PFIZER, MRNA, LNP-S, PF, 30MCG/0.3ML DOSE 2021, 2021    Influenza Vaccine 2012, 10/22/2013, 2014    Influenza Vaccine (>6 mo Afluria QUAD Vial 89023 (0.25 mL) / 16503 (0.5 mL)) 10/29/2015    Influenza Vaccine [employer]) PF (>6 Mo Flulaval, Fluarix, and >3 Yrs Afluria, Fluzone 95246) 2016, 10/19/2017, 09/10/2018, 2019    Influenza Vaccine Split 2010, 2011    Influenza, Quadrivalent, Adjuvanted (>65 Yrs FLUAD QUAD 31146) 10/14/2020    Pneumococcal Polysaccharide (PPSV-23) 2021    TB Skin Test (PPD) Intradermal 2020    TD Vaccine 2000    Tdap 06/30/2016       ROS:  Review of Systems negative except as noted above in HPI. PHYSICAL EXAMINATION    Visit Vitals  /73 (BP 1 Location: Left arm, BP Patient Position: Sitting)   Pulse 66   Temp 97.2 °F (36.2 °C) (Oral)   Resp 18   Ht 5' 5\" (1.651 m)   Wt 212 lb 6.4 oz (96.3 kg)   LMP 04/01/2011 (Approximate) Comment: jacy   SpO2 100%   BMI 35.35 kg/m²           Weight Metrics 7/8/2021 7/8/2021 5/4/2021 1/11/2021 1/27/2020 1/27/2020 11/8/2019   Weight - 212 lb 6.4 oz 210 lb 208 lb - 180 lb -   Neck Circ (inches) 13.25 - - - - - -   Waist Measure Inches 36.5 - - - - - 33   Exercise Mins/week - - - - - - -   Body Fat % 43.2 - - - 39.1 - 40   BMI - 35.35 kg/m2 36.05 kg/m2 35.7 kg/m2 - 30.9 kg/m2 -          Neck Circumference:  Acceptable range for M >16 inches, F>15 inches  Waist Circumference:  Acceptable range for M> 40 inches/102 cm, F > 35 inches, 88 cm  Body Fat: Acceptable range for M 18-24%, F 25-31%      General appearance - Well nourished. Well appearing. Well developed. No acute distress. Obese. Head - Normocephalic. Atraumatic. Eyes - pupils equal and reactive. Extraocular eye movements intact. Sclera anicteric. Mildly injected sclera. Ears - Hearing is grossly normal bilaterally. Nose - normal and patent. No polyps noted. No erythema. No discharge. Mouth - mucous membranes with adequate moisture. Posterior pharynx normal with cobblestone appearance. No erythema, white exudate or obstruction. Neck - supple. Midline trachea. No carotid bruits noted bilaterally. No thyromegaly noted. Chest - clear to auscultation bilaterally anteriorly and posteriorly. No wheezes. No rales or rhonchi. Breath sounds are symmetrical bilaterally. Unlabored respirations. Heart - normal rate. Regular rhythm. Normal S1, S2. No murmur noted. No rubs, clicks or gallops noted. Abdomen - soft and distended. No masses or organomegaly. No rebound, rigidity or guarding.   Bowel sounds normal x 4 quadrants. No tenderness noted. Neurological - awake, alert and oriented to person, place, and time and event. Cranial nerves II through XII intact. Clear speech. Muscle strength is +5/5 x 4 extremities. Sensation is intact to light touch bilaterally. Steady gait. Heme/Lymph - peripheral pulses normal x 4 extremities. No peripheral edema is noted. Musculoskeletal - Intact x 4 extremities. Full ROM x 4 extremities. No pain with movement. Back exam - normal range of motion. No pain on palpation of the spinous processes in the cervical, thoracic, lumbar, sacral regions. No CVA tenderness. No buffalo hump noted. Skin - no rashes, erythema, ecchymosis, lacerations, abrasions, suspicious moles noted. No skin tags or moles. No acanthosis nigricans noted in the axilla or neck. Psychological -   normal behavior, dress and thought processes. Good insight. Good eye contact. Normal affect. Appropriate mood. Normal speech. DATA REVIEWED:    Lab Results   Component Value Date/Time    WBC 4.3 01/25/2021 12:00 AM    HGB 13.4 01/25/2021 12:00 AM    HCT 41.1 01/25/2021 12:00 AM    PLATELET 722 65/92/8197 12:00 AM    MCV 92 01/25/2021 12:00 AM     Lab Results   Component Value Date/Time    Sodium 142 01/25/2021 12:00 AM    Potassium 4.3 01/25/2021 12:00 AM    Chloride 101 01/25/2021 12:00 AM    CO2 26 01/25/2021 12:00 AM    Anion gap 5 04/23/2011 05:20 AM    Glucose 85 01/25/2021 12:00 AM    BUN 17 01/25/2021 12:00 AM    Creatinine 0.95 01/25/2021 12:00 AM    BUN/Creatinine ratio 18 01/25/2021 12:00 AM    GFR est AA 73 01/25/2021 12:00 AM    GFR est non-AA 63 01/25/2021 12:00 AM    Calcium 9.3 01/25/2021 12:00 AM    Bilirubin, total 0.2 01/25/2021 12:00 AM    Alk.  phosphatase 123 (H) 01/25/2021 12:00 AM    Protein, total 7.1 01/25/2021 12:00 AM    Albumin 4.0 01/25/2021 12:00 AM    A-G Ratio 1.3 01/25/2021 12:00 AM    ALT (SGPT) 17 01/25/2021 12:00 AM    AST (SGOT) 22 01/25/2021 12:00 AM     Lab Results   Component Value Date/Time    Hemoglobin A1c 5.4 2018 09:02 AM      Lab Results   Component Value Date/Time    TSH 3.270 2021 12:00 AM    TSH 2.36 2014 07:50 AM     Lab Results   Component Value Date/Time    Uric acid 5.8 2014 07:50 AM     Magnesium   Date Value Ref Range Status   2014 2.0 1.6 - 2.4 mg/dL Final     Lab Results   Component Value Date/Time    Vitamin B12 947 (H) 02/10/2016 08:37 AM    Folate 13.5 02/10/2016 08:37 AM      Lab Results   Component Value Date/Time    Vitamin D 25-Hydroxy 26.3 (L) 2011 10:22 AM    VITAMIN D, 25-HYDROXY 32.4 2019 08:05 AM        Lab Results   Component Value Date/Time    Iron 61 2013 08:37 AM     Lab Results   Component Value Date/Time    Cholesterol, total 177 2021 12:00 AM    HDL Cholesterol 76 2021 12:00 AM    LDL, calculated 89 2021 12:00 AM    LDL, calculated 91 2019 08:05 AM    VLDL, calculated 12 2021 12:00 AM    VLDL, calculated 7 2019 08:05 AM    Triglyceride 60 2021 12:00 AM        EK13 by Dr. Eduardo Soto. Sinus Bradycardia at 59 bpm. QTc 443 msec. No prolonged QTc noted. (Upper QTc limit is 440 msec for males, 460 msec for females)    ASSESSMENT     ICD-10-CM ICD-9-CM    1. Class 2 severe obesity due to excess calories with serious comorbidity and body mass index (BMI) of 35.0 to 35.9 in adult ([facility])  E66.01 278.01     Z68.35 V85.35    2. Excessive weight gain  R63.5 783.1 INSULIN      METABOLIC PANEL, COMPREHENSIVE      TSH 3RD GENERATION      VITAMIN D, 25 HYDROXY      EKG, 12 LEAD, INITIAL    due to stress, stress eating, prolonged sitting   3. Chronic fatigue  R53.82 780.79 CBC W/O DIFF      HEMOGLOBIN A1C WITH EAG      IRON      METABOLIC PANEL, COMPREHENSIVE      MAGNESIUM      URIC ACID      VITAMIN B12 & FOLATE      EKG, 12 LEAD, INITIAL    due to stress vs thyroid vs vit D vs liver vs other   4.  Pure hypercholesterolemia  E78.00 272.0 INSULIN      LIPID PANEL      METABOLIC PANEL, COMPREHENSIVE      NMR LIPOPROFILE WITH LIPIDS (WITHOUT GRAPH)   5. Hashimoto's thyroiditis  E06.3 245.2 T4, FREE      TSH 3RD GENERATION    stable without medication   6. Vitamin D deficiency  E55.9 268.9 VITAMIN D, 25 HYDROXY   7. Elevated alkaline phosphatase level  R74.8 790.5     due to diet changes vs other   8. Recurrent pain of right knee  M25.561 719.46     intermittently, worse with weight gain   9. Leg edema, right  R60.0 782. 3     chronic, unchanged   10. Menopausal and postmenopausal disorder  N95.9 627.9    11. Osteopenia of multiple sites  M85.89 733.90    12. Chronic insomnia  F51.04 780.52     resolved   13. Family history of stroke  Z82.3 V17.1    14. BMI 35.0-35.9,adult  Z68.35 V85.35    15. Family history of heart attack  Z82.49 V17.3    16. Family history of diabetes mellitus (DM)  Z83.3 V18.0    16. Family history of thyroid disease  Z83.49 V18.19    18. Family history of colonic polyps  Z83.71 V18.51    19. Family history of obesity  Z83.49 V18.19    20. Family history of sleep apnea  Z82.0 V19.8    21. S/P MARIA R-BSO (total abdominal hysterectomy and bilateral salpingo-oophorectomy)  Z90.710 V88.01     Z90.722      Z90.79         WEIGHT MANAGEMENT PLAN    Chart was reviewed and updated during the office visit today.      Based on patient history, lab results, EKG and physical exam performed today in our office, Tito Ramsey is a good candidate for the [employer] Program using the [employer] New Direction meal replacements for Very Low Calorie Dietary approach (800 kcal daily.)     Welcomed patient to our [employer] Program.      During this visit, patient met with Garcia Coombs,  today to review and sign [facility] Commitment Form, Attendance Policy, and [facility] Agreement and Consent, further discuss program design, assist patient with ordering New Direction meal replacements and schedule initial 1:1 visit with dietician, mandatory weekly (VLCD 800 kcal/day)or biweekly (LCD/0339-2268 kcal/day) nurse visits. SEE SCANNED DOCUMENTS. Referred patient to Nissa Wyatt RD for initial 30 minute 1:1 nutritional counseling. Referred patient to [facility] Patient Manual for recommended antidotes, if any new symptoms or side effects are experienced once dietary approach is initiated . Notify our office if this occurs. Discussed the patient's BMI, anthropometrics and goals with her. The weight management follow up plan is as follows:     Dietary Prescription:  Decrease carbohydrates gradually during first couple of weeks:  (white foods like potatoes, rice, pasta, bread, sweet foods, and sweet drinks including alcohol). Increase green leafy vegetables and protein (lean meats and legumes) with each meal.  Avoid fried foods. Do not skip meals. Cautioned patient about timing of meals. No not wait longer than 3-4 hours between meals to avoid experiencing possible low blood sugar episodes. Ok to gradually start VLCD (Very low calorie diet) with 800 calories consumed daily using 3-4 [employer] [facility] Direction meal replacements after first slow wean off simple carbohydrates. Consume a minimum of 2 L (67 oz) of water daily up to half your body size in ounces of water, while utilizing this dietary approach. Avoid sugar-sweetened beverages. Limit caffeine, will allow 1 8 oz cup of black coffee or green tea (to stimulate release of Adiponectin and promote fat burning.)     Exercise Prescription: Minimal and as tolerated while using this dietary approach. Encouraged strength training, resistance or toning exercises daily to prevent muscle mass loss. Sleep Prescription: A goal of 7-9 hours of uninterrupted sleep is recommended to turn off the Grehlin hormone to be released from the stomach and triggers appetite while promoting weight gain.  Proper rest turns on Leptin hormone to be released from white adipose tissue and promotes weight loss. Discussed snoring, symptoms of Sleep Apnea and improvements with weight loss. If you currently use CPAP or BiPAP, please continue its use whenever resting. Emphasized the importance of patient continuing care from current primary care provider and other specialists while participating in this weight management program.  Patient has full access to all our office notes, orders, lab results on [employer] and can provide them copies, if desired. Advised patient to follow up with pcp for any acute symptoms and Health Maintenance. Continue current medications as directed by prescribers. Identified and discussed weight positive and weight negative medications on patient's medication list.  Advised patient not to stop any use without discussing with the prescribing provider. Will monitor patient's weight and health with use. Re-visit the need for weight loss medications after patient has adjusted to VLCD and we have a current EKG, labs. Supplement recommendations:  Start OTC Magnesium 400 mg po at night prn sleep, muscle cramping, constipation. Start OTC vit B12 1000 mcg daily orally if taking Metformin or experiencing numbness and tingling. Start OTC Fish Oil with EPA and DHA 1000 mg daily if experiencing dry skin, hair loss or low HDL. Use with caution if history of heartburn exists. Increase fiber supplements or try Fiber products if experiencing constipation. Take OTC Lactaid with meal replacements, if lactose intolerance history exists or symptoms begin. Refer to [facility] Patient Manual for additional considerations for side effects and notify me. Reviewed and discussed most recent lab and EKG results.  If not available today, advised patient to sign a release to provide our program a copy of each from pcp or specialist.  Lalito Pereira pertinent labs monthly while participating in VLCD or every 3 months while participating in LCD, if using New Direction meal replacements, as discussed to identify electrolyte abnormalities and guide therapeutic approach. If labs have not been performed within the past 3-6 months, an order form for initial labs was given to patient today to be drawn one week prior to next appointment with me. If an EKG has not been performed within the past 3-6 months, an order form was given to patient today to be performed after the office visit today to be done before starting the program.  Advised patient to sign up for [employer] and view labs directly. Notify me by [facility] if any questions or concerns arise. Labs ordered today will be reviewed in detail at the next office visit and time allowed for any questions regarding the results. Referrals given as noted above. Counseled patient on health topics:  Obesity, Insulin Resistance, VLCD and LCD dietary approaches, benefits, contraindications, possible side effects to these diets and how to prevent poor outcomes (including staying hydrated, limit physical exercise while transitioning into this program, avoid skipping meals, etc), weight loss goals, sleep hygiene, barriers to losing weight and keeping weight off and stressors. Informed patient that weight loss goal of 5-10% in 6-12 months has shown significant improvement in obesity and its related health conditions including DM, heart disease, asthma. A key study published in Arthritis & Rheumatism of Overweight and Obese Adults with OA found that losing 1 pound of weight resulted in 4 pounds of pressure being removed from the knees. Immunizations noted. Covid vaccines recommended, if patient has not had it. Informed patient that Obesity may increase risk for severe illness and death from Covid-19 disease. Offered empathy, encouragement, legitimation, prayers, partnership to patient. Praised patient for successes. Patient was offered a choice/choices in the treatment plan today.   Patient expresses understanding of the plan and agrees with recommendations. Follow-up and Dispositions    · Return in about 1 month (around 8/8/2021) for VLCD, results. More than 95 mins spent in counseling, reviewing and preparing documentation, discussing lab results and coordinating care and/or discussing treatment plans in reference to The primary encounter diagnosis was Class 2 severe obesity due to excess calories with serious comorbidity and body mass index (BMI) of 35.0 to 35.9 in adult [facility]). Diagnoses of Excessive weight gain, Chronic fatigue, Pure hypercholesterolemia, Hashimoto's thyroiditis, Vitamin D deficiency, Elevated alkaline phosphatase level, Recurrent pain of right knee, Leg edema, right, Menopausal and postmenopausal disorder, Osteopenia of multiple sites, Chronic insomnia, Family history of stroke, BMI 35.0-35.9,adult, Family history of heart attack, Family history of diabetes mellitus (DM), Family history of thyroid disease, Family history of colonic polyps, Family history of obesity, and Family history of sleep apnea were also pertinent to this visit. [address] [place] [address] [address], PA-C FOR ALLOWING ME THE PRIVILEGE TO PARTICIPATE IN THE CARE OF OUR MUTUAL PATIENT, Ms. Isidoro Harris. Patient Instructions   WEIGHT LOSS PLAN    1. Read food labels and count calories. Goal 5110-2379 calories daily for women and 0839-8122 calories daily for men. Achieve this by decreasing caloric intake by 500 calories by weekly increments. NOTE:  1 pound = 3500 calories! Www.loseit. com  Www.myfitnesspal.com  Www.Boats.com. eInstruction by [employer]  Www.Sparkbrowserfinder. gov  Weight watchers mobile    Calories needed to lose 1-2 pounds a week = 10 x your weight in pounds    2. Increase water intake. Per [facility], it is important to drink 1/2 your body weight in ounces of water daily. Decrease your water consumption 2-3 hours before bedtime to prevent sleep disturbance from frequent urination.     3.  Decrease sugary beverages. Each can or glass of soda increases your risk of obesity by 60%. Can lose 10 pounds in a month by avoiding any soda. 12 oz can of soda = 140 calories, 16 oz cup of sweet tea = 200 calories    16 oz orange juice = 200 calories, 10 oz apple juice = 150 calories   32 oz sports drink = 200 calories, 16 oz punch = 240 calories   3.5 oz alcohol = 100-150 calories     4. Avoid High Fructose Corn Syrup products. This ingredient makes products highly addictive! 5. Exercise 30 minutes daily 5 days weekly, minimally. If you burn 3500 calories that equals a pound! Use a pedometer to count steps. Visit www. [employer] for a free pedometer and diet recommendations. Your maximum heart rate = 220 - your age    Never exercise at your maximum heart rate. See handout for target heart rate. 6.  Decrease carbohydrates (white bread, pasta, rice, potatoes, sweet foods and sweet drinks like soda, tea, coffee, juice and sports drinks). Increase fiber and protein. Goal:   calories daily of carbohydrates     Try CHROMIUM PICONATE 200 MG THREE TIMES DAILY,    to decrease Premenstrual carbohydrate cravings. 7.  Eat 3-5 small meals daily, include lots of protein (beans/legumes, nuts, lean meat, eggs) and green vegetables with each. (Breakfast, lunch, dinner with 2 healthy snacks)    8. Get proper rest 7-8 hours uninterrupted. When you get less than 6 hours, it triggers hunger by affecting your Grehlin:Leptin ratio and this results in weight gain. 9.  Watch your food portions. Green leafy vegetables should cover 1/2 of the plate, lean meat 1/4 of the plate, starchy vegetable 1/4 of the plate. Use smaller plates. 10.  Do not eat until you are full. Eat until you are no longer hungry. If you are not sure, try drinking a glass of water before getting your second serving of food. 11.  Do not weigh yourself daily. Wait until your next office visit.   Use how you feel and how your clothes fit as measurements of success. 12.  Address your spirituality to draw strength from above during your journey. Remember \"I am fearfully and wonderfully made. Marvelous in His eyes. \"    13. Set realistic, appropriate and achievable weight loss goals:     RECOMMENDED TARGET WEIGHT LOSS:  Initial weight loss of 5-10% of your initial body weight achieved over 6 months or a decrease of 2 BMI units. MINIMUM GOAL OF WEIGHT LOSS:  Reduce body weight and maintain a lower body weight. Prevent weight gain. RELATED WEBSITES:      Www.obesityaction. org  (and consider joining the Obesity Action Coalition for $25/year. The [facility] mission is to elevated and empower those affected by obesity through education, advocacy and support. Quarterly journals included in membership fee. )    Www.SkillPixels  www.TempMine.com     RELATED DIETS:  Dr. Jassi Blue Weight loss challenge, Mediterranean diet, [address] [place] [address] Watchers, PaleInvenra Diet (anti-inflammatory diet)        Congrats on starting the VLCD! Use 3-4 meal replacements a day. 1.Please refer to Patient Manual for a list of all potential side effects of the VLCD and what to do. For constipation, do not allow more than 3 days to pass you without having a bowel movement. As mentioned at your provider monthly visit, you can try OTC Magnesium 400 mg daily or Milk of Magnesia or Miralax or Smooth Move Tea for constipation. Please make sure you are consuming 2 liter (67 oz of water minimally). 2. Recheck fasting labs with [facility] one week prior to next monthly visit with Dr. Henrique Abreu. 3. Attend the mandatory weekly weigh-ins at the office. Make sure homework sheets are completed prior to arrival or you will not be seen and cannot  meal products. 4. Attend the weekly nutritional meetings on Thursdays.    [id number],  at [phone] with any questions or concerns related to the program.

## 2021-07-08 NOTE — PATIENT INSTRUCTIONS
WEIGHT LOSS PLAN    1. Read food labels and count calories. Goal 5941-2109 calories daily for women and 5202-9037 calories daily for men. Achieve this by decreasing caloric intake by 500 calories by weekly increments. NOTE:  1 pound = 3500 calories! Www.loseit. [employer]  Www.myfitnesspal.[employer]  Www.[employer]s. [employer]  Www.Destinator Technologiesfinder. gov  Weight watchers mobile    Calories needed to lose 1-2 pounds a week = 10 x your weight in pounds    2. Increase water intake. Per [facility] Weight loss Program, it is important to drink 1/2 your body weight in ounces of water daily. Decrease your water consumption 2-3 hours before bedtime to prevent sleep disturbance from frequent urination. 3.  Decrease sugary beverages. Each can or glass of soda increases your risk of obesity by 60%. Can lose 10 pounds in a month by avoiding any soda. 12 oz can of soda = 140 calories, 16 oz cup of sweet tea = 200 calories    16 oz orange juice = 200 calories, 10 oz apple juice = 150 calories   32 oz sports drink = 200 calories, 16 oz punch = 240 calories   3.5 oz alcohol = 100-150 calories     4. Avoid High Fructose Corn Syrup products. This ingredient makes products highly addictive! 5. Exercise 30 minutes daily 5 days weekly, minimally. If you burn 3500 calories that equals a pound! Use a pedometer to count steps. Visit www. MindFuse. [employer] for a free pedometer and diet recommendations. Your maximum heart rate = 220 - your age    Never exercise at your maximum heart rate. See handout for target heart rate. 6.  Decrease carbohydrates (white bread, pasta, rice, potatoes, sweet foods and sweet drinks like soda, tea, coffee, juice and sports drinks). Increase fiber and protein. Goal:   calories daily of carbohydrates     Try CHROMIUM PICONATE 200 MG THREE TIMES DAILY,    to decrease Premenstrual carbohydrate cravings.     7.  Eat 3-5 small meals daily, include lots of protein (beans/legumes, nuts, lean meat, eggs) and green vegetables with each. (Breakfast, lunch, dinner with 2 healthy snacks)    8. Get proper rest 7-8 hours uninterrupted. When you get less than 6 hours, it triggers hunger by affecting your Grehlin:Leptin ratio and this results in weight gain. 9.  Watch your food portions. Green leafy vegetables should cover 1/2 of the plate, lean meat 1/4 of the plate, starchy vegetable 1/4 of the plate. Use smaller plates. 10.  Do not eat until you are full. Eat until you are no longer hungry. If you are not sure, try drinking a glass of water before getting your second serving of food. 11.  Do not weigh yourself daily. Wait until your next office visit. Use how you feel and  how your clothes fit as measurements of success. 12.  Address your spirituality to draw strength from above during your journey. Remember \"I am fearfully and wonderfully made. Marvelous in His eyes. \"    13. Set realistic, appropriate and achievable weight loss goals:     RECOMMENDED TARGET WEIGHT LOSS:  Initial weight loss of 5-10% of your initial body weight achieved over 6 months or a decrease of 2 BMI units. MINIMUM GOAL OF WEIGHT LOSS:  Reduce body weight and maintain a lower body weight. Prevent weight gain. RELATED WEBSITES:      Www.obesityaction. org  (and consider joining the Obesity Action Coalition for $25/year. The [facility] mission is to elevated and empower those affected by obesity through education, advocacy and support. Quarterly journals included in membership fee. )    Www.Sight Sciences. NetStreams  www.ProtAb.com     RELATED DIETS:  Dr. Deandra Crow Weight loss challenge, Mediterranean diet, [address] [place] [address] Watchers, Paleo Diet (anti-inflammatory diet)        Congrats on starting the VLCD! Use 3-4 meal replacements a day. 1.Please refer to Patient Manual for a list of all potential side effects of the VLCD and what to do.   For constipation, do not allow more than 3 days to pass you without having a bowel movement. As mentioned at your provider monthly visit, you can try OTC Magnesium 400 mg daily or Milk of Magnesia or Miralax or Smooth Move Tea for constipation. Please make sure you are consuming 2 liter (67 oz of water minimally). 2. Recheck fasting labs with [facility] one week prior to next monthly visit with Dr. Fidencio Valdivia. 3. Attend the mandatory weekly weigh-ins at the office. Make sure homework sheets are completed prior to arrival or you will not be seen and cannot  meal products. 4. Attend the weekly nutritional meetings on Thursdays.    [id number] [facility] Louis Sanchez,  at [phone] with any questions or concerns related to the program.

## 2021-07-14 ENCOUNTER — OFFICE VISIT (OUTPATIENT)
Dept: SURGERY | Age: 66
End: 2021-07-14

## 2021-07-14 DIAGNOSIS — E66.01 MORBID OBESITY (HCC): Primary | ICD-10-CM

## 2021-07-15 LAB
25(OH)D3+25(OH)D2 SERPL-MCNC: 38.2 NG/ML (ref 30–100)
ALBUMIN SERPL-MCNC: 4.1 G/DL (ref 3.8–4.8)
ALBUMIN/GLOB SERPL: 1.4 {RATIO} (ref 1.2–2.2)
ALP SERPL-CCNC: 105 IU/L (ref 48–121)
ALT SERPL-CCNC: 14 IU/L (ref 0–32)
AST SERPL-CCNC: 23 IU/L (ref 0–40)
BASOPHILS # BLD AUTO: 0 X10E3/UL (ref 0–0.2)
BASOPHILS NFR BLD AUTO: 1 %
BILIRUB SERPL-MCNC: 0.6 MG/DL (ref 0–1.2)
BUN SERPL-MCNC: 24 MG/DL (ref 8–27)
BUN/CREAT SERPL: 23 (ref 12–28)
CALCIUM SERPL-MCNC: 9.4 MG/DL (ref 8.7–10.3)
CHLORIDE SERPL-SCNC: 102 MMOL/L (ref 96–106)
CHOLEST SERPL-MCNC: 188 MG/DL (ref 100–199)
CHOLEST SERPL-MCNC: 193 MG/DL (ref 100–199)
CO2 SERPL-SCNC: 25 MMOL/L (ref 20–29)
CREAT SERPL-MCNC: 1.04 MG/DL (ref 0.57–1)
EOSINOPHIL # BLD AUTO: 0.1 X10E3/UL (ref 0–0.4)
EOSINOPHIL NFR BLD AUTO: 3 %
ERYTHROCYTE [DISTWIDTH] IN BLOOD BY AUTOMATED COUNT: 12.7 % (ref 11.7–15.4)
FOLATE SERPL-MCNC: 8.6 NG/ML
GLOBULIN SER CALC-MCNC: 3 G/DL (ref 1.5–4.5)
GLUCOSE SERPL-MCNC: 76 MG/DL (ref 65–99)
HBA1C MFR BLD: 5.3 % (ref 4.8–5.6)
HCT VFR BLD AUTO: 38.9 % (ref 34–46.6)
HDL SERPL-SCNC: 34.2 UMOL/L
HDLC SERPL-MCNC: 77 MG/DL
HDLC SERPL-MCNC: 83 MG/DL
HGB BLD-MCNC: 13.5 G/DL (ref 11.1–15.9)
IMM GRANULOCYTES # BLD AUTO: 0 X10E3/UL (ref 0–0.1)
IMM GRANULOCYTES NFR BLD AUTO: 0 %
INSULIN SERPL-ACNC: 8.5 UIU/ML (ref 2.6–24.9)
IRON SERPL-MCNC: 88 UG/DL (ref 27–139)
LDL SERPL QN: 21.4 NM
LDL SERPL-SCNC: 869 NMOL/L
LDL SMALL SERPL-SCNC: <90 NMOL/L
LDLC SERPL CALC-MCNC: 108 MG/DL (ref 0–99)
LDLC SERPL CALC-MCNC: 97 MG/DL (ref 0–99)
LYMPHOCYTES # BLD AUTO: 1.6 X10E3/UL (ref 0.7–3.1)
LYMPHOCYTES NFR BLD AUTO: 36 %
MAGNESIUM SERPL-MCNC: 2.3 MG/DL (ref 1.6–2.3)
MCH RBC QN AUTO: 31 PG (ref 26.6–33)
MCHC RBC AUTO-ENTMCNC: 34.7 G/DL (ref 31.5–35.7)
MCV RBC AUTO: 89 FL (ref 79–97)
MONOCYTES # BLD AUTO: 0.5 X10E3/UL (ref 0.1–0.9)
MONOCYTES NFR BLD AUTO: 11 %
NEUTROPHILS # BLD AUTO: 2.3 X10E3/UL (ref 1.4–7)
NEUTROPHILS NFR BLD AUTO: 49 %
PLATELET # BLD AUTO: 242 X10E3/UL (ref 150–450)
POTASSIUM SERPL-SCNC: 4.4 MMOL/L (ref 3.5–5.2)
PROT SERPL-MCNC: 7.1 G/DL (ref 6–8.5)
RBC # BLD AUTO: 4.35 X10E6/UL (ref 3.77–5.28)
SODIUM SERPL-SCNC: 139 MMOL/L (ref 134–144)
SPECIMEN STATUS REPORT, ROLRST: NORMAL
T4 SERPL-MCNC: 7.3 UG/DL (ref 4.5–12)
TRIGL SERPL-MCNC: 40 MG/DL (ref 0–149)
TRIGL SERPL-MCNC: 43 MG/DL (ref 0–149)
TSH SERPL DL<=0.005 MIU/L-ACNC: 2.27 UIU/ML (ref 0.45–4.5)
URATE SERPL-MCNC: 5.6 MG/DL (ref 3–7.2)
VIT B12 SERPL-MCNC: 980 PG/ML (ref 232–1245)
VLDLC SERPL CALC-MCNC: 8 MG/DL (ref 5–40)
WBC # BLD AUTO: 4.6 X10E3/UL (ref 3.4–10.8)

## 2021-07-19 ENCOUNTER — CLINICAL SUPPORT (OUTPATIENT)
Dept: SURGERY | Age: 66
End: 2021-07-19

## 2021-07-19 VITALS
HEART RATE: 70 BPM | BODY MASS INDEX: 34.28 KG/M2 | DIASTOLIC BLOOD PRESSURE: 70 MMHG | SYSTOLIC BLOOD PRESSURE: 112 MMHG | OXYGEN SATURATION: 98 % | WEIGHT: 206 LBS

## 2021-07-19 DIAGNOSIS — E66.09 CLASS 1 OBESITY DUE TO EXCESS CALORIES WITH SERIOUS COMORBIDITY AND BODY MASS INDEX (BMI) OF 34.0 TO 34.9 IN ADULT: Primary | ICD-10-CM

## 2021-07-26 ENCOUNTER — CLINICAL SUPPORT (OUTPATIENT)
Dept: SURGERY | Age: 66
End: 2021-07-26

## 2021-07-26 DIAGNOSIS — E66.01 MORBID OBESITY (HCC): Primary | ICD-10-CM

## 2021-07-26 NOTE — PROGRESS NOTES
Metabolic Program Initial Nutrition Consult    Date: 2021   Physician: Candie Mir MD  Name: Yessy Rider  :  1955    Type of Plan: LCD  Weeks on Plan: 3 weeks  Virtual visit was completed through [address] [place] [address]. ASSESSMENT:      Medications/Supplements:   Prior to Admission medications    Medication Sig Start Date End Date Taking? Authorizing Provider   albuterol (PROVENTIL HFA, VENTOLIN HFA, PROAIR HFA) 90 mcg/actuation inhaler INHALE 2 PUFFS BY MOUTH EVERY 4 HOURS AS NEEDED FOR WHEEZING OR COUGH 21   Ryder Alarcon MD   sertraline (ZOLOFT) 100 mg tablet TAKE 1 TABLET BY MOUTH DAILY FOR STRESS  Indications: anxiousness associated with depression 21   Marilee Weaver PA-C   montelukast (Singulair) 10 mg tablet Take 1 Tab by mouth daily. Indications: inflammation of the nose due to an allergy 21   Marilee Weaver PA-C   furosemide (LASIX) 40 mg tablet TAKE 1 TABLET BY MOUTH DAILY FOR SWELLING 21   Marilee Weaver PA-C   diclofenac EC (VOLTAREN) 75 mg EC tablet TAKE 1 TABLET BY MOUTH TWICE DAILY  Indications: rheumatoid arthritis 21   Marilee Weaver PA-C   hyaluronate (Synvisc-One) 48 mg/6 mL syrg injection Inject one prefilled syringe into the right knee, for a quantity of 1 injection 10/29/20   Provider, Historical   PREMARIN 0.625 mg/gram vaginal cream  4/3/18   Provider, Historical   MYRBETRIQ 50 mg ER tablet Take 50 mg by mouth two (2) times a week. 3/12/18   Provider, Historical   aspirin delayed-release 81 mg tablet Take 1 Tab by mouth daily. Indications: prevention of transient ischemic attack 17   Avis Cranker R, DO   MINIVELLE 0.1 mg/24 hr 1 Patch by TransDERmal route two (2) times a week. Changes  and Wednesday 2/9/15   Provider, Historical   cetirizine (ZYRTEC) 10 mg tablet Take 1 Tab by mouth daily. Patient taking differently: Take 10 mg by mouth daily as needed.  3/25/15   Nicolas Apple DO   fluticasone [facility]) 50 mcg/actuation nasal spray 2 Sprays by Both Nostrils route daily. Indications: ALLERGIC RHINITIS 3/25/15   Zechariah, Orange, DO   cholecalciferol, vitamin D3, (VITAMIN D3) 2,000 unit Tab Take 2,000 Units by mouth daily. Provider, Historical     Anthropometrics:    Ht:65\"   Wt: 206#    IBW: 125#   %IBW: 164%    BMI: 34   Category: Obesity Class I    Reported wt history: Pt presents today for initial nutrition consult for the [facility] - Metabolic Program.      Exercise/Physical Activity: high activity in the yard. Walking with neighbor 3 days/week 30 min. Reported Diet History: using hot air frier. Cooking at home more. Says not hungry, thinks she may not be getting enough calories. Bought a scale, using for protein and other serving sizes. Started meal replacements:yes  If yes, how many per day: 1-2 per day. Aversions/side effects to meal replacements: no    24 Hour Diet Recall  Breakfast  Skip or egg white with spinach and cheese   Lunch  Tossed salad no dressing with chicken or fish with premixed salad   Dinner  shake   Snacks  wasa cracker 2-3 plain   Beverages  Water 64 oz, green tea       Environment/Psychosocial/Support:only healthy eater in the family, not supportive. NUTRITION INTERVENTION:  Pt educated on nutrition recommendations for LCD, specifically 2 meal replacements per day plus a grocery meal and snack. Grocery meal:  Use the balanced plate method to plan meals, include 3-6 oz of lean source of protein, unlimited non-starchy vegetables, 1/2 cup whole grains/beans OR 1/2 cup fruit OR 1 serving of low fat dairy. Utilize handouts listing healthy snack and meal ideas to limit restaurant meals. Read all nutrition labels. Demonstrated and emphasized identifying serving size, total fat, sugar and protein content. Defined low fat as </= 3 g per serving. Discussed lean and extra lean sources of protein. Provided list of low fat cooking methods.  Avoid foods with sugar listed in the first 3 ingredients and >/15 g sugar per serving. Practice mindful eating habits; take small bites, chew thoroughly, avoid distractions, utilize hunger/fullness scale. Attend Metabolic Support Group and increase physical activity (approved per MD) for long term weight maintenance. NUTRITION MONITORING AND EVALUATION: pt is motivated as evidenced by weight loss and recall. Adherence to goals likely. The following goals were established with patient;  1) back into the gym  2) lose 2 more pounds next weigh in: through consistency and persistence with food  3) increase water to 80 oz every day  4) balanced snacks, IF choosing crackers, pair with I serving of protein or fat. Ideas discussed. Specific tips and techniques to facilitate compliance with above recommendations were provided and discussed. If further details are desired please contact me at [phone]. This phone number was also provided to the patient for any further questions or concerns.           Danny Gan MS, RD, LDN

## 2021-07-28 ENCOUNTER — OFFICE VISIT (OUTPATIENT)
Dept: SURGERY | Age: 66
End: 2021-07-28

## 2021-07-28 DIAGNOSIS — E66.01 CLASS 2 SEVERE OBESITY DUE TO EXCESS CALORIES WITH SERIOUS COMORBIDITY AND BODY MASS INDEX (BMI) OF 35.0 TO 35.9 IN ADULT (HCC): Primary | ICD-10-CM

## 2021-07-29 NOTE — PROGRESS NOTES
[facility]  Metabolic Weight Loss Program        Patient's Name: Harjit Smith  : 1955    This patient is enrolled in [address] [place] [address] Weight Loss Program and attended the required weekly virtual nutrition class hosted via [address] [place] [address] today.       Mary Salinas, MS, RD, LDN

## 2021-07-30 PROBLEM — Z90.79 S/P TAH-BSO (TOTAL ABDOMINAL HYSTERECTOMY AND BILATERAL SALPINGO-OOPHORECTOMY): Status: ACTIVE | Noted: 2021-07-30

## 2021-07-30 PROBLEM — Z90.722 S/P TAH-BSO (TOTAL ABDOMINAL HYSTERECTOMY AND BILATERAL SALPINGO-OOPHORECTOMY): Status: ACTIVE | Noted: 2021-07-30

## 2021-07-30 PROBLEM — Z90.710 S/P TAH-BSO (TOTAL ABDOMINAL HYSTERECTOMY AND BILATERAL SALPINGO-OOPHORECTOMY): Status: ACTIVE | Noted: 2021-07-30

## 2021-07-31 NOTE — PROGRESS NOTES
[facility]  Metabolic Weight Loss Program        Patient's Name: Jorge Dorsey  : 1955    This patient is enrolled in [address] [place] [address] Weight Loss Program and attended the required weekly virtual nutrition class hosted via [address] [place] [address] today.       Donnie Chavarria, MS, RD, LDN

## 2021-08-02 ENCOUNTER — CLINICAL SUPPORT (OUTPATIENT)
Dept: SURGERY | Age: 66
End: 2021-08-02

## 2021-08-02 VITALS
WEIGHT: 205 LBS | RESPIRATION RATE: 18 BRPM | HEART RATE: 66 BPM | BODY MASS INDEX: 34.11 KG/M2 | DIASTOLIC BLOOD PRESSURE: 64 MMHG | OXYGEN SATURATION: 98 % | SYSTOLIC BLOOD PRESSURE: 100 MMHG

## 2021-08-02 DIAGNOSIS — E66.09 CLASS 1 OBESITY DUE TO EXCESS CALORIES WITH SERIOUS COMORBIDITY AND BODY MASS INDEX (BMI) OF 34.0 TO 34.9 IN ADULT: Primary | ICD-10-CM

## 2021-08-02 NOTE — PROGRESS NOTES
Progress Note: Weekly Education Class in the [facility] Weight Loss Program     1) Patient is on Very Low Calorie Diet [] (4 meal replacements per day, 800 kcal/day)      Low Calorie Diet [x] (2-3 meal replacements per day, 3879-8091 kcal/day)    Did patient have any new symptoms or physical problems? Yes []   or  No [x]    If yes, check & comment: weakness [], fatigue [], lightheadedness [], headache [], cramps [], cold intolerance [], hair loss [], diarrhea [], constipation [],  NA [] other:                                 Has patient had any medical attention from other providers, urgent care or the emergency room this week? Yes []  or No [x]       NA [], If yes, why:                                         2) Number of meal replacements consumed daily? 1-2 (range)  NA []    Did you eat any food outside of the program? Yes [x] No []    3) Average ounces of water patient consumed daily this week (not including shakes)? 64     (divide the weekly total by 7)    Any other sugar sweetened beverages consumed this week? Yes [x]  No []    Did patient have any problems adhering to the diet? Yes []  No [x] NA []    If yes, Vacation [], Celebrations [], Conferences [], Family Reunions [] other:                                                4) How has patient mood overall been this week? Sad [], Happy [], Stressed [], Tired [], Content [x], NA [], other            5) Physical Activity Over the Past Week:    Cardio exercise: 0 min  Strength exercise:  0 workouts / week  Number of steps walked per day: 8913-9608      Medications reconciled by nurse Yes [x]  No[]    Patient was given therapeutic recommendations for any noted side effects of their dietary approach based upon [facility] patient manual per providers recommendation.      Progress Note: Weekly Education Class in the [facility] Weight Loss Program     1) Patient is on Very Low Calorie Diet [] (4 meal replacements per day, 800 kcal/day)      Low Calorie Diet [x] (2-3 meal replacements per day, 6457-0691 kcal/day)    Did patient have any new symptoms or physical problems? Yes []   or  No [x]    If yes, check & comment: weakness [], fatigue [], lightheadedness [], headache [], cramps [], cold intolerance [], hair loss [], diarrhea [], constipation [],  NA [] other:                                 Has patient had any medical attention from other providers, urgent care or the emergency room this week? Yes []  or No [x]       NA [], If yes, why:                                         2) Number of meal replacements consumed daily? 1-2 (range)  NA []    Did you eat any food outside of the program? Yes [x] No []    3) Average ounces of water patient consumed daily this week (not including shakes)? 64     (divide the weekly total by 7)    Any other sugar sweetened beverages consumed this week? Yes [x]  No []    Did patient have any problems adhering to the diet? Yes []  No [x] NA []    If yes, Vacation [], Celebrations [], Conferences [], Family Reunions [] other:                                                4) How has patient mood overall been this week? Sad [], Happy [], Stressed [], Tired [x], Content [x], NA [], other            5) Physical Activity Over the Past Week:    Cardio exercise: 0 min  Strength exercise: 0 workouts / week  Number of steps walked per day: 5246-1634      Medications reconciled by nurse Yes [x]  No[]    Patient was given therapeutic recommendations for any noted side effects of their dietary approach based upon [facility] patient manual per providers recommendation. Progress Note: Weekly Education Class in the [facility] Weight Loss Program     1) Patient is on Very Low Calorie Diet [] (4 meal replacements per day, 800 kcal/day)      Low Calorie Diet [x] (2-3 meal replacements per day, 9142-1305 kcal/day)    Did patient have any new symptoms or physical problems?    Yes [x]   or  No []    If yes, check & comment: weakness [], fatigue [x], lightheadedness [], headache [], cramps [], cold intolerance [], hair loss [], diarrhea [x], constipation [],  NA [] other:                                 Has patient had any medical attention from other providers, urgent care or the emergency room this week? Yes []  or No [x]       NA [], If yes, why:                                         2) Number of meal replacements consumed daily? 2  (range)  NA []    Did you eat any food outside of the program? Yes [] No []    3) Average ounces of water patient consumed daily this week (not including shakes)? 64    (divide the weekly total by 7)    Any other sugar sweetened beverages consumed this week? Yes []  No [x]    Did patient have any problems adhering to the diet? Yes []  No [x] NA []    If yes, Vacation [], Celebrations [], Conferences [], Family Reunions [] other:                                                4) How has patient mood overall been this week? Sad [], Happy [], Stressed [], Tired [x], Content [x], NA [], other            5) Physical Activity Over the Past Week:    Cardio exercise: 0 min  Strength exercise:  workouts / week  Number of steps walked per day: 5421-5128      Medications reconciled by nurse Yes [x]  No[]    Patient was given therapeutic recommendations for any noted side effects of their dietary approach based upon [facility] patient manual per providers recommendation.

## 2021-08-02 NOTE — PROGRESS NOTES
Nurse note reviewed. Nurse reminded patient to review the requirements for participation in the [facility] Program and to complete this homework documentation to remain in good standing. Patient expressed understanding and agreed to comply. Provider will follow up with the patient at the monthly office visit.

## 2021-08-04 ENCOUNTER — OFFICE VISIT (OUTPATIENT)
Dept: SURGERY | Age: 66
End: 2021-08-04

## 2021-08-04 DIAGNOSIS — E66.09 CLASS 1 OBESITY DUE TO EXCESS CALORIES WITH SERIOUS COMORBIDITY AND BODY MASS INDEX (BMI) OF 34.0 TO 34.9 IN ADULT: Primary | ICD-10-CM

## 2021-08-06 ENCOUNTER — HOSPITAL ENCOUNTER (OUTPATIENT)
Dept: NON INVASIVE DIAGNOSTICS | Age: 66
Discharge: HOME | End: 2021-08-06
Payer: COMMERCIAL

## 2021-08-06 DIAGNOSIS — R53.82 CHRONIC FATIGUE: ICD-10-CM

## 2021-08-06 DIAGNOSIS — R63.5 EXCESSIVE WEIGHT GAIN: ICD-10-CM

## 2021-08-06 PROCEDURE — 93005 ELECTROCARDIOGRAM TRACING: CPT

## 2021-08-07 LAB
ATRIAL RATE: 59 BPM
CALCULATED P AXIS, ECG09: 48 DEGREES
CALCULATED R AXIS, ECG10: -8 DEGREES
CALCULATED T AXIS, ECG11: 28 DEGREES
DIAGNOSIS, 93000: NORMAL
P-R INTERVAL, ECG05: 136 MS
Q-T INTERVAL, ECG07: 420 MS
QRS DURATION, ECG06: 80 MS
QTC CALCULATION (BEZET), ECG08: 415 MS
VENTRICULAR RATE, ECG03: 59 BPM

## 2021-08-09 ENCOUNTER — TELEPHONE (OUTPATIENT)
Dept: SURGERY | Age: 66
End: 2021-08-09

## 2021-08-11 ENCOUNTER — VIRTUAL VISIT (OUTPATIENT)
Dept: SURGERY | Age: 66
End: 2021-08-11
Payer: COMMERCIAL

## 2021-08-11 DIAGNOSIS — Z83.49 FAMILY HISTORY OF OBESITY: ICD-10-CM

## 2021-08-11 DIAGNOSIS — E55.9 VITAMIN D DEFICIENCY: ICD-10-CM

## 2021-08-11 DIAGNOSIS — Z83.49 FAMILY HISTORY OF THYROID DISEASE: ICD-10-CM

## 2021-08-11 DIAGNOSIS — E66.9 OBESITY, CLASS I, BMI 30-34.9: Primary | ICD-10-CM

## 2021-08-11 DIAGNOSIS — E78.00 PURE HYPERCHOLESTEROLEMIA: ICD-10-CM

## 2021-08-11 DIAGNOSIS — Z82.49 FAMILY HISTORY OF HEART ATTACK: ICD-10-CM

## 2021-08-11 DIAGNOSIS — E06.3 HASHIMOTO'S THYROIDITIS: ICD-10-CM

## 2021-08-11 DIAGNOSIS — Z90.710 S/P TAH-BSO (TOTAL ABDOMINAL HYSTERECTOMY AND BILATERAL SALPINGO-OOPHORECTOMY): ICD-10-CM

## 2021-08-11 DIAGNOSIS — R74.8 ELEVATED ALKALINE PHOSPHATASE LEVEL: ICD-10-CM

## 2021-08-11 DIAGNOSIS — Z83.71 FAMILY HISTORY OF COLONIC POLYPS: ICD-10-CM

## 2021-08-11 DIAGNOSIS — F51.04 CHRONIC INSOMNIA: ICD-10-CM

## 2021-08-11 DIAGNOSIS — Z83.3 FAMILY HISTORY OF DIABETES MELLITUS (DM): ICD-10-CM

## 2021-08-11 DIAGNOSIS — Z82.3 FAMILY HISTORY OF STROKE: ICD-10-CM

## 2021-08-11 DIAGNOSIS — Z90.79 S/P TAH-BSO (TOTAL ABDOMINAL HYSTERECTOMY AND BILATERAL SALPINGO-OOPHORECTOMY): ICD-10-CM

## 2021-08-11 DIAGNOSIS — Z82.0 FAMILY HISTORY OF SLEEP APNEA: ICD-10-CM

## 2021-08-11 DIAGNOSIS — N39.498 OTHER URINARY INCONTINENCE: ICD-10-CM

## 2021-08-11 DIAGNOSIS — M05.751 RHEUMATOID ARTHRITIS INVOLVING BOTH HIPS WITH POSITIVE RHEUMATOID FACTOR (HCC): ICD-10-CM

## 2021-08-11 DIAGNOSIS — Z90.722 S/P TAH-BSO (TOTAL ABDOMINAL HYSTERECTOMY AND BILATERAL SALPINGO-OOPHORECTOMY): ICD-10-CM

## 2021-08-11 DIAGNOSIS — F41.1 GENERALIZED ANXIETY DISORDER: ICD-10-CM

## 2021-08-11 DIAGNOSIS — Z82.49 FAMILY HISTORY OF ISCHEMIC HEART DISEASE: ICD-10-CM

## 2021-08-11 DIAGNOSIS — M05.752 RHEUMATOID ARTHRITIS INVOLVING BOTH HIPS WITH POSITIVE RHEUMATOID FACTOR (HCC): ICD-10-CM

## 2021-08-11 DIAGNOSIS — R63.4 WEIGHT LOSS: ICD-10-CM

## 2021-08-11 PROCEDURE — 99215 OFFICE O/P EST HI 40 MIN: CPT | Performed by: FAMILY MEDICINE

## 2021-08-11 NOTE — PROGRESS NOTES
[address] [address] [address].  [phone] o  [phone] f       TELE HEALTH VISIT    PCP:  Marilee Weaver PA-C    Yessy Rider was evaluated through a synchronous (real-time) audio-video encounter. The patient (or guardian if applicable) is aware that this is a billable service. Verbal consent to proceed has been obtained within the past 12 months. The visit was conducted pursuant to the emergency declaration under the [facility], [facility] authority and the [employer] and [employer] Act. Patient identification was verified. The patient was located in a state where the provider was credentialed to provide care. Method of Delivery:  Video  Platform:  [email or web address] me  My location:  Office                Patient location:  Parked car/[place]    HISTORY OF PRESENT ILLNESS  Agree with nurse and registered dietician notes. Yessy Rider is a 77 y.o. female with Obesity Class 1 There is no height or weight on file to calculate BMI. and associated health concerns was seen by synchronous (real-time) audio-video technology on 8/11/2021 for Weight Management      Patient has been participating in the [employer] Management Program using the [employer] New Bandwidth Low Calorie Diet (VLCD, 1000 kcal/day) since 07/08/21. She changed from VLCD to LCD because she believes she needs to eat something instead of just drinking shakes. She started LCD the week after seeing me. Anthropometric measures on start date:  Weight 212 pounds 6.4 oz, BMI 35.35 kg/m2, Neck circumference 13.25 inches, Waist circumference 36.5 inches, Body fat percentage 43.2 %.       Patient goals for participation in this weight management program:   150 lbs    Current Anthropometric Measures  Self-reported weight today:  (None reported today) since last appointment in our Center. Weight Metrics 8/2/2021 7/19/2021 7/8/2021 7/8/2021 5/4/2021 1/11/2021 1/27/2020   Weight 205 lb 206 lb - 212 lb 6.4 oz 210 lb 208 lb -   Neck Circ (inches) - - 13.25 - - - -   Waist Measure Inches - - 36.5 - - - -   Exercise Mins/week - - - - - - -   Body Fat % - - 43.2 - - - 39.1   BMI 34.11 kg/m2 34.28 kg/m2 - 35.35 kg/m2 36.05 kg/m2 35.7 kg/m2 -       Contributing factors to weight loss:  LCD, behavior change, paying closer attention to foods, reading labels, and starting walking everyday with a friend around a track, gardening    Total pounds loss since starting this therapeutic approach: 7 lbs. Is patient satisfied with his/her progress since the last appointment? Yes the first week, not as satisfied the second week. What makes it difficult to adhere to this plan of care:  When I'm not focused on it. The products are milk based and are causing lots of gas. I am busy and might forget to drink a shake. My time is very limited. Surgical History  Previous Weight Loss Surgery:  0 Date of Surgery and Surgeon:  0    Past Surgical History:   Procedure Laterality Date   Rangel Damon Right 2002    Right achiles tendon. Dr. Laura Fang.  HX BREAST BIOPSY  2001    Left. benign. Dr. Radha Luis HX COLONOSCOPY  09/27/2016    Dr. Jodi Hoover.  due q 5 yrs due to fam hx   Cindy Sharp. due to endometriosis    HX PELVIC LAPAROSCOPY  1995    due to endometriosis Dr. Kate Gandhi  04/2011    due to fibroids. Dr. Gaurav Cleveland.        Eating Habits  Total calories consumed per day:  Less than 800 kcal, not really tracking it      Number of Meal replacements consumed per day:  1-2 [employer]irection food products  Negative Side Effects:  Lots of gas  Does patient want to continue use of New Direction meal replacements:  yes    Typical Meals:        Breakfast: skip bc \"I'm not a breakfast person\" or spinach w egg white       Lunch:  12-1 pm chicken breast w broccoli or tossed salad or fish/turkey/hamburger     or occ ND shake       Dinner: 5-6 pm fish/chicken or turkey w tossed salad or vegetable medley     (broccoli, carrots, cauliflower)      Snacks: Rarely. \"I chew a lot of gum. \"    Barriers to eating meals:  Busy working outside in the yard early. Just want to shower and relax, not eat. The shakes are giving me gas. I am not a chocolate lover. I am a picky eater. Drinking Habits  Average amount of water consumed daily: 32-64 oz/ every other day  (Goal 2 L or 67 oz daily, minimally)  Amount of caffeine consumed daily: 8 oz coffee w Splenda alternate w green tea   Sugar-sweetened beverages consumed daily (including alcohol, sodas, teas, juices, etc.): Gatorade zero when working in the garden     Social History     Tobacco Use    Smoking status: Passive Smoke Exposure - Never Smoker    Smokeless tobacco: Never Used    Tobacco comment: lived with smoker mom x 18 yrs   Vaping Use    Vaping Use: Never used   Substance Use Topics    Alcohol use: No    Drug use: No         Sleep Habits  Total hours of sleep nightly: 8 hours (Goal 7-8 hours/nightly)  Rx or OTC Sleep Aids:  0  Occupation:  Retired    Night shift workt: 0  Personal or family history of Sleep Apnea:  Sister w ROOPA CPAP use:  0  Sleep Specialist:  0  Barriers to getting proper rest:  0     Physical Activity History  Type of physical activity:  Walking around track w friend 6 am, gardening  Physical activity is performed 7 times a week for 40 minutes 1 times in the day  Enjoyment with physical activity: Oh yes I do!   Pedometer or fitness tracker/Smartwatch/ Iphone use: yes   Average number steps per day:  Increased to 6,000 steps/day    Barriers limiting physical activity:  0    Mobile Apps used for meal planning, calorie counting, water consumption, sleep, or physical activity:  Health jessica monitoring heart rate, steps     Weight Loss Medication History  Current weight loss medication:  0 (previously Adipex 37.5 mg 02/04/15, per chart review. Alternated w Phendimetrazine 105 mg daily)     Outpatient Medications Marked as Taking for the 8/11/21 encounter (Virtual Visit) with Priscilla Downey, DO   Medication Sig Dispense Refill    albuterol (PROVENTIL HFA, VENTOLIN HFA, PROAIR HFA) 90 mcg/actuation inhaler INHALE 2 PUFFS BY MOUTH EVERY 4 HOURS AS NEEDED FOR WHEEZING OR COUGH 8.5 g 1    montelukast (Singulair) 10 mg tablet Take 1 Tab by mouth daily. Indications: inflammation of the nose due to an allergy 90 Tab 3    furosemide (LASIX) 40 mg tablet TAKE 1 TABLET BY MOUTH DAILY FOR SWELLING 90 Tab 3    diclofenac EC (VOLTAREN) 75 mg EC tablet TAKE 1 TABLET BY MOUTH TWICE DAILY  Indications: rheumatoid arthritis 60 Tab 5    PREMARIN 0.625 mg/gram vaginal cream   4    MYRBETRIQ 50 mg ER tablet Take 50 mg by mouth two (2) times a week.  aspirin delayed-release 81 mg tablet Take 1 Tab by mouth daily. Indications: prevention of transient ischemic attack 90 Tab 3    MINIVELLE 0.1 mg/24 hr 1 Patch by TransDERmal route two (2) times a week. Changes Sunday and Wednesday  0    cetirizine (ZYRTEC) 10 mg tablet Take 1 Tab by mouth daily. (Patient taking differently: Take 10 mg by mouth daily as needed.) 30 Tab 3    fluticasone (FLONASE) 50 mcg/actuation nasal spray 2 Sprays by Both Nostrils route daily. Indications: ALLERGIC RHINITIS 1 Bottle 5    cholecalciferol, vitamin D3, (VITAMIN D3) 2,000 unit Tab Take 2,000 Units by mouth daily. Medications that cause weight gain:  Premarin, Zyrtec, Voltaren, Myrbetriq (if urinary retention is experienced)  Medications that cause weight loss:   Wellbutrin  Changes to medications since last office visit with me:  None    Allergies   Allergen Reactions    Advil Pm [Ibuprofen-Diphenhydramine Hcl] Other (comments)     Slightly elevated Creaitnine 1.02    Aleve [Naproxen Sodium] Other (comments)     Due to kidneys    Bactrim [Sulfamethoxazole-Trimethoprim] Nausea and Vomiting    Sulfa (Sulfonamide Antibiotics) Nausea and Vomiting    Sulfasalazine Nausea and Vomiting       Behavioral Health History  DEPRESSION SCREENING:    3 most recent PHQ Screens 7/8/2021   PHQ Not Done -   Little interest or pleasure in doing things Nearly every day   Feeling down, depressed, irritable, or hopeless More than half the days   Total Score PHQ 2 5   Trouble falling or staying asleep, or sleeping too much Not at all   Feeling tired or having little energy Nearly every day   Poor appetite, weight loss, or overeating Several days   Feeling bad about yourself - or that you are a failure or have let yourself or your family down Several days   Trouble concentrating on things such as school, work, reading, or watching TV Not at all   Moving or speaking so slowly that other people could have noticed; or the opposite being so fidgety that others notice Not at all   Thoughts of being better off dead, or hurting yourself in some way Not at all   PHQ 9 Score 10   How difficult have these problems made it for you to do your work, take care of your home and get along with others Somewhat difficult       Any history of mental health conditions (including Depression, Anxiety, Anorexia, Bulimia or Binge Eating disorder): MARGI  Treating provider and medication(s):  Zoloft 100 mg daily  Is it controlled: yes    Any current major lifestyle changes or stressors:   Reduced activities w my sister and stress seems to be improving since I've done that. Trying to find sister a new home - too much clutter in my house w her there. 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 Approaching anniversary death date of mom 09/20/20. Other Medical Care Since Last Office Visit  Pt is seeing a new GYN, Dr. Valerie Barbosa. Surgery to place a sling will be next Wednesday. Associated Medical Conditions    Past Medical History:   Diagnosis Date    Achilles tendonitis 2004    Right. Dr. Sandra Quinn Arthritis 2010    hips, knees. Dr. Jarvis Curran    Chest pain 2013    Dr. Tasha Hill.  Chickenpox childhood    Endometriosis     Dr. Marvin Srinivasan Environmental allergies     MARGI (generalized anxiety disorder)     Hip pain, bilateral     due to severe OA. Iliopsoas bursitis. Dr. Bianca Cevallos    History of breast lump/mass excision     Left breast.  Dr. Karan Brunson Hyperlipidemia     Iron deficiency anemia     iron deficiency    Knee pain, bilateral     Dr. Tamanna Wilde    Menopausal and postmenopausal disorder     Dr. Marvin Srinivasan Uterine fibroid     Dr. Vini Obrien.  Vitamin D deficiency 2008       Family History   Problem Relation Age of Onset    Hypertension Mother     Other Mother         hearing loss/vision loss    Heart Disease Father     Lung Disease Father     Diabetes Sister     Heart Attack Sister 54        2014    Stroke Sister         after MI    Thyroid Disease Sister     Colon Polyps Sister     Obesity Sister     Sleep Apnea Sister     Heart Disease Brother         Defibrillator placed    Heart Attack Brother 48    Diabetes Maternal Grandmother          OB/GYN History (For Female Patients)  Social History     Substance and Sexual Activity   Sexual Activity Not Currently    Partners: Male    Birth control/protection: Abstinence, Surgical    Comment: jacy     OB History        2    Para   2    Term   2            AB        Living   2       SAB        TAB        Ectopic        Molar        Multiple        Live Births              Obstetric Comments    Patient has had a hysterectomy and BSO d/t fibroids. Menopause:  JACY    Are you pregnant or planning on becoming pregnant within 6 months:  no    Do you have upcoming travel in the next 6 weeks:  0.    Patient will also notify the dietician for recommendations during travel. Immunization History   Administered Date(s) Administered    COVID-19, PFIZER, MRNA, LNP-S, PF, 30MCG/0.3ML DOSE 01/28/2021, 02/25/2021    Influenza Vaccine 12/14/2012, 10/22/2013, 09/18/2014    Influenza Vaccine (>6 mo Afluria QUAD Vial 66145 (0.25 mL) / 70804 (0.5 mL)) 10/29/2015    Influenza Vaccine [place] Lexii) PF (>6 Mo Flulaval, Fluarix, and >3 Yrs Afluria, Fluzone 75564) 09/29/2016, 10/19/2017, 09/10/2018, 11/08/2019    Influenza Vaccine Split 11/12/2010, 11/07/2011    Influenza, Quadrivalent, Adjuvanted (>65 Yrs FLUAD QUAD 58121) 10/14/2020    Pneumococcal Polysaccharide (PPSV-23) 01/11/2021    TB Skin Test (PPD) Intradermal 01/27/2020    TD Vaccine 08/18/2000    Tdap 06/30/2016       Health Maintenance   A1c:  SEE RESULTS BELOW     Lipids:  SEE RESULTS BELOW  Depression Screening:  Performed during Initial Visit to [facility]  Colonoscopy:  09/27/16, for patients over 47 yo  Mammogram:  04/08/21 (on file), for female patients over 35 yo  Annual Wellness Exam:  To be performed by PCP, when appropriate. ROS    Pt denies hunger, cravings, lack of focus, fatigue, feeling weak, headaches, dizziness, light headedness, nausea, vomiting, diarrhea, constipation, indigestion, rapid heart rate, SOB, low blood sugar, feeling cold, hair loss, rash, fluid retention, leg aches, difficulty sleeping, irritability, mood swings, or other associated symptoms. Review of Systems negative except as noted above in HPI. PHYSICAL EXAMINATION  (Limited due to being a telehealth encounter)  Self-reported Vital Signs:  No flowsheet data found.        Weight Metrics 8/2/2021 7/19/2021 7/8/2021 7/8/2021 5/4/2021 1/11/2021 1/27/2020   Weight 205 lb 206 lb - 212 lb 6.4 oz 210 lb 208 lb -   Neck Circ (inches) - - 13.25 - - - -   Waist Measure Inches - - 36.5 - - - -   Exercise Mins/week - - - - - - -   Body Fat % - - 43.2 - - - 39.1   BMI 34.11 kg/m2 34.28 kg/m2 - 35.35 kg/m2 36.05 kg/m2 35.7 kg/m2 -          Neck Circumference:  Acceptable range for M >16 inches, F>15 inches  Waist Circumference:  Acceptable range for M> 40 inches/102 cm, F > 35 inches, 88 cm  Body Fat: Acceptable range for M 18-24%, F 25-31%    General appearance - Well nourished. Well appearing. Well developed. No acute distress. Obese. Head - Normocephalic. Atraumatic. Eyes - Extraocular eye movements intact. Sclera anicteric. Ears - Hearing is grossly normal bilaterally. Nose - normal and patent. No discharge. No nasal flaring noted. Mouth - oral mucous membranes with adequate moisture. Neck -   Midline trachea. No neck masses noted. No neck retractions noted. Chest - Unlabored respirations. Symmetrical chest.  No conversational dyspnea noted. Heart - normal rate. Neurological - awake, alert and oriented to person, place, and time and event. Cranial nerves II through XII intact. Clear speech. Muscle strength is +5/5 x 4 extremities. Steady gait. Musculoskeletal - Intact x 4 extremities. Full ROM x 4 extremities. No pain with movement. Back exam - normal range of motion. No pain on palpation of the spinous processes in the cervical, thoracic, lumbar, sacral regions. Skin - no rashes, erythema, ecchymosis, lacerations, abrasions, suspicious moles noted. No skin tags or moles. No acanthosis nigricans noted in the axilla or neck. Psychological -   normal behavior, dress and thought processes. Good insight. Good eye contact. Normal affect. Appropriate mood. Normal speech.       DATA REVIEWED    Lab Results   Component Value Date/Time    WBC 4.6 07/14/2021 09:46 AM    HGB 13.5 07/14/2021 09:46 AM    HCT 38.9 07/14/2021 09:46 AM    PLATELET 068 45/14/3285 09:46 AM    MCV 89 07/14/2021 09:46 AM     Lab Results   Component Value Date/Time    Sodium 139 07/14/2021 09:46 AM    Potassium 4.4 07/14/2021 09:46 AM    Chloride 102 07/14/2021 09:46 AM    CO2 25 07/14/2021 09:46 AM    Anion gap 5 04/23/2011 05:20 AM    Glucose 76 07/14/2021 09:46 AM    BUN 24 07/14/2021 09:46 AM    Creatinine 1.04 (H) 07/14/2021 09:46 AM    BUN/Creatinine ratio 23 07/14/2021 09:46 AM    GFR est AA 65 07/14/2021 09:46 AM    GFR est non-AA 56 (L) 07/14/2021 09:46 AM    Calcium 9.4 07/14/2021 09:46 AM    Bilirubin, total 0.6 07/14/2021 09:46 AM    Alk.  phosphatase 105 07/14/2021 09:46 AM    Protein, total 7.1 07/14/2021 09:46 AM    Albumin 4.1 07/14/2021 09:46 AM    A-G Ratio 1.4 07/14/2021 09:46 AM    ALT (SGPT) 14 07/14/2021 09:46 AM    AST (SGOT) 23 07/14/2021 09:46 AM     Lab Results   Component Value Date/Time    Hemoglobin A1c 5.3 07/14/2021 09:46 AM     Lab Results   Component Value Date/Time    TSH 2.270 07/14/2021 09:46 AM    TSH 2.36 08/21/2014 07:50 AM     Lab Results   Component Value Date/Time    Uric acid 5.6 07/14/2021 09:46 AM     Magnesium   Date Value Ref Range Status   07/14/2021 2.3 1.6 - 2.3 mg/dL Final   08/21/2014 2.0 1.6 - 2.4 mg/dL Final     Lab Results   Component Value Date/Time    Vitamin B12 980 07/14/2021 09:46 AM    Folate 8.6 07/14/2021 09:46 AM     Lab Results   Component Value Date/Time    Vitamin D 25-Hydroxy 26.3 (L) 07/18/2011 10:22 AM    VITAMIN D, 25-HYDROXY 38.2 07/14/2021 09:46 AM     Lab Results   Component Value Date/Time    Iron 88 07/14/2021 09:46 AM     Lab Results   Component Value Date/Time    Cholesterol, total 193 07/14/2021 09:46 AM    Cholesterol, Total 188 07/14/2021 09:46 AM    HDL Cholesterol 77 07/14/2021 09:46 AM    LDL, calculated 108 (H) 07/14/2021 09:46 AM    LDL, calculated 91 03/04/2019 08:05 AM    VLDL, calculated 8 07/14/2021 09:46 AM    VLDL, calculated 7 03/04/2019 08:05 AM    Triglyceride 43 07/14/2021 09:46 AM   )  Results for orders placed or performed in visit on 01/29/16   NMR LIPOPROFILE     Status: None   Result Value Ref Range Status    LDL-P 788 <1,000 nmol/L Final     Comment:                           Low                   < 1000 Moderate         1000 - 1299                            Borderline-High  1300 - 1599                            High             1600 - 2000                            Very High             > 2000      LDL-C 91 0 - 99 mg/dL Final     Comment:                           Optimal               <  100                            Above optimal     100 -  129                            Borderline        130 -  159                            High              160 -  189                            Very high             >  189  LDL-C is inaccurate if patient is non-fasting. HDL-C 71 >39 mg/dL Final    Triglycerides 61 0 - 149 mg/dL Final    Cholesterol, Total 174 100 - 199 mg/dL Final    HDL-P (Total) 32.7 >=30.5 umol/L Final    Small LDL-P <90 <=527 nmol/L Final    LDL size 21.5 >20.5 nm Final     Comment:  ----------------------------------------------------------                   ** INTERPRETATIVE INFORMATION**                   PARTICLE CONCENTRATION AND SIZE                      <--Lower CVD Risk   Higher CVD Risk-->    LDL AND HDL PARTICLES   Percentile in Reference Population    HDL-P (total)        High     75th    50th    25th   Low                         >34.9    34.9    30.5    26.7   <26.7    Small LDL-P          Low      25th    50th    75th   High                         <117     117     527     839    >839    LDL Size   <-Large (Pattern A)->    <-Small (Pattern B)->                      23.0    20.6           20.5      19.0   ----------------------------------------------------------  Small LDL-P and LDL Size are associated with CVD risk, but not after  LDL-P is taken into account. These assays were developed and their performance characteristics  determined by [employer]. These assays have not been cleared by the  [employer] and Drug Administration. The clinical utility o f these  laboratory values have not been fully established.       LP-IR SCORE <25 <=45 Final     Comment: INSULIN RESISTANCE MARKER      <--Insulin Sensitive    Insulin Resistant-->             Percentile in Reference Population  Insulin Resistance Score  LP-IR Score   Low   25th   50th   75th   High                <27   27     45     63     >63  LP-IR Score is inaccurate if patient is non-fasting. The LP-IR score is a laboratory developed index that has been  associated with insulin resistance and diabetes risk and should be  used as one component of a physician's clinical assessment. The  LP-IR score listed above has not been cleared by the [employer] and  Drug Administration. Narrative    Performed at:  [place] [id number]  : Alexandra Fleming MD, Phone:  [phone]        EKG:  Sinus Bradycardia at 59 bpm, QTC interval  415 ms. No prolonged QTc noted. Acceptable QTC upper limits:  440 ms for Males, 460 ms for Females    ASSESSMENT     ICD-10-CM ICD-9-CM    1. Obesity, Class I, BMI 30-34.9  E66.9 278.00    2. Hashimoto's thyroiditis  E06.3 245.2     stable   3. Elevated alkaline phosphatase level  R74.8 790.5     resolved with diet changes and exercise   4. Pure hypercholesterolemia  E78.00 272.0     improving   5. Generalized anxiety disorder  F41.1 300.02     stable on Zoloft 100 mg daily   6. Weight loss  R63.4 783.21     7 lbs due to LCD, effort     7. Rheumatoid arthritis involving both hips with positive rheumatoid factor (HCC)  M05.751 714.0     M05.752     8. Vitamin D deficiency  E55.9 268.9     resolved   9. Chronic insomnia  F51.04 780.52     resolved   10. Other urinary incontinence  N39.498 788.39     stable on Myrbetriq   11. S/P MARIA R-BSO (total abdominal hysterectomy and bilateral salpingo-oophorectomy)  Z90.710 V88.01     Z90.722      Z90.79     12. Family history of heart attack  Z82.49 V17.3    13. Family history of diabetes mellitus (DM)  Z83.3 V18.0    14. Family history of thyroid disease  Z83.49 V18.19    15.  Family history of colonic polyps  Z83.71 V18.51    16. Family history of obesity  Z83.49 V18.19    17. Family history of sleep apnea  Z82.0 V19.8    18. Family history of stroke  Z82.3 V17.1    19. Family history of ischemic heart disease  Z82.49 V17.3    20. BMI 34.0-34.9,adult  Z68.34 V85.34        We discussed the expected course, resolution and complications of the diagnosis(es) in detail. WEIGHT MANAGEMENT PLAN    Chart was reviewed and updated during the office visit today. Elo Andino has fulfilled [facility] program requirements this month by completing homework forms, attending educational classes, nurse triage visits and monthly provider appointments as agreed and remains in good standing. Reviewed and appreciate registered dietician note for nutritional counseling. Patient has attended all requirements of the program over the past month and is in good standing. Reviewed and appreciate weekly/biweekly nurse visits and agree with documentation. Followed up on any patient concerns today. Emphasized the importance of the patient continuing care from current primary care provider and other specialists while participating in this weight management program.  Patient has full access to all our office notes, orders, lab results on [employer] and can provide them copies, if desired. Advised patient to follow up with pcp for any acute symptoms and Health Maintenance. Continue current medications as directed by prescribers. Identified and discussed medications patient is taking that can cause weight gain or weight loss as recorded on patient's medication list.  Advised patient not to stop any use without discussing with the prescribing provider. Ask prescribing providers to consider more weight neutral or weight negative options. Will monitor patient's weight and health with continued use. Supplement recommendations: Take OTC Magnesium 400 mg po at night prn sleep, muscle cramping, constipation.   Take OTC vit B12 1000 mcg daily orally if taking Metformin or experiencing numbness and tingling. Take OTC Fish Oil 1000 mg with EPA and -1,000 mg daily if experiencing dry skin, low HDL. Use with caution if history of heartburn exists. Increase fiber products (I.e. fiber tea, fiber shakes) or try OTC Fiber products (I.e. Benefiber, Metamucil, psyllium, etc) if experiencing constipation. Take OTC Lactaid with meal replacements, if lactose intolerance history exists or symptoms begin. Try OTC Gas X prn flatulence. Avoid gas producing foods and carbonation for grocery meal and beverages. Referred patient to [employer] Patient Manual for recommended antidotes, if any new symptoms or side effects have been experienced once dietary approach is initiated. Advised patient to notify our office if this occurs. Reviewed and discussed most recent lab results and EKG results. If lab results or EKG have already been performed by another provider and are not available today, advised patient to sign a release to provide our program a copy of each. Recheck pertinent labs monthly while participating in VLCD and using New Direction meal replacements, as discussed to identify electrolyte abnormalities and guide therapeutic approach. An order form for pertinent labs was given to patient today. Patient was advised to have the labwork performed 1 week prior to the next monthly appointment with me. Labs ordered today will be reviewed in detail at the next office visit and time allowed for any questions regarding the results. Advised patient to sign up for [employer] and view lab results directly. Notify me by [facility] if any questions or concerns arise. Discussed the patient's BMI, anthropometric measures and goals with patient.   The weight management follow up plan is as follows:     Dietary Prescription:  Recommended meal plan:  New Direction Low Calorie Dietary approach   Make sure proper daily calories are consumed for each dietary approach: LCD 8716-2985 kcal/day, 2-3 meal replacements daily. Encouraged patient to stay within the recommended amount of calories per day. Do not skip meals. Meals must be eaten within a 3-4 hour time period in order to prevent potential side effects, including low blood sugar. Consume a minimum of 2 L (67 oz) of water daily up to half your body weight in ounces of water while utilizing this dietary approach. Drink the majority of water prior to supper to prevent nocturnal urination. Avoid sugar-sweetened beverages, including sodas, alcohol, juice. Limit caffeine intake to prevent sleep interference, heart palpitations and dehydration from increased urination. Will allow 1 8 oz cup of black coffee or green tea (to stimulate release of Adiponectin and promote fat burning. ). Consume caffeine 6-12 hours before bedtime to prevent sleep disturbances. Referred patient to New Direction Patient Manual for recommended antidotes, if any new symptoms or side effects have been experienced once dietary approach is initiated. Advised patient to notify our office if this occurs. Exercise Prescription: Advised minimal, gradual increase and as tolerated especially while using VLCD. If already exercising, reduce it to half the time and intensity while determining the level of tolerance for the amount of caloric intake. A goal of 30 minutes physical activity daily is recommended for health benefit and at least 60 minutes daily to prevent weight regain. During weight loss phase, no less than 75% of this time needs to be performing aerobic (i.e. Walking) activity with no more than 25% of the time performing resistance exercises (i.e. Weight lifting, strengthening, toning). During weight maintenance phase, 50% of the physical activity time needs to be spent performing aerobic activity with 50% of the total time performing resistance exercises.  Emphasized importance of mild physical activity and reducing sedentary time. Advised patient to work with RD to ensure you have proper calories for your level of activity. Sleep Prescription: A goal of 7-8 hours nightly of uninterrupted sleep is recommended to turn off the Grehlin hormone to be released from the stomach and triggers appetite while promoting weight gain. Proper rest turns on Leptin hormone to be released from white adipose tissue and promotes weight loss. Discussed snoring, symptoms of Sleep Apnea and improvements with weight loss. Strongly encouraged compliance with CPAP, if Sleep Apnea has been diagnosed. Advised patient to follow up with sleep specialist for every 25 pounds lost to see if CPAP settings need to be adjusted. Counseled patient on health topics:  Obesity, Insulin Resistance,VLCD dietary approaches, benefits, contraindications, possible side effects to these diets and how to prevent poor outcomes (including staying hydrated, limit physical exercise while transitioning into this program, avoid skipping meals, etc), weight loss goals, sleep hygiene, barriers to losing weight and keeping weight off, strategies to overcome habits or challenges to approve or continue adherence and stressors. Informed patient that weight loss goal of 5-10% in 6-12 months has shown significant improvement in obesity and its related health conditions including DM, heart disease, asthma. A key study published in Arthritis & Rheumatism of Overweight and Obese Adults with OA found that losing 1 pound of weight resulted in 4 pounds of pressure being removed from the knees. Reminded patients who are female gender and of reproductive age that with weight loss, they may become more fertile. Recommended the use of condoms or some reliable form of contraception if pregnancy is not desired. Notify our office immediately if patient becomes pregnant. Immunizations noted. Covid vaccines recommended, if patient has not had it.   Obesity may increase risk for severe illness and death from Covid-19 disease. Offered empathy, encouragement, legitimation, prayers, partnership to patient. Praised patient for successes. Patient was offered a choice/choices in the treatment plan today. Patient expressed understanding with the diagnoses and the plan and agrees with recommendations. Return in about 1 month (around 9/11/2021) for LCD, results, weight, referral follow up. Total time:  32 mins spent in counseling, coordinating care, reviewing and discussing results, reviewing and discussing relevant provider communication, completing relevant documentation, and discussing treatment plans in reference to The primary encounter diagnosis was Obesity, Class I, BMI 30-34.9. Diagnoses of Hashimoto's thyroiditis, Elevated alkaline phosphatase level, Pure hypercholesterolemia, Generalized anxiety disorder, Weight loss, Rheumatoid arthritis involving both hips with positive rheumatoid factor ([facility] [address] Vitamin D deficiency, Chronic insomnia, Other urinary incontinence, S/P MARIA R-BSO (total abdominal hysterectomy and bilateral salpingo-oophorectomy), Family history of heart attack, Family history of diabetes mellitus (DM), Family history of thyroid disease, Family history of colonic polyps, Family history of obesity, Family history of sleep apnea, Family history of stroke, Family history of ischemic heart disease, and BMI 34.0-34.9,adult were also pertinent to this visit. [address] [place] [address] [address], PA-C FOR ALLOWING ME THE PRIVILEGE TO PARTICIPATE IN THE CARE OF OUR MUTUAL PATIENT, Ms. [place]. Emanuel Shore DO, Diplomate KEYLA BLACKWELL    There are no Patient Instructions on file for this visit.

## 2021-08-25 DIAGNOSIS — M25.552 HIP PAIN, BILATERAL: ICD-10-CM

## 2021-08-25 DIAGNOSIS — M25.561 RECURRENT PAIN OF RIGHT KNEE: ICD-10-CM

## 2021-08-25 DIAGNOSIS — M25.551 HIP PAIN, BILATERAL: ICD-10-CM

## 2021-08-25 DIAGNOSIS — M05.752 RHEUMATOID ARTHRITIS INVOLVING BOTH HIPS WITH POSITIVE RHEUMATOID FACTOR (HCC): ICD-10-CM

## 2021-08-25 DIAGNOSIS — M05.751 RHEUMATOID ARTHRITIS INVOLVING BOTH HIPS WITH POSITIVE RHEUMATOID FACTOR (HCC): ICD-10-CM

## 2021-08-25 RX ORDER — DICLOFENAC SODIUM 75 MG/1
TABLET, DELAYED RELEASE ORAL
Qty: 180 TABLET | Refills: 1 | Status: SHIPPED | OUTPATIENT
Start: 2021-08-25 | End: 2022-05-27 | Stop reason: SDUPTHER

## 2021-08-31 NOTE — PROGRESS NOTES
[facility]  Metabolic Weight Loss Program        Patient's Name: Brian Westbrook  : 1955    This patient is enrolled in [address] [place] [address] Weight Loss Program and attended the required weekly virtual nutrition class hosted via [employer].       Mir Woods, MS, RD, LDN

## 2021-09-01 NOTE — PROGRESS NOTES
Wolf Slater presents for weekly or bi-monthly evaluation of Obesity Body mass index is 34.11 kg/m². Visit Vitals  /64 (BP 1 Location: Left arm)   Pulse 66   Resp 18   Wt 205 lb (93 kg)   LMP 04/01/2011 (Approximate) Comment: jacy   SpO2 98%   BMI 34.11 kg/m²       Wt Readings from Last 3 Encounters:   08/02/21 205 lb (93 kg)   07/19/21 206 lb (93.4 kg)   07/08/21 212 lb 6.4 oz (96.3 kg)       1. Class 1 obesity due to excess calories with serious comorbidity and body mass index (BMI) of 34.0 to 34.9 in adult         I have reviewed and agree with nurse documentation of Weekly Education Class nurse progress note for [facility] [address] [facility] [place]). Wolf Slater is compliant with program requirements and may continue participation utilizing the New Direction meal replacements, as discussed. Patient has been advised to refer to the [facility] Patient Manual and notify us if any side effects to this meal plan are present and/or persist.  Wolf Slater may continue the current dietary approach, care and follow up at the monthly office visit with the provider, as scheduled. Follow-up and Dispositions    · Return in about 2 weeks (around 8/16/2021) for ND LCD, weight. Documentation true and accepted by Gordon Guerrero.  Lexus Yao., AOSAEP, Diplomate KEYLA BLACKWELL

## 2021-10-11 NOTE — PROGRESS NOTES
All your lab results are normal except  Your kidney function is slightly abnormal.  Avoid over the counter medications like Aleve, Advil, Ibuprofen, Motrin, Naprosen. Instead use over the counter Tylenol as needed for headache, pain or fever. Never exceed more than 4000 mg or 4 grams a day. Recheck this level periodically. Your cholesterol level is too high. Eat foods that are baked, broiled, boiled, steamed or grilled. Avoid greasy or fried foods. Use olive oil or canola oil instead of vegetable oil. Eat fish twice weekly. Exercise 5 days a week for 30 minutes a day. Stretch before and after exercising. Can add OTC Fish oil pills, Flax Seed, Coenzyme Q10. Decrease weight by 10 pounds, if overweight. Eat a low carbohydrate diet. Decrease sugary beverages, white foods and sweets. Increase exercise to 5 days weekly for 30 minutes daily minimally and as tolerated. Have at an average of 7 hours of uninterrupted sleep at night. Get 15 minutes of sunlight daily. Take a multivitamin with 1000 IU vitamin D daily, if you are not currently taking a prescription of vit D. Continue your current medications. Recheck level in 3-6 months. As always, Dr. Juan M Briceno will discuss your results with your in greater detail and be available to answer any questions at your follow up appointment. Thank you for allowing us the privilege to care for you.

## 2021-11-02 NOTE — PROGRESS NOTES
Juliette Brown is a 77y.o. year old female seen in clinic today for   Chief Complaint   Patient presents with    Cholesterol Problem    Asthma    Anxiety       she is here today to follow up for Obesity, thyroid problems, HLD, vitamin D deficiency    Had bladder sling surgery by Dr. Ruiz Cristobal. Before, was seeing Dr. Yuli Moore for weight loss therapy. Working with her on different diet plans. Had significant blood work done. A1C normal. . Thyroid at goal. Vitamin D at goal.    Notes she is feeling well. Has not been back in to see her since before her surgery. Continues to lose weight. Down to 203. Wants to restart phentermine for 3 month period. Notes more anxious in the evening time. Does not feel Zoloft is working as well. Is unsure what is making her more anxious, no additional life stress. Feels as she gets older she gets more anxious. she specifically denies any CP, SOB, HA. Dizziness, fevers, chills, N/V/D, urinary symptoms or other bowel changes. Current Outpatient Medications on File Prior to Visit   Medication Sig Dispense Refill    lactulose (CHRONULAC) 10 gram/15 mL solution TAKE 30 ML BY MOUTH THREE TIMES DAILY AS NEEDED      Stool Softener 100 mg capsule TAKE 1 CAPSULE BY MOUTH TWICE DAILY      diclofenac EC (VOLTAREN) 75 mg EC tablet TAKE 1 TABLET BY MOUTH TWICE DAILY FOR RHEUMATOID ARTHRITIS 180 Tablet 1    albuterol (PROVENTIL HFA, VENTOLIN HFA, PROAIR HFA) 90 mcg/actuation inhaler INHALE 2 PUFFS BY MOUTH EVERY 4 HOURS AS NEEDED FOR WHEEZING OR COUGH 8.5 g 1    sertraline (ZOLOFT) 100 mg tablet TAKE 1 TABLET BY MOUTH DAILY FOR STRESS  Indications: anxiousness associated with depression 90 Tab 3    montelukast (Singulair) 10 mg tablet Take 1 Tab by mouth daily.  Indications: inflammation of the nose due to an allergy 90 Tab 3    furosemide (LASIX) 40 mg tablet TAKE 1 TABLET BY MOUTH DAILY FOR SWELLING 90 Tab 3    hyaluronate (Synvisc-One) 48 mg/6 mL syrg injection Inject one prefilled syringe into the right knee, for a quantity of 1 injection      PREMARIN 0.625 mg/gram vaginal cream   4    MYRBETRIQ 50 mg ER tablet Take 50 mg by mouth two (2) times a week.  aspirin delayed-release 81 mg tablet Take 1 Tab by mouth daily. Indications: prevention of transient ischemic attack 90 Tab 3    MINIVELLE 0.1 mg/24 hr 1 Patch by TransDERmal route two (2) times a week. Changes Sunday and Wednesday  0    cetirizine (ZYRTEC) 10 mg tablet Take 1 Tab by mouth daily. (Patient taking differently: Take 10 mg by mouth daily as needed.) 30 Tab 3    fluticasone (FLONASE) 50 mcg/actuation nasal spray 2 Sprays by Both Nostrils route daily. Indications: ALLERGIC RHINITIS 1 Bottle 5    cholecalciferol, vitamin D3, (VITAMIN D3) 2,000 unit Tab Take 2,000 Units by mouth daily. No current facility-administered medications on file prior to visit. Allergies   Allergen Reactions    Advil Pm [Ibuprofen-Diphenhydramine Hcl] Other (comments)     Slightly elevated Creaitnine 1.02    Aleve [Naproxen Sodium] Other (comments)     Due to kidneys    Bactrim [Sulfamethoxazole-Trimethoprim] Nausea and Vomiting    Sulfa (Sulfonamide Antibiotics) Nausea and Vomiting    Sulfasalazine Nausea and Vomiting     Past Medical History:   Diagnosis Date    Achilles tendonitis 12/2004    Right. Dr. Gabbie De La Fuente Arthritis 2010    hips, knees. Dr. Kervin Dewey    Chest pain 08/2013    Dr. Ismael Strong.  Chickenpox childhood    Endometriosis 1990    Dr. Mario Marrufo Environmental allergies     MARGI (generalized anxiety disorder)     Hip pain, bilateral 2010    due to severe OA. Iliopsoas bursitis.   Dr. Bambi Hunter    History of breast lump/mass excision 1998    Left breast.  Dr. Oj Owens Hyperlipidemia     Iron deficiency anemia     iron deficiency    Knee pain, bilateral 2017    Dr. Gisela Betancur    Menopausal and postmenopausal disorder 2007    Dr. Mario Marrufo Uterine fibroid     Dr. Nancy Marino.  Vitamin D deficiency 03/2008      Past Surgical History:   Procedure Laterality Date    BIOPSY BREAST  Rt 1993;Lt 1998    Dr. Garibay Fearing Right 2002    Right achiles tendon. Dr. Virgilio Portillo.  HX BREAST BIOPSY  2001    Left. benign. Dr. Katerin Coto HX COLONOSCOPY  09/27/2016    Dr. Nicolasa Thorpe.  due q 5 yrs due to fam hx   Katheleen Drain    Dr. Gilbert Dumas. due to endometriosis    HX PELVIC LAPAROSCOPY  1995    due to endometriosis Dr. Severiano Situ  04/2011    due to fibroids. Dr. Nancy Marino.         Family History   Problem Relation Age of Onset    Hypertension Mother     Other Mother         hearing loss/vision loss    Heart Disease Father     Lung Disease Father     Diabetes Sister     Heart Attack Sister 54        March 2014    Stroke Sister         after MI    Thyroid Disease Sister     Colon Polyps Sister     Obesity Sister     Sleep Apnea Sister     Heart Disease Brother         Defibrillator placed    Heart Attack Brother 48    Diabetes Maternal Grandmother         Social History     Socioeconomic History    Marital status: SINGLE     Spouse name: Not on file    Number of children: 2    Years of education: Not on file    Highest education level: Not on file   Occupational History    Occupation: [employer]     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    Tobacco Use    Smoking status: Passive Smoke Exposure - Never Smoker    Smokeless tobacco: Never Used    Tobacco comment: lived with smoker mom x 18 yrs   Vaping Use    Vaping Use: Never used   Substance and Sexual Activity    Alcohol use: No    Drug use: No    Sexual activity: Not Currently     Partners: Male     Birth control/protection: Abstinence, Surgical     Comment: jacy   Other Topics Concern    Not on file   Social History Narrative    Not on file     Social Determinants of Health     Financial Resource Strain:     Difficulty of Paying Living Expenses: Not on file   Food Insecurity:     Worried About Running Out of Food in the Last Year: Not on file    Ran Out of Food in the Last Year: Not on file   Transportation Needs:     Lack of Transportation (Medical): Not on file    Lack of Transportation (Non-Medical): Not on file   Physical Activity:     Days of Exercise per Week: Not on file    Minutes of Exercise per Session: Not on file   Stress:     Feeling of Stress : Not on file   Social Connections:     Frequency of Communication with Friends and Family: Not on file    Frequency of Social Gatherings with Friends and Family: Not on file    Attends Latter-day Services: Not on file    Active Member of [address] [place] [address] [employer] or Organizations: Not on file    Attends Club or Organization Meetings: Not on file    Marital Status: Not on file   Intimate Partner Violence:     Fear of Current or Ex-Partner: Not on file    Emotionally Abused: Not on file    Physically Abused: Not on file    Sexually Abused: Not on file   Housing Stability:     Unable to Pay for Housing in the Last Year: Not on file    Number of [place] in the Last Year: Not on file    Unstable Housing in the Last Year: Not on file           Visit Vitals  /80 (BP 1 Location: Left upper arm, BP Patient Position: Sitting)   Pulse (!) 55   Temp 97.9 °F (36.6 °C) (Oral)   Resp 12   Ht 5' 5\" (1.651 m)   Wt 203 lb (92.1 kg)   LMP 04/01/2011 (Approximate) Comment: jacy   SpO2 99%   BMI 33.78 kg/m²       Review of Systems   Constitutional: Negative for chills, fever, malaise/fatigue and weight loss. HENT: Negative for ear pain, hearing loss and sore throat. Respiratory: Negative for cough and shortness of breath. Cardiovascular: Negative for chest pain and leg swelling. Gastrointestinal: Negative for abdominal pain, blood in stool, constipation, diarrhea, heartburn, melena, nausea and vomiting. Genitourinary: Negative for dysuria and hematuria.    Musculoskeletal: Negative for back pain and myalgias. Skin: Negative for rash. Neurological: Negative for weakness and headaches. Psychiatric/Behavioral: Negative for depression. The patient is nervous/anxious. Physical Exam  Vitals and nursing note reviewed. Constitutional:       Appearance: Normal appearance. She is obese. HENT:      Head: Normocephalic and atraumatic. Right Ear: Tympanic membrane, ear canal and external ear normal.      Left Ear: Tympanic membrane, ear canal and external ear normal.      Nose: Nose normal.      Mouth/Throat:      Mouth: Mucous membranes are moist.      Pharynx: Oropharynx is clear. Eyes:      Conjunctiva/sclera: Conjunctivae normal.      Pupils: Pupils are equal, round, and reactive to light. Neck:      Vascular: No carotid bruit. Cardiovascular:      Rate and Rhythm: Normal rate and regular rhythm. Pulses: Normal pulses. Heart sounds: Normal heart sounds. Pulmonary:      Effort: Pulmonary effort is normal.      Breath sounds: Normal breath sounds. No wheezing, rhonchi or rales. Abdominal:      General: Abdomen is flat. Bowel sounds are normal. There is no distension. Palpations: There is no mass. Tenderness: There is no abdominal tenderness. There is no guarding or rebound. Musculoskeletal:         General: Normal range of motion. Cervical back: Normal range of motion and neck supple. No rigidity. Comments: BLLE trace non-pitting edema     Skin:     General: Skin is warm and dry. Capillary Refill: Capillary refill takes less than 2 seconds. Neurological:      General: No focal deficit present. Mental Status: She is alert and oriented to person, place, and time. Sensory: No sensory deficit. Gait: Gait normal.   Psychiatric:         Mood and Affect: Mood normal.         Behavior: Behavior normal.         Thought Content:  Thought content normal.          No results found for this or any previous visit (from the past 24 hour(s)). ASSESSMENT AND PLAN  Diagnoses and all orders for this visit:    1. Hashimoto's thyroiditis  At goal last check in July  2. Rheumatoid arthritis involving both hips with positive rheumatoid factor (HCC)  No meds, followed by Rheumatology  3. Primary osteoarthritis of right knee  Continuing to get orthovisc injections, doing better with weight loss   4. Osteopenia of multiple sites  Encouraged weight bearing exercise   5. Pure hypercholesterolemia  , needs to work on low fat diet   6. Vitamin D deficiency  At goal  7. Chronic insomnia  Anxiety hoping to improve insomnia  8. Generalized anxiety disorder  -     escitalopram oxalate (LEXAPRO) 10 mg tablet; Take 1 Tablet by mouth daily. Exchange Zoloft for Lexapro, 1/2 dose of Zoloft x 1 week for 1/2 dose Lexapro then go full strength 10 mg Lexapro  9. Obesity, Class I, BMI 30-34.9  -     phentermine (ADIPEX-P) 37.5 mg tablet; Take 1 Tablet by mouth every morning. Max Daily Amount: 37.5 mg.  3 month round of phentermine, advised to monitor BP.  10. Colon cancer screening  -     REFERRAL TO GASTROENTEROLOGY  Send back for Dr. Lisa Rodríguez  11. Needs flu shot  -     FLU (FLUAD QUAD INFLUENZA VACCINE,QUAD,ADJUVANTED)    12. Bilateral lower extremity edema  Continue PRN Lasix when legs get more swollen than normal         I have discussed the diagnosis with the patient and the intended plan as seen in the above orders. Patient is in agreement. The patient has received an after-visit summary and questions were answered concerning future plans. I have discussed medication side effects and warnings with the patient as well.     Carlee Diaz PA-C

## 2021-11-03 ENCOUNTER — OFFICE VISIT (OUTPATIENT)
Dept: INTERNAL MEDICINE CLINIC | Age: 66
End: 2021-11-03
Payer: COMMERCIAL

## 2021-11-03 VITALS
DIASTOLIC BLOOD PRESSURE: 80 MMHG | HEART RATE: 55 BPM | BODY MASS INDEX: 33.82 KG/M2 | TEMPERATURE: 97.9 F | SYSTOLIC BLOOD PRESSURE: 123 MMHG | RESPIRATION RATE: 12 BRPM | OXYGEN SATURATION: 99 % | WEIGHT: 203 LBS | HEIGHT: 65 IN

## 2021-11-03 DIAGNOSIS — E78.00 PURE HYPERCHOLESTEROLEMIA: ICD-10-CM

## 2021-11-03 DIAGNOSIS — E66.9 OBESITY, CLASS I, BMI 30-34.9: ICD-10-CM

## 2021-11-03 DIAGNOSIS — M05.751 RHEUMATOID ARTHRITIS INVOLVING BOTH HIPS WITH POSITIVE RHEUMATOID FACTOR (HCC): ICD-10-CM

## 2021-11-03 DIAGNOSIS — F51.04 CHRONIC INSOMNIA: ICD-10-CM

## 2021-11-03 DIAGNOSIS — R60.0 BILATERAL LOWER EXTREMITY EDEMA: ICD-10-CM

## 2021-11-03 DIAGNOSIS — M85.89 OSTEOPENIA OF MULTIPLE SITES: ICD-10-CM

## 2021-11-03 DIAGNOSIS — F41.1 GENERALIZED ANXIETY DISORDER: ICD-10-CM

## 2021-11-03 DIAGNOSIS — E55.9 VITAMIN D DEFICIENCY: ICD-10-CM

## 2021-11-03 DIAGNOSIS — M17.11 PRIMARY OSTEOARTHRITIS OF RIGHT KNEE: ICD-10-CM

## 2021-11-03 DIAGNOSIS — Z12.11 COLON CANCER SCREENING: ICD-10-CM

## 2021-11-03 DIAGNOSIS — E06.3 HASHIMOTO'S THYROIDITIS: Primary | ICD-10-CM

## 2021-11-03 DIAGNOSIS — Z23 NEEDS FLU SHOT: ICD-10-CM

## 2021-11-03 DIAGNOSIS — M05.752 RHEUMATOID ARTHRITIS INVOLVING BOTH HIPS WITH POSITIVE RHEUMATOID FACTOR (HCC): ICD-10-CM

## 2021-11-03 PROCEDURE — G0008 ADMIN INFLUENZA VIRUS VAC: HCPCS | Performed by: PHYSICIAN ASSISTANT

## 2021-11-03 PROCEDURE — 99214 OFFICE O/P EST MOD 30 MIN: CPT | Performed by: PHYSICIAN ASSISTANT

## 2021-11-03 PROCEDURE — 90694 VACC AIIV4 NO PRSRV 0.5ML IM: CPT | Performed by: PHYSICIAN ASSISTANT

## 2021-11-03 RX ORDER — LACTULOSE 10 G/15ML
SOLUTION ORAL; RECTAL
COMMUNITY
Start: 2021-08-20

## 2021-11-03 RX ORDER — PHENTERMINE HYDROCHLORIDE 37.5 MG/1
37.5 TABLET ORAL
Qty: 30 TABLET | Refills: 2 | Status: SHIPPED | OUTPATIENT
Start: 2021-11-03 | End: 2021-11-04 | Stop reason: SDUPTHER

## 2021-11-03 RX ORDER — DOCUSATE SODIUM 100 MG
CAPSULE ORAL
COMMUNITY
Start: 2021-08-20 | End: 2022-08-12

## 2021-11-03 RX ORDER — ESCITALOPRAM OXALATE 10 MG/1
10 TABLET ORAL DAILY
Qty: 30 TABLET | Refills: 5 | Status: SHIPPED | OUTPATIENT
Start: 2021-11-03 | End: 2021-11-04 | Stop reason: SDUPTHER

## 2021-11-03 NOTE — PATIENT INSTRUCTIONS
Vaccine Information Statement    Influenza (Flu) Vaccine (Inactivated or Recombinant): What You Need to Know    Many vaccine information statements are available in German and other languages. See www.immunize.org/vis. Hojas de información sobre vacunas están disponibles en español y en muchos otros idiomas. Visite www.immunize.org/vis. 1. Why get vaccinated? Influenza vaccine can prevent influenza (flu). Flu is a contagious disease that spreads around the [facility] every year, usually between October and May. Anyone can get the flu, but it is more dangerous for some people. Infants and young children, people 72 years and older, pregnant people, and people with certain health conditions or a weakened immune system are at greatest risk of flu complications. Pneumonia, bronchitis, sinus infections, and ear infections are examples of flu-related complications. If you have a medical condition, such as heart disease, cancer, or diabetes, flu can make it worse. Flu can cause fever and chills, sore throat, muscle aches, fatigue, cough, headache, and runny or stuffy nose. Some people may have vomiting and diarrhea, though this is more common in children than adults. In an average year, thousands of people in the [facility] die from flu, and many more are hospitalized. Flu vaccine prevents millions of illnesses and flu-related visits to the doctor each year. 2. Influenza vaccines     CDC recommends everyone 6 months and older get vaccinated every flu season. Children 6 months through 6years of age may need 2 doses during a single flu season. Everyone else needs only 1 dose each flu season. It takes about 2 weeks for protection to develop after vaccination. There are many flu viruses, and they are always changing. Each year a new flu vaccine is made to protect against the influenza viruses believed to be likely to cause disease in the upcoming flu season.  Even when the vaccine doesnt exactly match these viruses, it may still provide some protection. Influenza vaccine does not cause flu. Influenza vaccine may be given at the same time as other vaccines. 3. Talk with your health care provider    Tell your vaccination provider if the person getting the vaccine:   Has had an allergic reaction after a previous dose of influenza vaccine, or has any severe, life-threatening allergies    Has ever had Guillain-Barré Syndrome (also called GBS)    In some cases, your health care provider may decide to postpone influenza vaccination until a future visit. Influenza vaccine can be administered at any time during pregnancy. People who are or will be pregnant during influenza season should receive inactivated influenza vaccine. People with minor illnesses, such as a cold, may be vaccinated. People who are moderately or severely ill should usually wait until they recover before getting influenza vaccine. Your health care provider can give you more information. 4. Risks of a vaccine reaction     Soreness, redness, and swelling where the shot is given, fever, muscle aches, and headache can happen after influenza vaccination.  There may be a very small increased risk of Guillain-Barré Syndrome (GBS) after inactivated influenza vaccine (the flu shot). Janeal Ray children who get the flu shot along with pneumococcal vaccine (PCV13) and/or DTaP vaccine at the same time might be slightly more likely to have a seizure caused by fever. Tell your health care provider if a child who is getting flu vaccine has ever had a seizure. People sometimes faint after medical procedures, including vaccination. Tell your provider if you feel dizzy or have vision changes or ringing in the ears. As with any medicine, there is a very remote chance of a vaccine causing a severe allergic reaction, other serious injury, or death. 5. What if there is a serious problem?     An allergic reaction could occur after the vaccinated person leaves the clinic. If you see signs of a severe allergic reaction (hives, swelling of the face and throat, difficulty breathing, a fast heartbeat, dizziness, or weakness), call 9-1-1 and get the person to the nearest hospital.    For other signs that concern you, call your health care provider. Adverse reactions should be reported to the Vaccine Adverse Event Reporting System (VAERS). Your health care provider will usually file this report, or you can do it yourself. Visit the VAERS website at www.vaers. [facility].gov or call 3-624.392.7610. VAERS is only for reporting reactions, and VAERS staff members do not give medical advice. 6. The National Vaccine Injury Compensation Program    The [facility] Vaccine Injury Compensation Program (VICP) is a federal program that was created to compensate people who may have been injured by certain vaccines. Claims regarding alleged injury or death due to vaccination have a time limit for filing, which may be as short as two years. Visit the VICP website at www.[facility].gov/vaccinecompensation or call 2-142.872.5787 to learn about the program and about filing a claim. 7. How can I learn more?  Ask your health care provider.  Call your local or state health department.  Visit the website of the Food and Drug Administration (FDA) for vaccine package inserts and additional information at www.fda.gov/vaccines-blood-biologics/vaccines.  Contact the Centers for Disease Control and Prevention (CDC):  - Call 0-702.635.2784 (1-800-CDC-INFO) or  - Visit CDCs influenza website at www.cdc.gov/flu. Vaccine Information Statement   Inactivated Influenza Vaccine   8/6/2021  [address] 797RK-42   Department of Health and Human Services  Centers for Disease Control and Prevention    Office Use Only

## 2021-11-03 NOTE — PROGRESS NOTES
Andreia Briseno  Identified pt with two pt identifiers(name and ). Chief Complaint   Patient presents with    Cholesterol Problem    Asthma    Anxiety       Reviewed record In preparation for visit and have obtained necessary documentation. 1. Have you been to the ER, urgent care clinic or hospitalized since your last visit? No     2. Have you seen or consulted any other health care providers outside of the [address] [place] [address] since your last visit? Include any pap smears or colon screening. No    Vitals reviewed with provider. Health Maintenance reviewed: After verbal order read back of [facility], patient received High Dose Flu Shot (Adjuvanted Fluad) in left deltoid. Kirill Carrera 47: [id number] Lot: [id number] Exp: 2022. Patient tolerated procedure without complaints and received VIS. Health Maintenance Due   Topic    Shingrix Vaccine Age 49> (1 of 2)    COVID-19 Vaccine (3 - Pfizer booster)    Flu Vaccine (1)    Colorectal Cancer Screening Combo         Wt Readings from Last 3 Encounters:   21 205 lb (93 kg)   21 206 lb (93.4 kg)   21 212 lb 6.4 oz (96.3 kg)      Temp Readings from Last 3 Encounters:   21 97.2 °F (36.2 °C) (Oral)   21 97.9 °F (36.6 °C) (Oral)   21 97.3 °F (36.3 °C) (Oral)      BP Readings from Last 3 Encounters:   21 100/64   21 112/70   21 115/73      Pulse Readings from Last 3 Encounters:   21 66   21 70   21 66      There were no vitals filed for this visit.        Learning Assessment:   :     Learning Assessment 3/15/2018 2014   PRIMARY LEARNER Patient Patient   HIGHEST LEVEL OF EDUCATION - PRIMARY LEARNER  4 YEARS OF COLLEGE 4 YEARS OF COLLEGE   BARRIERS PRIMARY LEARNER NONE NONE   CO-LEARNER CAREGIVER No -   PRIMARY LANGUAGE ENGLISH ENGLISH   LEARNER PREFERENCE PRIMARY READING LISTENING   ANSWERED BY self patient   RELATIONSHIP SELF SELF        Depression Screening:   :     3 most recent PHQ Screens 7/8/2021   PHQ Not Done -   Little interest or pleasure in doing things Nearly every day   Feeling down, depressed, irritable, or hopeless More than half the days   Total Score PHQ 2 5   Trouble falling or staying asleep, or sleeping too much Not at all   Feeling tired or having little energy Nearly every day   Poor appetite, weight loss, or overeating Several days   Feeling bad about yourself - or that you are a failure or have let yourself or your family down Several days   Trouble concentrating on things such as school, work, reading, or watching TV Not at all   Moving or speaking so slowly that other people could have noticed; or the opposite being so fidgety that others notice Not at all   Thoughts of being better off dead, or hurting yourself in some way Not at all   PHQ 9 Score 10   How difficult have these problems made it for you to do your work, take care of your home and get along with others Somewhat difficult        Fall Risk Assessment:   :     Fall Risk Assessment, last 12 mths 7/8/2021   Able to walk? Yes   Fall in past 12 months? 0   Do you feel unsteady? 0   Are you worried about falling 0        Abuse Screening:   :     Abuse Screening Questionnaire 4/11/2019 6/15/2018 4/16/2018 6/30/2016   Do you ever feel afraid of your partner? N N N N   Are you in a relationship with someone who physically or mentally threatens you? N N N N   Is it safe for you to go home?  Y Y Y Y        ADL Screening:   :     ADL Assessment 6/15/2018   Feeding yourself No Help Needed   Getting from bed to chair No Help Needed   Getting dressed No Help Needed   Bathing or showering No Help Needed   Walk across the room (includes cane/walker) No Help Needed   Using the telphone No Help Needed   Taking your medications No Help Needed   Preparing meals No Help Needed   Managing money (expenses/bills) No Help Needed   Moderately strenuous housework (laundry) No Help Needed   Shopping for personal items (toiletries/medicines) No Help Needed   Shopping for groceries No Help Needed   Driving No Help Needed   Climbing a flight of stairs No Help Needed   Getting to places beyond walking distances No Help Needed

## 2021-11-04 DIAGNOSIS — E66.9 OBESITY, CLASS I, BMI 30-34.9: ICD-10-CM

## 2021-11-04 DIAGNOSIS — M05.752 RHEUMATOID ARTHRITIS INVOLVING BOTH HIPS WITH POSITIVE RHEUMATOID FACTOR (HCC): ICD-10-CM

## 2021-11-04 DIAGNOSIS — M05.751 RHEUMATOID ARTHRITIS INVOLVING BOTH HIPS WITH POSITIVE RHEUMATOID FACTOR (HCC): ICD-10-CM

## 2021-11-04 DIAGNOSIS — F41.1 GENERALIZED ANXIETY DISORDER: ICD-10-CM

## 2021-11-04 DIAGNOSIS — M25.561 RECURRENT PAIN OF RIGHT KNEE: ICD-10-CM

## 2021-11-04 DIAGNOSIS — M25.551 HIP PAIN, BILATERAL: ICD-10-CM

## 2021-11-04 DIAGNOSIS — M25.552 HIP PAIN, BILATERAL: ICD-10-CM

## 2021-11-04 NOTE — TELEPHONE ENCOUNTER
----- Message from Jennie Childress sent at 11/4/2021  2:18 PM EDT -----  Subject: Message to Provider    QUESTIONS  Information for Provider? The pt was seen yesterday and her prescriptions   were all sent to [facility]. However it was the wrong one. All of the   prescriptions sent in yesterday need to be canceled and they need to be   sent to the correct [facility] at [phone] [id number] [address] [facility] [place], [place], [address] [place] [address]  ---------------------------------------------------------------------------  --------------  [address]  What is the best way for the office to contact you? OK to leave message on   voicemail  Preferred Call Back Phone Number? [phone]  ---------------------------------------------------------------------------  --------------  SCRIPT ANSWERS  Relationship to Patient?  Self

## 2021-11-04 NOTE — TELEPHONE ENCOUNTER
PCP: Nia Yuen PA-C     Last appt: 11/3/2021   Future Appointments   Date Time Provider Amalia Garcia   5/4/2022  8:30 AM Nia Yuen PA-C [facility] BS AMB        Requested Prescriptions     Pending Prescriptions Disp Refills    phentermine (ADIPEX-P) 37.5 mg tablet 30 Tablet 2     Sig: Take 1 Tablet by mouth every morning. Max Daily Amount: 37.5 mg.    escitalopram oxalate (LEXAPRO) 10 mg tablet 30 Tablet 5     Sig: Take 1 Tablet by mouth daily.

## 2021-11-05 ENCOUNTER — TELEPHONE (OUTPATIENT)
Dept: INTERNAL MEDICINE CLINIC | Age: 66
End: 2021-11-05

## 2021-11-05 RX ORDER — ESCITALOPRAM OXALATE 10 MG/1
10 TABLET ORAL DAILY
Qty: 30 TABLET | Refills: 5 | Status: SHIPPED | OUTPATIENT
Start: 2021-11-05 | End: 2022-08-12

## 2021-11-05 RX ORDER — PHENTERMINE HYDROCHLORIDE 37.5 MG/1
37.5 TABLET ORAL
Qty: 30 TABLET | Refills: 2 | Status: SHIPPED | OUTPATIENT
Start: 2021-11-05 | End: 2022-05-27

## 2021-11-08 NOTE — TELEPHONE ENCOUNTER
Denied on November 5  PA Case: [id number], Status: Denied. Notification: Completed. I called the patient and verified them by name and date of birth. I informed her that the prescription for Phentermine is not covered by insurance. The options are:  1. Call insurance to see what alternatives are covered  2. Pay out of pocket    She stated understanding and thinks that she paid out of pocket last time. She will check and see.

## 2021-11-10 ENCOUNTER — VIRTUAL VISIT (OUTPATIENT)
Dept: INTERNAL MEDICINE CLINIC | Age: 66
End: 2021-11-10
Payer: COMMERCIAL

## 2021-11-10 DIAGNOSIS — Z00.00 MEDICARE ANNUAL WELLNESS VISIT, SUBSEQUENT: Primary | ICD-10-CM

## 2021-11-10 PROCEDURE — 3017F COLORECTAL CA SCREEN DOC REV: CPT | Performed by: PHYSICIAN ASSISTANT

## 2021-11-10 PROCEDURE — 1101F PT FALLS ASSESS-DOCD LE1/YR: CPT | Performed by: PHYSICIAN ASSISTANT

## 2021-11-10 PROCEDURE — G8427 DOCREV CUR MEDS BY ELIG CLIN: HCPCS | Performed by: PHYSICIAN ASSISTANT

## 2021-11-10 PROCEDURE — G0439 PPPS, SUBSEQ VISIT: HCPCS | Performed by: PHYSICIAN ASSISTANT

## 2021-11-10 PROCEDURE — G8432 DEP SCR NOT DOC, RNG: HCPCS | Performed by: PHYSICIAN ASSISTANT

## 2021-11-10 PROCEDURE — G8399 PT W/DXA RESULTS DOCUMENT: HCPCS | Performed by: PHYSICIAN ASSISTANT

## 2021-11-10 PROCEDURE — G9899 SCRN MAM PERF RSLTS DOC: HCPCS | Performed by: PHYSICIAN ASSISTANT

## 2021-11-10 NOTE — PROGRESS NOTES
This is the Subsequent Medicare Annual Wellness Exam, performed 12 months or more after the Initial AWV or the last Subsequent AWV    I have reviewed the patient's medical history in detail and updated the computerized patient record. Assessment/Plan   Education and counseling provided:  Are appropriate based on today's review and evaluation  End-of-Life planning (with patient's consent)  Colorectal cancer screening tests    1. Medicare annual wellness visit, subsequent       Depression Risk Factor Screening:     3 most recent PHQ Screens 11/10/2021   PHQ Not Done -   Little interest or pleasure in doing things Not at all   Feeling down, depressed, irritable, or hopeless Not at all   Total Score PHQ 2 0   Trouble falling or staying asleep, or sleeping too much -   Feeling tired or having little energy -   Poor appetite, weight loss, or overeating -   Feeling bad about yourself - or that you are a failure or have let yourself or your family down -   Trouble concentrating on things such as school, work, reading, or watching TV -   Moving or speaking so slowly that other people could have noticed; or the opposite being so fidgety that others notice -   Thoughts of being better off dead, or hurting yourself in some way -   PHQ 9 Score -   How difficult have these problems made it for you to do your work, take care of your home and get along with others -       Alcohol Risk Screen    Do you average more than 1 drink per night or more than 7 drinks a week:  No    On any one occasion in the past three months have you have had more than 3 drinks containing alcohol:  No        Functional Ability and Level of Safety:    Hearing: Hearing is good. Activities of Daily Living: The home contains: handrails, grab bars and rugs  Patient does total self care      Ambulation: with no difficulty     Fall Risk:  Fall Risk Assessment, last 12 mths 11/10/2021   Able to walk?  Yes   Fall in past 12 months? 0   Do you feel unsteady?  0   Are you worried about falling 0      Abuse Screen:  Patient is not abused       Cognitive Screening    Has your family/caregiver stated any concerns about your memory: no    Cognitive Screening: Normal - Mini Cog Test    Health Maintenance Due     Health Maintenance Due   Topic Date Due    Shingrix Vaccine Age 49> (1 of 2) Never done    COVID-19 Vaccine (3 - Booster for Pfizer series) 08/25/2021    Colorectal Cancer Screening Combo  09/27/2021       Patient Care Team   Patient Care Team:  Aron Delarosa PA-C as PCP - General (Physician Assistant)  Aron Delarosa PA-C as PCP - [address] [place] [address] Dr Vines Provider  Carmel Weller MD (Gastroenterology)  Marsha Rincon MD (Orthopedic Surgery)  Arnaldo Curtis NP (Gynecology)    History     Patient Active Problem List   Diagnosis Code    Arthritis M19.90    Menopausal and postmenopausal disorder N95.9    Hip pain, bilateral M25.551, M25.552    Female pelvic pain R10.2    RA (rheumatoid arthritis) ([facility] [address] M06.9    Pure hypercholesterolemia E78.00    Achilles tendonitis M76.60    Vitamin D deficiency E55.9    Chronic insomnia F51.04    Generalized anxiety disorder F41.1    Peripheral edema R60.9    Family history of ischemic heart disease Z80.55    Family history of stroke Z82.3    Elevated alkaline phosphatase level R74.8    Advance care planning Z71.89    Hashimoto's thyroiditis E06.3    Bilateral lower extremity edema R60.0    Recurrent pain of right knee M25.561    Primary osteoarthritis of right knee M17.11    Obesity, Class I, BMI 30-34.9 E66.9    Osteopenia of multiple sites M85.89    Class 2 severe obesity due to excess calories with serious comorbidity and body mass index (BMI) of 35.0 to 35.9 in adult ([facility] [address] E66.01, Z68.35    Excessive weight gain R63.5    Chronic fatigue R53.82    BMI 35.0-35.9,adult Z68.35    Family history of heart attack Z82.49    Family history of diabetes mellitus (DM) Z83.3    Family history of thyroid disease Z80.51    Family history of colonic polyps Z83.71    Family history of obesity Z83.49    Family history of sleep apnea Z82.0    S/P MARIA R-BSO (total abdominal hysterectomy and bilateral salpingo-oophorectomy) Z90.710, Z90.722, Z90.79     Past Medical History:   Diagnosis Date    Achilles tendonitis 12/2004    Right. Dr. Sosa Desir Arthritis 2010    hips, knees. Dr. Sid Lenz    Chest pain 08/2013    Dr. Lizeth Ludwig.  Chickenpox childhood    Endometriosis 1990    Dr. Sarwat Mcdonnell Environmental allergies     MARGI (generalized anxiety disorder)     Hip pain, bilateral 2010    due to severe OA. Iliopsoas bursitis. Dr. Christopher Vázquez    History of breast lump/mass excision 1998    Left breast.  Dr. Mary Kay Ivan Hyperlipidemia     Iron deficiency anemia     iron deficiency    Knee pain, bilateral 2017    Dr. Bridger Jorge    Menopausal and postmenopausal disorder 2007    Dr. Sarwat Mcdonnell Uterine fibroid     Dr. Duc Prescott.  Vitamin D deficiency 03/2008      Past Surgical History:   Procedure Laterality Date    BIOPSY BREAST  Rt 1993;Lt 1998    Dr. Jie Santoro Right 2002    Right achiles tendon. Dr. Elaina Velez.  HX BREAST BIOPSY  2001    Left. benign. Dr. Mehdi Yousif HX COLONOSCOPY  09/27/2016    Dr. Zo Aj.  due q 5 yrs due to fam hx   [place]    Dr. Kelly Mitchell. due to endometriosis    HX PELVIC LAPAROSCOPY  1995    due to endometriosis Dr. Claudia Calixto  04/2011    due to fibroids. Dr. Duc Prescott. Current Outpatient Medications   Medication Sig Dispense Refill    phentermine (ADIPEX-P) 37.5 mg tablet Take 1 Tablet by mouth every morning. Max Daily Amount: 37.5 mg. 30 Tablet 2    escitalopram oxalate (LEXAPRO) 10 mg tablet Take 1 Tablet by mouth daily.  30 Tablet 5    lactulose (CHRONULAC) 10 gram/15 mL solution TAKE 30 ML BY MOUTH THREE TIMES DAILY AS NEEDED      Stool Softener 100 mg capsule TAKE 1 CAPSULE BY MOUTH TWICE DAILY      diclofenac EC (VOLTAREN) 75 mg EC tablet TAKE 1 TABLET BY MOUTH TWICE DAILY FOR RHEUMATOID ARTHRITIS 180 Tablet 1    albuterol (PROVENTIL HFA, VENTOLIN HFA, PROAIR HFA) 90 mcg/actuation inhaler INHALE 2 PUFFS BY MOUTH EVERY 4 HOURS AS NEEDED FOR WHEEZING OR COUGH 8.5 g 1    montelukast (Singulair) 10 mg tablet Take 1 Tab by mouth daily. Indications: inflammation of the nose due to an allergy 90 Tab 3    furosemide (LASIX) 40 mg tablet TAKE 1 TABLET BY MOUTH DAILY FOR SWELLING 90 Tab 3    hyaluronate (Synvisc-One) 48 mg/6 mL syrg injection Inject one prefilled syringe into the right knee, for a quantity of 1 injection      PREMARIN 0.625 mg/gram vaginal cream   4    MYRBETRIQ 50 mg ER tablet Take 50 mg by mouth two (2) times a week.  aspirin delayed-release 81 mg tablet Take 1 Tab by mouth daily. Indications: prevention of transient ischemic attack 90 Tab 3    MINIVELLE 0.1 mg/24 hr 1 Patch by TransDERmal route two (2) times a week. Changes Sunday and Wednesday  0    cetirizine (ZYRTEC) 10 mg tablet Take 1 Tab by mouth daily. (Patient taking differently: Take 10 mg by mouth daily as needed.) 30 Tab 3    fluticasone (FLONASE) 50 mcg/actuation nasal spray 2 Sprays by Both Nostrils route daily. Indications: ALLERGIC RHINITIS 1 Bottle 5    cholecalciferol, vitamin D3, (VITAMIN D3) 2,000 unit Tab Take 2,000 Units by mouth daily.        Allergies   Allergen Reactions    Advil Pm [Ibuprofen-Diphenhydramine Hcl] Other (comments)     Slightly elevated Creaitnine 1.02    Aleve [Naproxen Sodium] Other (comments)     Due to kidneys    Bactrim [Sulfamethoxazole-Trimethoprim] Nausea and Vomiting    Sulfa (Sulfonamide Antibiotics) Nausea and Vomiting    Sulfasalazine Nausea and Vomiting       Family History   Problem Relation Age of Onset    Hypertension Mother     Other Mother         hearing loss/vision loss    Heart Disease Father     Lung Disease Father     Diabetes Sister     Heart Attack Sister 54        March 2014    Stroke Sister         after MI    Thyroid Disease Sister     Colon Polyps Sister     Obesity Sister     Sleep Apnea Sister     Heart Disease Brother         Defibrillator placed    Heart Attack Brother 48    Diabetes Maternal Grandmother      Social History     Tobacco Use    Smoking status: Passive Smoke Exposure - Never Smoker    Smokeless tobacco: Never Used    Tobacco comment: lived with smoker mom x 18 yrs   Substance Use Topics    Alcohol use: No       Juliette Robertsimoto, who was evaluated through a synchronous (real-time) audio-video encounter, and/or her healthcare decision maker, is aware that it is a billable service, with coverage as determined by her insurance carrier. She provided verbal consent to proceed: Yes, and patient identification was verified. It was conducted pursuant to the emergency declaration under the [address] [place] [address] authority and the [employer] Act. A caregiver was present when appropriate. Ability to conduct physical exam was limited. I was in the office. The patient was at home.     Daysi Renner PA-C

## 2021-11-10 NOTE — PROGRESS NOTES
Judge Torres  Identified pt with two pt identifiers(name and ). Chief Complaint   Patient presents with   [facility] Wellness Visit       Reviewed record In preparation for visit and have obtained necessary documentation. 1. Have you been to the ER, urgent care clinic or hospitalized since your last visit? No     2. Have you seen or consulted any other health care providers outside of the [address] [place] [address] since your last visit? Include any pap smears or colon screening. No    Vitals reviewed with provider. Health Maintenance reviewed:     Health Maintenance Due   Topic    Shingrix Vaccine Age 49> (1 of 2)    COVID-19 Vaccine (3 - Booster for [employer] series)    Colorectal Cancer Screening Combo     Medicare Yearly Exam         Wt Readings from Last 3 Encounters:   21 203 lb (92.1 kg)   21 205 lb (93 kg)   21 206 lb (93.4 kg)      Temp Readings from Last 3 Encounters:   21 97.9 °F (36.6 °C) (Oral)   21 97.2 °F (36.2 °C) (Oral)   21 97.9 °F (36.6 °C) (Oral)      BP Readings from Last 3 Encounters:   21 123/80   21 100/64   21 112/70      Pulse Readings from Last 3 Encounters:   21 (!) 55   21 66   21 70      There were no vitals filed for this visit.        Learning Assessment:   :     Learning Assessment 3/15/2018 2014   PRIMARY LEARNER Patient Patient   HIGHEST LEVEL OF EDUCATION - PRIMARY LEARNER  4 YEARS OF COLLEGE 4 YEARS OF COLLEGE   BARRIERS PRIMARY LEARNER NONE NONE   CO-LEARNER CAREGIVER No -   PRIMARY LANGUAGE ENGLISH ENGLISH   LEARNER PREFERENCE PRIMARY READING LISTENING   ANSWERED BY self patient   RELATIONSHIP SELF SELF        Depression Screening:   :     3 most recent PHQ Screens 11/3/2021   PHQ Not Done -   Little interest or pleasure in doing things Not at all   Feeling down, depressed, irritable, or hopeless Not at all   Total Score PHQ 2 0   Trouble falling or staying asleep, or sleeping too much - Feeling tired or having little energy -   Poor appetite, weight loss, or overeating -   Feeling bad about yourself - or that you are a failure or have let yourself or your family down -   Trouble concentrating on things such as school, work, reading, or watching TV -   Moving or speaking so slowly that other people could have noticed; or the opposite being so fidgety that others notice -   Thoughts of being better off dead, or hurting yourself in some way -   PHQ 9 Score -   How difficult have these problems made it for you to do your work, take care of your home and get along with others -        Fall Risk Assessment:   :     Fall Risk Assessment, last 12 mths 7/8/2021   Able to walk? Yes   Fall in past 12 months? 0   Do you feel unsteady? 0   Are you worried about falling 0        Abuse Screening:   :     Abuse Screening Questionnaire 11/3/2021 4/11/2019 6/15/2018 4/16/2018 6/30/2016   Do you ever feel afraid of your partner? N N N N N   Are you in a relationship with someone who physically or mentally threatens you? N N N N N   Is it safe for you to go home?  Y Y Y Y Y        ADL Screening:   :     ADL Assessment 11/10/2021   Feeding yourself No Help Needed   Getting from bed to chair No Help Needed   Getting dressed No Help Needed   Bathing or showering No Help Needed   Walk across the room (includes cane/walker) No Help Needed   Using the telphone No Help Needed   Taking your medications No Help Needed   Preparing meals No Help Needed   Managing money (expenses/bills) No Help Needed   Moderately strenuous housework (laundry) No Help Needed   Shopping for personal items (toiletries/medicines) No Help Needed   Shopping for groceries No Help Needed   Driving No Help Needed   Climbing a flight of stairs No Help Needed   Getting to places beyond walking distances No Help Needed

## 2021-11-10 NOTE — PATIENT INSTRUCTIONS

## 2022-03-18 PROBLEM — M85.89 OSTEOPENIA OF MULTIPLE SITES: Status: ACTIVE | Noted: 2021-05-07

## 2022-03-18 PROBLEM — Z90.722 S/P TAH-BSO (TOTAL ABDOMINAL HYSTERECTOMY AND BILATERAL SALPINGO-OOPHORECTOMY): Status: ACTIVE | Noted: 2021-07-30

## 2022-03-18 PROBLEM — Z90.79 S/P TAH-BSO (TOTAL ABDOMINAL HYSTERECTOMY AND BILATERAL SALPINGO-OOPHORECTOMY): Status: ACTIVE | Noted: 2021-07-30

## 2022-03-18 PROBLEM — Z90.710 S/P TAH-BSO (TOTAL ABDOMINAL HYSTERECTOMY AND BILATERAL SALPINGO-OOPHORECTOMY): Status: ACTIVE | Noted: 2021-07-30

## 2022-03-19 PROBLEM — E66.9 OBESITY, CLASS I, BMI 30-34.9: Status: ACTIVE | Noted: 2019-08-16

## 2022-03-19 PROBLEM — E06.3 HASHIMOTO'S THYROIDITIS: Status: ACTIVE | Noted: 2017-02-24

## 2022-03-19 PROBLEM — M25.561 RECURRENT PAIN OF RIGHT KNEE: Status: ACTIVE | Noted: 2017-10-19

## 2022-03-19 PROBLEM — Z82.49 FAMILY HISTORY OF HEART ATTACK: Status: ACTIVE | Noted: 2021-07-08

## 2022-03-19 PROBLEM — E66.01 CLASS 2 SEVERE OBESITY DUE TO EXCESS CALORIES WITH SERIOUS COMORBIDITY AND BODY MASS INDEX (BMI) OF 35.0 TO 35.9 IN ADULT (HCC): Status: ACTIVE | Noted: 2021-07-08

## 2022-03-19 PROBLEM — E66.811 OBESITY, CLASS I, BMI 30-34.9: Status: ACTIVE | Noted: 2019-08-16

## 2022-03-19 PROBLEM — R60.0 BILATERAL LOWER EXTREMITY EDEMA: Status: ACTIVE | Noted: 2017-05-29

## 2022-03-19 PROBLEM — Z83.49 FAMILY HISTORY OF THYROID DISEASE: Status: ACTIVE | Noted: 2021-07-08

## 2022-03-19 PROBLEM — Z83.71 FAMILY HISTORY OF COLONIC POLYPS: Status: ACTIVE | Noted: 2021-07-08

## 2022-03-19 PROBLEM — Z83.3 FAMILY HISTORY OF DIABETES MELLITUS (DM): Status: ACTIVE | Noted: 2021-07-08

## 2022-03-19 PROBLEM — R53.82 CHRONIC FATIGUE: Status: ACTIVE | Noted: 2021-07-08

## 2022-03-19 PROBLEM — Z83.719 FAMILY HISTORY OF COLONIC POLYPS: Status: ACTIVE | Noted: 2021-07-08

## 2022-03-19 PROBLEM — M17.11 PRIMARY OSTEOARTHRITIS OF RIGHT KNEE: Status: ACTIVE | Noted: 2017-07-31

## 2022-03-19 PROBLEM — E66.812 CLASS 2 SEVERE OBESITY DUE TO EXCESS CALORIES WITH SERIOUS COMORBIDITY AND BODY MASS INDEX (BMI) OF 35.0 TO 35.9 IN ADULT (HCC): Status: ACTIVE | Noted: 2021-07-08

## 2022-03-20 PROBLEM — R63.5 EXCESSIVE WEIGHT GAIN: Status: ACTIVE | Noted: 2021-07-08

## 2022-03-20 PROBLEM — Z83.49 FAMILY HISTORY OF OBESITY: Status: ACTIVE | Noted: 2021-07-08

## 2022-03-20 PROBLEM — Z82.0 FAMILY HISTORY OF SLEEP APNEA: Status: ACTIVE | Noted: 2021-07-08

## 2022-05-27 ENCOUNTER — OFFICE VISIT (OUTPATIENT)
Dept: INTERNAL MEDICINE CLINIC | Age: 67
End: 2022-05-27
Payer: MEDICARE

## 2022-05-27 VITALS
SYSTOLIC BLOOD PRESSURE: 116 MMHG | HEART RATE: 60 BPM | RESPIRATION RATE: 16 BRPM | HEIGHT: 65 IN | OXYGEN SATURATION: 96 % | TEMPERATURE: 97.9 F | DIASTOLIC BLOOD PRESSURE: 76 MMHG | WEIGHT: 206.8 LBS | BODY MASS INDEX: 34.45 KG/M2

## 2022-05-27 DIAGNOSIS — M05.751 RHEUMATOID ARTHRITIS INVOLVING BOTH HIPS WITH POSITIVE RHEUMATOID FACTOR (HCC): ICD-10-CM

## 2022-05-27 DIAGNOSIS — E78.00 PURE HYPERCHOLESTEROLEMIA: ICD-10-CM

## 2022-05-27 DIAGNOSIS — R60.9 PERIPHERAL EDEMA: ICD-10-CM

## 2022-05-27 DIAGNOSIS — M05.752 RHEUMATOID ARTHRITIS INVOLVING BOTH HIPS WITH POSITIVE RHEUMATOID FACTOR (HCC): ICD-10-CM

## 2022-05-27 DIAGNOSIS — N18.30 STAGE 3 CHRONIC KIDNEY DISEASE, UNSPECIFIED WHETHER STAGE 3A OR 3B CKD (HCC): ICD-10-CM

## 2022-05-27 DIAGNOSIS — M25.552 HIP PAIN, BILATERAL: ICD-10-CM

## 2022-05-27 DIAGNOSIS — M85.89 OSTEOPENIA OF MULTIPLE SITES: Primary | ICD-10-CM

## 2022-05-27 DIAGNOSIS — M25.561 RECURRENT PAIN OF RIGHT KNEE: ICD-10-CM

## 2022-05-27 DIAGNOSIS — E66.9 OBESITY, CLASS I, BMI 30-34.9: ICD-10-CM

## 2022-05-27 DIAGNOSIS — E06.3 HASHIMOTO'S THYROIDITIS: ICD-10-CM

## 2022-05-27 DIAGNOSIS — M25.551 HIP PAIN, BILATERAL: ICD-10-CM

## 2022-05-27 DIAGNOSIS — E55.9 VITAMIN D DEFICIENCY: ICD-10-CM

## 2022-05-27 PROCEDURE — 3017F COLORECTAL CA SCREEN DOC REV: CPT | Performed by: PHYSICIAN ASSISTANT

## 2022-05-27 PROCEDURE — G8432 DEP SCR NOT DOC, RNG: HCPCS | Performed by: PHYSICIAN ASSISTANT

## 2022-05-27 PROCEDURE — G8399 PT W/DXA RESULTS DOCUMENT: HCPCS | Performed by: PHYSICIAN ASSISTANT

## 2022-05-27 PROCEDURE — G9899 SCRN MAM PERF RSLTS DOC: HCPCS | Performed by: PHYSICIAN ASSISTANT

## 2022-05-27 PROCEDURE — 1101F PT FALLS ASSESS-DOCD LE1/YR: CPT | Performed by: PHYSICIAN ASSISTANT

## 2022-05-27 PROCEDURE — 99214 OFFICE O/P EST MOD 30 MIN: CPT | Performed by: PHYSICIAN ASSISTANT

## 2022-05-27 PROCEDURE — G8427 DOCREV CUR MEDS BY ELIG CLIN: HCPCS | Performed by: PHYSICIAN ASSISTANT

## 2022-05-27 PROCEDURE — 1090F PRES/ABSN URINE INCON ASSESS: CPT | Performed by: PHYSICIAN ASSISTANT

## 2022-05-27 PROCEDURE — 1123F ACP DISCUSS/DSCN MKR DOCD: CPT | Performed by: PHYSICIAN ASSISTANT

## 2022-05-27 PROCEDURE — G8417 CALC BMI ABV UP PARAM F/U: HCPCS | Performed by: PHYSICIAN ASSISTANT

## 2022-05-27 PROCEDURE — G8536 NO DOC ELDER MAL SCRN: HCPCS | Performed by: PHYSICIAN ASSISTANT

## 2022-05-27 RX ORDER — PHENTERMINE HYDROCHLORIDE 37.5 MG/1
37.5 TABLET ORAL
Qty: 30 TABLET | Refills: 2 | Status: SHIPPED | OUTPATIENT
Start: 2022-05-27

## 2022-05-27 RX ORDER — DICLOFENAC SODIUM 75 MG/1
TABLET, DELAYED RELEASE ORAL
Qty: 180 TABLET | Refills: 1 | Status: SHIPPED | OUTPATIENT
Start: 2022-05-27

## 2022-05-27 RX ORDER — FUROSEMIDE 40 MG/1
TABLET ORAL
Qty: 90 TABLET | Refills: 3 | Status: SHIPPED | OUTPATIENT
Start: 2022-05-27

## 2022-05-27 NOTE — PROGRESS NOTES
Tone Borges  Identified pt with two pt identifiers(name and ). Chief Complaint   Patient presents with    Medication Refill     Room 4B //        Reviewed record In preparation for visit and have obtained necessary documentation. 1. Have you been to the ER, urgent care clinic or hospitalized since your last visit? No     2. Have you seen or consulted any other health care providers outside of the [address] [place] [address] since your last visit? Include any pap smears or colon screening. No    Patient has an advance directive. Vitals reviewed with provider.     Health Maintenance reviewed:     Health Maintenance Due   Topic    Shingrix Vaccine Age 49> (1 of 2)    COVID-19 Vaccine (3 - Booster for [employer] series)    Colorectal Cancer Screening Combo     Pneumococcal 65+ years (2 - PCV)        Wt Readings from Last 3 Encounters:   22 206 lb 12.8 oz (93.8 kg)   21 203 lb (92.1 kg)   21 205 lb (93 kg)      Temp Readings from Last 3 Encounters:   21 97.9 °F (36.6 °C) (Oral)   21 97.2 °F (36.2 °C) (Oral)   21 97.9 °F (36.6 °C) (Oral)      BP Readings from Last 3 Encounters:   21 123/80   21 100/64   21 112/70      Pulse Readings from Last 3 Encounters:   21 (!) 55   21 66   21 70      Vitals:    22 1103   Resp: 16   Weight: 206 lb 12.8 oz (93.8 kg)   Height: 5' 5\" (1.651 m)   PainSc:   0 - No pain   LMP: 2011          Learning Assessment:   :     Learning Assessment 3/15/2018 2014   PRIMARY LEARNER Patient Patient   HIGHEST LEVEL OF EDUCATION - PRIMARY LEARNER  4 YEARS OF COLLEGE 4 YEARS OF COLLEGE   BARRIERS PRIMARY LEARNER NONE NONE   CO-LEARNER CAREGIVER No -   PRIMARY LANGUAGE ENGLISH ENGLISH   LEARNER PREFERENCE PRIMARY READING LISTENING   ANSWERED BY self patient   RELATIONSHIP SELF SELF        Depression Screening:   :     3 most recent PHQ Screens 11/10/2021   PHQ Not Done -   Little interest or pleasure in doing things Not at all   Feeling down, depressed, irritable, or hopeless Not at all   Total Score PHQ 2 0   Trouble falling or staying asleep, or sleeping too much -   Feeling tired or having little energy -   Poor appetite, weight loss, or overeating -   Feeling bad about yourself - or that you are a failure or have let yourself or your family down -   Trouble concentrating on things such as school, work, reading, or watching TV -   Moving or speaking so slowly that other people could have noticed; or the opposite being so fidgety that others notice -   Thoughts of being better off dead, or hurting yourself in some way -   PHQ 9 Score -   How difficult have these problems made it for you to do your work, take care of your home and get along with others -        Fall Risk Assessment:   :     Fall Risk Assessment, last 12 mths 11/10/2021   Able to walk? Yes   Fall in past 12 months? 0   Do you feel unsteady? 0   Are you worried about falling 0        Abuse Screening:   :     Abuse Screening Questionnaire 11/10/2021 11/3/2021 4/11/2019 6/15/2018 4/16/2018 6/30/2016   Do you ever feel afraid of your partner? N N N N N N   Are you in a relationship with someone who physically or mentally threatens you? N N N N N N   Is it safe for you to go home?  Y Y Y Y Y Y        ADL Screening:   :     ADL Assessment 11/10/2021   Feeding yourself No Help Needed   Getting from bed to chair No Help Needed   Getting dressed No Help Needed   Bathing or showering No Help Needed   Walk across the room (includes cane/walker) No Help Needed   Using the telphone No Help Needed   Taking your medications No Help Needed   Preparing meals No Help Needed   Managing money (expenses/bills) No Help Needed   Moderately strenuous housework (laundry) No Help Needed   Shopping for personal items (toiletries/medicines) No Help Needed   Shopping for groceries No Help Needed   Driving No Help Needed   Climbing a flight of stairs No Help Needed   Getting to places beyond walking distances No Help Needed

## 2022-05-27 NOTE — PATIENT INSTRUCTIONS
[facility]     Address: [address] [place] [address] [facility]  Phone: [phone]    [facility]    Address: [address] [facility] [address]  Phone: [phone]

## 2022-05-27 NOTE — PROGRESS NOTES
Dami Simons is a 77y.o. year old female seen in clinic today for   Chief Complaint   Patient presents with    Medication Refill     Room 4B //        she is here today to follow up for obesity, Rheumatoid Arthritis, HTN w/ edema, Anxiety, Asthma    Asthma: No issues recently. Not needing albuterol inhaler much if at all. Continue zyrtec, singuliar and flonase for allergies routinely  Anxiety: Has been taking care of herself a bit more instead of constantly running around doing things for her sister who needs more assistance with driving and health care. No s/e reported from lexapro. RA: No rheumatologist. Finding her hands are starting to swell more. Uses diclofenac as needed, labs from last year reported mild kidney dysfunction, only uses diclofenac once a week at most.    HTN: At goal on Lasix. Obesity: wishes to try phentermine again with brand from [employer] as she feels that was the one that worked better. Has been off of it for several months. No s/e reported. Is continuing Myrbetriq 50 mg due to urinary frequency after bladder sling surgery. Wishing she had not gone forward with surgery, no improvement in symptoms. Has been walking more and cooking for her household so she is watching carbohydrates. Due for labs  Colonoscopy scheduled in August.       Current Outpatient Medications on File Prior to Visit   Medication Sig Dispense Refill    phentermine (ADIPEX-P) 37.5 mg tablet Take 1 Tablet by mouth every morning. Max Daily Amount: 37.5 mg. 30 Tablet 2    escitalopram oxalate (LEXAPRO) 10 mg tablet Take 1 Tablet by mouth daily.  30 Tablet 5    lactulose (CHRONULAC) 10 gram/15 mL solution TAKE 30 ML BY MOUTH THREE TIMES DAILY AS NEEDED      Stool Softener 100 mg capsule TAKE 1 CAPSULE BY MOUTH TWICE DAILY      albuterol (PROVENTIL HFA, VENTOLIN HFA, PROAIR HFA) 90 mcg/actuation inhaler INHALE 2 PUFFS BY MOUTH EVERY 4 HOURS AS NEEDED FOR WHEEZING OR COUGH 8.5 g 1    montelukast (Singulair) 10 mg tablet Take 1 Tab by mouth daily. Indications: inflammation of the nose due to an allergy 90 Tab 3    hyaluronate (Synvisc-One) 48 mg/6 mL syrg injection Inject one prefilled syringe into the right knee, for a quantity of 1 injection      PREMARIN 0.625 mg/gram vaginal cream   4    MYRBETRIQ 50 mg ER tablet Take 50 mg by mouth two (2) times a week.  aspirin delayed-release 81 mg tablet Take 1 Tab by mouth daily. Indications: prevention of transient ischemic attack 90 Tab 3    MINIVELLE 0.1 mg/24 hr 1 Patch by TransDERmal route two (2) times a week. Changes Sunday and Wednesday  0    cetirizine (ZYRTEC) 10 mg tablet Take 1 Tab by mouth daily. (Patient taking differently: Take 10 mg by mouth daily as needed.) 30 Tab 3    fluticasone (FLONASE) 50 mcg/actuation nasal spray 2 Sprays by Both Nostrils route daily. Indications: ALLERGIC RHINITIS 1 Bottle 5    cholecalciferol, vitamin D3, (VITAMIN D3) 2,000 unit Tab Take 2,000 Units by mouth daily.  [DISCONTINUED] diclofenac EC (VOLTAREN) 75 mg EC tablet TAKE 1 TABLET BY MOUTH TWICE DAILY FOR RHEUMATOID ARTHRITIS 180 Tablet 1    [DISCONTINUED] furosemide (LASIX) 40 mg tablet TAKE 1 TABLET BY MOUTH DAILY FOR SWELLING 90 Tab 3     No current facility-administered medications on file prior to visit. Allergies   Allergen Reactions    Advil Pm [Ibuprofen-Diphenhydramine Hcl] Other (comments)     Slightly elevated Creaitnine 1.02    Aleve [Naproxen Sodium] Other (comments)     Due to kidneys    Bactrim [Sulfamethoxazole-Trimethoprim] Nausea and Vomiting    Sulfa (Sulfonamide Antibiotics) Nausea and Vomiting    Sulfasalazine Nausea and Vomiting     Past Medical History:   Diagnosis Date    Achilles tendonitis 12/2004    Right. Dr. Mil Ramirez Arthritis 2010    hips, knees. Dr. Michelle Petersen    Chest pain 08/2013    Dr. Mary Banegas.      Chickenpox childhood    Endometriosis 1990    Dr. Laura Kent Environmental allergies     MARGI (generalized anxiety disorder)     Hip pain, bilateral 2010    due to severe OA. Iliopsoas bursitis. Dr. Candie Dangelo    History of breast lump/mass excision 1998    Left breast.  Dr. Jax Pham Hyperlipidemia     Iron deficiency anemia     iron deficiency    Knee pain, bilateral 2017    Dr. Siobhan Hogan    Menopausal and postmenopausal disorder 2007    Dr. Taniya Osman Uterine fibroid     Dr. Laureen Perdomo.  Vitamin D deficiency 03/2008      Past Surgical History:   Procedure Laterality Date    BIOPSY BREAST  Rt 1993;Lt 1998    Dr. Peter Sue Right 2002    Right achiles tendon. Dr. Rangel Chowdary.  HX BREAST BIOPSY  2001    Left. benign. Dr. Patsy Barillas HX COLONOSCOPY  09/27/2016    Dr. Ramy Whitaker.  due q 5 yrs due to fam hx   Luvenia Bench    Dr. Rossi Lazo. due to endometriosis    HX PELVIC LAPAROSCOPY  1995    due to endometriosis Dr. Hong Nice  04/2011    due to fibroids. Dr. Laureen Perdomo.         Family History   Problem Relation Age of Onset    Hypertension Mother     Other Mother         hearing loss/vision loss    Heart Disease Father     Lung Disease Father     Diabetes Sister     Heart Attack Sister 54        March 2014    Stroke Sister         after MI    Thyroid Disease Sister     Colon Polyps Sister     Obesity Sister     Sleep Apnea Sister     Heart Disease Brother         Defibrillator placed    Heart Attack Brother 48    Diabetes Maternal Grandmother         Social History     Socioeconomic History    Marital status: SINGLE     Spouse name: Not on file    Number of children: 2    Years of education: Not on file    Highest education level: Not on file   Occupational History    Occupation: [employer]     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    Tobacco Use    Smoking status: Passive Smoke Exposure - Never Smoker    Smokeless tobacco: Never Used    Tobacco comment: lived with smoker mom x 18 yrs Vaping Use    Vaping Use: Never used   Substance and Sexual Activity    Alcohol use: No    Drug use: No    Sexual activity: Not Currently     Partners: Male     Birth control/protection: Abstinence, Surgical     Comment: jacy   Other Topics Concern    Not on file   Social History Narrative    Not on file     Social Determinants of Health     Financial Resource Strain:     Difficulty of Paying Living Expenses: Not on file   Food Insecurity:     Worried About Running Out of Food in the Last Year: Not on file    Mj of Food in the Last Year: Not on file   Transportation Needs:     Lack of Transportation (Medical): Not on file    Lack of Transportation (Non-Medical):  Not on file   Physical Activity:     Days of Exercise per Week: Not on file    Minutes of Exercise per Session: Not on file   Stress:     Feeling of Stress : Not on file   Social Connections:     Frequency of Communication with Friends and Family: Not on file    Frequency of Social Gatherings with Friends and Family: Not on file    Attends Scientologist Services: Not on file    Active Member of [address] [place] [address] or Organizations: Not on file    Attends Club or Organization Meetings: Not on file    Marital Status: Not on file   Intimate Partner Violence:     Fear of Current or Ex-Partner: Not on file    Emotionally Abused: Not on file    Physically Abused: Not on file    Sexually Abused: Not on file   Housing Stability:     Unable to Pay for Housing in the Last Year: Not on file    Number of [place] in the Last Year: Not on file    Unstable Housing in the Last Year: Not on file           Visit Vitals  /76 (BP 1 Location: Left upper arm, BP Patient Position: Sitting, BP Cuff Size: Adult)   Pulse 60   Temp 97.9 °F (36.6 °C) (Oral)   Resp 16   Ht 5' 5\" (1.651 m)   Wt 206 lb 12.8 oz (93.8 kg)   LMP 04/01/2011 (Approximate) Comment: jacy   SpO2 96%   BMI 34.41 kg/m²       Review of Systems   Constitutional: Negative for chills, fever, malaise/fatigue and weight loss. HENT: Negative for ear pain, hearing loss and sore throat. Respiratory: Negative for cough and shortness of breath. Cardiovascular: Negative for chest pain and leg swelling. Gastrointestinal: Negative for abdominal pain, blood in stool, constipation, diarrhea, heartburn, melena, nausea and vomiting. Genitourinary: Negative for dysuria and hematuria. Musculoskeletal: Negative for back pain and myalgias. Skin: Negative for rash. Neurological: Negative for weakness and headaches. Psychiatric/Behavioral: Negative for depression. Physical Exam  Vitals and nursing note reviewed. Constitutional:       Appearance: Normal appearance. She is normal weight. HENT:      Head: Normocephalic and atraumatic. Right Ear: Tympanic membrane, ear canal and external ear normal.      Left Ear: Tympanic membrane, ear canal and external ear normal.      Nose: Nose normal.      Mouth/Throat:      Mouth: Mucous membranes are moist.      Pharynx: Oropharynx is clear. Eyes:      Conjunctiva/sclera: Conjunctivae normal.      Pupils: Pupils are equal, round, and reactive to light. Neck:      Vascular: No carotid bruit. Cardiovascular:      Rate and Rhythm: Normal rate and regular rhythm. Pulses: Normal pulses. Heart sounds: Normal heart sounds. Pulmonary:      Effort: Pulmonary effort is normal.      Breath sounds: Normal breath sounds. No wheezing, rhonchi or rales. Abdominal:      General: Abdomen is flat. Bowel sounds are normal. There is no distension. Palpations: There is no mass. Tenderness: There is no abdominal tenderness. There is no guarding or rebound. Musculoskeletal:         General: Normal range of motion. Cervical back: Normal range of motion and neck supple. No rigidity. Right lower leg: No edema. Left lower leg: No edema. Skin:     General: Skin is warm and dry.       Capillary Refill: Capillary refill takes less than 2 seconds. Neurological:      General: No focal deficit present. Mental Status: She is alert and oriented to person, place, and time. Sensory: No sensory deficit. Gait: Gait normal.   Psychiatric:         Mood and Affect: Mood normal.         Behavior: Behavior normal.         Thought Content: Thought content normal.          No results found for this or any previous visit (from the past 24 hour(s)). ASSESSMENT AND PLAN  Diagnoses and all orders for this visit:    1. Osteopenia of multiple sites  Check Calcium, Vit D. Recheck DEXA in 2023  2. Rheumatoid arthritis involving both hips with positive rheumatoid factor (HCC)  -     diclofenac EC (VOLTAREN) 75 mg EC tablet; TAKE 1 TABLET BY MOUTH TWICE DAILY FOR RHEUMATOID ARTHRITIS  -     REFERRAL TO RHEUMATOLOGY  Sending for rheum. Starting to have changes to hands  3. Peripheral edema  Comments:  hands, feet, ankles  Orders:  -     furosemide (LASIX) 40 mg tablet; TAKE 1 TABLET BY MOUTH DAILY FOR SWELLING    4. Recurrent pain of right knee  -     diclofenac EC (VOLTAREN) 75 mg EC tablet; TAKE 1 TABLET BY MOUTH TWICE DAILY FOR RHEUMATOID ARTHRITIS    5. Hip pain, bilateral  Comments:  L>R, due to RA and OA vs other  Orders:  -     diclofenac EC (VOLTAREN) 75 mg EC tablet; TAKE 1 TABLET BY MOUTH TWICE DAILY FOR RHEUMATOID ARTHRITIS  Send for Rheumatology referral as hips still bothering her and hands causing problems  6. Stage 3 chronic kidney disease, unspecified whether stage 3a or 3b CKD ([address]  -     URINALYSIS W/MICROSCOPIC; Future  Avoid consistent NSAID use. If GFR worse cut out diclofenac  7. Pure hypercholesterolemia  -     LIPID PANEL; Future  -     METABOLIC PANEL, COMPREHENSIVE; Future  -     CBC WITH AUTOMATED DIFF; Future  -     URINALYSIS W/MICROSCOPIC; Future  Recheck lipids, low fat diet and low carb diet recommended   8. Obesity, Class I, BMI 30-34.9  -     phentermine (ADIPEX-P) 37.5 mg tablet;  Take 1 Tablet by mouth every morning. Max Daily Amount: 37.5 mg. Will trial one final 3 month period with Phentermine with [employer] type Phentermine for patient, BP normal  9. Hashimoto's thyroiditis  -     TSH 3RD GENERATION; Future  -     T4, FREE; Future  Thyroid was normal last year, check yearly  10. Vitamin D deficiency  -     VITAMIN D, 25 HYDROXY; Future  Continue supplement, recheck levels with lab work. Osteopenic based off DEXA last year. Check Ca level as well         I have discussed the diagnosis with the patient and the intended plan as seen in the above orders. Patient is in agreement. The patient has received an after-visit summary and questions were answered concerning future plans. I have discussed medication side effects and warnings with the patient as well.     Denton Gaspar PA-C

## 2022-06-22 LAB
25(OH)D3+25(OH)D2 SERPL-MCNC: 86.7 NG/ML (ref 30–100)
ALBUMIN SERPL-MCNC: 4.3 G/DL (ref 3.8–4.8)
ALBUMIN/GLOB SERPL: 1.5 {RATIO} (ref 1.2–2.2)
ALP SERPL-CCNC: 118 IU/L (ref 44–121)
ALT SERPL-CCNC: 16 IU/L (ref 0–32)
APPEARANCE UR: ABNORMAL
AST SERPL-CCNC: 21 IU/L (ref 0–40)
BACTERIA #/AREA URNS HPF: ABNORMAL /[HPF]
BASOPHILS # BLD AUTO: 0.1 X10E3/UL (ref 0–0.2)
BASOPHILS NFR BLD AUTO: 2 %
BILIRUB SERPL-MCNC: 0.3 MG/DL (ref 0–1.2)
BILIRUB UR QL STRIP: NEGATIVE
BUN SERPL-MCNC: 18 MG/DL (ref 8–27)
BUN/CREAT SERPL: 19 (ref 12–28)
CALCIUM SERPL-MCNC: 9.2 MG/DL (ref 8.7–10.3)
CASTS URNS QL MICRO: ABNORMAL /LPF
CHLORIDE SERPL-SCNC: 102 MMOL/L (ref 96–106)
CHOLEST SERPL-MCNC: 181 MG/DL (ref 100–199)
CO2 SERPL-SCNC: 23 MMOL/L (ref 20–29)
COLOR UR: YELLOW
CREAT SERPL-MCNC: 0.96 MG/DL (ref 0.57–1)
EGFR: 65 ML/MIN/1.73
EOSINOPHIL # BLD AUTO: 0.2 X10E3/UL (ref 0–0.4)
EOSINOPHIL NFR BLD AUTO: 6 %
EPI CELLS #/AREA URNS HPF: ABNORMAL /HPF (ref 0–10)
ERYTHROCYTE [DISTWIDTH] IN BLOOD BY AUTOMATED COUNT: 12.9 % (ref 11.7–15.4)
GLOBULIN SER CALC-MCNC: 2.9 G/DL (ref 1.5–4.5)
GLUCOSE SERPL-MCNC: 84 MG/DL (ref 65–99)
GLUCOSE UR QL STRIP: NEGATIVE
HCT VFR BLD AUTO: 39.9 % (ref 34–46.6)
HDLC SERPL-MCNC: 77 MG/DL
HGB BLD-MCNC: 12.9 G/DL (ref 11.1–15.9)
HGB UR QL STRIP: NEGATIVE
IMM GRANULOCYTES # BLD AUTO: 0 X10E3/UL (ref 0–0.1)
IMM GRANULOCYTES NFR BLD AUTO: 0 %
KETONES UR QL STRIP: NEGATIVE
LDLC SERPL CALC-MCNC: 92 MG/DL (ref 0–99)
LEUKOCYTE ESTERASE UR QL STRIP: NEGATIVE
LYMPHOCYTES # BLD AUTO: 1.7 X10E3/UL (ref 0.7–3.1)
LYMPHOCYTES NFR BLD AUTO: 40 %
MCH RBC QN AUTO: 29.1 PG (ref 26.6–33)
MCHC RBC AUTO-ENTMCNC: 32.3 G/DL (ref 31.5–35.7)
MCV RBC AUTO: 90 FL (ref 79–97)
MICRO URNS: ABNORMAL
MICRO URNS: ABNORMAL
MONOCYTES # BLD AUTO: 0.5 X10E3/UL (ref 0.1–0.9)
MONOCYTES NFR BLD AUTO: 11 %
NEUTROPHILS # BLD AUTO: 1.7 X10E3/UL (ref 1.4–7)
NEUTROPHILS NFR BLD AUTO: 41 %
NITRITE UR QL STRIP: NEGATIVE
PH UR STRIP: 5.5 [PH] (ref 5–7.5)
PLATELET # BLD AUTO: 207 X10E3/UL (ref 150–450)
POTASSIUM SERPL-SCNC: 4.1 MMOL/L (ref 3.5–5.2)
PROT SERPL-MCNC: 7.2 G/DL (ref 6–8.5)
PROT UR QL STRIP: NEGATIVE
RBC # BLD AUTO: 4.43 X10E6/UL (ref 3.77–5.28)
RBC #/AREA URNS HPF: ABNORMAL /HPF (ref 0–2)
SODIUM SERPL-SCNC: 139 MMOL/L (ref 134–144)
SP GR UR STRIP: 1.02 (ref 1–1.03)
T4 FREE SERPL-MCNC: 1.18 NG/DL (ref 0.82–1.77)
TRIGL SERPL-MCNC: 62 MG/DL (ref 0–149)
TSH SERPL DL<=0.005 MIU/L-ACNC: 3.05 UIU/ML (ref 0.45–4.5)
UROBILINOGEN UR STRIP-MCNC: 0.2 MG/DL (ref 0.2–1)
VLDLC SERPL CALC-MCNC: 12 MG/DL (ref 5–40)
WBC # BLD AUTO: 4.2 X10E3/UL (ref 3.4–10.8)
WBC #/AREA URNS HPF: ABNORMAL /HPF (ref 0–5)

## 2022-07-01 ENCOUNTER — TELEPHONE (OUTPATIENT)
Dept: RHEUMATOLOGY | Age: 67
End: 2022-07-01

## 2022-07-01 NOTE — TELEPHONE ENCOUNTER
Pt called to schedule np appt. Referral received in chart, scheduled pt for the next available, mailed paperwork, and pt aware to arrive 30 mins early.

## 2022-08-12 RX ORDER — CHOLECALCIFEROL TAB 125 MCG (5000 UNIT) 125 MCG
5000 TAB ORAL DAILY
COMMUNITY

## 2022-08-15 ENCOUNTER — ANESTHESIA EVENT (OUTPATIENT)
Dept: ENDOSCOPY | Age: 67
End: 2022-08-15
Payer: MEDICARE

## 2022-08-15 ENCOUNTER — HOSPITAL ENCOUNTER (OUTPATIENT)
Age: 67
Setting detail: OUTPATIENT SURGERY
Discharge: HOME | End: 2022-08-15
Attending: INTERNAL MEDICINE | Admitting: INTERNAL MEDICINE
Payer: MEDICARE

## 2022-08-15 ENCOUNTER — ANESTHESIA (OUTPATIENT)
Dept: ENDOSCOPY | Age: 67
End: 2022-08-15
Payer: MEDICARE

## 2022-08-15 VITALS
OXYGEN SATURATION: 100 % | BODY MASS INDEX: 36.49 KG/M2 | SYSTOLIC BLOOD PRESSURE: 147 MMHG | HEIGHT: 65 IN | RESPIRATION RATE: 17 BRPM | WEIGHT: 219 LBS | TEMPERATURE: 97.9 F | HEART RATE: 51 BPM | DIASTOLIC BLOOD PRESSURE: 74 MMHG

## 2022-08-15 PROCEDURE — 74011000250 HC RX REV CODE- 250: Performed by: ANESTHESIOLOGY

## 2022-08-15 PROCEDURE — 2709999900 HC NON-CHARGEABLE SUPPLY: Performed by: INTERNAL MEDICINE

## 2022-08-15 PROCEDURE — 74011250636 HC RX REV CODE- 250/636: Performed by: INTERNAL MEDICINE

## 2022-08-15 PROCEDURE — 74011250636 HC RX REV CODE- 250/636: Performed by: ANESTHESIOLOGY

## 2022-08-15 PROCEDURE — 76040000019: Performed by: INTERNAL MEDICINE

## 2022-08-15 PROCEDURE — 77030013992 HC SNR POLYP ENDOSC BSC -B: Performed by: INTERNAL MEDICINE

## 2022-08-15 PROCEDURE — 76060000031 HC ANESTHESIA FIRST 0.5 HR: Performed by: INTERNAL MEDICINE

## 2022-08-15 PROCEDURE — 88305 TISSUE EXAM BY PATHOLOGIST: CPT

## 2022-08-15 RX ORDER — SODIUM CHLORIDE 9 MG/ML
75 INJECTION, SOLUTION INTRAVENOUS CONTINUOUS
Status: DISCONTINUED | OUTPATIENT
Start: 2022-08-15 | End: 2022-08-15 | Stop reason: HOSPADM

## 2022-08-15 RX ORDER — LIDOCAINE HYDROCHLORIDE 20 MG/ML
INJECTION, SOLUTION EPIDURAL; INFILTRATION; INTRACAUDAL; PERINEURAL AS NEEDED
Status: DISCONTINUED | OUTPATIENT
Start: 2022-08-15 | End: 2022-08-15 | Stop reason: HOSPADM

## 2022-08-15 RX ORDER — ATROPINE SULFATE 0.1 MG/ML
0.5 INJECTION INTRAVENOUS
Status: DISCONTINUED | OUTPATIENT
Start: 2022-08-15 | End: 2022-08-15 | Stop reason: HOSPADM

## 2022-08-15 RX ORDER — NALOXONE HYDROCHLORIDE 0.4 MG/ML
0.4 INJECTION, SOLUTION INTRAMUSCULAR; INTRAVENOUS; SUBCUTANEOUS
Status: DISCONTINUED | OUTPATIENT
Start: 2022-08-15 | End: 2022-08-15 | Stop reason: HOSPADM

## 2022-08-15 RX ORDER — SODIUM CHLORIDE 0.9 % (FLUSH) 0.9 %
5-40 SYRINGE (ML) INJECTION EVERY 8 HOURS
Status: DISCONTINUED | OUTPATIENT
Start: 2022-08-15 | End: 2022-08-15 | Stop reason: HOSPADM

## 2022-08-15 RX ORDER — PROPOFOL 10 MG/ML
INJECTION, EMULSION INTRAVENOUS AS NEEDED
Status: DISCONTINUED | OUTPATIENT
Start: 2022-08-15 | End: 2022-08-15 | Stop reason: HOSPADM

## 2022-08-15 RX ORDER — SODIUM CHLORIDE 0.9 % (FLUSH) 0.9 %
5-40 SYRINGE (ML) INJECTION AS NEEDED
Status: DISCONTINUED | OUTPATIENT
Start: 2022-08-15 | End: 2022-08-15 | Stop reason: HOSPADM

## 2022-08-15 RX ORDER — MIDAZOLAM HYDROCHLORIDE 1 MG/ML
.25-5 INJECTION, SOLUTION INTRAMUSCULAR; INTRAVENOUS
Status: DISCONTINUED | OUTPATIENT
Start: 2022-08-15 | End: 2022-08-15 | Stop reason: HOSPADM

## 2022-08-15 RX ORDER — FLUMAZENIL 0.1 MG/ML
0.2 INJECTION INTRAVENOUS
Status: DISCONTINUED | OUTPATIENT
Start: 2022-08-15 | End: 2022-08-15 | Stop reason: HOSPADM

## 2022-08-15 RX ORDER — EPINEPHRINE 0.1 MG/ML
1 INJECTION INTRACARDIAC; INTRAVENOUS
Status: DISCONTINUED | OUTPATIENT
Start: 2022-08-15 | End: 2022-08-15 | Stop reason: HOSPADM

## 2022-08-15 RX ORDER — DEXTROMETHORPHAN/PSEUDOEPHED 2.5-7.5/.8
1.2 DROPS ORAL
Status: DISCONTINUED | OUTPATIENT
Start: 2022-08-15 | End: 2022-08-15 | Stop reason: HOSPADM

## 2022-08-15 RX ADMIN — LIDOCAINE HYDROCHLORIDE 40 MG: 20 INJECTION, SOLUTION EPIDURAL; INFILTRATION; INTRACAUDAL; PERINEURAL at 11:36

## 2022-08-15 RX ADMIN — PROPOFOL 220 MG: 10 INJECTION, EMULSION INTRAVENOUS at 11:51

## 2022-08-15 RX ADMIN — SODIUM CHLORIDE 75 ML/HR: 9 INJECTION, SOLUTION INTRAVENOUS at 11:02

## 2022-08-15 NOTE — DISCHARGE INSTRUCTIONS
[place] Office: [phone]    Bautista Spaulding  [id number]  1955    EGD/COLONOSCOPY DISCHARGE INSTRUCTIONS  Discomfort:  Sore throat- throat lozenges or warm salt water gargle  redness at IV site- apply warm compress to area; if redness or soreness persist- contact your physician  Gaseous discomfort- walking, belching will help relieve any discomfort  You may not operate a vehicle for 12 hours  You may not engage in an occupation involving machinery or appliances for rest of today. You may not drink alcoholic beverages for at least 12 hours  Avoid making any critical decisions for at least 24 hour  DIET  You may resume your regular diet - however -  remember your colon is empty and a heavy meal will produce gas. Avoid these foods:  fried / greasy foods, excessive carbonated drinks or too much caffeine  MEDICATIONS   Regarding Aspirin or Nonsteroidal medications specifically, please see below. ACTIVITY  You may resume your normal daily activities. Spend the remainder of the day resting -  avoid any strenuous activity. CALL M.D. ANY SIGN OF   Increasing pain, nausea, vomiting  Abdominal distension (swelling)  New increased bleeding (oral or rectal)  Fever (chills)  Pain in chest area  Bloody discharge from nose or mouth  Shortness of breath    You may not take any Advil, Aspirin, Ibuprofen, Motrin, Aleve, or Goodys for 7 days, ONLY  Tylenol as needed for pain. Follow-up Instructions:   Call  Hoang Masters MD for any questions or concerns  Results of procedure / biopsy in 7 days   Telephone # [phone]  Repeat colonoscopy with a better prep.     [unfilled]        Patient Education on Sedation / Analgesia Administered for Procedure      For 24 hours after general anesthesia or intravenous analgesia / sedation:  Have someone responsible help you with your care  Limit your activities  Do not drive and operate hazardous machinery  Do not make important personal, legal or business decisions  Do not drink alcoholic beverages  If you have not urinated within 8 hours after discharge, please contact your physician  Resume your medications unless otherwise instructed    For 24 hours after general anesthesia or intravenous analgesia / sedation  you may experience:  Drowsiness, dizziness, sleepiness, or confusion  Difficulty remembering or delayed reaction times  Difficulty with your balance, especially while walking, move slowly and carefully, do not make sudden position changes  Difficulty focusing or blurred vision    You may not be aware of slight changes in your behavior and/or your reaction time because of the medication used during and after your procedure. Report the following to your physician:  Excessive pain, swelling, redness or odor of or around the surgical area  Temperature over 100.5  Nausea and vomiting lasting longer than 4 hours or if unable to take medications  Any signs of decreased circulation or nerve impairment to extremity: change in color, persistent numbness, tingling, coldness or increase pain  Any questions or concerns    IF YOU REPORT TO AN EMERGENCY ROOM, DOCTOR'S OFFICE OR HOSPITAL WITHIN 24 HOURS AFTER YOUR PROCEDURE, BRING THIS SHEET AND YOUR AFTER VISIT SUMMARY WITH YOU AND GIVE IT TO THE PHYSICIAN OR NURSE ATTENDING YOU. Colon Polyps: Care Instructions  Your Care Instructions     Colon polyps are growths in the colon or the rectum. The cause of most colon polyps is not known, and most people who get them do not have any problems. But a certain kind can turn into cancer. For this reason, regular testing for colon polyps is important for people as they get older. It is also important for anyone who has an increased risk for colon cancer. Polyps are usually found through routine colon cancer screening tests. Although most colon polyps are not cancerous, they are usually removed and then tested for cancer.  Screening for colon cancer saves lives because the cancer can usually be cured if it is caught early. If you have a polyp that is the type that can turn into cancer, you may need more tests to examine your entire colon. The doctor will remove any other polyps that he or she finds, and you will be tested more often. Follow-up care is a key part of your treatment and safety. Be sure to make and go to all appointments, and call your doctor if you are having problems. It's also a good idea to know your test results and keep a list of the medicines you take. How can you care for yourself at home? Regular exams to look for colon polyps are the best way to prevent polyps from turning into colon cancer. These can include stool tests, sigmoidoscopy, colonoscopy, and CT colonography. Talk with your doctor about a testing schedule that is right for you. To prevent polyps  There is no home treatment that can prevent colon polyps. But these steps may help lower your risk for cancer. Stay active. Being active can help you get to and stay at a healthy weight. Try to exercise on most days of the week. Walking is a good choice. Eat well. Choose a variety of vegetables, fruits, legumes (such as peas and beans), fish, poultry, and whole grains. Do not smoke. If you need help quitting, talk to your doctor about stop-smoking programs and medicines. These can increase your chances of quitting for good. If you drink alcohol, limit how much you drink. Limit alcohol to 2 drinks a day for men and 1 drink a day for women. When should you call for help? Call your doctor now or seek immediate medical care if:    You have severe belly pain. Your stools are maroon or very bloody. Watch closely for changes in your health, and be sure to contact your doctor if:    You have a fever. You have nausea or vomiting. You have a change in bowel habits (new constipation or diarrhea). Your symptoms get worse or are not improving as expected. Where can you learn more?   Go to http://www.gray.com/  Enter [phone] [id number] in the search box to learn more about \"Colon Polyps: Care Instructions. \"  Current as of: September 8, 2021               Content Version: 13.2  © 2006-2022 [employer], [employer]. Care instructions adapted under license by [employer] (which disclaims liability or warranty for this information). If you have questions about a medical condition or this instruction, always ask your healthcare professional. [address] any warranty or liability for your use of this information.

## 2022-08-15 NOTE — ANESTHESIA POSTPROCEDURE EVALUATION
Procedure(s):  COLONOSCOPY  ENDOSCOPIC POLYPECTOMY. total IV anesthesia    Anesthesia Post Evaluation        Patient location during evaluation: PACU  Note status: Adequate. Level of consciousness: responsive to verbal stimuli and sleepy but conscious  Pain management: satisfactory to patient  Airway patency: patent  Anesthetic complications: no  Cardiovascular status: acceptable  Respiratory status: acceptable  Hydration status: acceptable  Comments: +Post-Anesthesia Evaluation and Assessment    Patient: Vero Durbin MRN: [id number]  SSN: [id number]   YOB: 1955  Age: 79 y.o. Sex: female      Cardiovascular Function/Vital Signs    BP (!) 147/74   Pulse (!) 51   Temp 36.6 °C (97.9 °F)   Resp 17   Ht 5' 5\" (1.651 m)   Wt 99.3 kg (219 lb)   SpO2 100%   Breastfeeding No   BMI 36.44 kg/m²     Patient is status post Procedure(s):  COLONOSCOPY  ENDOSCOPIC POLYPECTOMY. Nausea/Vomiting: Controlled. Postoperative hydration reviewed and adequate. Pain:  Pain Scale 1: Numeric (0 - 10) (08/15/22 1218)  Pain Intensity 1: 0 (08/15/22 1218)   Managed. Neurological Status: At baseline. Mental Status and Level of Consciousness: Arousable. Pulmonary Status:   O2 Device: None (Room air) (08/15/22 1218)   Adequate oxygenation and airway patent. Complications related to anesthesia: None    Post-anesthesia assessment completed. No concerns. Signed By: Alexandro Hernandez MD    8/15/2022  Post anesthesia nausea and vomiting:  controlled      INITIAL Post-op Vital signs:   Vitals Value Taken Time   /74 08/15/22 1218   Temp 36.6 °C (97.9 °F) 08/15/22 1200   Pulse 51 08/15/22 1218   Resp 17 08/15/22 1218   SpO2 100 % 08/15/22 1218   Vitals shown include unvalidated device data.

## 2022-08-15 NOTE — PROGRESS NOTES
Endoscopy Case End Note:    1151:  Procedure scope was pre-cleaned, per protocol, at bedside by Wyatt Soliman.      0489:  Report received from anesthesia - Dr. Melo Castro. See anesthesia flowsheet for intra-procedure vital signs and events. 1152:  glasses returned to patient.

## 2022-08-15 NOTE — ROUTINE PROCESS
Ministerio Last  1955  [id number]    Situation:  Verbal report received from: [employer]  Procedure: Procedure(s):  COLONOSCOPY  ENDOSCOPIC POLYPECTOMY    Background:    Preoperative diagnosis: Family history of colonic polyps [Z83.71]  Postoperative diagnosis: polyp,hemorrhoids, inadequate prep    :  Dr. Shalonda Riojas  Assistant(s): Endoscopy Technician-1: Audra Shannon  Endoscopy RN-1: Ra Pennington RN  Endoscopy RN-2: Khai Gilliam RN    Specimens:   ID Type Source Tests Collected by Time Destination   1 : transverse colon polyp Preservative Colon, Transverse  Juni Espino MD 8/15/2022 1147 Pathology     H. Pylori  no    Assessment:  Intra-procedure medications     Anesthesia gave intra-procedure sedation and medications, see anesthesia flow sheet yes    Intravenous fluids: NS@ KVO     Vital signs stable     Abdominal assessment: round and soft     Recommendation:  Discharge patient per MD order. Family or Friend   Permission to share finding with family or friend no     Procedure results, discharge information and sedation/analgesia instructions reviewed with and given to patient only per patient request pre-procedure.

## 2022-08-15 NOTE — ANESTHESIA PREPROCEDURE EVALUATION
Relevant Problems   NEUROLOGY   (+) Generalized anxiety disorder      RENAL FAILURE   (+) Chronic renal disease, stage III      ENDOCRINE   (+) Arthritis   (+) Class 2 severe obesity due to excess calories with serious comorbidity and body mass index (BMI) of 35.0 to 35.9 in adult (HCC)   (+) RA (rheumatoid arthritis) (HCC)       Anesthetic History   No history of anesthetic complications            Review of Systems / Medical History  Patient summary reviewed, nursing notes reviewed and pertinent labs reviewed    Pulmonary  Within defined limits                 Neuro/Psych   Within defined limits           Cardiovascular  Within defined limits            Hyperlipidemia    Exercise tolerance: >4 METS     GI/Hepatic/Renal  Within defined limits       Renal disease: CRI       Endo/Other  Within defined limits    Hypothyroidism  Obesity and arthritis  Pertinent negatives: No morbid obesity   Other Findings              Physical Exam    Airway  Mallampati: II  TM Distance: 4 - 6 cm  Neck ROM: normal range of motion   Mouth opening: Normal     Cardiovascular  Regular rate and rhythm,  S1 and S2 normal,  no murmur, click, rub, or gallop             Dental  No notable dental hx       Pulmonary  Breath sounds clear to auscultation               Abdominal  GI exam deferred       Other Findings            Anesthetic Plan    ASA: 2  Anesthesia type: general and total IV anesthesia          Induction: Intravenous  Anesthetic plan and risks discussed with: Patient      Propofol MAC

## 2022-08-15 NOTE — H&P
Pre-endoscopy H and P    The patient was seen and examined in the room/pre-op holding area. The airway was assessed and documented. The problem list, past medical history, and medications were reviewed.      Patient Active Problem List   Diagnosis Code    Arthritis M19.90    Menopausal and postmenopausal disorder N95.9    Hip pain, bilateral M25.551, M25.552    Female pelvic pain R10.2    RA (rheumatoid arthritis) ([facility] [address] M06.9    Pure hypercholesterolemia E78.00    Achilles tendonitis M76.60    Vitamin D deficiency E55.9    Chronic insomnia F51.04    Generalized anxiety disorder F41.1    Peripheral edema R60.9    Family history of ischemic heart disease Z82.49    Family history of stroke Z82.3    Elevated alkaline phosphatase level R74.8    Advance care planning Z71.89    Hashimoto's thyroiditis E06.3    Bilateral lower extremity edema R60.0    Recurrent pain of right knee M25.561    Primary osteoarthritis of right knee M17.11    Obesity, Class I, BMI 30-34.9 E66.9    Osteopenia of multiple sites M85.89    Class 2 severe obesity due to excess calories with serious comorbidity and body mass index (BMI) of 35.0 to 35.9 in adult (HCC) E66.01, Z68.35    Excessive weight gain R63.5    Chronic fatigue R53.82    BMI 35.0-35.9,adult Z68.35    Family history of heart attack Z82.49    Family history of diabetes mellitus (DM) Z83.3    Family history of thyroid disease Z83.49    Family history of colonic polyps Z83.71    Family history of obesity Z83.49    Family history of sleep apnea Z82.0    S/P MARIA R-BSO (total abdominal hysterectomy and bilateral salpingo-oophorectomy) Z90.710, Z90.722, Z90.79    Chronic renal disease, stage III N18.30     Social History     Socioeconomic History    Marital status: SINGLE     Spouse name: Not on file    Number of children: 2    Years of education: Not on file    Highest education level: Not on file   Occupational History    Occupation: [employer]     Comment: focus is [place] Tobacco Use    Smoking status: Never     Passive exposure: Yes    Smokeless tobacco: Never    Tobacco comments:     lived with smoker mom x 18 yrs   Vaping Use    Vaping Use: Never used   Substance and Sexual Activity    Alcohol use: Yes     Comment: a glass of wine every 2-3 months    Drug use: Never    Sexual activity: Not on file     Comment: jacy   Other Topics Concern    Not on file   Social History Narrative    Not on file     Social Determinants of Health     Financial Resource Strain: Not on file   Food Insecurity: Not on file   Transportation Needs: Not on file   Physical Activity: Not on file   Stress: Not on file   Social Connections: Not on file   Intimate Partner Violence: Not on file   Housing Stability: Not on file     Past Medical History:   Diagnosis Date    Achilles tendonitis 2004    Right. Dr. Shola Gerard 2010    hips, knees. Dr. Cristhian Castillo    History of breast lump/mass excision     Left breast.  Dr. Arlet Holland    Uterine fibroid     Dr. Evelyn White. Vitamin D deficiency 2008         Prior to Admission Medications   Prescriptions Last Dose Informant Patient Reported? Taking? MINIVELLE 0.1 mg/24 hr 2022  Yes Yes   Si Patch by TransDERmal route two (2) times a week. Changes  and Wednesday   PREMARIN 0.625 mg/gram vaginal cream 2022  Yes Yes   albuterol (PROVENTIL HFA, VENTOLIN HFA, PROAIR HFA) 90 mcg/actuation inhaler Not Taking  No No   Sig: INHALE 2 PUFFS BY MOUTH EVERY 4 HOURS AS NEEDED FOR WHEEZING OR COUGH   Patient not taking: Reported on 8/15/2022   cholecalciferol (VITAMIN D3) (5000 Units/125 mcg) tab tablet 2022  Yes Yes   Sig: Take 5,000 Units by mouth in the morning.    diclofenac EC (VOLTAREN) 75 mg EC tablet 2022  No Yes   Sig: TAKE 1 TABLET BY MOUTH TWICE DAILY FOR RHEUMATOID ARTHRITIS   fluticasone (FLONASE) 50 mcg/actuation nasal spray Not Taking  No No   Si Sprays by Both Nostrils route daily. Indications: ALLERGIC RHINITIS   Patient not taking: Reported on 8/15/2022   furosemide (LASIX) 40 mg tablet 8/13/2022  No Yes   Sig: TAKE 1 TABLET BY MOUTH DAILY FOR SWELLING   hyaluronate (Synvisc-One) 48 mg/6 mL syrg injection 1/4/2022  Yes No   Sig: Inject one prefilled syringe into the right knee, for a quantity of 1 injection   lactulose (CHRONULAC) 10 gram/15 mL solution 8/5/2022  Yes Yes   Sig: TAKE 30 ML BY MOUTH THREE TIMES DAILY AS NEEDED   phentermine (ADIPEX-P) 37.5 mg tablet 8/13/2022  No Yes   Sig: Take 1 Tablet by mouth every morning. Max Daily Amount: 37.5 mg. Facility-Administered Medications: None           The review of systems is:  Negative  for shortness of breath or chest pain      The heart, lungs, and mental status were satisfactory for the administration of deep sedation and for the procedure. I discussed with the patient the objectives, risks, consequences and alternatives to the procedure.       Florecita Piper MD  8/15/2022  11:33 AM

## 2022-08-15 NOTE — PROCEDURES
Colonoscopy Procedure Note    Aurelia Pastrana  1955  [id number]    Indications:  Please see below. Pre-operative Diagnosis: Family history of colonic polyps [Z83.71]    Post-operative Diagnosis: transverse colon polyp, internal hemorrhoids, inadequate prep      : Hoang Ferraro MD    Referring Provider: Merary Gordon PA-C    Sedation:  MAC anesthesia Propofol        Procedure Details:    After detailed informed consent was obtained with all risks and benefits of procedure explained and preoperative exam completed, the patient was taken to the endoscopy suite and placed in the left lateral decubitus position. Upon sequential sedation as per above, a digital rectal exam was performed  and was normal.  The Olympus videocolonoscope  was inserted in the rectum and carefully advanced to the cecum, which was identified by the ileocecal valve. The quality of preparation was inadequate. The colonoscope was slowly withdrawn with careful evaluation between folds. Retroflexion in the rectum was performed. Findings:   The Olympus video high-definition colonoscope was advanced to the cecum, identified by the ICV. Colon is not well prepped with vegetable material ,mixed with liquid stool and prep noted throughout. Right colon is very poorly prepped. A small sessile nodule/polyp with white tip dimpling is noted at the proximal transverse colon; removed with a cold snare. Small internal hemorrhoids with hypertrophic anal papilla noted      Therapies:  1 complete polypectomy was performed using cold snare  and the polyp was  retrieved    Specimen/s:      Specimens were collected as described above and sent to pathology. Complications: None were encountered during the procedure. EBL:  None. Recommendations:     -Await pathology. -Repeat colonoscopy with a better prep. Suyapa Ferraro MD  8/15/2022  11:51 AM

## 2022-11-06 ENCOUNTER — ANESTHESIA EVENT (OUTPATIENT)
Dept: ENDOSCOPY | Age: 67
End: 2022-11-06
Payer: MEDICARE

## 2022-11-06 RX ORDER — FENTANYL CITRATE 50 UG/ML
25 INJECTION, SOLUTION INTRAMUSCULAR; INTRAVENOUS
Status: CANCELLED | OUTPATIENT
Start: 2022-11-06

## 2022-11-06 RX ORDER — ONDANSETRON 2 MG/ML
4 INJECTION INTRAMUSCULAR; INTRAVENOUS AS NEEDED
Status: CANCELLED | OUTPATIENT
Start: 2022-11-06

## 2022-11-06 RX ORDER — EPHEDRINE SULFATE/0.9% NACL/PF 50 MG/5 ML
5 SYRINGE (ML) INTRAVENOUS AS NEEDED
Status: CANCELLED | OUTPATIENT
Start: 2022-11-06

## 2022-11-06 NOTE — ANESTHESIA PREPROCEDURE EVALUATION
Relevant Problems   NEUROLOGY   (+) Generalized anxiety disorder      RENAL FAILURE   (+) Chronic renal disease, stage III      ENDOCRINE   (+) Arthritis   (+) Class 2 severe obesity due to excess calories with serious comorbidity and body mass index (BMI) of 35.0 to 35.9 in adult (HCC)   (+) RA (rheumatoid arthritis) (HCC)       Anesthetic History   No history of anesthetic complications            Review of Systems / Medical History  Patient summary reviewed, nursing notes reviewed and pertinent labs reviewed    Pulmonary  Within defined limits                 Neuro/Psych   Within defined limits           Cardiovascular  Within defined limits            Hyperlipidemia    Exercise tolerance: >4 METS     GI/Hepatic/Renal  Within defined limits       Renal disease: CRI       Endo/Other  Within defined limits    Hypothyroidism  Obesity and arthritis  Pertinent negatives: No morbid obesity   Other Findings              Physical Exam    Airway  Mallampati: II  TM Distance: 4 - 6 cm  Neck ROM: normal range of motion   Mouth opening: Normal     Cardiovascular  Regular rate and rhythm,  S1 and S2 normal,  no murmur, click, rub, or gallop             Dental  No notable dental hx       Pulmonary  Breath sounds clear to auscultation               Abdominal  GI exam deferred       Other Findings            Anesthetic Plan    ASA: 3  Anesthesia type: total IV anesthesia and MAC          Induction: Intravenous  Anesthetic plan and risks discussed with: Patient      Propofol MAC

## 2022-11-07 ENCOUNTER — HOSPITAL ENCOUNTER (OUTPATIENT)
Age: 67
Setting detail: OUTPATIENT SURGERY
Discharge: HOME | End: 2022-11-07
Attending: INTERNAL MEDICINE | Admitting: INTERNAL MEDICINE
Payer: MEDICARE

## 2022-11-07 ENCOUNTER — ANESTHESIA (OUTPATIENT)
Dept: ENDOSCOPY | Age: 67
End: 2022-11-07
Payer: MEDICARE

## 2022-11-07 VITALS
SYSTOLIC BLOOD PRESSURE: 131 MMHG | BODY MASS INDEX: 35.77 KG/M2 | WEIGHT: 214.7 LBS | RESPIRATION RATE: 20 BRPM | HEIGHT: 65 IN | HEART RATE: 53 BPM | OXYGEN SATURATION: 100 % | DIASTOLIC BLOOD PRESSURE: 60 MMHG | TEMPERATURE: 97.6 F

## 2022-11-07 PROCEDURE — 74011000250 HC RX REV CODE- 250: Performed by: ANESTHESIOLOGY

## 2022-11-07 PROCEDURE — 74011250636 HC RX REV CODE- 250/636: Performed by: ANESTHESIOLOGY

## 2022-11-07 PROCEDURE — 76040000019: Performed by: INTERNAL MEDICINE

## 2022-11-07 PROCEDURE — 2709999900 HC NON-CHARGEABLE SUPPLY: Performed by: INTERNAL MEDICINE

## 2022-11-07 PROCEDURE — 74011250636 HC RX REV CODE- 250/636: Performed by: INTERNAL MEDICINE

## 2022-11-07 PROCEDURE — 76060000031 HC ANESTHESIA FIRST 0.5 HR: Performed by: INTERNAL MEDICINE

## 2022-11-07 RX ORDER — EPINEPHRINE 0.1 MG/ML
1 INJECTION INTRACARDIAC; INTRAVENOUS
Status: DISCONTINUED | OUTPATIENT
Start: 2022-11-07 | End: 2022-11-07 | Stop reason: HOSPADM

## 2022-11-07 RX ORDER — PROPOFOL 10 MG/ML
INJECTION, EMULSION INTRAVENOUS AS NEEDED
Status: DISCONTINUED | OUTPATIENT
Start: 2022-11-07 | End: 2022-11-07 | Stop reason: HOSPADM

## 2022-11-07 RX ORDER — SODIUM CHLORIDE 0.9 % (FLUSH) 0.9 %
5-40 SYRINGE (ML) INJECTION AS NEEDED
Status: DISCONTINUED | OUTPATIENT
Start: 2022-11-07 | End: 2022-11-07 | Stop reason: HOSPADM

## 2022-11-07 RX ORDER — SODIUM CHLORIDE 9 MG/ML
75 INJECTION, SOLUTION INTRAVENOUS CONTINUOUS
Status: DISCONTINUED | OUTPATIENT
Start: 2022-11-07 | End: 2022-11-07 | Stop reason: HOSPADM

## 2022-11-07 RX ORDER — SODIUM CHLORIDE 0.9 % (FLUSH) 0.9 %
5-40 SYRINGE (ML) INJECTION EVERY 8 HOURS
Status: DISCONTINUED | OUTPATIENT
Start: 2022-11-07 | End: 2022-11-07 | Stop reason: HOSPADM

## 2022-11-07 RX ORDER — MIDAZOLAM HYDROCHLORIDE 1 MG/ML
.25-5 INJECTION, SOLUTION INTRAMUSCULAR; INTRAVENOUS
Status: DISCONTINUED | OUTPATIENT
Start: 2022-11-07 | End: 2022-11-07 | Stop reason: HOSPADM

## 2022-11-07 RX ORDER — DEXTROMETHORPHAN/PSEUDOEPHED 2.5-7.5/.8
1.2 DROPS ORAL
Status: DISCONTINUED | OUTPATIENT
Start: 2022-11-07 | End: 2022-11-07 | Stop reason: HOSPADM

## 2022-11-07 RX ORDER — LIDOCAINE HYDROCHLORIDE 20 MG/ML
INJECTION, SOLUTION EPIDURAL; INFILTRATION; INTRACAUDAL; PERINEURAL AS NEEDED
Status: DISCONTINUED | OUTPATIENT
Start: 2022-11-07 | End: 2022-11-07 | Stop reason: HOSPADM

## 2022-11-07 RX ORDER — FLUMAZENIL 0.1 MG/ML
0.2 INJECTION INTRAVENOUS
Status: DISCONTINUED | OUTPATIENT
Start: 2022-11-07 | End: 2022-11-07 | Stop reason: HOSPADM

## 2022-11-07 RX ORDER — NALOXONE HYDROCHLORIDE 0.4 MG/ML
0.4 INJECTION, SOLUTION INTRAMUSCULAR; INTRAVENOUS; SUBCUTANEOUS
Status: DISCONTINUED | OUTPATIENT
Start: 2022-11-07 | End: 2022-11-07 | Stop reason: HOSPADM

## 2022-11-07 RX ORDER — ATROPINE SULFATE 0.1 MG/ML
0.5 INJECTION INTRAVENOUS
Status: DISCONTINUED | OUTPATIENT
Start: 2022-11-07 | End: 2022-11-07 | Stop reason: HOSPADM

## 2022-11-07 RX ADMIN — LIDOCAINE HYDROCHLORIDE 40 MG: 20 INJECTION, SOLUTION EPIDURAL; INFILTRATION; INTRACAUDAL; PERINEURAL at 07:40

## 2022-11-07 RX ADMIN — SODIUM CHLORIDE 75 ML/HR: 9 INJECTION, SOLUTION INTRAVENOUS at 07:29

## 2022-11-07 RX ADMIN — PROPOFOL 250 MG: 10 INJECTION, EMULSION INTRAVENOUS at 07:56

## 2022-11-07 NOTE — H&P
Pre-endoscopy H and P    The patient was seen and examined in the room/pre-op holding area. The airway was assessed and documented. The problem list, past medical history, and medications were reviewed.      Patient Active Problem List   Diagnosis Code    Arthritis M19.90    Menopausal and postmenopausal disorder N95.9    Hip pain, bilateral M25.551, M25.552    Female pelvic pain R10.2    RA (rheumatoid arthritis) ([facility] [address] M06.9    Pure hypercholesterolemia E78.00    Achilles tendonitis M76.60    Vitamin D deficiency E55.9    Chronic insomnia F51.04    Generalized anxiety disorder F41.1    Peripheral edema R60.9    Family history of ischemic heart disease Z82.49    Family history of stroke Z82.3    Elevated alkaline phosphatase level R74.8    Advance care planning Z71.89    Hashimoto's thyroiditis E06.3    Bilateral lower extremity edema R60.0    Recurrent pain of right knee M25.561    Primary osteoarthritis of right knee M17.11    Obesity, Class I, BMI 30-34.9 E66.9    Osteopenia of multiple sites M85.89    Class 2 severe obesity due to excess calories with serious comorbidity and body mass index (BMI) of 35.0 to 35.9 in adult (HCC) E66.01, Z68.35    Excessive weight gain R63.5    Chronic fatigue R53.82    BMI 35.0-35.9,adult Z68.35    Family history of heart attack Z82.49    Family history of diabetes mellitus (DM) Z83.3    Family history of thyroid disease Z83.49    Family history of colonic polyps Z83.71    Family history of obesity Z83.49    Family history of sleep apnea Z82.0    S/P MARIA R-BSO (total abdominal hysterectomy and bilateral salpingo-oophorectomy) Z90.710, Z90.722, Z90.79    Chronic renal disease, stage III N18.30     Social History     Socioeconomic History    Marital status: SINGLE     Spouse name: Not on file    Number of children: 2    Years of education: Not on file    Highest education level: Not on file   Occupational History    Occupation: [employer]     Comment: focus is [place] Tobacco Use    Smoking status: Never     Passive exposure: Yes    Smokeless tobacco: Never    Tobacco comments:     lived with smoker mom x 18 yrs   Vaping Use    Vaping Use: Never used   Substance and Sexual Activity    Alcohol use: Yes     Comment: a glass of wine every 2-3 months    Drug use: Never    Sexual activity: Not on file     Comment: jacy   Other Topics Concern    Not on file   Social History Narrative    Not on file     Social Determinants of Health     Financial Resource Strain: Not on file   Food Insecurity: Not on file   Transportation Needs: Not on file   Physical Activity: Not on file   Stress: Not on file   Social Connections: Not on file   Intimate Partner Violence: Not on file   Housing Stability: Not on file     Past Medical History:   Diagnosis Date    Achilles tendonitis 2004    Right. Dr. Leatha Culver 2010    hips, knees. Dr. Caroline Billy    History of breast lump/mass excision     Left breast.  Dr. Arabella Douglas, benign    Uterine fibroid     Dr. Dalton Menon. Vitamin D deficiency 2008         Prior to Admission Medications   Prescriptions Last Dose Informant Patient Reported? Taking? MINIVELLE 0.1 mg/24 hr 2022  Yes No   Si Patch by TransDERmal route two (2) times a week. Changes  and Wednesday   PREMARIN 0.625 mg/gram vaginal cream   Yes No   Sig: Insert  into vagina every Monday and Thursday. cholecalciferol (VITAMIN D3) (5000 Units/125 mcg) tab tablet 2022  Yes No   Sig: Take 5,000 Units by mouth in the morning. diclofenac EC (VOLTAREN) 75 mg EC tablet 2022  No No   Sig: TAKE 1 TABLET BY MOUTH TWICE DAILY FOR RHEUMATOID ARTHRITIS   fluticasone (FLONASE) 50 mcg/actuation nasal spray   No No   Si Sprays by Both Nostrils route daily.  Indications: ALLERGIC RHINITIS   Patient not taking: Reported on 8/15/2022   furosemide (LASIX) 40 mg tablet 2022  No No   Sig: TAKE 1 TABLET BY MOUTH DAILY FOR SWELLING   hyaluronate (Synvisc-One) 48 mg/6 mL syrg injection   Yes No   Sig: Inject one prefilled syringe into the right knee, for a quantity of 1 injection   lactulose (CHRONULAC) 10 gram/15 mL solution   Yes No   Sig: TAKE 30 ML BY MOUTH THREE TIMES DAILY AS NEEDED   phentermine (ADIPEX-P) 37.5 mg tablet 11/4/2022  No No   Sig: Take 1 Tablet by mouth every morning. Max Daily Amount: 37.5 mg. Facility-Administered Medications: None           The review of systems is:  Negative  for shortness of breath or chest pain      The heart, lungs, and mental status were satisfactory for the administration of deep sedation and for the procedure. I discussed with the patient the objectives, risks, consequences and alternatives to the procedure.       Lakeshia Diaz MD  11/7/2022  7:38 AM

## 2022-11-07 NOTE — ANESTHESIA POSTPROCEDURE EVALUATION
Procedure(s):  COLONOSCOPY.    total IV anesthesia    Anesthesia Post Evaluation        Patient location during evaluation: PACU  Note status: Adequate. Level of consciousness: responsive to verbal stimuli and sleepy but conscious  Pain management: satisfactory to patient  Airway patency: patent  Anesthetic complications: no  Cardiovascular status: acceptable  Respiratory status: acceptable  Hydration status: acceptable  Comments: +Post-Anesthesia Evaluation and Assessment    Patient: Adrian Bah MRN: [id number]  SSN: [id number]   YOB: 1955  Age: 79 y.o. Sex: female      Cardiovascular Function/Vital Signs    /60   Pulse (!) 53   Temp 36.4 °C (97.6 °F)   Resp 20   Ht 5' 5\" (1.651 m)   Wt 97.4 kg (214 lb 11.2 oz)   SpO2 100%   BMI 35.73 kg/m²     Patient is status post Procedure(s):  COLONOSCOPY. Nausea/Vomiting: Controlled. Postoperative hydration reviewed and adequate. Pain:  Pain Scale 1: Numeric (0 - 10) (11/07/22 0818)  Pain Intensity 1: 0 (11/07/22 0818)   Managed. Neurological Status: At baseline. Mental Status and Level of Consciousness: Arousable. Pulmonary Status:   O2 Device: None (Room air) (11/07/22 0818)   Adequate oxygenation and airway patent. Complications related to anesthesia: None    Post-anesthesia assessment completed. No concerns. Signed By: Shana Kruse DO    11/7/2022  Post anesthesia nausea and vomiting:  controlled      INITIAL Post-op Vital signs:   Vitals Value Taken Time   /60 11/07/22 0818   Temp 36.4 °C (97.6 °F) 11/07/22 0804   Pulse 49 11/07/22 0820   Resp 13 11/07/22 0820   SpO2 98 % 11/07/22 0820   Vitals shown include unvalidated device data.

## 2022-11-07 NOTE — PROCEDURES
Colonoscopy Procedure Note    [facility] Reji  1955  [id number]    Indications:  Please see below. Pre-operative Diagnosis: Family history of colonic polyps [Z83.71],Phx of colon polyps    Post-operative Diagnosis:   Internal hemorrhoids    : Hoang Kim MD    Referring Provider: Eliot Pizano PA-C    Sedation:  MAC anesthesia Propofol        Procedure Details:    After detailed informed consent was obtained with all risks and benefits of procedure explained and preoperative exam completed, the patient was taken to the endoscopy suite and placed in the left lateral decubitus position. Upon sequential sedation as per above, a digital rectal exam was performed  and was normal.  The Olympus videocolonoscope  was inserted in the rectum and carefully advanced to the cecum, which was identified by the ileocecal valve and appendiceal orifice. The quality of preparation was fair. The colonoscope was slowly withdrawn with careful evaluation between folds. Retroflexion in the rectum was performed. Findings:   The Olympus video high-definition colonoscope was advanced to the cecum, identified by its typical land marks, with ease. The mucosa of the colon is normal appearing to the cecum with no obvious mucosal pathology (polyp,mass lesion, colitis etc) noted on this colonoscopy. Small internal hemorrhoids  Mildly redundant colon. Therapies:  none    Specimen/s:  none Complications: None were encountered during the procedure. EBL:  None. Recommendations:     -High fiber diet. -Repeat colonoscopy in 4 years    Naturally, for new bleeding, unexplained weight loss,change in bowel habits and anemia, an earlier colonoscopy should be considered. Karen Kim MD  11/7/2022  7:54 AM

## 2022-11-07 NOTE — ROUTINE PROCESS
Jacinta Russian  1955  [id number]    Situation:  Verbal report received from: ELODIA Power RN  Procedure: Procedure(s):  COLONOSCOPY    Background:    Preoperative diagnosis: Family history of colonic polyps [Z83.71]  Postoperative diagnosis: Small hemorrhoids    :  Dr. Jan Hendricks  Assistant(s): Endoscopy Technician-1: Jose Reid  Endoscopy RN-1: Vivica Meckel, RN    Specimens: * No specimens in log *  H. Pylori  no    Assessment:  Intra-procedure medications     Anesthesia gave intra-procedure sedation and medications, see anesthesia flow sheet yes    Intravenous fluids: NS@ KVO     Vital signs stable   yes    Abdominal assessment: round and soft    yes    Recommendation:  Discharge patient per MD order   yes.     Family or Erika King, friend  Permission to share finding with family or friend yes

## 2022-11-07 NOTE — DISCHARGE INSTRUCTIONS
[facility] Office: [phone]    Mariia Villareal  [id number]  1955    EGD/COLONOSCOPY DISCHARGE INSTRUCTIONS  Discomfort:  Sore throat- throat lozenges or warm salt water gargle  redness at IV site- apply warm compress to area; if redness or soreness persist- contact your physician  Gaseous discomfort- walking, belching will help relieve any discomfort  You may not operate a vehicle for 12 hours  You may not engage in an occupation involving machinery or appliances for rest of today. You may not drink alcoholic beverages for at least 12 hours  Avoid making any critical decisions for at least 24 hour  DIET  You may resume your regular diet - however -  remember your colon is empty and a heavy meal will produce gas. Avoid these foods:  fried / greasy foods, excessive carbonated drinks or too much caffeine  MEDICATIONS   Regarding Aspirin or Nonsteroidal medications specifically, please see below. ACTIVITY  You may resume your normal daily activities. Spend the remainder of the day resting -  avoid any strenuous activity. CALL M.D. ANY SIGN OF   Increasing pain, nausea, vomiting  Abdominal distension (swelling)  New increased bleeding (oral or rectal)  Fever (chills)  Pain in chest area  Bloody discharge from nose or mouth  Shortness of breath    You may take any Advil, Aspirin, Ibuprofen, Motrin, Aleve, or  Tylenol as needed for pain. Follow-up Instructions:   Call  Hoang Gordon MD for any questions or concerns     Telephone # [phone]      Patient Education on Sedation / Analgesia Administered for Procedure      For 24 hours after general anesthesia or intravenous analgesia / sedation:  Have someone responsible help you with your care  Limit your activities  Do not drive and operate hazardous machinery  Do not make important personal, legal or business decisions  Do not drink alcoholic beverages  If you have not urinated within 8 hours after discharge, please contact your physician  Resume your medications unless otherwise instructed    For 24 hours after general anesthesia or intravenous analgesia / sedation  you may experience:  Drowsiness, dizziness, sleepiness, or confusion  Difficulty remembering or delayed reaction times  Difficulty with your balance, especially while walking, move slowly and carefully, do not make sudden position changes  Difficulty focusing or blurred vision    You may not be aware of slight changes in your behavior and/or your reaction time because of the medication used during and after your procedure.     Report the following to your physician:  Excessive pain, swelling, redness or odor of or around the surgical area  Temperature over 100.5  Nausea and vomiting lasting longer than 4 hours or if unable to take medications  Any signs of decreased circulation or nerve impairment to extremity: change in color, persistent numbness, tingling, coldness or increase pain  Any questions or concerns    IF YOU REPORT TO AN EMERGENCY ROOM, DOCTOR'S OFFICE OR HOSPITAL WITHIN 24 HOURS AFTER YOUR PROCEDURE, BRING THIS SHEET AND YOUR AFTER VISIT SUMMARY WITH YOU AND GIVE IT TO THE PHYSICIAN OR NURSE ATTENDING YOU.

## 2022-11-07 NOTE — PERIOP NOTES
Endoscopy Case End Note:    ***:  Procedure scope was pre-cleaned, per protocol, at bedside by LT.      ***:  Report received from anesthesia Ana Beal MD.  See anesthesia flowsheet for intra-procedure vital signs and events. ***:  Glasses returned to patient.

## 2022-11-27 PROBLEM — N18.2 CKD (CHRONIC KIDNEY DISEASE) STAGE 2, GFR 60-89 ML/MIN: Status: ACTIVE | Noted: 2022-05-27

## 2023-04-25 ENCOUNTER — TELEPHONE (OUTPATIENT)
Dept: RHEUMATOLOGY | Age: 68
End: 2023-04-25

## 2023-04-25 NOTE — TELEPHONE ENCOUNTER
Called the pt , but wasn't able to leave a vm since his vm was full, I wanted to inform him about canceling his appt for 5/26/23 since our provider is leaving the practice and he will not be able to see the pt.

## 2023-05-03 ENCOUNTER — TELEPHONE (OUTPATIENT)
Dept: RHEUMATOLOGY | Age: 68
End: 2023-05-03

## 2023-05-17 DIAGNOSIS — E66.9 OBESITY, UNSPECIFIED CLASSIFICATION, UNSPECIFIED OBESITY TYPE, UNSPECIFIED WHETHER SERIOUS COMORBIDITY PRESENT: Primary | ICD-10-CM

## 2023-05-17 RX ORDER — PHENTERMINE HYDROCHLORIDE 37.5 MG/1
37.5 TABLET ORAL
Qty: 30 TABLET | Refills: 2 | OUTPATIENT
Start: 2023-05-17 | End: 2023-06-16

## 2023-05-25 ENCOUNTER — TELEPHONE (OUTPATIENT)
Dept: RHEUMATOLOGY | Age: 68
End: 2023-05-25

## 2023-05-26 ENCOUNTER — OFFICE VISIT (OUTPATIENT)
Facility: CLINIC | Age: 68
End: 2023-05-26

## 2023-05-26 VITALS
OXYGEN SATURATION: 95 % | HEIGHT: 65 IN | SYSTOLIC BLOOD PRESSURE: 118 MMHG | DIASTOLIC BLOOD PRESSURE: 73 MMHG | TEMPERATURE: 98.1 F | RESPIRATION RATE: 18 BRPM | WEIGHT: 237 LBS | HEART RATE: 62 BPM | BODY MASS INDEX: 39.49 KG/M2

## 2023-05-26 DIAGNOSIS — E66.01 CLASS 2 SEVERE OBESITY DUE TO EXCESS CALORIES WITH SERIOUS COMORBIDITY AND BODY MASS INDEX (BMI) OF 35.0 TO 35.9 IN ADULT (HCC): ICD-10-CM

## 2023-05-26 DIAGNOSIS — M05.752: ICD-10-CM

## 2023-05-26 DIAGNOSIS — E78.00 PURE HYPERCHOLESTEROLEMIA: ICD-10-CM

## 2023-05-26 DIAGNOSIS — Z00.00 ENCOUNTER FOR SUBSEQUENT ANNUAL WELLNESS VISIT (AWV) IN MEDICARE PATIENT: Primary | ICD-10-CM

## 2023-05-26 DIAGNOSIS — N18.2 CKD (CHRONIC KIDNEY DISEASE) STAGE 2, GFR 60-89 ML/MIN: ICD-10-CM

## 2023-05-26 DIAGNOSIS — E06.3 HASHIMOTO'S THYROIDITIS: ICD-10-CM

## 2023-05-26 DIAGNOSIS — Z12.31 ENCOUNTER FOR SCREENING MAMMOGRAM FOR MALIGNANT NEOPLASM OF BREAST: ICD-10-CM

## 2023-05-26 DIAGNOSIS — E55.9 VITAMIN D DEFICIENCY: ICD-10-CM

## 2023-05-26 DIAGNOSIS — F41.1 GENERALIZED ANXIETY DISORDER: ICD-10-CM

## 2023-05-26 DIAGNOSIS — R60.9 EDEMA, UNSPECIFIED TYPE: ICD-10-CM

## 2023-05-26 DIAGNOSIS — M85.89 OSTEOPENIA OF MULTIPLE SITES: ICD-10-CM

## 2023-05-26 RX ORDER — PHENTERMINE HYDROCHLORIDE 37.5 MG/1
37.5 TABLET ORAL
Qty: 30 TABLET | Refills: 2 | Status: SHIPPED | OUTPATIENT
Start: 2023-05-26 | End: 2023-08-24

## 2023-05-26 RX ORDER — ESCITALOPRAM OXALATE 5 MG/1
5 TABLET ORAL DAILY
Qty: 90 TABLET | Refills: 1 | Status: SHIPPED | OUTPATIENT
Start: 2023-05-26

## 2023-05-26 RX ORDER — TOPIRAMATE 25 MG/1
25 TABLET ORAL NIGHTLY
Qty: 90 TABLET | Refills: 3 | Status: SHIPPED | OUTPATIENT
Start: 2023-05-26

## 2023-05-26 RX ORDER — FUROSEMIDE 40 MG/1
TABLET ORAL
Qty: 90 TABLET | Refills: 3 | Status: SHIPPED | OUTPATIENT
Start: 2023-05-26

## 2023-05-26 SDOH — ECONOMIC STABILITY: FOOD INSECURITY: WITHIN THE PAST 12 MONTHS, THE FOOD YOU BOUGHT JUST DIDN'T LAST AND YOU DIDN'T HAVE MONEY TO GET MORE.: NEVER TRUE

## 2023-05-26 SDOH — ECONOMIC STABILITY: HOUSING INSECURITY
IN THE LAST 12 MONTHS, WAS THERE A TIME WHEN YOU DID NOT HAVE A STEADY PLACE TO SLEEP OR SLEPT IN A SHELTER (INCLUDING NOW)?: NO

## 2023-05-26 SDOH — ECONOMIC STABILITY: INCOME INSECURITY: HOW HARD IS IT FOR YOU TO PAY FOR THE VERY BASICS LIKE FOOD, HOUSING, MEDICAL CARE, AND HEATING?: NOT HARD AT ALL

## 2023-05-26 SDOH — ECONOMIC STABILITY: FOOD INSECURITY: WITHIN THE PAST 12 MONTHS, YOU WORRIED THAT YOUR FOOD WOULD RUN OUT BEFORE YOU GOT MONEY TO BUY MORE.: NEVER TRUE

## 2023-05-26 ASSESSMENT — LIFESTYLE VARIABLES
HOW OFTEN DO YOU HAVE A DRINK CONTAINING ALCOHOL: MONTHLY OR LESS
HOW MANY STANDARD DRINKS CONTAINING ALCOHOL DO YOU HAVE ON A TYPICAL DAY: 1 OR 2

## 2023-05-26 ASSESSMENT — PATIENT HEALTH QUESTIONNAIRE - PHQ9
2. FEELING DOWN, DEPRESSED OR HOPELESS: 0
SUM OF ALL RESPONSES TO PHQ9 QUESTIONS 1 & 2: 0
SUM OF ALL RESPONSES TO PHQ QUESTIONS 1-9: 0
1. LITTLE INTEREST OR PLEASURE IN DOING THINGS: 0
SUM OF ALL RESPONSES TO PHQ QUESTIONS 1-9: 0

## 2023-05-26 NOTE — PROGRESS NOTES
Khadijah Brown  Identified pt with two pt identifiers(name and ). Chief Complaint   Patient presents with    Medicare AWV       Reviewed record In preparation for visit and have obtained necessary documentation. 1. Have you been to the ER, urgent care clinic or hospitalized since your last visit? No     2. Have you seen or consulted any other health care providers outside of the [address] [place] [address] since your last visit? Include any pap smears or colon screening. No    Vitals reviewed with provider. Health Maintenance reviewed:     Health Maintenance Due   Topic    Annual Wellness Visit (AWV)     COVID-19 Vaccine (5 - Booster for Pfizer series)    Breast cancer screen           Wt Readings from Last 3 Encounters:   22 206 lb 12.8 oz (93.8 kg)   21 203 lb (92.1 kg)   21 205 lb (93 kg)        Temp Readings from Last 3 Encounters:   No data found for Temp        BP Readings from Last 3 Encounters:   22 116/76   21 123/80   21 100/64        Pulse Readings from Last 3 Encounters:   22 60   21 55   21 66      No flowsheet data found. Learning Assessment:   :     [facility] AMB LEARNING ASSESSMENT 2023   PRIMARY LEARNER Patient   HIGHEST LEVEL OF EDUCATION - PRIMARY LEARNER 4 YEARS OF COLLEGE   PRIMARY LANGUAGE ENGLISH   LEARNER PREFERENCE PRIMARY READING     LISTENING   ANSWERED BY patient   RELATIONSHIP SELF         Abuse Assessment:    AMB Abuse Screening 2023   Do you ever feel afraid of your partner? N   Are you in a relationship with someone who physically or mentally threatens you? N   Is it safe for you to go home?  Y         Fall Risk Assessment:   :     ,  Amb Fall Risk Assessment and TUG Test 2023 8/15/2022 11/10/2021 2021 2021 2021   Do you feel unsteady or are you worried about falling?  no - - - - -   2 or more falls in past year? no - - - - -   Fall with injury in past year? no - - - - -   Fall in past 12
status is at baseline. Sensory: No sensory deficit. Motor: No weakness. Gait: Gait normal.   Psychiatric:         Mood and Affect: Mood normal.         Behavior: Behavior normal.         Thought Content: Thought content normal.          Allergies   Allergen Reactions    Ibuprofen-Diphenhydramine Hcl Other (See Comments)     Slightly elevated Creaitnine 1.02    Naproxen Sodium Other (See Comments)     Due to kidneys    Sulfa Antibiotics Nausea And Vomiting    Sulfamethoxazole-Trimethoprim Nausea And Vomiting    Sulfasalazine Nausea And Vomiting     Prior to Visit Medications    Medication Sig Taking? Authorizing Provider   vitamin D3 (CHOLECALCIFEROL) 125 MCG (5000 UT) TABS tablet Take by mouth daily Yes Ar Automatic Reconciliation   diclofenac (VOLTAREN) 75 MG EC tablet TAKE 1 TABLET BY MOUTH TWICE DAILY FOR RHEUMATOID ARTHRITIS Yes Ar Automatic Reconciliation   estradiol (VIVELLE) 0.1 MG/24HR Place 1 patch onto the skin Twice a Week Yes Ar Automatic Reconciliation   estrogens conjugated (PREMARIN) 0.625 MG/GM CREA vaginal cream Place vaginally Yes Ar Automatic Reconciliation   fluticasone (FLONASE) 50 MCG/ACT nasal spray 2 sprays by Nasal route daily Yes Ar Automatic Reconciliation   furosemide (LASIX) 40 MG tablet TAKE 1 TABLET BY MOUTH DAILY FOR SWELLING Yes Ar Automatic Reconciliation   Hylan (SYNVISC ONE) 48 MG/6ML injection Inject one prefilled syringe into the right knee, for a quantity of 1 injection Yes Ar Automatic Reconciliation   lactulose (CHRONULAC) 10 GM/15ML solution TAKE 30 ML BY MOUTH THREE TIMES DAILY AS NEEDED Yes Ar Automatic Reconciliation   phentermine (ADIPEX-P) 37.5 MG tablet Take by mouth.  Yes Ar Automatic Reconciliation       CareTeam (Including outside providers/suppliers regularly involved in providing care):   Patient Care Team:  Kayce Carr PA-C as PCP - [address] AMADEO Gaspar as PCP - Empaneled Provider     Reviewed and updated this visit:  Tobacco

## 2023-05-30 ENCOUNTER — TELEPHONE (OUTPATIENT)
Facility: CLINIC | Age: 68
End: 2023-05-30

## 2023-05-30 NOTE — TELEPHONE ENCOUNTER
----- Message from Asuncion Bui sent at 5/30/2023 10:11 AM EDT -----  Subject: Message to Provider    QUESTIONS  Information for Provider? PT was told by the Rheumatologist to get the   last 2 doctors visits before making the appt. Please call the PT back once   it's ready for pickup. Her phone# is [phone]  ---------------------------------------------------------------------------  --------------  Margarita Serna INFO  [phone]; OK to leave message on voicemail  ---------------------------------------------------------------------------  --------------  SCRIPT ANSWERS  Relationship to Patient?  Self

## 2023-05-30 NOTE — TELEPHONE ENCOUNTER
I called the patient and verified them by name and date of birth. I informed her that the last 2 office notes are available for . She stated understanding and had no further questions.

## 2023-06-23 ENCOUNTER — HOSPITAL ENCOUNTER (OUTPATIENT)
Facility: HOSPITAL | Age: 68
End: 2023-06-23
Payer: MEDICARE

## 2023-06-23 DIAGNOSIS — M85.89 OSTEOPENIA OF MULTIPLE SITES: ICD-10-CM

## 2023-06-23 DIAGNOSIS — Z12.31 ENCOUNTER FOR SCREENING MAMMOGRAM FOR MALIGNANT NEOPLASM OF BREAST: ICD-10-CM

## 2023-06-23 PROCEDURE — 77063 BREAST TOMOSYNTHESIS BI: CPT

## 2023-06-23 PROCEDURE — 77080 DXA BONE DENSITY AXIAL: CPT

## 2023-06-29 LAB
BASOPHILS # BLD AUTO: 0.1 X10E3/UL (ref 0–0.2)
BASOPHILS NFR BLD AUTO: 1 %
EOSINOPHIL # BLD AUTO: 0.2 X10E3/UL (ref 0–0.4)
EOSINOPHIL NFR BLD AUTO: 4 %
ERYTHROCYTE [DISTWIDTH] IN BLOOD BY AUTOMATED COUNT: 13 % (ref 11.7–15.4)
HCT VFR BLD AUTO: 39.7 % (ref 34–46.6)
HGB BLD-MCNC: 13.4 G/DL (ref 11.1–15.9)
IMM GRANULOCYTES # BLD AUTO: 0 X10E3/UL (ref 0–0.1)
IMM GRANULOCYTES NFR BLD AUTO: 0 %
LYMPHOCYTES # BLD AUTO: 1.7 X10E3/UL (ref 0.7–3.1)
LYMPHOCYTES NFR BLD AUTO: 36 %
MCH RBC QN AUTO: 30 PG (ref 26.6–33)
MCHC RBC AUTO-ENTMCNC: 33.8 G/DL (ref 31.5–35.7)
MCV RBC AUTO: 89 FL (ref 79–97)
MONOCYTES # BLD AUTO: 0.6 X10E3/UL (ref 0.1–0.9)
MONOCYTES NFR BLD AUTO: 12 %
NEUTROPHILS # BLD AUTO: 2.2 X10E3/UL (ref 1.4–7)
NEUTROPHILS NFR BLD AUTO: 47 %
PLATELET # BLD AUTO: 231 X10E3/UL (ref 150–450)
RBC # BLD AUTO: 4.47 X10E6/UL (ref 3.77–5.28)
WBC # BLD AUTO: 4.7 X10E3/UL (ref 3.4–10.8)

## 2023-06-30 LAB
25(OH)D3+25(OH)D2 SERPL-MCNC: 112 NG/ML (ref 30–100)
ALBUMIN SERPL-MCNC: 4.2 G/DL (ref 3.8–4.8)
ALBUMIN/GLOB SERPL: 1.4 {RATIO} (ref 1.2–2.2)
ALP SERPL-CCNC: 105 IU/L (ref 44–121)
ALT SERPL-CCNC: 16 IU/L (ref 0–32)
APPEARANCE UR: CLEAR
AST SERPL-CCNC: 20 IU/L (ref 0–40)
BACTERIA #/AREA URNS HPF: NORMAL /[HPF]
BILIRUB SERPL-MCNC: 0.5 MG/DL (ref 0–1.2)
BILIRUB UR QL STRIP: NEGATIVE
BUN SERPL-MCNC: 22 MG/DL (ref 8–27)
BUN/CREAT SERPL: 23 (ref 12–28)
CALCIUM SERPL-MCNC: 9.7 MG/DL (ref 8.7–10.3)
CASTS URNS QL MICRO: NORMAL /LPF
CHLORIDE SERPL-SCNC: 101 MMOL/L (ref 96–106)
CHOLEST SERPL-MCNC: 216 MG/DL (ref 100–199)
CO2 SERPL-SCNC: 25 MMOL/L (ref 20–29)
COLOR UR: YELLOW
CREAT SERPL-MCNC: 0.96 MG/DL (ref 0.57–1)
EGFRCR SERPLBLD CKD-EPI 2021: 64 ML/MIN/1.73
EPI CELLS #/AREA URNS HPF: NORMAL /HPF (ref 0–10)
GLOBULIN SER CALC-MCNC: 2.9 G/DL (ref 1.5–4.5)
GLUCOSE SERPL-MCNC: 85 MG/DL (ref 70–99)
GLUCOSE UR QL STRIP: NEGATIVE
HDLC SERPL-MCNC: 75 MG/DL
HGB UR QL STRIP: NEGATIVE
KETONES UR QL STRIP: NEGATIVE
LDLC SERPL CALC-MCNC: 132 MG/DL (ref 0–99)
LEUKOCYTE ESTERASE UR QL STRIP: NEGATIVE
MICRO URNS: NORMAL
MICRO URNS: NORMAL
NITRITE UR QL STRIP: NEGATIVE
PH UR STRIP: 7 [PH] (ref 5–7.5)
POTASSIUM SERPL-SCNC: 4.2 MMOL/L (ref 3.5–5.2)
PROT SERPL-MCNC: 7.1 G/DL (ref 6–8.5)
PROT UR QL STRIP: NEGATIVE
RBC #/AREA URNS HPF: NORMAL /HPF (ref 0–2)
SODIUM SERPL-SCNC: 141 MMOL/L (ref 134–144)
SP GR UR STRIP: 1.02 (ref 1–1.03)
T4 SERPL-MCNC: 7.1 UG/DL (ref 4.5–12)
TRIGL SERPL-MCNC: 49 MG/DL (ref 0–149)
TSH SERPL DL<=0.005 MIU/L-ACNC: 1.61 UIU/ML (ref 0.45–4.5)
UROBILINOGEN UR STRIP-MCNC: 0.2 MG/DL (ref 0.2–1)
VLDLC SERPL CALC-MCNC: 9 MG/DL (ref 5–40)
WBC #/AREA URNS HPF: NORMAL /HPF (ref 0–5)

## 2024-07-31 ENCOUNTER — OFFICE VISIT (OUTPATIENT)
Facility: CLINIC | Age: 69
End: 2024-07-31

## 2024-07-31 VITALS
DIASTOLIC BLOOD PRESSURE: 79 MMHG | WEIGHT: 222 LBS | OXYGEN SATURATION: 97 % | SYSTOLIC BLOOD PRESSURE: 134 MMHG | HEART RATE: 63 BPM | BODY MASS INDEX: 36.99 KG/M2 | HEIGHT: 65 IN | RESPIRATION RATE: 16 BRPM | TEMPERATURE: 97.7 F

## 2024-07-31 DIAGNOSIS — M06.9 RHEUMATOID ARTHRITIS, INVOLVING UNSPECIFIED SITE, UNSPECIFIED WHETHER RHEUMATOID FACTOR PRESENT (HCC): ICD-10-CM

## 2024-07-31 DIAGNOSIS — M17.11 PRIMARY OSTEOARTHRITIS OF RIGHT KNEE: ICD-10-CM

## 2024-07-31 DIAGNOSIS — Z00.00 ENCOUNTER FOR SUBSEQUENT ANNUAL WELLNESS VISIT (AWV) IN MEDICARE PATIENT: Primary | ICD-10-CM

## 2024-07-31 DIAGNOSIS — N18.2 CKD (CHRONIC KIDNEY DISEASE) STAGE 2, GFR 60-89 ML/MIN: ICD-10-CM

## 2024-07-31 DIAGNOSIS — E78.00 PURE HYPERCHOLESTEROLEMIA: ICD-10-CM

## 2024-07-31 DIAGNOSIS — F41.1 GENERALIZED ANXIETY DISORDER: ICD-10-CM

## 2024-07-31 DIAGNOSIS — M54.31 BILATERAL SCIATICA: ICD-10-CM

## 2024-07-31 DIAGNOSIS — M54.32 BILATERAL SCIATICA: ICD-10-CM

## 2024-07-31 DIAGNOSIS — E55.9 VITAMIN D DEFICIENCY: ICD-10-CM

## 2024-07-31 DIAGNOSIS — E06.3 HASHIMOTO'S THYROIDITIS: ICD-10-CM

## 2024-07-31 DIAGNOSIS — R73.01 IMPAIRED FASTING GLUCOSE: ICD-10-CM

## 2024-07-31 DIAGNOSIS — E66.01 CLASS 2 SEVERE OBESITY DUE TO EXCESS CALORIES WITH SERIOUS COMORBIDITY AND BODY MASS INDEX (BMI) OF 36.0 TO 36.9 IN ADULT (HCC): ICD-10-CM

## 2024-07-31 DIAGNOSIS — R60.9 EDEMA, UNSPECIFIED TYPE: ICD-10-CM

## 2024-07-31 DIAGNOSIS — M85.89 OSTEOPENIA OF MULTIPLE SITES: ICD-10-CM

## 2024-07-31 RX ORDER — BACLOFEN 10 MG/1
10 TABLET ORAL 3 TIMES DAILY PRN
Qty: 30 TABLET | Refills: 2 | Status: SHIPPED | OUTPATIENT
Start: 2024-07-31

## 2024-07-31 RX ORDER — FUROSEMIDE 40 MG/1
TABLET ORAL
Qty: 90 TABLET | Refills: 3 | Status: SHIPPED | OUTPATIENT
Start: 2024-07-31

## 2024-07-31 RX ORDER — SEMAGLUTIDE 0.25 MG/.5ML
0.25 INJECTION, SOLUTION SUBCUTANEOUS
Qty: 2 ML | Refills: 1 | Status: SHIPPED | OUTPATIENT
Start: 2024-07-31

## 2024-07-31 RX ORDER — ESCITALOPRAM OXALATE 10 MG/1
10 TABLET ORAL DAILY
Qty: 90 TABLET | Refills: 3 | Status: SHIPPED | OUTPATIENT
Start: 2024-07-31

## 2024-07-31 RX ORDER — PHENTERMINE HYDROCHLORIDE 37.5 MG/1
37.5 TABLET ORAL
Qty: 30 TABLET | Refills: 2 | Status: SHIPPED | OUTPATIENT
Start: 2024-07-31 | End: 2024-10-29

## 2024-07-31 SDOH — ECONOMIC STABILITY: FOOD INSECURITY: WITHIN THE PAST 12 MONTHS, YOU WORRIED THAT YOUR FOOD WOULD RUN OUT BEFORE YOU GOT MONEY TO BUY MORE.: NEVER TRUE

## 2024-07-31 SDOH — ECONOMIC STABILITY: FOOD INSECURITY: WITHIN THE PAST 12 MONTHS, THE FOOD YOU BOUGHT JUST DIDN'T LAST AND YOU DIDN'T HAVE MONEY TO GET MORE.: NEVER TRUE

## 2024-07-31 SDOH — ECONOMIC STABILITY: INCOME INSECURITY: HOW HARD IS IT FOR YOU TO PAY FOR THE VERY BASICS LIKE FOOD, HOUSING, MEDICAL CARE, AND HEATING?: NOT HARD AT ALL

## 2024-07-31 ASSESSMENT — LIFESTYLE VARIABLES
HOW MANY STANDARD DRINKS CONTAINING ALCOHOL DO YOU HAVE ON A TYPICAL DAY: 1 OR 2
HOW OFTEN DO YOU HAVE A DRINK CONTAINING ALCOHOL: MONTHLY OR LESS

## 2024-07-31 ASSESSMENT — PATIENT HEALTH QUESTIONNAIRE - PHQ9
SUM OF ALL RESPONSES TO PHQ QUESTIONS 1-9: 0
SUM OF ALL RESPONSES TO PHQ QUESTIONS 1-9: 0
2. FEELING DOWN, DEPRESSED OR HOPELESS: NOT AT ALL
SUM OF ALL RESPONSES TO PHQ QUESTIONS 1-9: 0
SUM OF ALL RESPONSES TO PHQ9 QUESTIONS 1 & 2: 0
SUM OF ALL RESPONSES TO PHQ QUESTIONS 1-9: 0
1. LITTLE INTEREST OR PLEASURE IN DOING THINGS: NOT AT ALL

## 2024-07-31 NOTE — PATIENT INSTRUCTIONS
Learning About Being Active as an Older Adult  Why is being active important as you get older?     Being active is one of the best things you can do for your health. And it's never too late to start. Being active--or getting active, if you aren't already--has definite benefits. It can:  Give you more energy,  Keep your mind sharp.  Improve balance to reduce your risk of falls.  Help you manage chronic illness with fewer medicines.  No matter how old you are, how fit you are, or what health problems you have, there is a form of activity that will work for you. And the more physical activity you can do, the better your overall health will be.  What kinds of activity can help you stay healthy?  Being more active will make your daily activities easier. Physical activity includes planned exercise and things you do in daily life. There are four types of activity:  Aerobic.  Doing aerobic activity makes your heart and lungs strong.  Includes walking, dancing, and gardening.  Aim for at least 2½ hours spread throughout the week.  It improves your energy and can help you sleep better.  Muscle-strengthening.  This type of activity can help maintain muscle and strengthen bones.  Includes climbing stairs, using resistance bands, and lifting or carrying heavy loads.  Aim for at least twice a week.  It can help protect the knees and other joints.  Stretching.  Stretching gives you better range of motion in joints and muscles.  Includes upper arm stretches, calf stretches, and gentle yoga.  Aim for at least twice a week, preferably after your muscles are warmed up from other activities.  It can help you function better in daily life.  Balancing.  This helps you stay coordinated and have good posture.  Includes heel-to-toe walking, jania chi, and certain types of yoga.  Aim for at least 3 days a week.  It can reduce your risk of falling.  Even if you have a hard time meeting the recommendations, it's better to be more active

## 2024-07-31 NOTE — PROGRESS NOTES
Identified pt with two pt identifiers(name and ). Reviewed record in preparation for visit and have obtained necessary documentation.  Chief Complaint   Patient presents with    Medicare AWV    Medication Refill        Health Maintenance Due   Topic    Respiratory Syncytial Virus (RSV) Pregnant or age 60 yrs+ (1 - 1-dose 60+ series)    COVID-19 Vaccine ( season)    Depression Screen     Annual Wellness Visit (Medicare)      Vitals:    24 1302   BP: 134/79   Site: Left Upper Arm   Position: Sitting   Cuff Size: Large Adult   Pulse: 63   Resp: 16   Temp: 97.7 °F (36.5 °C)   TempSrc: Oral   SpO2: 97%   Weight: 100.7 kg (222 lb)   Height: 1.651 m (5' 5\")      Pain Scale: 4/10    Coordination of Care Questionnaire:  :   1. Have you been to the ER, urgent care clinic since your last visit?  Hospitalized since your last visit?No    2. Have you seen or consulted any other health care providers outside of the [facility] since your last visit?  Include any pap smears or colon screening. No

## 2024-07-31 NOTE — PROGRESS NOTES
Medicare Annual Wellness Visit    Nakul Lucero is here for Medicare AWV and Medication Refill    Assessment & Plan   Encounter for subsequent annual wellness visit (AWV) in Medicare patient  Hashimoto's thyroiditis  -     TSH; Future  -     T4; Future  Monitor labs  Osteopenia of multiple sites  DEXA done last year, continues walking and VITD/[facility]  Primary osteoarthritis of right knee-looking towards surgery  Rheumatoid arthritis, involving unspecified site, unspecified whether rheumatoid factor present ([facility])  Working with Dr. Simpson, recommended ortho eval  CKD (chronic kidney disease) stage 2, GFR 60-89 ml/min  -     Hemoglobin A1C; Future  -     Comprehensive Metabolic Panel; Future  -     CBC with Auto Differential; Future  Monitor for nsaid use  Pure hypercholesterolemia  -     Lipid Panel; Future  stable  Vitamin D deficiency  -     Vitamin D 25 Hydroxy; Future  Cut back to 1-2 times a week 5000 units  Generalized anxiety disorder  -     escitalopram (LEXAPRO) 10 MG tablet; Take 1 tablet by mouth daily, Disp-90 tablet, R-3Normal  More irritable with sister, trial increase to 10 mg  Edema, unspecified type  -     furosemide (LASIX) 40 MG tablet; TAKE 1 TABLET BY MOUTH DAILY FOR SWELLING, Disp-90 tablet, R-3Normal  Continue dialy/prn use  Class 2 severe obesity due to excess calories with serious comorbidity and body mass index (BMI) of 36.0 to 36.9 in adult ([facility])  -     phentermine (ADIPEX-P) 37.5 MG tablet; Take 1 tablet by mouth every morning (before breakfast) for 90 days. Max Daily Amount: 37.5 mg, Disp-30 tablet, R-2Normal  -     Hemoglobin A1C; Future  See if wegovy covered, had good success with topamax and phentermine, trial back on 3 months  Bilateral sciatica  -     baclofen (LIORESAL) 10 MG tablet; Take 1 tablet by mouth 3 times daily as needed (back pain), Disp-30 tablet, R-2Normal  Suspect related to knee pains, trial prn muscle relaxant  Impaired fasting glucose  -     Hemoglobin A1C;

## 2024-08-01 LAB
25(OH)D3 SERPL-MCNC: 39.1 NG/ML (ref 30–100)
ALBUMIN SERPL-MCNC: 4 G/DL (ref 3.5–5)
ALBUMIN/GLOB SERPL: 1.2 (ref 1.1–2.2)
ALP SERPL-CCNC: 107 U/L (ref 45–117)
ALT SERPL-CCNC: 23 U/L (ref 12–78)
ANION GAP SERPL CALC-SCNC: 5 MMOL/L (ref 5–15)
AST SERPL-CCNC: 17 U/L (ref 15–37)
BASOPHILS # BLD: 0.1 K/UL (ref 0–0.1)
BASOPHILS NFR BLD: 1 % (ref 0–1)
BILIRUB SERPL-MCNC: 0.5 MG/DL (ref 0.2–1)
BUN SERPL-MCNC: 15 MG/DL (ref 6–20)
BUN/CREAT SERPL: 16 (ref 12–20)
CALCIUM SERPL-MCNC: 9.1 MG/DL (ref 8.5–10.1)
CHLORIDE SERPL-SCNC: 108 MMOL/L (ref 97–108)
CHOLEST SERPL-MCNC: 215 MG/DL
CO2 SERPL-SCNC: 28 MMOL/L (ref 21–32)
CREAT SERPL-MCNC: 0.92 MG/DL (ref 0.55–1.02)
DIFFERENTIAL METHOD BLD: ABNORMAL
EOSINOPHIL # BLD: 0.2 K/UL (ref 0–0.4)
EOSINOPHIL NFR BLD: 3 % (ref 0–7)
ERYTHROCYTE [DISTWIDTH] IN BLOOD BY AUTOMATED COUNT: 14.7 % (ref 11.5–14.5)
EST. AVERAGE GLUCOSE BLD GHB EST-MCNC: 100 MG/DL
GLOBULIN SER CALC-MCNC: 3.3 G/DL (ref 2–4)
GLUCOSE SERPL-MCNC: 110 MG/DL (ref 65–100)
HBA1C MFR BLD: 5.1 % (ref 4–5.6)
HCT VFR BLD AUTO: 39.8 % (ref 35–47)
HDLC SERPL-MCNC: 90 MG/DL
HDLC SERPL: 2.4 (ref 0–5)
HGB BLD-MCNC: 12.3 G/DL (ref 11.5–16)
IMM GRANULOCYTES # BLD AUTO: 0 K/UL (ref 0–0.04)
IMM GRANULOCYTES NFR BLD AUTO: 0 % (ref 0–0.5)
LDLC SERPL CALC-MCNC: 111 MG/DL (ref 0–100)
LYMPHOCYTES # BLD: 2 K/UL (ref 0.8–3.5)
LYMPHOCYTES NFR BLD: 39 % (ref 12–49)
MCH RBC QN AUTO: 28.7 PG (ref 26–34)
MCHC RBC AUTO-ENTMCNC: 30.9 G/DL (ref 30–36.5)
MCV RBC AUTO: 93 FL (ref 80–99)
MONOCYTES # BLD: 0.5 K/UL (ref 0–1)
MONOCYTES NFR BLD: 9 % (ref 5–13)
NEUTS SEG # BLD: 2.4 K/UL (ref 1.8–8)
NEUTS SEG NFR BLD: 48 % (ref 32–75)
NRBC # BLD: 0 K/UL (ref 0–0.01)
NRBC BLD-RTO: 0 PER 100 WBC
PLATELET # BLD AUTO: 220 K/UL (ref 150–400)
PMV BLD AUTO: 10.6 FL (ref 8.9–12.9)
POTASSIUM SERPL-SCNC: 4.1 MMOL/L (ref 3.5–5.1)
PROT SERPL-MCNC: 7.3 G/DL (ref 6.4–8.2)
RBC # BLD AUTO: 4.28 M/UL (ref 3.8–5.2)
SODIUM SERPL-SCNC: 141 MMOL/L (ref 136–145)
T4 SERPL-MCNC: 9.2 UG/DL (ref 4.8–13.9)
TRIGL SERPL-MCNC: 70 MG/DL
TSH SERPL DL<=0.05 MIU/L-ACNC: 1.64 UIU/ML (ref 0.36–3.74)
VLDLC SERPL CALC-MCNC: 14 MG/DL
WBC # BLD AUTO: 5.1 K/UL (ref 3.6–11)

## 2024-08-07 ENCOUNTER — TELEPHONE (OUTPATIENT)
Facility: CLINIC | Age: 69
End: 2024-08-07

## 2024-08-07 NOTE — TELEPHONE ENCOUNTER
PA for Wegovy 0.25mg has been denied.     because Medicare law does not include drugs for weight loss under Medicare Part D coverage.  This is one of those drugs. This is not a medical necessity decision. It is a benefit issue only.  We based this decision on Chapter 6 section 20.1 of the Medicare Prescription Drug Benefit  Manual. For more information, talk to your prescriber or call 1-800-MEDICARE.    Patient notified via [employer].

## 2024-08-08 ENCOUNTER — TRANSCRIBE ORDERS (OUTPATIENT)
Facility: HOSPITAL | Age: 69
End: 2024-08-08

## 2024-08-08 DIAGNOSIS — Z12.31 VISIT FOR SCREENING MAMMOGRAM: Primary | ICD-10-CM

## 2024-08-23 ENCOUNTER — HOSPITAL ENCOUNTER (OUTPATIENT)
Facility: HOSPITAL | Age: 69
End: 2024-08-23
Payer: MEDICARE

## 2024-08-23 VITALS — HEIGHT: 60 IN | WEIGHT: 220 LBS | BODY MASS INDEX: 43.19 KG/M2

## 2024-08-23 DIAGNOSIS — Z12.31 VISIT FOR SCREENING MAMMOGRAM: ICD-10-CM

## 2024-08-23 PROCEDURE — 77063 BREAST TOMOSYNTHESIS BI: CPT

## 2025-04-20 SDOH — ECONOMIC STABILITY: INCOME INSECURITY: IN THE LAST 12 MONTHS, WAS THERE A TIME WHEN YOU WERE NOT ABLE TO PAY THE MORTGAGE OR RENT ON TIME?: NO

## 2025-04-20 SDOH — ECONOMIC STABILITY: FOOD INSECURITY: WITHIN THE PAST 12 MONTHS, THE FOOD YOU BOUGHT JUST DIDN'T LAST AND YOU DIDN'T HAVE MONEY TO GET MORE.: NEVER TRUE

## 2025-04-20 SDOH — ECONOMIC STABILITY: FOOD INSECURITY: WITHIN THE PAST 12 MONTHS, YOU WORRIED THAT YOUR FOOD WOULD RUN OUT BEFORE YOU GOT MONEY TO BUY MORE.: NEVER TRUE

## 2025-04-20 SDOH — ECONOMIC STABILITY: TRANSPORTATION INSECURITY
IN THE PAST 12 MONTHS, HAS LACK OF TRANSPORTATION KEPT YOU FROM MEETINGS, WORK, OR FROM GETTING THINGS NEEDED FOR DAILY LIVING?: NO

## 2025-04-20 SDOH — ECONOMIC STABILITY: TRANSPORTATION INSECURITY
IN THE PAST 12 MONTHS, HAS THE LACK OF TRANSPORTATION KEPT YOU FROM MEDICAL APPOINTMENTS OR FROM GETTING MEDICATIONS?: NO

## 2025-04-21 ENCOUNTER — OFFICE VISIT (OUTPATIENT)
Facility: CLINIC | Age: 70
End: 2025-04-21
Payer: MEDICARE

## 2025-04-21 VITALS
TEMPERATURE: 97.9 F | DIASTOLIC BLOOD PRESSURE: 72 MMHG | BODY MASS INDEX: 39.69 KG/M2 | OXYGEN SATURATION: 99 % | SYSTOLIC BLOOD PRESSURE: 139 MMHG | HEIGHT: 65 IN | RESPIRATION RATE: 16 BRPM | WEIGHT: 238.2 LBS | HEART RATE: 56 BPM

## 2025-04-21 DIAGNOSIS — I10 HYPERTENSION, UNSPECIFIED TYPE: ICD-10-CM

## 2025-04-21 DIAGNOSIS — R60.9 EDEMA, UNSPECIFIED TYPE: ICD-10-CM

## 2025-04-21 DIAGNOSIS — M25.552 BILATERAL HIP PAIN: Primary | ICD-10-CM

## 2025-04-21 DIAGNOSIS — M54.31 BILATERAL SCIATICA: ICD-10-CM

## 2025-04-21 DIAGNOSIS — M25.551 BILATERAL HIP PAIN: Primary | ICD-10-CM

## 2025-04-21 DIAGNOSIS — M54.32 BILATERAL SCIATICA: ICD-10-CM

## 2025-04-21 PROCEDURE — 3017F COLORECTAL CA SCREEN DOC REV: CPT | Performed by: PHYSICIAN ASSISTANT

## 2025-04-21 PROCEDURE — G8417 CALC BMI ABV UP PARAM F/U: HCPCS | Performed by: PHYSICIAN ASSISTANT

## 2025-04-21 PROCEDURE — 3075F SYST BP GE 130 - 139MM HG: CPT | Performed by: PHYSICIAN ASSISTANT

## 2025-04-21 PROCEDURE — 1125F AMNT PAIN NOTED PAIN PRSNT: CPT | Performed by: PHYSICIAN ASSISTANT

## 2025-04-21 PROCEDURE — 3078F DIAST BP <80 MM HG: CPT | Performed by: PHYSICIAN ASSISTANT

## 2025-04-21 PROCEDURE — 1090F PRES/ABSN URINE INCON ASSESS: CPT | Performed by: PHYSICIAN ASSISTANT

## 2025-04-21 PROCEDURE — 1036F TOBACCO NON-USER: CPT | Performed by: PHYSICIAN ASSISTANT

## 2025-04-21 PROCEDURE — 1123F ACP DISCUSS/DSCN MKR DOCD: CPT | Performed by: PHYSICIAN ASSISTANT

## 2025-04-21 PROCEDURE — G8399 PT W/DXA RESULTS DOCUMENT: HCPCS | Performed by: PHYSICIAN ASSISTANT

## 2025-04-21 PROCEDURE — 1159F MED LIST DOCD IN RCRD: CPT | Performed by: PHYSICIAN ASSISTANT

## 2025-04-21 PROCEDURE — 99213 OFFICE O/P EST LOW 20 MIN: CPT | Performed by: PHYSICIAN ASSISTANT

## 2025-04-21 PROCEDURE — 1160F RVW MEDS BY RX/DR IN RCRD: CPT | Performed by: PHYSICIAN ASSISTANT

## 2025-04-21 PROCEDURE — G8427 DOCREV CUR MEDS BY ELIG CLIN: HCPCS | Performed by: PHYSICIAN ASSISTANT

## 2025-04-21 RX ORDER — FUROSEMIDE 40 MG/1
TABLET ORAL
Qty: 90 TABLET | Refills: 3 | Status: SHIPPED | OUTPATIENT
Start: 2025-04-21

## 2025-04-21 RX ORDER — LOSARTAN POTASSIUM 25 MG/1
25 TABLET ORAL DAILY
Qty: 30 TABLET | Refills: 5 | Status: SHIPPED | OUTPATIENT
Start: 2025-04-21

## 2025-04-21 RX ORDER — BACLOFEN 10 MG/1
10 TABLET ORAL 3 TIMES DAILY PRN
Qty: 30 TABLET | Refills: 2 | Status: SHIPPED | OUTPATIENT
Start: 2025-04-21

## 2025-04-21 ASSESSMENT — ENCOUNTER SYMPTOMS
SHORTNESS OF BREATH: 0
RESPIRATORY NEGATIVE: 1
VOMITING: 0
BACK PAIN: 1
NAUSEA: 0
CONSTIPATION: 0
DIARRHEA: 0
ABDOMINAL PAIN: 0
EYES NEGATIVE: 1
BLOOD IN STOOL: 0

## 2025-04-21 ASSESSMENT — PATIENT HEALTH QUESTIONNAIRE - PHQ9
SUM OF ALL RESPONSES TO PHQ QUESTIONS 1-9: 0
2. FEELING DOWN, DEPRESSED OR HOPELESS: NOT AT ALL
SUM OF ALL RESPONSES TO PHQ QUESTIONS 1-9: 0
1. LITTLE INTEREST OR PLEASURE IN DOING THINGS: NOT AT ALL

## 2025-04-21 NOTE — PROGRESS NOTES
Nakul Lucero is a 69 y.o. year old female seen in clinic today for   Chief Complaint   Patient presents with    Hip Pain     Room 4A //     Back Pain    Hypertension       she is here today to follow up for hip and back pain, elevated blood pressures.  Has been noting higher BP's at home since initially seeing it high in January at [facility]. Has been dealing with BL hip and low back pain more as she is preparing to have knee replacement surgery. First surgery is scheduled for June 17th.   Her BP at [facility] was 166 systolic and at home has been running 140-150's.   She has no HA, Dizziness, CP or SOB.  She has never had issues with BP before.    she specifically denies any CP, SOB, HA. Dizziness, fevers, chills, N/V/D, urinary symptoms or other bowel changes.    Current Outpatient Medications on File Prior to Visit   Medication Sig Dispense Refill    escitalopram (LEXAPRO) 10 MG tablet Take 1 tablet by mouth daily 90 tablet 3    Semaglutide-Weight Management (WEGOVY) 0.25 MG/0.5ML SOAJ SC injection Inject 0.25 mg into the skin every 7 days 2 mL 1    topiramate (TOPAMAX) 25 MG tablet Take 1 tablet by mouth nightly 90 tablet 3    vitamin D3 (CHOLECALCIFEROL) 125 MCG (5000 UT) TABS tablet Take by mouth daily      diclofenac (VOLTAREN) 75 MG EC tablet TAKE 1 TABLET BY MOUTH TWICE DAILY FOR RHEUMATOID ARTHRITIS      estradiol (VIVELLE) 0.1 MG/24HR Place 1 patch onto the skin Twice a Week      estrogens conjugated (PREMARIN) 0.625 MG/GM CREA vaginal cream Place vaginally      fluticasone (FLONASE) 50 MCG/ACT nasal spray 2 sprays by Nasal route daily      Hylan (SYNVISC ONE) 48 MG/6ML injection Inject one prefilled syringe into the right knee, for a quantity of 1 injection       No current facility-administered medications on file prior to visit.         Allergies   Allergen Reactions    Ibuprofen-Diphenhydramine Hcl Other (See Comments)     Slightly elevated Creaitnine 1.02    Naproxen Sodium Other (See Comments)

## 2025-04-21 NOTE — PROGRESS NOTES
Nakul Lucero  Identified pt with two pt identifiers(name and ).     Chief Complaint   Patient presents with    Hip Pain     Room 4A //     Back Pain    Hypertension       Reviewed record In preparation for visit and have obtained necessary documentation.     1. Have you been to the ER, urgent care clinic or hospitalized since your last visit? No     2. Have you seen or consulted any other health care providers outside of the [facility] since your last visit? Include any pap smears or colon screening. No    Patient has an advance directive.     Vitals reviewed with provider.    Health Maintenance reviewed:     Health Maintenance Due   Topic    Respiratory Syncytial Virus (RSV) Pregnant or age 60 yrs+ (1 - Risk 60-74 years 1-dose series)    COVID-19 Vaccine ( season)          Wt Readings from Last 3 Encounters:   25 108 kg (238 lb 3.2 oz)   24 99.8 kg (220 lb)   24 100.7 kg (222 lb)        Temp Readings from Last 3 Encounters:   24 97.7 °F (36.5 °C) (Oral)   23 98.1 °F (36.7 °C) (Oral)        BP Readings from Last 3 Encounters:   24 134/79   23 118/73   22 116/76        Pulse Readings from Last 3 Encounters:   24 63   23 62   22 60             No data to display                  Learning Assessment:   :         2023     1:30 PM   [facility] AMB LEARNING ASSESSMENT   Primary Learner Patient   level of education 4 YEARS OF COLLEGE   Primary Language ENGLISH   Learning Preference READING    LISTENING   Answered By patient   Relationship to Learner SELF         Fall Risk Assessment:         2024     1:07 PM 2023     9:20 AM 8/15/2022    10:42 AM 11/10/2021    10:29 AM 2021     9:04 AM 2021    10:32 AM 2021     8:37 AM   Amb Fall Risk Assessment and TUG Test   Do you feel unsteady or are you worried about falling?  no no        2 or more falls in past year? no no        Fall with injury in past year? no

## 2025-05-28 DIAGNOSIS — M54.31 BILATERAL SCIATICA: ICD-10-CM

## 2025-05-28 DIAGNOSIS — M54.32 BILATERAL SCIATICA: ICD-10-CM

## 2025-05-28 RX ORDER — BACLOFEN 10 MG/1
TABLET ORAL
Qty: 30 TABLET | Refills: 0 | Status: SHIPPED | OUTPATIENT
Start: 2025-05-28

## 2025-05-28 NOTE — TELEPHONE ENCOUNTER
PCP: Quan Landers PA-C     Last appt:  4/21/2025      Future Appointments   Date Time Provider Department Center   6/3/2025 10:00 AM [facility]   9/2/2025  8:30 AM [facility]          Requested Prescriptions     Pending Prescriptions Disp Refills    baclofen (LIORESAL) 10 MG tablet [Pharmacy Med Name: Baclofen 10 MG Oral Tablet] 30 tablet 0     Sig: TAKE 1 TABLET BY MOUTH THREE TIMES DAILY AS NEEDED BACK  PAIN

## 2025-06-03 ENCOUNTER — OFFICE VISIT (OUTPATIENT)
Facility: CLINIC | Age: 70
End: 2025-06-03
Payer: MEDICARE

## 2025-06-03 VITALS
TEMPERATURE: 97.8 F | HEIGHT: 65 IN | SYSTOLIC BLOOD PRESSURE: 123 MMHG | HEART RATE: 61 BPM | OXYGEN SATURATION: 98 % | DIASTOLIC BLOOD PRESSURE: 70 MMHG | BODY MASS INDEX: 39.49 KG/M2 | RESPIRATION RATE: 16 BRPM | WEIGHT: 237 LBS

## 2025-06-03 DIAGNOSIS — E06.3 HASHIMOTO'S THYROIDITIS: ICD-10-CM

## 2025-06-03 DIAGNOSIS — M06.9 RHEUMATOID ARTHRITIS, INVOLVING UNSPECIFIED SITE, UNSPECIFIED WHETHER RHEUMATOID FACTOR PRESENT (HCC): ICD-10-CM

## 2025-06-03 DIAGNOSIS — N18.2 CKD (CHRONIC KIDNEY DISEASE) STAGE 2, GFR 60-89 ML/MIN: ICD-10-CM

## 2025-06-03 DIAGNOSIS — M85.89 OSTEOPENIA OF MULTIPLE SITES: ICD-10-CM

## 2025-06-03 DIAGNOSIS — I10 HYPERTENSION, UNSPECIFIED TYPE: ICD-10-CM

## 2025-06-03 DIAGNOSIS — Z01.818 PRE-OPERATIVE EXAM: Primary | ICD-10-CM

## 2025-06-03 DIAGNOSIS — E78.00 PURE HYPERCHOLESTEROLEMIA: ICD-10-CM

## 2025-06-03 PROCEDURE — 1126F AMNT PAIN NOTED NONE PRSNT: CPT | Performed by: PHYSICIAN ASSISTANT

## 2025-06-03 PROCEDURE — 1090F PRES/ABSN URINE INCON ASSESS: CPT | Performed by: PHYSICIAN ASSISTANT

## 2025-06-03 PROCEDURE — 3078F DIAST BP <80 MM HG: CPT | Performed by: PHYSICIAN ASSISTANT

## 2025-06-03 PROCEDURE — 99214 OFFICE O/P EST MOD 30 MIN: CPT | Performed by: PHYSICIAN ASSISTANT

## 2025-06-03 PROCEDURE — G8417 CALC BMI ABV UP PARAM F/U: HCPCS | Performed by: PHYSICIAN ASSISTANT

## 2025-06-03 PROCEDURE — 1160F RVW MEDS BY RX/DR IN RCRD: CPT | Performed by: PHYSICIAN ASSISTANT

## 2025-06-03 PROCEDURE — 3074F SYST BP LT 130 MM HG: CPT | Performed by: PHYSICIAN ASSISTANT

## 2025-06-03 PROCEDURE — 1123F ACP DISCUSS/DSCN MKR DOCD: CPT | Performed by: PHYSICIAN ASSISTANT

## 2025-06-03 PROCEDURE — 3017F COLORECTAL CA SCREEN DOC REV: CPT | Performed by: PHYSICIAN ASSISTANT

## 2025-06-03 PROCEDURE — 1159F MED LIST DOCD IN RCRD: CPT | Performed by: PHYSICIAN ASSISTANT

## 2025-06-03 PROCEDURE — G8427 DOCREV CUR MEDS BY ELIG CLIN: HCPCS | Performed by: PHYSICIAN ASSISTANT

## 2025-06-03 PROCEDURE — G8399 PT W/DXA RESULTS DOCUMENT: HCPCS | Performed by: PHYSICIAN ASSISTANT

## 2025-06-03 PROCEDURE — 1036F TOBACCO NON-USER: CPT | Performed by: PHYSICIAN ASSISTANT

## 2025-06-03 NOTE — PROGRESS NOTES
Nakul Lucero  Identified pt with two pt identifiers(name and ).     Chief Complaint   Patient presents with    Pre-op Exam     Room 4A //        Reviewed record In preparation for visit and have obtained necessary documentation.     1. Have you been to the ER, urgent care clinic or hospitalized since your last visit? No     2. Have you seen or consulted any other health care providers outside of the [facility] since your last visit? Include any pap smears or colon screening. No    Patient has an advance directive.     Vitals reviewed with provider.    Health Maintenance reviewed:     Health Maintenance Due   Topic    COVID-19 Vaccine ( season)          Wt Readings from Last 3 Encounters:   25 107.5 kg (237 lb)   25 108 kg (238 lb 3.2 oz)   24 99.8 kg (220 lb)        Temp Readings from Last 3 Encounters:   25 97.9 °F (36.6 °C) (Oral)   24 97.7 °F (36.5 °C) (Oral)        BP Readings from Last 3 Encounters:   25 139/72   24 134/79   23 118/73        Pulse Readings from Last 3 Encounters:   25 56   24 63   23 62             No data to display                  Learning Assessment:   :         2023     1:30 PM   [facility] AMB LEARNING ASSESSMENT   Primary Learner Patient   level of education 4 YEARS OF COLLEGE   Primary Language ENGLISH   Learning Preference READING    LISTENING   Answered By patient   Relationship to Learner SELF         Fall Risk Assessment:         2024     1:07 PM 2023     9:20 AM 8/15/2022    10:42 AM 11/10/2021    10:29 AM 2021     9:04 AM 2021    10:32 AM 2021     8:37 AM   Amb Fall Risk Assessment and TUG Test   Do you feel unsteady or are you worried about falling?  no no        2 or more falls in past year? no no        Fall with injury in past year? no no        Fall in past 12 months?    0 0 0 0   Able to walk?    Yes Yes Yes Yes   Total Score   0             Abuse 
Date    Achilles tendonitis 12/2004    Right.  Dr. Vogt    Arthritis 2010    hips, knees.  Dr. Fredy Simpson    Endometriosis 1990    Dr. Carlin Buchanan    History of breast lump/mass excision 1998    Left breast.  Dr. Amaya, benign    Obesity     Osteoarthritis     Uterine fibroid     Dr. Buchanan.    Vitamin D deficiency 03/2008     Past Surgical History:   Procedure Laterality Date    BIOPSY BREAST  Rt 1993;Lt 1998    Dr. Amaya    BREAST BIOPSY  2001    Left.  benign.  Dr. Cliff Amaya    COLONOSCOPY N/A 11/7/2022    COLONOSCOPY [facility] by [facility] at hospitals ENDOSCOPY    COLONOSCOPY N/A 8/15/2022    COLONOSCOPY performed by Zhang Rivera MD at hospitals ENDOSCOPY    COLONOSCOPY  09/27/2016    Dr. Zhang Rivera.  due q 5 yrs due to fam hx    DILATION AND CURETTAGE OF UTERUS  2000    Dr. Carlin Buchanan.  due to endometriosis    FOOT/TOES SURGERY PROC UNLISTED Right 2002    Right achiles tendon.  Dr. Vogt.    HYSTERECTOMY, TOTAL ABDOMINAL (CERVIX REMOVED)      PELVIC LAPAROSCOPY  1995    due to endometriosis Dr. Buchanan    SHYLA AND BSO (CERVIX REMOVED)  04/2011    due to fibroids.  Dr. Buchanan.     Social History     Socioeconomic History    Marital status: Single     Spouse name: None    Number of children: None    Years of education: None    Highest education level: None   Tobacco Use    Smoking status: Never    Smokeless tobacco: Never   Substance and Sexual Activity    Alcohol use: Yes    Drug use: Never    Sexual activity: Not Currently     Partners: Male     Social Drivers of Health     Financial Resource Strain: Low Risk  (7/31/2024)    Overall Financial Resource Strain (CARDIA)     Difficulty of Paying Living Expenses: Not hard at all   Food Insecurity: No Food Insecurity (4/20/2025)    Hunger Vital Sign     Worried About Running Out of Food in the Last Year: Never true     Ran Out of Food in the Last Year: Never true   Transportation Needs: No Transportation Needs (4/20/2025)    PRAPARE - Transportation

## 2025-06-13 ENCOUNTER — HOSPITAL ENCOUNTER (OUTPATIENT)
Facility: HOSPITAL | Age: 70
Discharge: HOME | End: 2025-06-16
Payer: MEDICARE

## 2025-06-13 DIAGNOSIS — E66.01 CLASS 2 SEVERE OBESITY DUE TO EXCESS CALORIES WITH SERIOUS COMORBIDITY AND BODY MASS INDEX (BMI) OF 36.0 TO 36.9 IN ADULT (HCC): ICD-10-CM

## 2025-06-13 DIAGNOSIS — E78.00 PURE HYPERCHOLESTEROLEMIA: ICD-10-CM

## 2025-06-13 DIAGNOSIS — N18.2 CKD (CHRONIC KIDNEY DISEASE) STAGE 2, GFR 60-89 ML/MIN: ICD-10-CM

## 2025-06-13 DIAGNOSIS — E66.812 CLASS 2 SEVERE OBESITY DUE TO EXCESS CALORIES WITH SERIOUS COMORBIDITY AND BODY MASS INDEX (BMI) OF 36.0 TO 36.9 IN ADULT (HCC): ICD-10-CM

## 2025-06-13 PROCEDURE — 75571 CT HRT W/O DYE W/CA TEST: CPT

## 2025-06-19 ENCOUNTER — RESULTS FOLLOW-UP (OUTPATIENT)
Facility: CLINIC | Age: 70
End: 2025-06-19

## 2025-07-17 ENCOUNTER — TELEPHONE (OUTPATIENT)
Facility: CLINIC | Age: 70
End: 2025-07-17

## 2025-07-17 DIAGNOSIS — M19.90 ARTHRITIS: Primary | ICD-10-CM

## 2025-07-19 DIAGNOSIS — F41.1 GENERALIZED ANXIETY DISORDER: ICD-10-CM

## 2025-07-21 RX ORDER — ESCITALOPRAM OXALATE 10 MG/1
10 TABLET ORAL DAILY
Qty: 90 TABLET | Refills: 3 | Status: SHIPPED | OUTPATIENT
Start: 2025-07-21

## 2025-09-02 ENCOUNTER — OFFICE VISIT (OUTPATIENT)
Facility: CLINIC | Age: 70
End: 2025-09-02
Payer: MEDICARE

## 2025-09-02 VITALS
HEIGHT: 65 IN | OXYGEN SATURATION: 99 % | RESPIRATION RATE: 16 BRPM | TEMPERATURE: 97.8 F | DIASTOLIC BLOOD PRESSURE: 70 MMHG | HEART RATE: 59 BPM | WEIGHT: 243.6 LBS | BODY MASS INDEX: 40.59 KG/M2 | SYSTOLIC BLOOD PRESSURE: 122 MMHG

## 2025-09-02 DIAGNOSIS — E06.3 HASHIMOTO'S THYROIDITIS: ICD-10-CM

## 2025-09-02 DIAGNOSIS — E55.9 VITAMIN D DEFICIENCY: ICD-10-CM

## 2025-09-02 DIAGNOSIS — I10 HYPERTENSION, UNSPECIFIED TYPE: ICD-10-CM

## 2025-09-02 DIAGNOSIS — E66.01 MORBID OBESITY (HCC): ICD-10-CM

## 2025-09-02 DIAGNOSIS — N18.2 CKD (CHRONIC KIDNEY DISEASE) STAGE 2, GFR 60-89 ML/MIN: ICD-10-CM

## 2025-09-02 DIAGNOSIS — M06.9 RHEUMATOID ARTHRITIS, INVOLVING UNSPECIFIED SITE, UNSPECIFIED WHETHER RHEUMATOID FACTOR PRESENT (HCC): ICD-10-CM

## 2025-09-02 DIAGNOSIS — Z00.00 ENCOUNTER FOR SUBSEQUENT ANNUAL WELLNESS VISIT (AWV) IN MEDICARE PATIENT: Primary | ICD-10-CM

## 2025-09-02 DIAGNOSIS — E78.00 PURE HYPERCHOLESTEROLEMIA: ICD-10-CM

## 2025-09-02 DIAGNOSIS — F41.1 GENERALIZED ANXIETY DISORDER: ICD-10-CM

## 2025-09-02 LAB
25(OH)D3 SERPL-MCNC: 46.3 NG/ML (ref 30–100)
ALBUMIN SERPL-MCNC: 3.7 G/DL (ref 3.5–5.2)
ALBUMIN/GLOB SERPL: 1.1 (ref 1.1–2.2)
ALP SERPL-CCNC: 114 U/L (ref 35–104)
ALT SERPL-CCNC: 15 U/L (ref 10–35)
ANION GAP SERPL CALC-SCNC: 10 MMOL/L (ref 2–14)
AST SERPL-CCNC: 20 U/L (ref 10–35)
BASOPHILS # BLD: 0.05 K/UL (ref 0–0.1)
BASOPHILS NFR BLD: 1.1 % (ref 0–1)
BILIRUB SERPL-MCNC: 0.5 MG/DL (ref 0–1.2)
BUN SERPL-MCNC: 19 MG/DL (ref 8–23)
BUN/CREAT SERPL: 22 (ref 12–20)
CALCIUM SERPL-MCNC: 9.3 MG/DL (ref 8.8–10.2)
CHLORIDE SERPL-SCNC: 106 MMOL/L (ref 98–107)
CHOLEST SERPL-MCNC: 198 MG/DL (ref 0–200)
CO2 SERPL-SCNC: 27 MMOL/L (ref 20–29)
CREAT SERPL-MCNC: 0.86 MG/DL (ref 0.6–1)
DIFFERENTIAL METHOD BLD: ABNORMAL
EOSINOPHIL # BLD: 0.15 K/UL (ref 0–0.4)
EOSINOPHIL NFR BLD: 3.3 % (ref 0–7)
ERYTHROCYTE [DISTWIDTH] IN BLOOD BY AUTOMATED COUNT: 14.6 % (ref 11.5–14.5)
GLOBULIN SER CALC-MCNC: 3.3 G/DL (ref 2–4)
GLUCOSE SERPL-MCNC: 85 MG/DL (ref 65–100)
HCT VFR BLD AUTO: 37.4 % (ref 35–47)
HDLC SERPL-MCNC: 75 MG/DL (ref 40–60)
HDLC SERPL: 2.6 (ref 0–5)
HGB BLD-MCNC: 11.6 G/DL (ref 11.5–16)
IMM GRANULOCYTES # BLD AUTO: 0.01 K/UL (ref 0–0.04)
IMM GRANULOCYTES NFR BLD AUTO: 0.2 % (ref 0–0.5)
LDLC SERPL CALC-MCNC: 111 MG/DL (ref 0–100)
LYMPHOCYTES # BLD: 1.5 K/UL (ref 0.8–3.5)
LYMPHOCYTES NFR BLD: 33.5 % (ref 12–49)
MCH RBC QN AUTO: 28.4 PG (ref 26–34)
MCHC RBC AUTO-ENTMCNC: 31 G/DL (ref 30–36.5)
MCV RBC AUTO: 91.7 FL (ref 80–99)
MONOCYTES # BLD: 0.5 K/UL (ref 0–1)
MONOCYTES NFR BLD: 11.2 % (ref 5–13)
NEUTS SEG # BLD: 2.27 K/UL (ref 1.8–8)
NEUTS SEG NFR BLD: 50.7 % (ref 32–75)
NRBC # BLD: 0 K/UL (ref 0–0.01)
NRBC BLD-RTO: 0 PER 100 WBC
PLATELET # BLD AUTO: 222 K/UL (ref 150–400)
PMV BLD AUTO: 11.3 FL (ref 8.9–12.9)
POTASSIUM SERPL-SCNC: 4 MMOL/L (ref 3.5–5.1)
PROT SERPL-MCNC: 6.9 G/DL (ref 6.4–8.3)
RBC # BLD AUTO: 4.08 M/UL (ref 3.8–5.2)
SODIUM SERPL-SCNC: 142 MMOL/L (ref 136–145)
T4 FREE SERPL-MCNC: 1.2 NG/DL (ref 0.9–1.6)
TRIGL SERPL-MCNC: 58 MG/DL (ref 0–150)
TSH, 3RD GENERATION: 2.57 UIU/ML (ref 0.27–4.2)
VLDLC SERPL CALC-MCNC: 12 MG/DL
WBC # BLD AUTO: 4.5 K/UL (ref 3.6–11)

## 2025-09-02 PROCEDURE — G8427 DOCREV CUR MEDS BY ELIG CLIN: HCPCS | Performed by: PHYSICIAN ASSISTANT

## 2025-09-02 PROCEDURE — 3017F COLORECTAL CA SCREEN DOC REV: CPT | Performed by: PHYSICIAN ASSISTANT

## 2025-09-02 PROCEDURE — 1090F PRES/ABSN URINE INCON ASSESS: CPT | Performed by: PHYSICIAN ASSISTANT

## 2025-09-02 PROCEDURE — G8399 PT W/DXA RESULTS DOCUMENT: HCPCS | Performed by: PHYSICIAN ASSISTANT

## 2025-09-02 PROCEDURE — G8417 CALC BMI ABV UP PARAM F/U: HCPCS | Performed by: PHYSICIAN ASSISTANT

## 2025-09-02 PROCEDURE — 1159F MED LIST DOCD IN RCRD: CPT | Performed by: PHYSICIAN ASSISTANT

## 2025-09-02 PROCEDURE — 99214 OFFICE O/P EST MOD 30 MIN: CPT | Performed by: PHYSICIAN ASSISTANT

## 2025-09-02 PROCEDURE — 1126F AMNT PAIN NOTED NONE PRSNT: CPT | Performed by: PHYSICIAN ASSISTANT

## 2025-09-02 PROCEDURE — 1123F ACP DISCUSS/DSCN MKR DOCD: CPT | Performed by: PHYSICIAN ASSISTANT

## 2025-09-02 PROCEDURE — 1160F RVW MEDS BY RX/DR IN RCRD: CPT | Performed by: PHYSICIAN ASSISTANT

## 2025-09-02 PROCEDURE — 3074F SYST BP LT 130 MM HG: CPT | Performed by: PHYSICIAN ASSISTANT

## 2025-09-02 PROCEDURE — 3078F DIAST BP <80 MM HG: CPT | Performed by: PHYSICIAN ASSISTANT

## 2025-09-02 PROCEDURE — 1036F TOBACCO NON-USER: CPT | Performed by: PHYSICIAN ASSISTANT

## 2025-09-02 PROCEDURE — G0439 PPPS, SUBSEQ VISIT: HCPCS | Performed by: PHYSICIAN ASSISTANT

## 2025-09-02 RX ORDER — PHENTERMINE HYDROCHLORIDE 37.5 MG/1
37.5 TABLET ORAL
Qty: 30 TABLET | Refills: 2 | Status: SHIPPED | OUTPATIENT
Start: 2025-09-02 | End: 2025-12-01

## 2025-09-02 SDOH — HEALTH STABILITY: PHYSICAL HEALTH: ON AVERAGE, HOW MANY MINUTES DO YOU ENGAGE IN EXERCISE AT THIS LEVEL?: 10 MIN

## 2025-09-02 SDOH — HEALTH STABILITY: PHYSICAL HEALTH: ON AVERAGE, HOW MANY DAYS PER WEEK DO YOU ENGAGE IN MODERATE TO STRENUOUS EXERCISE (LIKE A BRISK WALK)?: 0 DAYS

## 2025-09-02 ASSESSMENT — PATIENT HEALTH QUESTIONNAIRE - PHQ9
1. LITTLE INTEREST OR PLEASURE IN DOING THINGS: NOT AT ALL
SUM OF ALL RESPONSES TO PHQ QUESTIONS 1-9: 1
SUM OF ALL RESPONSES TO PHQ QUESTIONS 1-9: 1
2. FEELING DOWN, DEPRESSED OR HOPELESS: SEVERAL DAYS
SUM OF ALL RESPONSES TO PHQ QUESTIONS 1-9: 1
SUM OF ALL RESPONSES TO PHQ QUESTIONS 1-9: 1

## 2025-09-02 ASSESSMENT — LIFESTYLE VARIABLES
HOW OFTEN DO YOU HAVE A DRINK CONTAINING ALCOHOL: 1
HOW MANY STANDARD DRINKS CONTAINING ALCOHOL DO YOU HAVE ON A TYPICAL DAY: 0
HOW OFTEN DO YOU HAVE A DRINK CONTAINING ALCOHOL: NEVER
HOW OFTEN DO YOU HAVE SIX OR MORE DRINKS ON ONE OCCASION: 1
HOW MANY STANDARD DRINKS CONTAINING ALCOHOL DO YOU HAVE ON A TYPICAL DAY: PATIENT DOES NOT DRINK

## (undated) DEVICE — SOLIDIFIER FLD 2OZ 1500CC N DISINF IN BTL DISP SAFESORB

## (undated) DEVICE — Z DISCONTINUED PER MEDLINE LINE GAS SAMPLING O2/CO2 LNG AD 13 FT NSL W/ TBNG FILTERLINE

## (undated) DEVICE — HYPODERMIC SAFETY NEEDLE: Brand: MAGELLAN

## (undated) DEVICE — NEONATAL-ADULT SPO2 SENSOR: Brand: NELLCOR

## (undated) DEVICE — CATH IV AUTOGRD BC PNK 20GA 25 -- INSYTE

## (undated) DEVICE — TOWEL 4 PLY TISS 19X30 SUE WHT

## (undated) DEVICE — NON-REM POLYHESIVE PATIENT RETURN ELECTRODE: Brand: VALLEYLAB

## (undated) DEVICE — CONTAINER SPEC 20 ML LID NEUT BUFF FORMALIN 10 % POLYPR STS

## (undated) DEVICE — 1200 GUARD II KIT W/5MM TUBE W/O VAC TUBE: Brand: GUARDIAN

## (undated) DEVICE — SYR 10ML LUER LOK 1/5ML GRAD --

## (undated) DEVICE — SNARE ENDOSCP M L240CM W27MM SHTH DIA2.4MM CHN 2.8MM OVL

## (undated) DEVICE — BASIN EMSIS 16OZ GRAPHITE PLAS KID SHP MOLD GRAD FOR ORAL

## (undated) DEVICE — SET ADMIN 16ML TBNG L100IN 2 Y INJ SITE IV PIGGY BK DISP (ORDER IN MULIPLES OF 48)

## (undated) DEVICE — SET ADMIN 16ML TBNG L100IN 2 Y INJ SITE IV PIGGY BK DISP

## (undated) DEVICE — Device

## (undated) DEVICE — ELECTRODE,RADIOTRANSLUCENT,FOAM,5PK: Brand: MEDLINE

## (undated) DEVICE — SYR 3ML LL TIP 1/10ML GRAD --

## (undated) DEVICE — TRAP,MUCUS SPECIMEN, 80CC: Brand: MEDLINE

## (undated) DEVICE — STRAINER URIN CALC RNL MSH -- CONVERT TO ITEM 357634